# Patient Record
Sex: FEMALE | Race: WHITE | NOT HISPANIC OR LATINO | Employment: OTHER | URBAN - METROPOLITAN AREA
[De-identification: names, ages, dates, MRNs, and addresses within clinical notes are randomized per-mention and may not be internally consistent; named-entity substitution may affect disease eponyms.]

---

## 2017-02-03 NOTE — TELEPHONE ENCOUNTER
3 month supply refilled. Please advise pt to do fasting labs and make appt for follow-up and additional fills.

## 2017-02-18 ENCOUNTER — HOSPITAL ENCOUNTER (OUTPATIENT)
Dept: LAB | Facility: MEDICAL CENTER | Age: 62
End: 2017-02-18
Attending: NURSE PRACTITIONER
Payer: COMMERCIAL

## 2017-02-18 DIAGNOSIS — E11.9 DIET-CONTROLLED TYPE 2 DIABETES MELLITUS (HCC): ICD-10-CM

## 2017-02-18 DIAGNOSIS — E78.1 HYPERTRIGLYCERIDEMIA WITHOUT HYPERCHOLESTEROLEMIA: ICD-10-CM

## 2017-02-18 LAB
ALBUMIN SERPL BCP-MCNC: 4.3 G/DL (ref 3.2–4.9)
ALBUMIN/GLOB SERPL: 1.7 G/DL
ALP SERPL-CCNC: 55 U/L (ref 30–99)
ALT SERPL-CCNC: 21 U/L (ref 2–50)
ANION GAP SERPL CALC-SCNC: 9 MMOL/L (ref 0–11.9)
AST SERPL-CCNC: 22 U/L (ref 12–45)
BILIRUB SERPL-MCNC: 0.5 MG/DL (ref 0.1–1.5)
BUN SERPL-MCNC: 18 MG/DL (ref 8–22)
CALCIUM SERPL-MCNC: 9.5 MG/DL (ref 8.5–10.5)
CHLORIDE SERPL-SCNC: 109 MMOL/L (ref 96–112)
CHOLEST SERPL-MCNC: 156 MG/DL (ref 100–199)
CO2 SERPL-SCNC: 24 MMOL/L (ref 20–33)
CREAT SERPL-MCNC: 0.74 MG/DL (ref 0.5–1.4)
EST. AVERAGE GLUCOSE BLD GHB EST-MCNC: 137 MG/DL
GLOBULIN SER CALC-MCNC: 2.6 G/DL (ref 1.9–3.5)
GLUCOSE SERPL-MCNC: 149 MG/DL (ref 65–99)
HBA1C MFR BLD: 6.4 % (ref 0–5.6)
HDLC SERPL-MCNC: 63 MG/DL
LDLC SERPL CALC-MCNC: 66 MG/DL
POTASSIUM SERPL-SCNC: 4.3 MMOL/L (ref 3.6–5.5)
PROT SERPL-MCNC: 6.9 G/DL (ref 6–8.2)
SODIUM SERPL-SCNC: 142 MMOL/L (ref 135–145)
TRIGL SERPL-MCNC: 135 MG/DL (ref 0–149)

## 2017-02-18 PROCEDURE — 80053 COMPREHEN METABOLIC PANEL: CPT

## 2017-02-18 PROCEDURE — 83036 HEMOGLOBIN GLYCOSYLATED A1C: CPT

## 2017-02-18 PROCEDURE — 80061 LIPID PANEL: CPT

## 2017-02-18 PROCEDURE — 36415 COLL VENOUS BLD VENIPUNCTURE: CPT

## 2017-02-27 ENCOUNTER — TELEPHONE (OUTPATIENT)
Dept: MEDICAL GROUP | Facility: PHYSICIAN GROUP | Age: 62
End: 2017-02-27

## 2017-02-27 DIAGNOSIS — N18.1 DIABETES MELLITUS DUE TO UNDERLYING CONDITION, CONTROLLED, WITH STAGE 1 CHRONIC KIDNEY DISEASE, WITHOUT LONG-TERM CURRENT USE OF INSULIN (HCC): ICD-10-CM

## 2017-02-27 DIAGNOSIS — E08.22 DIABETES MELLITUS DUE TO UNDERLYING CONDITION, CONTROLLED, WITH STAGE 1 CHRONIC KIDNEY DISEASE, WITHOUT LONG-TERM CURRENT USE OF INSULIN (HCC): ICD-10-CM

## 2017-02-27 NOTE — TELEPHONE ENCOUNTER
Spoke with Dunia to notify. She is asking if you can place the orders so that she can go in and get labs done in 6 months. Please advise.

## 2017-02-27 NOTE — TELEPHONE ENCOUNTER
Very bizarre.  I never received the results.  They look great.  Cholesterol is well controlled and A1C is well controlled at 6.4. No changes to medications.  Plan on repeating in 6 months.  Thank you

## 2017-02-27 NOTE — TELEPHONE ENCOUNTER
1. Caller Name: Dunia Her                                           Call Back Number: [phone] (home)         Patient approves a detailed voicemail message: N\A    Pt is asking for results of labs done 02/18/2017.  Thank you

## 2017-03-17 RX ORDER — LOSARTAN POTASSIUM 50 MG/1
TABLET ORAL
Qty: 90 TAB | Refills: 1 | Status: SHIPPED | OUTPATIENT
Start: 2017-03-17 | End: 2017-05-02 | Stop reason: SDUPTHER

## 2017-03-17 NOTE — TELEPHONE ENCOUNTER
Refill X 6 months, sent to pharmacy.Pt. Seen in the last 6 months per protocol.   Lab Results   Component Value Date/Time    SODIUM 142 02/18/2017 07:34 AM    POTASSIUM 4.3 02/18/2017 07:34 AM    CHLORIDE 109 02/18/2017 07:34 AM    CO2 24 02/18/2017 07:34 AM    GLUCOSE 149* 02/18/2017 07:34 AM    BUN 18 02/18/2017 07:34 AM    CREATININE 0.74 02/18/2017 07:34 AM

## 2017-04-10 RX ORDER — TRAZODONE HYDROCHLORIDE 50 MG/1
TABLET ORAL
Qty: 90 TAB | Refills: 0 | Status: SHIPPED | OUTPATIENT
Start: 2017-04-10 | End: 2017-05-02 | Stop reason: SDUPTHER

## 2017-04-10 NOTE — TELEPHONE ENCOUNTER
Was the patient seen in the last year in this department? Yes     Does patient have an active prescription for medications requested? No     Received Request Via: Pharmacy      Pt met protocol?: Yes    LAST OV 07/19/2016

## 2017-04-11 NOTE — TELEPHONE ENCOUNTER
Last seen by PCP 7/16. Will send 3 months to pharmacy. Patient is due for an appointment. Please schedule.

## 2017-04-27 ENCOUNTER — HOSPITAL ENCOUNTER (EMERGENCY)
Facility: MEDICAL CENTER | Age: 62
End: 2017-04-27
Attending: EMERGENCY MEDICINE
Payer: COMMERCIAL

## 2017-04-27 ENCOUNTER — APPOINTMENT (OUTPATIENT)
Dept: RADIOLOGY | Facility: MEDICAL CENTER | Age: 62
End: 2017-04-27
Attending: EMERGENCY MEDICINE
Payer: COMMERCIAL

## 2017-04-27 VITALS
DIASTOLIC BLOOD PRESSURE: 87 MMHG | RESPIRATION RATE: 18 BRPM | HEART RATE: 74 BPM | BODY MASS INDEX: 32.24 KG/M2 | WEIGHT: 200.62 LBS | TEMPERATURE: 97.8 F | SYSTOLIC BLOOD PRESSURE: 155 MMHG | OXYGEN SATURATION: 96 % | HEIGHT: 66 IN

## 2017-04-27 DIAGNOSIS — R51.9 ACUTE NONINTRACTABLE HEADACHE, UNSPECIFIED HEADACHE TYPE: ICD-10-CM

## 2017-04-27 PROCEDURE — 700102 HCHG RX REV CODE 250 W/ 637 OVERRIDE(OP): Performed by: EMERGENCY MEDICINE

## 2017-04-27 PROCEDURE — 70450 CT HEAD/BRAIN W/O DYE: CPT

## 2017-04-27 PROCEDURE — A9270 NON-COVERED ITEM OR SERVICE: HCPCS | Performed by: EMERGENCY MEDICINE

## 2017-04-27 PROCEDURE — 99284 EMERGENCY DEPT VISIT MOD MDM: CPT

## 2017-04-27 RX ORDER — OXYCODONE HYDROCHLORIDE AND ACETAMINOPHEN 5; 325 MG/1; MG/1
2 TABLET ORAL ONCE
Status: DISCONTINUED | OUTPATIENT
Start: 2017-04-27 | End: 2017-04-27 | Stop reason: HOSPADM

## 2017-04-27 RX ORDER — IBUPROFEN 600 MG/1
600 TABLET ORAL ONCE
Status: COMPLETED | OUTPATIENT
Start: 2017-04-27 | End: 2017-04-27

## 2017-04-27 RX ORDER — OXYCODONE HYDROCHLORIDE AND ACETAMINOPHEN 5; 325 MG/1; MG/1
1-2 TABLET ORAL EVERY 4 HOURS PRN
Qty: 20 TAB | Refills: 0 | Status: SHIPPED | OUTPATIENT
Start: 2017-04-27 | End: 2017-05-02

## 2017-04-27 RX ADMIN — IBUPROFEN 600 MG: 600 TABLET, FILM COATED ORAL at 12:02

## 2017-04-27 ASSESSMENT — LIFESTYLE VARIABLES: DO YOU DRINK ALCOHOL: NO

## 2017-04-27 NOTE — ED PROVIDER NOTES
"ED Provider Note    Scribed for Arun Fu M.D. by Venancio Mcfadden. 4/27/2017  11:50 AM    Primary care provider: JUANI Mccrary  Means of arrival: Walk in   History obtained from: Patient  History limited by: None    CHIEF COMPLAINT  Chief Complaint   Patient presents with   • Head Ache       HPI  uDnia Her is a 61 y.o. female who presents to the Emergency Department complaining of a headache. The patient reports two nights ago \"the room was spinning\". She states her headache developed 2 days ago that has progressively worsened so she decided to come to the ED. Currently, she has a severe headache sensitive to light touch. She describes her headache like a stabbing pain located throughout. She has associated nausea, sensitivity to sound, and mild neck pain. She denies any vomiting,  recent numbness, tingling, weakness to arms or legs. She denies a history of migraines. She has medical hhistory    REVIEW OF SYSTEMS  Pertinent positives include headache, nausea, dizziness, sound sensitivity, neck pain.   Pertinent negatives include no vomiting, numbness, tingling, weakness.    All other systems reviewed and negative.      PAST MEDICAL HISTORY   has a past medical history of Recurrent sinusitis; Hypertension; Type II or unspecified type diabetes mellitus without mention of complication, not stated as uncontrolled; Pre-diabetes (10/2/2014); Obesity (BMI 30-39.9) (10/2/2014); and Hyperlipidemia LDL goal < 100 (10/2/2014).    SURGICAL HISTORY   has past surgical history that includes abdominal hysterectomy total (1993).    SOCIAL HISTORY  Social History   Substance Use Topics   • Smoking status: Current Every Day Smoker -- 1.00 packs/day for 48 years     Types: Cigarettes   • Smokeless tobacco: None   • Alcohol Use: No      History   Drug Use No       FAMILY HISTORY  Family History   Problem Relation Age of Onset   • Cancer Mother      pancreatic    • Heart Disease Father    • Heart Attack Father    • " "Diabetes Father      pre-diabetes   • Stroke Neg Hx        CURRENT MEDICATIONS  Home Medications     Reviewed by Dior Irby R.N. (Registered Nurse) on 04/27/17 at 1139  Med List Status: Not Addressed    Medication Last Dose Status    albuterol (PROVENTIL) 2.5mg/3ml Nebu Soln solution for nebulization prn Active    losartan (COZAAR) 50 MG Tab 4/27/2017 Active    metformin (GLUCOPHAGE) 500 MG Tab 4/26/2017 Active    simvastatin (ZOCOR) 40 MG Tab 4/26/2017 Active    trazodone (DESYREL) 50 MG Tab 4/26/2017 Active                ALLERGIES  Allergies   Allergen Reactions   • Shellfish Allergy      Eyes itchy   • Sulfa Drugs Hives       PHYSICAL EXAM  VITAL SIGNS: /88 mmHg  Pulse 80  Temp(Src) 36.6 °C (97.8 °F) (Temporal)  Resp 12  Ht 1.664 m (5' 5.5\")  Wt 91 kg (200 lb 9.9 oz)  BMI 32.87 kg/m2  SpO2 99%    Nursing note and vitals reviewed.  Constitutional: Well-developed and well-nourished. No distress.   HENT: Head is normocephalic and atraumatic. Oropharynx is clear and moist without exudate or erythema. Tenderness to very light palpation of the parietal occipital scalp. No skin lesions. No tenderness over the temporal artery.   Eyes: Pupils are equal, round, and reactive to light. Conjunctiva are normal. No nystagmus.   Cardiovascular: Normal rate and regular rhythm. No murmur heard. Normal radial pulses.  Pulmonary/Chest: Breath sounds normal. No wheezes or rales.   Abdominal: Soft and non-tender. No distention    Musculoskeletal: Extremities exhibit normal range of motion without edema. Mild tenderness of the upper cervical paraspinal musculature.   Neurological: Awake, alert and oriented to person, place, and time. No focal deficits noted. Normal speech and language. Normal strength and sensation.   Skin: Skin is warm and dry. No rash.   Psychiatric: Normal mood and affect. Appropriate for clinical situation    DIAGNOSTIC STUDIES / PROCEDURES    RADIOLOGY  CT-HEAD W/O   Final Result      No " acute intracranial abnormality is identified.        The radiologist's interpretation of all radiological studies have been reviewed by me.    COURSE & MEDICAL DECISION MAKING  Nursing notes, VS, PMSFHx reviewed in chart.     Review of past medical records shows no previous ER visits.      11:50 AM - Patient seen and examined at bedside. Patient will be treated with Motrin 600 mg PO. Ordered CT-Head w/o to evaluate her symptoms. The differential diagnoses include but are not limited to: Tension headache, occipital neuralgia, intracranial hemorrhage    12:57 PM - Patient will be treated with PERCOCET 5-325mg PO    1:00 PM - Patient re-evaluated at bedside. Patient reports feeling better. The patient also says she has been crocheting a blanket recently for extending periods of time while her head is bent down in a forward position. Discussed with patient this may be causing her headache. Patient's CT-Head results discussed which showed no acute intracranial abnormality identified. Discussed patient's condition and treatment plan of prescription for PERCOCET.  Patient advised to return to the ED if symptoms worsen and to follow up with her primary care provider. The patient understood and is in agreement.     I reviewed prescription monitoring program for patient's narcotic use before prescribing a scheduled drug.The patient will not drink alcohol nor drive with prescribed medications. The patient will return for new or worsening symptoms and is stable at the time of discharge.    The patient is referred to a primary physician for blood pressure management, diabetic screening, and for all other preventative health concerns.    DISPOSITION:  Patient will be discharged home in stable condition.    FOLLOW UP:  [facility], Emergency Dept  [address] [address] [phone]    If symptoms worsen    MARIAN Mccrary.  [address] [facility]  [address] [place] [address]  [id number]  [phone]    Schedule an appointment as soon as possible for a visit        OUTPATIENT MEDICATIONS:  New Prescriptions    OXYCODONE-ACETAMINOPHEN (PERCOCET) 5-325 MG TAB    Take 1-2 Tabs by mouth every four hours as needed.       FINAL IMPRESSION  1. Acute nonintractable headache, unspecified headache type          I, Venancio Mcfadden (Scribe), am scribing for, and in the presence of, Arun Fu M.D..    Electronically signed by: Venancio Mcfadden (Scribe), 4/27/2017    I, Arun Fu M.D. personally performed the services described in this documentation, as scribed by Venancio Mcfadden in my presence, and it is both accurate and complete.    The note accurately reflects work and decisions made by me.  Arun Fu  4/27/2017  3:42 PM

## 2017-04-27 NOTE — ED NOTES
Pt to triage c/o headache and pressure x2 days. (+) Nausea. (+) sound sensitivity. Vertigo last night.   Pt advised to return to triage nurse for any changes or concerns.

## 2017-04-27 NOTE — DISCHARGE INSTRUCTIONS
General Headache Without Cause  A headache is pain or discomfort felt around the head or neck area. The specific cause of a headache may not be found. There are many causes and types of headaches. A few common ones are:  · Tension headaches.  · Migraine headaches.  · Cluster headaches.  · Chronic daily headaches.  HOME CARE INSTRUCTIONS   · Keep all follow-up appointments with your health care provider or any specialist referral.  · Only take over-the-counter or prescription medicines for pain or discomfort as directed by your health care provider.  · Lie down in a dark, quiet room when you have a headache.  · Keep a headache journal to find out what may trigger your migraine headaches. For example, write down:  · What you eat and drink.  · How much sleep you get.  · Any change to your diet or medicines.  · Try massage or other relaxation techniques.  · Put ice packs or heat on the head and neck. Use these 3 to 4 times per day for 15 to 20 minutes each time, or as needed.  · Limit stress.  · Sit up straight, and do not tense your muscles.  · Quit smoking if you smoke.  · Limit alcohol use.  · Decrease the amount of caffeine you drink, or stop drinking caffeine.  · Eat and sleep on a regular schedule.  · Get 7 to 9 hours of sleep, or as recommended by your health care provider.  · Keep lights dim if bright lights bother you and make your headaches worse.  SEEK MEDICAL CARE IF:   · You have problems with the medicines you were prescribed.  · Your medicines are not working.  · You have a change from the usual headache.  · You have nausea or vomiting.  SEEK IMMEDIATE MEDICAL CARE IF:   · Your headache becomes severe.  · You have a fever.  · You have a stiff neck.  · You have loss of vision.  · You have muscular weakness or loss of muscle control.  · You start losing your balance or have trouble walking.  · You feel faint or pass out.  · You have severe symptoms that are different from your first symptoms.     This  information is not intended to replace advice given to you by your health care provider. Make sure you discuss any questions you have with your health care provider.     Document Released: 12/18/2006 Document Revised: 05/03/2016 Document Reviewed: 01/02/2013  [employer] Interactive Patient Education ©2016 [employer]      Occipital Neuralgia  Occipital neuralgia is a type of headache that causes episodes of very bad pain in the back of your head. Pain from occipital neuralgia may spread (radiate) to other parts of your head. The pain is usually brief and often goes away after you rest and relax.  These headaches may be caused by irritation of the nerves that leave your spinal cord high up in your neck, just below the base of your skull (occipital nerves). Your occipital nerves transmit sensations from the back of your head, the top of your head, and the areas behind your ears.  CAUSES  Occipital neuralgia can occur without any known cause (primary headache syndrome). In other cases, occipital neuralgia is caused by pressure on or irritation of one of the two occipital nerves. Causes of occipital nerve compression or irritation include:  · Wear and tear of the vertebrae in the neck (osteoarthritis).  · Neck injury.  · Disease of the disks that separate the vertebrae.  · Tumors.  · Gout.  · Infections.  · Diabetes.  · Swollen blood vessels that put pressure on the occipital nerves.  · Muscle spasm in the neck.  SIGNS AND SYMPTOMS  Pain is the main symptom of occipital neuralgia. It usually starts in the back of the head but may also be felt in other areas supplied by the occipital nerves. Pain is usually on one side but may be on both sides. You may have:   · Brief episodes of very bad pain that is burning, stabbing, shocking, or shooting.  · Pain behind the eye.  · Pain triggered by neck movement or hair brushing.  · Scalp tenderness.  · Aching in the back of the head between episodes of very bad pain.  DIAGNOSIS    Your health care provider may diagnose occipital neuralgia based on your symptoms and a physical exam. During the exam, the health care provider may push on areas supplied by the occipital nerves to see if they are painful. Some tests may also be done to help in making the diagnosis. These may include:  · Imaging studies of the upper spinal cord, such as an MRI or CT scan. These may show compression or spinal cord abnormalities.  · Nerve block. You will get an injection of numbing medicine (local anesthetic) near the occipital nerve to see if this relieves pain.  TREATMENT   Treatment may begin with simple measures, such as:   · Rest.  · Massage.  · Heat.  · Over-the-counter pain relievers.  If these measures do not work, you may need other treatments, including:  · Medicines such as:  ¨ Prescription-strength anti-inflammatory medicines.  ¨ Muscle relaxants.  ¨ Antiseizure medicines.  ¨ Antidepressants.  · Steroid injection. This involves injections of local anesthetic and strong anti-inflammatory drugs (steroids).  · Pulsed radiofrequency. Wires are implanted to deliver electrical impulses that block pain signals from the occipital nerve.  · Physical therapy.  · Surgery to relieve nerve pressure.  HOME CARE INSTRUCTIONS  · Take all medicines as directed by your health care provider.  · Avoid activities that cause pain.  · Rest when you have an attack of pain.  · Try gentle massage or a heating pad to relieve pain.  · Work with a physical therapist to learn stretching exercises you can do at home.  · Try a different pillow or sleeping position.  · Practice good posture.  · Try to stay active. Get regular exercise that does not cause pain. Ask your health care provider to suggest safe exercises for you.  · Keep all follow-up visits as directed by your health care provider. This is important.  SEEK MEDICAL CARE IF:  · Your medicine is not working.  · You have new or worsening symptoms.  SEEK IMMEDIATE MEDICAL CARE  IF:  · You have very bad head pain that is not going away.  · You have a sudden change in vision, balance, or speech.  MAKE SURE YOU:  · Understand these instructions.  · Will watch your condition.  · Will get help right away if you are not doing well or get worse.     This information is not intended to replace advice given to you by your health care provider. Make sure you discuss any questions you have with your health care provider.     Document Released: 12/12/2002 Document Revised: 01/08/2016 Document Reviewed: 12/10/2014  [employer] Interactive Patient Education ©2016 [employer]

## 2017-04-27 NOTE — ED AVS SNAPSHOT
4/27/2017    Dunia Her  [address] [place] [address]    Dear Dunia:    [facility] wants to ensure your discharge home is safe and you or your loved ones have had all of your questions answered regarding your care after you leave the hospital.    Below is a list of resources and contact information should you have any questions regarding your hospital stay, follow-up instructions, or active medical symptoms.    Questions or Concerns Regarding… Contact   Medical Questions Related to Your Discharge  (7 days a week, 8am-5pm) Contact a Nurse Care Coordinator   [phone]   Medical Questions Not Related to Your Discharge  (24 hours a day / 7 days a week)  Contact the Nurse Health Line   [phone]    Medications or Discharge Instructions Refer to your discharge packet   or contact your [facility] Primary Care Provider   [phone]   Follow-up Appointment(s) Schedule your appointment via [employer]   or contact Scheduling [phone]   Billing Review your statement via [employer]  or contact Billing [phone]   Medical Records Review your records via [employer]   or contact Medical Records [phone]     You may receive a telephone call within two days of discharge. This call is to make certain you understand your discharge instructions and have the opportunity to have any questions answered. You can also easily access your medical information, test results and upcoming appointments via the [employer] free online health management tool. You can learn more and sign up at [employer]. For assistance setting up your [facility] account, please call [phone].    Once again, we want to ensure your discharge home is safe and that you have a clear understanding of any next steps in your care. If you have any questions or concerns, please do not hesitate to contact us, we are here for you. Thank you for choosing [facility] for your healthcare needs.    Sincerely,    Your [facility] Healthcare Team

## 2017-04-27 NOTE — ED AVS SNAPSHOT
Shopistan Access Code: [email or web address]  Expires: 5/27/2017  1:15 PM    Shopistan  A secure, online tool to manage your health information     [employer]’s Shopistan® is a secure, online tool that connects you to your personalized health information from the privacy of your home -- day or night - making it very easy for you to manage your healthcare. Once the activation process is completed, you can even access your medical information using the Shopistan martha, which is available for free in the [employer] store or Google Play store.     Shopistan provides the following levels of access (as shown below):   My Chart Features   [facility] Primary Care Doctor [facility]  Specialists [facility]  Urgent  Care Non-[facility]  Primary Care  Doctor   Email your healthcare team securely and privately 24/7 X X X X   Manage appointments: schedule your next appointment; view details of past/upcoming appointments X      Request prescription refills. X      View recent personal medical records, including lab and immunizations X X X X   View health record, including health history, allergies, medications X X X X   Read reports about your outpatient visits, procedures, consult and ER notes X X X X   See your discharge summary, which is a recap of your hospital and/or ER visit that includes your diagnosis, lab results, and care plan. X X       How to register for Shopistan:  1. Go to  [email or web address].  2. Click on the Sign Up Now box, which takes you to the New Member Sign Up page. You will need to provide the following information:  a. Enter your Shopistan Access Code exactly as it appears at the top of this page. (You will not need to use this code after you’ve completed the sign-up process. If you do not sign up before the expiration date, you must request a new code.)   b. Enter your date of birth.   c. Enter your home email address.   d. Click Submit, and follow the next screen’s instructions.  3. Create a Shopistan ID. This will be your Shopistan  login ID and cannot be changed, so think of one that is secure and easy to remember.  4. Create a [employer] password. You can change your password at any time.  5. Enter your Password Reset Question and Answer. This can be used at a later time if you forget your password.   6. Enter your e-mail address. This allows you to receive e-mail notifications when new information is available in [employer].  7. Click Sign Up. You can now view your health information.    For assistance activating your [employer] account, call [phone]

## 2017-04-27 NOTE — ED NOTES
Discharge instructions given, pt verbalized understanding.  Prescription instructions given, pt verbalized understanding.  Understands NOT to drive or drink alcohol while taking narcotics.  A&ox4.  VSS.  Ambulates out of ER with friend

## 2017-04-27 NOTE — ED AVS SNAPSHOT
Home Care Instructions                                                                                                                Dunia Her   MRN: [id number]    Department:  [facility], Emergency Dept   Date of Visit:  4/27/2017            [facility], Emergency Dept    [address] [place] [id number]    Phone:  [phone]      You were seen by     Arun Fu M.D.      Your Diagnosis Was     Acute nonintractable headache, unspecified headache type     R51       These are the medications you received during your hospitalization from 04/27/2017 1114 to 04/27/2017 1315     Date/Time Order Dose Route Action    04/27/2017 1202 ibuprofen (MOTRIN) tablet 600 mg 600 mg Oral Given    04/27/2017 1314 oxycodone-acetaminophen (PERCOCET) 5-325 MG per tablet 2 Tab 2 Tab Oral Refused      Follow-up Information     1. Follow up with [facility], Emergency Dept.    Specialty:  Emergency Medicine    Why:  If symptoms worsen    Contact information    [address] [place] [id number] [phone]        2. Schedule an appointment as soon as possible for a visit with JUANI Mccrary.    Specialty:  Family Medicine    Contact information    [address]  [address]  [facility] [address] [phone]        Medication Information     Review all of your home medications and newly ordered medications with your primary doctor and/or pharmacist as soon as possible. Follow medication instructions as directed by your doctor and/or pharmacist.     Please keep your complete medication list with you and share with your physician. Update the information when medications are discontinued, doses are changed, or new medications (including over-the-counter products) are added; and carry medication information at all times in the event of emergency situations.               Medication List      START taking these medications        Instructions    Morning  Afternoon Evening Bedtime    oxycodone-acetaminophen 5-325 MG Tabs   Commonly known as:  PERCOCET        Take 1-2 Tabs by mouth every four hours as needed.   Dose:  1-2 Tab                          ASK your doctor about these medications        Instructions    Morning Afternoon Evening Bedtime    albuterol 2.5mg/3ml Nebu solution for nebulization   Commonly known as:  PROVENTIL        Doctor's comments:  Dispense #25 3ml unit dose vials   3 mL by Nebulization route every four hours as needed for Shortness of Breath.   Dose:  2.5 mg                        losartan 50 MG Tabs   Commonly known as:  COZAAR        TAKE ONE TABLET BY MOUTH DAILY                        metformin 500 MG Tabs   Commonly known as:  GLUCOPHAGE        Doctor's comments:  Pt to make appt and get labs prior to more refills.   TAKE ONE TABLET BY MOUTH TWICE A DAY WITH MEALS                        simvastatin 40 MG Tabs   Commonly known as:  ZOCOR        Doctor's comments:  Authorization of a refill request. Phoned in.   TAKE ONE TABLET BY MOUTH IN THE EVENING                        trazodone 50 MG Tabs   Commonly known as:  DESYREL        Doctor's comments:  Authorization of a refill request.   TAKE ONE TABLET BY MOUTH EVERY NIGHT AT BEDTIME AS NEEDED FOR SLEEP                             Where to Get Your Medications      You can get these medications from any pharmacy     Bring a paper prescription for each of these medications    - oxycodone-acetaminophen 5-325 MG Tabs            Procedures and tests performed during your visit     CT-HEAD W/O        Discharge Instructions       General Headache Without Cause  A headache is pain or discomfort felt around the head or neck area. The specific cause of a headache may not be found. There are many causes and types of headaches. A few common ones are:  · Tension headaches.  · Migraine headaches.  · Cluster headaches.  · Chronic daily headaches.  HOME CARE INSTRUCTIONS   · Keep all follow-up  appointments with your health care provider or any specialist referral.  · Only take over-the-counter or prescription medicines for pain or discomfort as directed by your health care provider.  · Lie down in a dark, quiet room when you have a headache.  · Keep a headache journal to find out what may trigger your migraine headaches. For example, write down:  · What you eat and drink.  · How much sleep you get.  · Any change to your diet or medicines.  · Try massage or other relaxation techniques.  · Put ice packs or heat on the head and neck. Use these 3 to 4 times per day for 15 to 20 minutes each time, or as needed.  · Limit stress.  · Sit up straight, and do not tense your muscles.  · Quit smoking if you smoke.  · Limit alcohol use.  · Decrease the amount of caffeine you drink, or stop drinking caffeine.  · Eat and sleep on a regular schedule.  · Get 7 to 9 hours of sleep, or as recommended by your health care provider.  · Keep lights dim if bright lights bother you and make your headaches worse.  SEEK MEDICAL CARE IF:   · You have problems with the medicines you were prescribed.  · Your medicines are not working.  · You have a change from the usual headache.  · You have nausea or vomiting.  SEEK IMMEDIATE MEDICAL CARE IF:   · Your headache becomes severe.  · You have a fever.  · You have a stiff neck.  · You have loss of vision.  · You have muscular weakness or loss of muscle control.  · You start losing your balance or have trouble walking.  · You feel faint or pass out.  · You have severe symptoms that are different from your first symptoms.     This information is not intended to replace advice given to you by your health care provider. Make sure you discuss any questions you have with your health care provider.     Document Released: 12/18/2006 Document Revised: 05/03/2016 Document Reviewed: 01/02/2013  Elsevier Interactive Patient Education ©2016 Elsevier Inc.      Occipital Neuralgia  Occipital neuralgia is  a type of headache that causes episodes of very bad pain in the back of your head. Pain from occipital neuralgia may spread (radiate) to other parts of your head. The pain is usually brief and often goes away after you rest and relax.  These headaches may be caused by irritation of the nerves that leave your spinal cord high up in your neck, just below the base of your skull (occipital nerves). Your occipital nerves transmit sensations from the back of your head, the top of your head, and the areas behind your ears.  CAUSES  Occipital neuralgia can occur without any known cause (primary headache syndrome). In other cases, occipital neuralgia is caused by pressure on or irritation of one of the two occipital nerves. Causes of occipital nerve compression or irritation include:  · Wear and tear of the vertebrae in the neck (osteoarthritis).  · Neck injury.  · Disease of the disks that separate the vertebrae.  · Tumors.  · Gout.  · Infections.  · Diabetes.  · Swollen blood vessels that put pressure on the occipital nerves.  · Muscle spasm in the neck.  SIGNS AND SYMPTOMS  Pain is the main symptom of occipital neuralgia. It usually starts in the back of the head but may also be felt in other areas supplied by the occipital nerves. Pain is usually on one side but may be on both sides. You may have:   · Brief episodes of very bad pain that is burning, stabbing, shocking, or shooting.  · Pain behind the eye.  · Pain triggered by neck movement or hair brushing.  · Scalp tenderness.  · Aching in the back of the head between episodes of very bad pain.  DIAGNOSIS   Your health care provider may diagnose occipital neuralgia based on your symptoms and a physical exam. During the exam, the health care provider may push on areas supplied by the occipital nerves to see if they are painful. Some tests may also be done to help in making the diagnosis. These may include:  · Imaging studies of the upper spinal cord, such as an MRI or CT  scan. These may show compression or spinal cord abnormalities.  · Nerve block. You will get an injection of numbing medicine (local anesthetic) near the occipital nerve to see if this relieves pain.  TREATMENT   Treatment may begin with simple measures, such as:   · Rest.  · Massage.  · Heat.  · Over-the-counter pain relievers.  If these measures do not work, you may need other treatments, including:  · Medicines such as:  ¨ Prescription-strength anti-inflammatory medicines.  ¨ Muscle relaxants.  ¨ Antiseizure medicines.  ¨ Antidepressants.  · Steroid injection. This involves injections of local anesthetic and strong anti-inflammatory drugs (steroids).  · Pulsed radiofrequency. Wires are implanted to deliver electrical impulses that block pain signals from the occipital nerve.  · Physical therapy.  · Surgery to relieve nerve pressure.  HOME CARE INSTRUCTIONS  · Take all medicines as directed by your health care provider.  · Avoid activities that cause pain.  · Rest when you have an attack of pain.  · Try gentle massage or a heating pad to relieve pain.  · Work with a physical therapist to learn stretching exercises you can do at home.  · Try a different pillow or sleeping position.  · Practice good posture.  · Try to stay active. Get regular exercise that does not cause pain. Ask your health care provider to suggest safe exercises for you.  · Keep all follow-up visits as directed by your health care provider. This is important.  SEEK MEDICAL CARE IF:  · Your medicine is not working.  · You have new or worsening symptoms.  SEEK IMMEDIATE MEDICAL CARE IF:  · You have very bad head pain that is not going away.  · You have a sudden change in vision, balance, or speech.  MAKE SURE YOU:  · Understand these instructions.  · Will watch your condition.  · Will get help right away if you are not doing well or get worse.     This information is not intended to replace advice given to you by your health care provider. Make sure  you discuss any questions you have with your health care provider.     Document Released: 12/12/2002 Document Revised: 01/08/2016 Document Reviewed: 12/10/2014  Elsevier Interactive Patient Education ©2016 Elsevier Inc.            Patient Information     Patient Information    Following emergency treatment: all patient requiring follow-up care must return either to a private physician or a clinic if your condition worsens before you are able to obtain further medical attention, please return to the emergency room.     Billing Information    At [facility], we work to make the billing process streamlined for our patients.  Our Representatives are here to answer any questions you may have regarding your hospital bill.  If you have insurance coverage and have supplied your insurance information to us, we will submit a claim to your insurer on your behalf.  Should you have any questions regarding your bill, we can be reached online or by phone as follows:  Online: You are able pay your bills online or live chat with our representatives about any billing questions you may have. We are here to help Monday - Friday from 8:00am to 7:30pm and 9:00am - 12:00pm on Saturdays.  Please visit [email or web address]  for more information.   Phone:  [phone] or [phone]    Please note that your emergency physician, surgeon, pathologist, radiologist, anesthesiologist, and other specialists are not employed by [facility] and will therefore bill separately for their services.  Please contact them directly for any questions concerning their bills at the numbers below:     Emergency Physician Services:  [phone]  [facility]:  [phone]  [facility]:  [phone]  [facility]:  [phone]    1. Your final bill may vary from the amount quoted upon discharge if all procedures are not complete at that time, or if your doctor has additional  procedures of which we are not aware. You will receive an additional bill if you return to the Emergency Department at [facility] for suture removal regardless of the facility of which the sutures were placed.     2. Please arrange for settlement of this account at the emergency registration.    3. All self-pay accounts are due in full at the time of treatment.  If you are unable to meet this obligation then payment is expected within 4-5 days.     4. If you have had radiology studies (CT, X-ray, Ultrasound, MRI), you have received a preliminary result during your emergency department visit. Please contact the radiology department [phone] to receive a copy of your final result. Please discuss the Final result with your primary physician or with the follow up physician provided.     Crisis Hotline:  [place] Crisis Hotline:  0-560-XIESUPX or 1-664.732.5510  [place] Crisis Hotline:    1-643.178.5504 or 881-669-5223         ED Discharge Follow Up Questions    1. In order to provide you with very good care, we would like to follow up with a phone call in the next few days.  May we have your permission to contact you?     YES /  NO    2. What is the best phone number to call you? (       )_____-__________    3. What is the best time to call you?      Morning  /  Afternoon  /  Evening                   Patient Signature:  ____________________________________________________________    Date:  ____________________________________________________________      Your appointments     Jun 29, 2017  9:00 AM   Established Patient with JUANI Mccrary   [facility])    [address] [place] [id number] [phone]           You will be receiving a confirmation call a few days before your appointment from our automated call confirmation system.

## 2017-05-02 ENCOUNTER — OFFICE VISIT (OUTPATIENT)
Dept: MEDICAL GROUP | Facility: PHYSICIAN GROUP | Age: 62
End: 2017-05-02
Payer: COMMERCIAL

## 2017-05-02 VITALS
BODY MASS INDEX: 31.82 KG/M2 | WEIGHT: 198 LBS | SYSTOLIC BLOOD PRESSURE: 120 MMHG | TEMPERATURE: 98.1 F | DIASTOLIC BLOOD PRESSURE: 84 MMHG | OXYGEN SATURATION: 97 % | HEIGHT: 66 IN | HEART RATE: 85 BPM | RESPIRATION RATE: 16 BRPM

## 2017-05-02 DIAGNOSIS — Z71.6 ENCOUNTER FOR SMOKING CESSATION COUNSELING: ICD-10-CM

## 2017-05-02 DIAGNOSIS — F17.200 CURRENT SMOKER: ICD-10-CM

## 2017-05-02 DIAGNOSIS — E66.9 OBESITY (BMI 30-39.9): ICD-10-CM

## 2017-05-02 DIAGNOSIS — R42 VERTIGO: ICD-10-CM

## 2017-05-02 DIAGNOSIS — F33.1 MODERATE EPISODE OF RECURRENT MAJOR DEPRESSIVE DISORDER (HCC): Primary | ICD-10-CM

## 2017-05-02 DIAGNOSIS — E78.5 HYPERLIPIDEMIA WITH TARGET LDL LESS THAN 100: ICD-10-CM

## 2017-05-02 PROCEDURE — 99214 OFFICE O/P EST MOD 30 MIN: CPT | Performed by: NURSE PRACTITIONER

## 2017-05-02 RX ORDER — LOSARTAN POTASSIUM 50 MG/1
50 TABLET ORAL DAILY
Qty: 90 TAB | Refills: 1 | Status: SHIPPED | OUTPATIENT
Start: 2017-05-02 | End: 2018-03-05

## 2017-05-02 RX ORDER — TRAZODONE HYDROCHLORIDE 50 MG/1
50 TABLET ORAL EVERY EVENING
Qty: 90 TAB | Refills: 0 | Status: SHIPPED | OUTPATIENT
Start: 2017-05-02 | End: 2018-03-03 | Stop reason: SDUPTHER

## 2017-05-02 RX ORDER — BUPROPION HYDROCHLORIDE 150 MG/1
150 TABLET, EXTENDED RELEASE ORAL 2 TIMES DAILY
Qty: 60 TAB | Refills: 3 | Status: SHIPPED | OUTPATIENT
Start: 2017-05-02 | End: 2017-08-26 | Stop reason: SDUPTHER

## 2017-05-02 RX ORDER — SIMVASTATIN 40 MG
40 TABLET ORAL EVERY EVENING
Qty: 90 TAB | Refills: 1 | Status: SHIPPED | OUTPATIENT
Start: 2017-05-02 | End: 2017-10-24 | Stop reason: SDUPTHER

## 2017-05-02 ASSESSMENT — PATIENT HEALTH QUESTIONNAIRE - PHQ9
5. POOR APPETITE OR OVEREATING: 3 - NEARLY EVERY DAY
CLINICAL INTERPRETATION OF PHQ2 SCORE: 6
SUM OF ALL RESPONSES TO PHQ QUESTIONS 1-9: 22

## 2017-05-02 NOTE — MR AVS SNAPSHOT
"Dunia Her   2017 9:00 AM   Office Visit   MRN: [id number]    Department:  [facility]   Dept Phone:  [phone]    Description:  Female : 1955   Provider:  JUANI Mccrary           Reason for Visit     Hospital Follow-up     Nail Problem     Depression score 22      Allergies as of 2017     Allergen Noted Reactions    Shellfish Allergy 10/02/2014       Eyes itchy    Sulfa Drugs 2009   Hives      You were diagnosed with     Moderate episode of recurrent major depressive disorder ([facility])   [8782157]  -  Primary     Vertigo   [076964]       Hyperlipidemia with target LDL less than 100   [400898]       Current smoker   [047713]       Obesity (BMI 30-39.9)   [762374]       Encounter for smoking cessation counseling   [488110]         Vital Signs     Blood Pressure Pulse Temperature Respirations Height Weight    120/84 mmHg 85 36.7 °C (98.1 °F) 16 1.664 m (5' 5.5\") 89.812 kg (198 lb)    Body Mass Index Oxygen Saturation Smoking Status             32.44 kg/m2 97% Current Every Day Smoker         Basic Information     Date Of Birth Sex Race Ethnicity Preferred Language    1955 Female White Non- English      Your appointments     2017  8:40 AM   Established Patient with JUANI Mccrary   [address] [address] [phone]           You will be receiving a confirmation call a few days before your appointment from our automated call confirmation system.              Problem List              ICD-10-CM Priority Class Noted - Resolved    Pre-diabetes R73.03   10/2/2014 - Present    Obesity (BMI 30-39.9) E66.9   10/2/2014 - Present    Hyperlipidemia with target LDL less than 100 E78.5   10/2/2014 - Present    Essential hypertension I10   3/28/2016 - Present    Chronic insomnia F51.04   2016 - Present    Current smoker F17.200   2016 - Present      Health Maintenance   "       Date Due Completion Dates    RETINAL SCREENING 6/13/1972 ---    IMM DTaP/Tdap/Td Vaccine (1 - Tdap) 6/13/1973 ---    URINE ACR / MICROALBUMIN 3/4/2017 3/4/2016, 10/3/2014, 4/18/2014    DIABETES MONOFILAMENT / LE EXAM 4/18/2017 4/18/2016    MAMMOGRAM 5/16/2017 5/16/2016, 10/21/2014, 10/2/1994 (Prv Comp)    Override on 10/2/1994: Previously completed    A1C SCREENING 8/18/2017 2/18/2017, 7/19/2016, 3/4/2016, 8/5/2015, 2/24/2015, 10/3/2014, 7/16/2014, 4/18/2014    FASTING LIPID PROFILE 2/18/2018 2/18/2017, 7/19/2016, 3/4/2016, 8/5/2015, 2/24/2015, 10/3/2014, 7/16/2014, 4/18/2014    SERUM CREATININE 2/18/2018 2/18/2017, 7/19/2016, 3/4/2016, 8/5/2015, 2/24/2015, 10/3/2014, 4/18/2014    COLONOSCOPY 5/2/2018 5/2/2013 (Prv Comp)    Override on 5/2/2013: Previously completed            Current Immunizations     13-VALENT PCV PREVNAR 4/18/2016    Influenza Vaccine Quad Inj (Pf) 10/2/2014    Influenza Vaccine Quad Inj (Preserved)  Incomplete    Pneumococcal polysaccharide vaccine (PPSV-23) 2/24/2015    SHINGLES VACCINE 2/24/2015      Below and/or attached are the medications your provider expects you to take. Review all of your home medications and newly ordered medications with your provider and/or pharmacist. Follow medication instructions as directed by your provider and/or pharmacist. Please keep your medication list with you and share with your provider. Update the information when medications are discontinued, doses are changed, or new medications (including over-the-counter products) are added; and carry medication information at all times in the event of emergency situations     Allergies:  SHELLFISH ALLERGY - (reactions not documented)     SULFA DRUGS - Hives               Medications  Valid as of: May 02, 2017 -  9:22 AM    Generic Name Brand Name Tablet Size Instructions for use    BuPROPion HCl (TABLET SR 12 HR) WELLBUTRIN- MG Take 1 Tab by mouth 2 times a day.        Losartan Potassium (Tab) COZAAR 50  MG Take 1 Tab by mouth every day.        MetFORMIN HCl (Tab) GLUCOPHAGE 500 MG Take 1 Tab by mouth 2 times a day, with meals.        Simvastatin (Tab) ZOCOR 40 MG Take 1 Tab by mouth every evening.        TraZODone HCl (Tab) DESYREL 50 MG Take 1 Tab by mouth every evening. AS NEEDED FOR SLEEP        .                 Medicines prescribed today were sent to:     [facility][id number] - [place], [place] - [address] [place] [address] AT [address] [address]    Phone: [phone] Fax: [phone]    Open 24 Hours?: No      Medication refill instructions:       If your prescription bottle indicates you have medication refills left, it is not necessary to call your provider’s office. Please contact your pharmacy and they will refill your medication.    If your prescription bottle indicates you do not have any refills left, you may request refills at any time through one of the following ways: The online [employer] system (except Urgent Care), by calling your provider’s office, or by asking your pharmacy to contact your provider’s office with a refill request. Medication refills are processed only during regular business hours and may not be available until the next business day. Your provider may request additional information or to have a follow-up visit with you prior to refilling your medication.   *Please Note: Medication refills are assigned a new Rx number when refilled electronically. Your pharmacy may indicate that no refills were authorized even though a new prescription for the same medication is available at the pharmacy. Please request the medicine by name with the pharmacy before contacting your provider for a refill.           [employer] Access Code: [email or web address]  Expires: 5/27/2017  1:15 PM    [employer]  A secure, online tool to manage your health information     [employer]’s [employer]® is a secure, online tool that connects you to your personalized health information from the privacy of your home --  day or night - making it very easy for you to manage your healthcare. Once the activation process is completed, you can even access your medical information using the [employer] martha, which is available for free in the [employer] Martha store or Google Play store.     [employer] provides the following levels of access (as shown below):   My Chart Features   [facility] Doctor [facility]  Specialists [facility]  Urgent  Care Non-[facility]  Primary Care  Doctor   Email your healthcare team securely and privately 24/7 X X X    Manage appointments: schedule your next appointment; view details of past/upcoming appointments X      Request prescription refills. X      View recent personal medical records, including lab and immunizations X X X X   View health record, including health history, allergies, medications X X X X   Read reports about your outpatient visits, procedures, consult and ER notes X X X X   See your discharge summary, which is a recap of your hospital and/or ER visit that includes your diagnosis, lab results, and care plan. X X       How to register for [employer]:  1. Go to  [email or web address].  2. Click on the Sign Up Now box, which takes you to the New Member Sign Up page. You will need to provide the following information:  a. Enter your [employer] Access Code exactly as it appears at the top of this page. (You will not need to use this code after you’ve completed the sign-up process. If you do not sign up before the expiration date, you must request a new code.)   b. Enter your date of birth.   c. Enter your home email address.   d. Click Submit, and follow the next screen’s instructions.  3. Create a [employer] ID. This will be your [employer] login ID and cannot be changed, so think of one that is secure and easy to remember.  4. Create a [employer] password. You can change your password at any time.  5. Enter your Password Reset Question and Answer. This can be used at a later time if you forget your password.   6. Enter  your e-mail address. This allows you to receive e-mail notifications when new information is available in .com.  7. Click Sign Up. You can now view your health information.    For assistance activating your .com account, call [phone]        Quit Tobacco Information     Do you want to quit using tobacco?    Quitting tobacco decreases risks of cancer, heart and lung disease, increases life expectancy, improves sense of taste and smell, and increases spending money, among other benefits.    If you are thinking about quitting, we can help.  • [facility] Quit Tobacco Program: 635.400.2771  o Program occurs weekly for four weeks and includes pharmacist consultation on products to support quitting smoking or chewing tobacco. A provider referral is needed for pharmacist consultation.  • Tobacco Users Help Hotline: 9-800QUIT-NOW (776-1360) or https://[place].quitlogix.org/  o Free, confidential telephone and online coaching for [place] residents. Sessions are designed on a schedule that is convenient for you. Eligible clients receive free nicotine replacement therapy.  • Nationally: www.smokefree.gov  o Information and professional assistance to support both immediate and long-term needs as you become, and remain, a non-smoker. Smokefree.gov allows you to choose the help that best fits your needs.

## 2017-05-02 NOTE — PROGRESS NOTES
Chief Complaint   Patient presents with   • Hospital Follow-up   • Nail Problem   • Depression     score 22       HISTORY OF PRESENT ILLNESS: Patient is a 61 y.o. female established patient who presents today to follow-up on recent ER visit as well as discuss the followin. Moderate episode of recurrent major depressive disorder (CMS-HCC)  Patient scored a 22 on the depression screen.  She states that she has felt sad for quite some time now.  She states that it is not related to her current ER visit.  She reports lack of motivation and energy, states that she cries a lot and also wants to do is sleep.  She believes that a majority of her fresh reaching comes from the lack of interest her daughter-in-law has for her granddaughter.  She states that both her son and granddaughter currently living with her and the patient is refusing the 3-year-old granddaughter.  She states that her mother is not involved in this creates a lot of frustration, guilt and sadness for her granddaughter.  She has been on mood medications in the past including Paxil and Wellbutrin.  She states that it is been a number of years since she has tried any medications and is open to suggestions.  Patient is not doing anything regularly for exercise.  She denies any suicidal or homicidal thoughts.    2. Vertigo  Patient was recently seen in the emergency room with complaints of headache and spinning room.  She was tentatively diagnosed with vertigo and sent home after normal CT scan of the head.  Patient mentioned that she had been crocheting for quite some time prior to the onset and believes that may be the position of her head prompted the symptoms.  She continues to report some subtle tenderness on the top of her head, but symptoms have improved.    3. Hyperlipidemia with target LDL less than 100  Patient's most recent labs reviewed.  Cholesterol remains very well controlled with her current dose of simvastatin.  She denies any side  effects with the medication.    Lab Results   Component Value Date/Time    CHOLESTEROL, 02/18/2017 07:34 AM    LDL 66 02/18/2017 07:34 AM    HDL 63 02/18/2017 07:34 AM    TRIGLYCERIDES 135 02/18/2017 07:34 AM       Lab Results   Component Value Date/Time    SODIUM 142 02/18/2017 07:34 AM    POTASSIUM 4.3 02/18/2017 07:34 AM    CHLORIDE 109 02/18/2017 07:34 AM    CO2 24 02/18/2017 07:34 AM    GLUCOSE 149* 02/18/2017 07:34 AM    BUN 18 02/18/2017 07:34 AM    CREATININE 0.74 02/18/2017 07:34 AM     Lab Results   Component Value Date/Time    ALKALINE PHOSPHATASE 55 02/18/2017 07:34 AM    AST(SGOT) 22 02/18/2017 07:34 AM    ALT(SGPT) 21 02/18/2017 07:34 AM    TOTAL BILIRUBIN 0.5 02/18/2017 07:34 AM      4. Current smoker  Patient is currently smoking approximately one pack of cigarettes per day.  She has tried smoking cessation options in the past without success.  She is fearful that continued smoking is going to be very detrimental to her health as her siblings recently had endarterectomies and been having issues with the circulation in her legs.  She would like to explore some smoking cessation options.    5. Obesity (BMI 30-39.9)    6. Encounter for smoking cessation counseling    Allergies:Shellfish allergy and Sulfa drugs    Current Outpatient Prescriptions Ordered in [facility]   Medication Sig Dispense Refill   • buPROPion SR (WELLBUTRIN-SR) 150 MG TABLET SR 12 HR sustained-release tablet Take 1 Tab by mouth 2 times a day. 60 Tab 3   • trazodone (DESYREL) 50 MG Tab Take 1 Tab by mouth every evening. AS NEEDED FOR SLEEP 90 Tab 0   • simvastatin (ZOCOR) 40 MG Tab Take 1 Tab by mouth every evening. 90 Tab 1   • metformin (GLUCOPHAGE) 500 MG Tab Take 1 Tab by mouth 2 times a day, with meals. 180 Tab 1   • losartan (COZAAR) 50 MG Tab Take 1 Tab by mouth every day. 90 Tab 1     No current Epic-ordered facility-administered medications on file.       Past Medical History   Diagnosis Date   • Recurrent sinusitis    •  "Hypertension    • Type II or unspecified type diabetes mellitus without mention of complication, not stated as uncontrolled      pre-diabetes   • Pre-diabetes 10/2/2014   • Obesity (BMI 30-39.9) 10/2/2014   • Hyperlipidemia LDL goal < 100 10/2/2014       Social History   Substance Use Topics   • Smoking status: Current Every Day Smoker -- 1.00 packs/day for 48 years     Types: Cigarettes   • Smokeless tobacco: Never Used   • Alcohol Use: No       Family Status   Relation Status Death Age   • Mother     • Father       Family History   Problem Relation Age of Onset   • Cancer Mother      pancreatic    • Heart Disease Father    • Heart Attack Father    • Diabetes Father      pre-diabetes   • Stroke Neg Hx        ROS: see above    Review of Systems   Constitutional: Negative for fever, chills, weight loss and malaise/fatigue.    HENT: Negative for ear pain, nosebleeds, congestion, sore throat and neck pain.    Eyes: Negative for blurred vision.   Respiratory: Negative for cough, sputum production, shortness of breath and wheezing.    Cardiovascular: Negative for chest pain, palpitations, orthopnea and leg swelling.   Gastrointestinal: Negative for heartburn, nausea, vomiting and abdominal pain.   Genitourinary: Negative for dysuria, urgency and frequency.   Musculoskeletal: Negative for myalgias, back pain and joint pain.   Skin: Negative for rash and itching.   Neurological: Negative for dizziness, tingling, tremors, sensory change, focal weakness and headaches.   Endo/Heme/Allergies: Does not bruise/bleed easily.   Psychiatric/Behavioral: Negative for depression, suicidal ideas and memory loss.  The patient is not nervous/anxious and does not have insomnia.        Exam:  Blood pressure 120/84, pulse 85, temperature 36.7 °C (98.1 °F), resp. rate 16, height 1.664 m (5' 5.5\"), weight 89.812 kg (198 lb), SpO2 97 %.  General:  Well nourished, well developed female in NAD  Head is grossly normal.  Neck: " Thyroid is not enlarged.  Pulmonary: Normal effort. Rhonci throughout the lower lobes with a congested cough.  Cardiovascular: Regular rate and rhythm without murmur.   Extremities: no clubbing, cyanosis, or edema.  Psych:  Mood and affect are normal.  Answering questions appropriately with good eye contact. Tearful during visit.      Please note that this dictation was created using voice recognition software. I have made every reasonable attempt to correct obvious errors, but I expect that there are errors of grammar and possibly content that I did not discover before finalizing the note.    Assessment/Plan:    1. Moderate episode of recurrent major depressive disorder (CMS-HCC)  buPROPion SR (WELLBUTRIN-SR) 150 MG TABLET SR 12 HR sustained-release tablet    Patient has been identified as being depressed and appropriate orders and counseling have been given   2. Vertigo     3. Hyperlipidemia with target LDL less than 100  simvastatin (ZOCOR) 40 MG Tab   4. Current smoker  buPROPion SR (WELLBUTRIN-SR) 150 MG TABLET SR 12 HR sustained-release tablet   5. Obesity (BMI 30-39.9)     6. Encounter for smoking cessation counseling  buPROPion SR (WELLBUTRIN-SR) 150 MG TABLET SR 12 HR sustained-release tablet        1.  Trial of Wellbutrin, start 150 mg once a day, increase to twice a day after the first 5 days.  Discussed the purpose of the medication, in hopes of getting 2 benefits with mood and smoking cessation.  2.  No changes to remainder of medications, refilled at current dosages, stable.  3.  Return to clinic in 3-4 weeks for recheck of mood and effectiveness of medication.

## 2017-06-01 ENCOUNTER — HOSPITAL ENCOUNTER (OUTPATIENT)
Dept: RADIOLOGY | Facility: MEDICAL CENTER | Age: 62
End: 2017-06-01
Attending: NURSE PRACTITIONER
Payer: COMMERCIAL

## 2017-06-01 ENCOUNTER — OFFICE VISIT (OUTPATIENT)
Dept: MEDICAL GROUP | Facility: PHYSICIAN GROUP | Age: 62
End: 2017-06-01
Payer: COMMERCIAL

## 2017-06-01 ENCOUNTER — TELEPHONE (OUTPATIENT)
Dept: MEDICAL GROUP | Facility: PHYSICIAN GROUP | Age: 62
End: 2017-06-01

## 2017-06-01 VITALS
RESPIRATION RATE: 16 BRPM | TEMPERATURE: 98.2 F | DIASTOLIC BLOOD PRESSURE: 80 MMHG | WEIGHT: 198 LBS | HEART RATE: 85 BPM | BODY MASS INDEX: 31.82 KG/M2 | SYSTOLIC BLOOD PRESSURE: 142 MMHG | OXYGEN SATURATION: 98 % | HEIGHT: 66 IN

## 2017-06-01 DIAGNOSIS — F33.41 RECURRENT MAJOR DEPRESSIVE DISORDER, IN PARTIAL REMISSION (HCC): Primary | ICD-10-CM

## 2017-06-01 DIAGNOSIS — R20.2 PARESTHESIA OF BOTH LOWER EXTREMITIES: ICD-10-CM

## 2017-06-01 DIAGNOSIS — Z12.39 SCREENING BREAST EXAMINATION: ICD-10-CM

## 2017-06-01 DIAGNOSIS — M85.80 OSTEOPENIA, UNSPECIFIED LOCATION: ICD-10-CM

## 2017-06-01 DIAGNOSIS — R73.03 PRE-DIABETES: ICD-10-CM

## 2017-06-01 DIAGNOSIS — F17.200 CURRENT SMOKER: ICD-10-CM

## 2017-06-01 PROCEDURE — 99213 OFFICE O/P EST LOW 20 MIN: CPT | Performed by: NURSE PRACTITIONER

## 2017-06-01 PROCEDURE — 72100 X-RAY EXAM L-S SPINE 2/3 VWS: CPT

## 2017-06-01 ASSESSMENT — PATIENT HEALTH QUESTIONNAIRE - PHQ9
2. FEELING DOWN, DEPRESSED, IRRITABLE, OR HOPELESS: 0
1. LITTLE INTEREST OR PLEASURE IN DOING THINGS: 3
SUM OF ALL RESPONSES TO PHQ9 QUESTIONS 1 AND 2: 3

## 2017-06-01 NOTE — TELEPHONE ENCOUNTER
----- Message from JUANI Mccrary sent at 6/1/2017  4:21 PM PDT -----  Mild arthritis throughout the spine.  No obvious abnormalities or cause to her symptoms.  We can consider physical therapy if symptoms continue.  Thank you

## 2017-06-01 NOTE — PROGRESS NOTES
Chief Complaint   Patient presents with   • Follow-Up     Depression        HISTORY OF PRESENT ILLNESS: Patient is a 61 y.o. female established patient who presents today to discuss the followin. Recurrent major depressive disorder, in partial remission (CMS-HCC)  Patient reports significant improvement of her mood since starting the Wellbutrin.  She is currently taking 150mg twice a day.  She does report some slight jitteriness but is unsure if this is due to the medication or her reduction in the nicotine.  She states that she is pleased with her mood at this time and denies any desire to adjust or change the medication at this time.  She reports much less tearful episodes and finds herself being more involved and smiling.    2. Paresthesia of both lower extremities  Patient has been experiencing intermittent numbness in the tops of her legs.  She also reports a catching sensation when she stands up from a seated position.  She denies any significant back pain.  Denies any specific joint pain.  Denies any falls or loss of bowel or bladder control.    3. Pre-diabetes    4. Osteopenia, unspecified location  Patient reports undergoing an informal bone density test during a health fair several years ago.  She recalls being told that she was osteopenic.  She has not had a formal bone density test.  Denies any personal history of pathologic fractures.    5. Current smoker  Patient has reduced her cigarette use from 20-30 a day to about 10 since starting the Wellbutrin.    6. Screening breast examination    Allergies:Shellfish allergy and Sulfa drugs    Current Outpatient Prescriptions Ordered in [facility]   Medication Sig Dispense Refill   • buPROPion SR (WELLBUTRIN-SR) 150 MG TABLET SR 12 HR sustained-release tablet Take 1 Tab by mouth 2 times a day. 60 Tab 3   • trazodone (DESYREL) 50 MG Tab Take 1 Tab by mouth every evening. AS NEEDED FOR SLEEP 90 Tab 0   • simvastatin (ZOCOR) 40 MG Tab Take 1 Tab by mouth every  evening. 90 Tab 1   • metformin (GLUCOPHAGE) 500 MG Tab Take 1 Tab by mouth 2 times a day, with meals. 180 Tab 1   • losartan (COZAAR) 50 MG Tab Take 1 Tab by mouth every day. 90 Tab 1     No current Epic-ordered facility-administered medications on file.       Past Medical History   Diagnosis Date   • Recurrent sinusitis    • Hypertension    • Type II or unspecified type diabetes mellitus without mention of complication, not stated as uncontrolled      pre-diabetes   • Pre-diabetes 10/2/2014   • Obesity (BMI 30-39.9) 10/2/2014   • Hyperlipidemia LDL goal < 100 10/2/2014       Social History   Substance Use Topics   • Smoking status: Current Every Day Smoker -- 1.00 packs/day for 48 years     Types: Cigarettes   • Smokeless tobacco: Never Used   • Alcohol Use: No       Family Status   Relation Status Death Age   • Mother     • Father       Family History   Problem Relation Age of Onset   • Cancer Mother      pancreatic    • Heart Disease Father    • Heart Attack Father    • Diabetes Father      pre-diabetes   • Stroke Neg Hx        ROS: see above    Review of Systems   Constitutional: Negative for fever, chills, weight loss and malaise/fatigue.   HENT: Negative for ear pain, nosebleeds, congestion, sore throat and neck pain.    Eyes: Negative for blurred vision.   Respiratory: Negative for cough, sputum production, shortness of breath and wheezing.    Cardiovascular: Negative for chest pain, palpitations, orthopnea and leg swelling.   Gastrointestinal: Negative for heartburn, nausea, vomiting and abdominal pain.   Genitourinary: Negative for dysuria, urgency and frequency.   Musculoskeletal: Negative for myalgias, back pain and joint pain.   Skin: Negative for rash and itching.   Neurological: Negative for dizziness, tingling, tremors, sensory change, focal weakness and headaches.   Endo/Heme/Allergies: Does not bruise/bleed easily.   Psychiatric/Behavioral: Negative for depression, suicidal ideas  "and memory loss.  The patient is not nervous/anxious and does not have insomnia.        Exam:  Blood pressure 142/80, pulse 85, temperature 36.8 °C (98.2 °F), resp. rate 16, height 1.664 m (5' 5.5\"), weight 89.812 kg (198 lb), SpO2 98 %.  General:  Well nourished, well developed female in NAD  Head is grossly normal.  Neck: Thyroid is not enlarged.  Pulmonary: Normal effort.   Cardiovascular: Regular rate.   Extremities: no clubbing, cyanosis, or edema.  Diabetic Foot Exam: No ulcers or skin lesions present, patient tested with a 10 g force and is sensitive bilaterally throughout the ball of the foot, great toe and heel.  Psych:  Mood and affect are normal.  Answering questions appropriately with good eye contact.      Please note that this dictation was created using voice recognition software. I have made every reasonable attempt to correct obvious errors, but I expect that there are errors of grammar and possibly content that I did not discover before finalizing the note.    Assessment/Plan:    1. Recurrent major depressive disorder, in partial remission (CMS-HCC)     2. Paresthesia of both lower extremities  DX-LUMBAR SPINE-2 OR 3 VIEWS   3. Pre-diabetes  Diabetic Monofilament LE Exam   4. Osteopenia, unspecified location  DS-BONE DENSITY STUDY (DEXA)   5. Current smoker     6. Screening breast examination  MA-SCREEN MAMMO W/CAD-BILAT        1.  Lumbar spine xray  2.  Due for mammo and bone density  3.  No changes to medications, change to 300mg XL with next refill.  4.  RTC in 3 months, sooner prn.    "

## 2017-06-01 NOTE — Clinical Note
[facility]  DELFINA MccraryPALICIA  [address]  [place] [address]  Fax: [phone]   Authorization for Release/Disclosure of   Protected Health Information   Name: DUNIA HATCH : 1955 SSN: [id number]   Address: [facility] [place] [address] Phone:    [phone] (home)    I authorize the entity listed below to release/disclose the PHI below to:   Critical access hospital/DELFINA MccraryPALICIA and JUANI Mccrary   Provider or Entity Name:  [facility]    Address   City, State, [facility]   Phone:      Fax:     Reason for request: continuity of care   Information to be released:    [  ] LAST COLONOSCOPY,  including any PATH REPORT and follow-up  [  ] LAST FIT/COLOGUARD RESULT [  ] LAST DEXA  [  ] LAST MAMMOGRAM  [  ] LAST PAP  [  ] LAST LABS [X] RETINA EXAM REPORT  [  ] IMMUNIZATION RECORDS  [  ] Release all info      [  ] Check here and initial the line next to each item to release ALL health information INCLUDING  _____ Care and treatment for drug and / or alcohol abuse  _____ HIV testing, infection status, or AIDS  _____ Genetic Testing    DATES OF SERVICE OR TIME PERIOD TO BE DISCLOSED: _____________  I understand and acknowledge that:  * This Authorization may be revoked at any time by you in writing, except if your health information has already been used or disclosed.  * Your health information that will be used or disclosed as a result of you signing this authorization could be re-disclosed by the recipient. If this occurs, your re-disclosed health information may no longer be protected by State or Federal laws.  * You may refuse to sign this Authorization. Your refusal will not affect your ability to obtain treatment.  * This Authorization becomes effective upon signing and will  on (date) __________.      If no date is indicated, this Authorization will  one (1) year from the signature date.    Name: Dunia Hatch       Date:     2017       PLEASE FAX  REQUESTED RECORDS BACK TO: [phone]

## 2017-06-01 NOTE — MR AVS SNAPSHOT
"Dunia Her   2017 8:40 AM   Office Visit   MRN: [id number]    Department:  [facility]   Dept Phone:  [phone]    Description:  Female : 1955   Provider:  JUANI Mccrary           Reason for Visit     Follow-Up Depression       Allergies as of 2017     Allergen Noted Reactions    Shellfish Allergy 10/02/2014       Eyes itchy    Sulfa Drugs 2009   Hives      You were diagnosed with     Recurrent major depressive disorder, in partial remission (CMS-[facility])   [7736611]  -  Primary     Paresthesia of both lower extremities   [1858443]       Pre-diabetes   [067593]       Osteopenia, unspecified location   [2123890]       Current smoker   [966664]       Screening breast examination   [880139]         Vital Signs     Blood Pressure Pulse Temperature Respirations Height Weight    142/80 mmHg 85 36.8 °C (98.2 °F) 16 1.664 m (5' 5.5\") 89.812 kg (198 lb)    Body Mass Index Oxygen Saturation Smoking Status             32.44 kg/m2 98% Current Every Day Smoker         Basic Information     Date Of Birth Sex Race Ethnicity Preferred Language    1955 Female White Non- English      Problem List              ICD-10-CM Priority Class Noted - Resolved    Pre-diabetes R73.03   10/2/2014 - Present    Obesity (BMI 30-39.9) E66.9   10/2/2014 - Present    Hyperlipidemia with target LDL less than 100 E78.5   10/2/2014 - Present    Essential hypertension I10   3/28/2016 - Present    Chronic insomnia F51.04   2016 - Present    Current smoker F17.200   2016 - Present      Health Maintenance        Date Due Completion Dates    RETINAL SCREENING 1972 ---    BONE DENSITY 1972 ---    IMM DTaP/Tdap/Td Vaccine (1 - Tdap) 1973 ---    URINE ACR / MICROALBUMIN 3/4/2017 3/4/2016, 10/3/2014, 2014    DIABETES MONOFILAMENT / LE EXAM 2017    MAMMOGRAM 2017, 10/21/2014, 10/2/1994 (Prv Comp)    Override on 10/2/1994: Previously completed  "    A1C SCREENING 8/18/2017 2/18/2017, 7/19/2016, 3/4/2016, 8/5/2015, 2/24/2015, 10/3/2014, 7/16/2014, 4/18/2014    FASTING LIPID PROFILE 2/18/2018 2/18/2017, 7/19/2016, 3/4/2016, 8/5/2015, 2/24/2015, 10/3/2014, 7/16/2014, 4/18/2014    SERUM CREATININE 2/18/2018 2/18/2017, 7/19/2016, 3/4/2016, 8/5/2015, 2/24/2015, 10/3/2014, 4/18/2014    COLONOSCOPY 5/2/2018 5/2/2013 (Prv Comp)    Override on 5/2/2013: Previously completed            Current Immunizations     13-VALENT PCV PREVNAR 4/18/2016    Influenza Vaccine Quad Inj (Pf) 10/2/2014    Influenza Vaccine Quad Inj (Preserved)  Incomplete    Pneumococcal polysaccharide vaccine (PPSV-23) 2/24/2015    SHINGLES VACCINE 2/24/2015      Below and/or attached are the medications your provider expects you to take. Review all of your home medications and newly ordered medications with your provider and/or pharmacist. Follow medication instructions as directed by your provider and/or pharmacist. Please keep your medication list with you and share with your provider. Update the information when medications are discontinued, doses are changed, or new medications (including over-the-counter products) are added; and carry medication information at all times in the event of emergency situations     Allergies:  SHELLFISH ALLERGY - (reactions not documented)     SULFA DRUGS - Hives               Medications  Valid as of: June 01, 2017 -  8:56 AM    Generic Name Brand Name Tablet Size Instructions for use    BuPROPion HCl (TABLET SR 12 HR) WELLBUTRIN- MG Take 1 Tab by mouth 2 times a day.        Losartan Potassium (Tab) COZAAR 50 MG Take 1 Tab by mouth every day.        MetFORMIN HCl (Tab) GLUCOPHAGE 500 MG Take 1 Tab by mouth 2 times a day, with meals.        Simvastatin (Tab) ZOCOR 40 MG Take 1 Tab by mouth every evening.        TraZODone HCl (Tab) DESYREL 50 MG Take 1 Tab by mouth every evening. AS NEEDED FOR SLEEP        .                 Medicines prescribed today were sent  to:     [facility][id number] - [place], [place] - [address]    [address] [place] [address]    Phone: [phone] Fax: [phone]    Open 24 Hours?: No      Medication refill instructions:       If your prescription bottle indicates you have medication refills left, it is not necessary to call your provider’s office. Please contact your pharmacy and they will refill your medication.    If your prescription bottle indicates you do not have any refills left, you may request refills at any time through one of the following ways: The online SafetyCulture system (except Urgent Care), by calling your provider’s office, or by asking your pharmacy to contact your provider’s office with a refill request. Medication refills are processed only during regular business hours and may not be available until the next business day. Your provider may request additional information or to have a follow-up visit with you prior to refilling your medication.   *Please Note: Medication refills are assigned a new Rx number when refilled electronically. Your pharmacy may indicate that no refills were authorized even though a new prescription for the same medication is available at the pharmacy. Please request the medicine by name with the pharmacy before contacting your provider for a refill.        Your To Do List     Future Labs/Procedures Complete By Expires    DS-BONE DENSITY STUDY (DEXA)  As directed 12/1/2017    DX-LUMBAR SPINE-2 OR 3 VIEWS  As directed 6/1/2018    MA-SCREEN MAMMO W/CAD-BILAT  As directed 6/1/2018         SafetyCulture Access Code: [email or web address]  Expires: 6/30/2017  4:38 AM    SafetyCulture  A secure, online tool to manage your health information     OneRiot® is a secure, online tool that connects you to your personalized health information from the privacy of your home -- day or night - making it very easy for you to manage your healthcare. Once the activation process is completed, you can even  access your medical information using the Exit Games martha, which is available for free in the Apple Martha store or Google Play store.     Exit Games provides the following levels of access (as shown below):   My Chart Features   [facility] Doctor [facility]  Specialists [facility]  Urgent  Care Non-[facility]  Primary Care  Doctor   Email your healthcare team securely and privately 24/7 X X X    Manage appointments: schedule your next appointment; view details of past/upcoming appointments X      Request prescription refills. X      View recent personal medical records, including lab and immunizations X X X X   View health record, including health history, allergies, medications X X X X   Read reports about your outpatient visits, procedures, consult and ER notes X X X X   See your discharge summary, which is a recap of your hospital and/or ER visit that includes your diagnosis, lab results, and care plan. X X       How to register for Exit Games:  1. Go to  [email or web address].  2. Click on the Sign Up Now box, which takes you to the New Member Sign Up page. You will need to provide the following information:  a. Enter your Exit Games Access Code exactly as it appears at the top of this page. (You will not need to use this code after you’ve completed the sign-up process. If you do not sign up before the expiration date, you must request a new code.)   b. Enter your date of birth.   c. Enter your home email address.   d. Click Submit, and follow the next screen’s instructions.  3. Create a Exit Games ID. This will be your Exit Games login ID and cannot be changed, so think of one that is secure and easy to remember.  4. Create a Exit Games password. You can change your password at any time.  5. Enter your Password Reset Question and Answer. This can be used at a later time if you forget your password.   6. Enter your e-mail address. This allows you to receive e-mail notifications when new information is available in Exit Games.  7. Click  Sign Up. You can now view your health information.    For assistance activating your [employer] account, call [phone]        Quit Tobacco Information     Do you want to quit using tobacco?    Quitting tobacco decreases risks of cancer, heart and lung disease, increases life expectancy, improves sense of taste and smell, and increases spending money, among other benefits.    If you are thinking about quitting, we can help.  • [facility] Quit Tobacco Program: [phone]  o Program occurs weekly for four weeks and includes pharmacist consultation on products to support quitting smoking or chewing tobacco. A provider referral is needed for pharmacist consultation.  • Tobacco Users Help Hotline: 4-800QUIT-NOW (101-1247) or https://[place].quitlogix.org/  o Free, confidential telephone and online coaching for [place] residents. Sessions are designed on a schedule that is convenient for you. Eligible clients receive free nicotine replacement therapy.  • Nationally: www.smokefree.gov  o Information and professional assistance to support both immediate and long-term needs as you become, and remain, a non-smoker. Smokefree.gov allows you to choose the help that best fits your needs.

## 2017-08-26 DIAGNOSIS — F17.200 CURRENT SMOKER: ICD-10-CM

## 2017-08-26 DIAGNOSIS — F33.1 MODERATE EPISODE OF RECURRENT MAJOR DEPRESSIVE DISORDER (HCC): ICD-10-CM

## 2017-08-26 DIAGNOSIS — Z71.6 ENCOUNTER FOR SMOKING CESSATION COUNSELING: ICD-10-CM

## 2017-08-28 RX ORDER — BUPROPION HYDROCHLORIDE 150 MG/1
TABLET, EXTENDED RELEASE ORAL
Qty: 180 TAB | Refills: 1 | Status: SHIPPED | OUTPATIENT
Start: 2017-08-28 | End: 2018-03-04 | Stop reason: SDUPTHER

## 2017-08-28 NOTE — TELEPHONE ENCOUNTER
Was the patient seen in the last year in this department? Yes     Does patient have an active prescription for medications requested? No     Received Request Via: Pharmacy      Pt met protocol?: Yes, last ov 6/1/17

## 2017-09-27 ENCOUNTER — OFFICE VISIT (OUTPATIENT)
Dept: MEDICAL GROUP | Facility: PHYSICIAN GROUP | Age: 62
End: 2017-09-27
Payer: COMMERCIAL

## 2017-09-27 VITALS
OXYGEN SATURATION: 96 % | HEIGHT: 65 IN | BODY MASS INDEX: 32.99 KG/M2 | HEART RATE: 80 BPM | RESPIRATION RATE: 16 BRPM | TEMPERATURE: 98.3 F | DIASTOLIC BLOOD PRESSURE: 80 MMHG | SYSTOLIC BLOOD PRESSURE: 138 MMHG | WEIGHT: 198 LBS

## 2017-09-27 DIAGNOSIS — Z76.89 ENCOUNTER TO ESTABLISH CARE WITH NEW DOCTOR: ICD-10-CM

## 2017-09-27 DIAGNOSIS — I10 ESSENTIAL HYPERTENSION: ICD-10-CM

## 2017-09-27 DIAGNOSIS — E78.5 HYPERLIPIDEMIA WITH TARGET LDL LESS THAN 100: ICD-10-CM

## 2017-09-27 DIAGNOSIS — Z12.39 SCREENING FOR BREAST CANCER: ICD-10-CM

## 2017-09-27 PROCEDURE — 99214 OFFICE O/P EST MOD 30 MIN: CPT | Performed by: NURSE PRACTITIONER

## 2017-09-27 ASSESSMENT — PATIENT HEALTH QUESTIONNAIRE - PHQ9
5. POOR APPETITE OR OVEREATING: 3 - NEARLY EVERY DAY
CLINICAL INTERPRETATION OF PHQ2 SCORE: 1
SUM OF ALL RESPONSES TO PHQ QUESTIONS 1-9: 8

## 2017-09-27 NOTE — PROGRESS NOTES
Chief Complaint   Patient presents with   • Establish Care       HISTORY OF PRESENT ILLNESS: Patient is a 62 y.o. female new patient who presents today to discuss the following issues:    Encounter to establish care with new doctor  Is here to establish with a new primary care provider.  Was previously seen by Shira Ahuja.    Screening for breast cancer  Is due for screening mammogram.    BMI 32.0-32.9,adult  Patient is aware of BMI elevation.  Brief discussion of diet, exercise, and lifestyle modification.      Essential hypertension  Chronic in nature, stable on meds.    Hyperlipidemia with target LDL less than 100  Chronic in nature, stable on meds.      Patient Active Problem List    Diagnosis Date Noted   • Encounter to establish care with new doctor 09/27/2017   • Screening for breast cancer 09/27/2017   • Chronic insomnia 04/18/2016   • Current smoker 04/18/2016   • Essential hypertension 03/28/2016   • Pre-diabetes 10/02/2014   • BMI 32.0-32.9,adult 10/02/2014   • Hyperlipidemia with target LDL less than 100 10/02/2014       Allergies:Shellfish allergy and Sulfa drugs    Current Outpatient Prescriptions   Medication Sig Dispense Refill   • buPROPion SR (WELLBUTRIN-SR) 150 MG TABLET SR 12 HR sustained-release tablet TAKE ONE TABLET BY MOUTH TWICE A  Tab 1   • trazodone (DESYREL) 50 MG Tab Take 1 Tab by mouth every evening. AS NEEDED FOR SLEEP 90 Tab 0   • simvastatin (ZOCOR) 40 MG Tab Take 1 Tab by mouth every evening. 90 Tab 1   • metformin (GLUCOPHAGE) 500 MG Tab Take 1 Tab by mouth 2 times a day, with meals. 180 Tab 1   • losartan (COZAAR) 50 MG Tab Take 1 Tab by mouth every day. 90 Tab 1     No current facility-administered medications for this visit.        Social History   Substance Use Topics   • Smoking status: Former Smoker     Packs/day: 0.00     Years: 48.00     Quit date: 6/7/2017   • Smokeless tobacco: Never Used   • Alcohol use No       Family Status   Relation Status   • Mother  "   • Father    • Neg Hx      Family History   Problem Relation Age of Onset   • Cancer Mother      pancreatic    • Heart Disease Father    • Heart Attack Father    • Diabetes Father      pre-diabetes   • Stroke Neg Hx        Review of Systems:   Constitutional: Negative for fever, chills, weight loss and malaise/fatigue.   HENT: Negative for ear pain, nosebleeds, congestion, sore throat and neck pain.    Eyes: Negative for blurred vision.   Respiratory: Negative for cough, sputum production, shortness of breath and wheezing.    Cardiovascular: Negative for chest pain, palpitations, orthopnea and leg swelling.   Gastrointestinal: Negative for heartburn, nausea, vomiting and abdominal pain.   Genitourinary: Negative for dysuria, urgency and frequency.   Musculoskeletal: Negative for myalgias, joint pain, and back pain.  Skin: Negative for rash and itching.   Neurological: Negative for dizziness, tingling, tremors, sensory change, focal weakness and headaches.   Endo/Heme/Allergies: Does not bruise/bleed easily.   Psychiatric/Behavioral: Negative for depression, suicidal ideas and memory loss.  The patient is not nervous/anxious and does not have insomnia.    All other systems reviewed and are negative except as in HPI.    Exam:  Blood pressure 138/80, pulse 80, temperature 36.8 °C (98.3 °F), resp. rate 16, height 1.651 m (5' 5\"), weight 89.8 kg (198 lb), SpO2 96 %.  General:  Well nourished, well developed female in NAD  Head: Grossly normal.  Neck: Supple without JVD or bruit. Thyroid is not enlarged.  Pulmonary: Clear to ausculation. Normal effort. No rales, ronchi, or wheezing.  Cardiovascular: Regular rate and rhythm without murmur.   Extremities: No clubbing, cyanosis, or edema.  Skin: Intact with no obvious rashes or lesions.  Neuro: Grossly intact.  Psych: Alert and oriented x 3.  Mood and affect appropriate.    Medical decision-making and discussion: Dunia is here to establish with a new " primary care provider.  We reviewed her past medical history and discussed her current medications. A mammogram was ordered, and she will call her pharmacy for refills. She will sign a records release for her previous provider, she will sign up with Pranay, and she will plan to follow-up here as needed.       Assessment/Plan:  1. Encounter to establish care with new doctor     2. Screening for breast cancer  MA-SCREEN MAMMO W/CAD-BILAT   3. BMI 32.0-32.9,adult  Patient identified as having weight management issue.  Appropriate orders and counseling given.   4. Essential hypertension     5. Hyperlipidemia with target LDL less than 100         Return if symptoms worsen or fail to improve.    Please note that this dictation was created using voice recognition software. I have made every reasonable attempt to correct obvious errors, but I expect that there are errors of grammar and possibly content that I did not discover before finalizing the note.

## 2017-10-18 ENCOUNTER — HOSPITAL ENCOUNTER (OUTPATIENT)
Dept: RADIOLOGY | Facility: MEDICAL CENTER | Age: 62
End: 2017-10-18
Attending: NURSE PRACTITIONER
Payer: COMMERCIAL

## 2017-10-18 DIAGNOSIS — Z12.39 SCREENING FOR BREAST CANCER: ICD-10-CM

## 2017-10-18 PROCEDURE — G0202 SCR MAMMO BI INCL CAD: HCPCS

## 2017-10-23 ENCOUNTER — TELEPHONE (OUTPATIENT)
Dept: MEDICAL GROUP | Facility: PHYSICIAN GROUP | Age: 62
End: 2017-10-23

## 2017-10-23 NOTE — TELEPHONE ENCOUNTER
----- Message from JUANI Swann sent at 10/23/2017 12:17 PM PDT -----  Please call patient and let her know that her mammogram was normal.  She should plan on having her next mammogram in about 1 year.    Thank you!  Benjamin

## 2017-10-24 DIAGNOSIS — E78.5 HYPERLIPIDEMIA WITH TARGET LDL LESS THAN 100: ICD-10-CM

## 2017-10-26 RX ORDER — SIMVASTATIN 40 MG
TABLET ORAL
Qty: 90 TAB | Refills: 3 | Status: SHIPPED | OUTPATIENT
Start: 2017-10-26 | End: 2018-10-24 | Stop reason: SDUPTHER

## 2017-10-26 NOTE — TELEPHONE ENCOUNTER
Was the patient seen in the last year in this department? Yes     Does patient have an active prescription for medications requested? No     Received Request Via: Pharmacy      Pt met protocol?: Yes Last OV 09/2017  Lab Results   Component Value Date/Time    HBA1C 6.4 (H) 02/18/2017 07:34 AM      Lab Results   Component Value Date/Time    HDL 63 02/18/2017 07:34 AM      BP Readings from Last 1 Encounters:   09/27/17 138/80

## 2018-02-03 ENCOUNTER — OFFICE VISIT (OUTPATIENT)
Dept: URGENT CARE | Facility: PHYSICIAN GROUP | Age: 63
End: 2018-02-03
Payer: COMMERCIAL

## 2018-02-03 ENCOUNTER — HOSPITAL ENCOUNTER (OUTPATIENT)
Dept: RADIOLOGY | Facility: MEDICAL CENTER | Age: 63
End: 2018-02-03
Attending: PHYSICIAN ASSISTANT
Payer: COMMERCIAL

## 2018-02-03 VITALS
DIASTOLIC BLOOD PRESSURE: 84 MMHG | SYSTOLIC BLOOD PRESSURE: 142 MMHG | BODY MASS INDEX: 33.49 KG/M2 | HEART RATE: 83 BPM | HEIGHT: 65 IN | OXYGEN SATURATION: 96 % | WEIGHT: 201 LBS | RESPIRATION RATE: 16 BRPM | TEMPERATURE: 99.1 F

## 2018-02-03 DIAGNOSIS — J44.1 COPD WITH ACUTE EXACERBATION (HCC): Primary | ICD-10-CM

## 2018-02-03 DIAGNOSIS — R05.9 COUGH: ICD-10-CM

## 2018-02-03 DIAGNOSIS — R50.9 FEVER, UNSPECIFIED FEVER CAUSE: ICD-10-CM

## 2018-02-03 LAB
FLUAV+FLUBV AG SPEC QL IA: NEGATIVE
INT CON NEG: NEGATIVE
INT CON POS: POSITIVE

## 2018-02-03 PROCEDURE — 87804 INFLUENZA ASSAY W/OPTIC: CPT | Performed by: PHYSICIAN ASSISTANT

## 2018-02-03 PROCEDURE — 71046 X-RAY EXAM CHEST 2 VIEWS: CPT

## 2018-02-03 PROCEDURE — 99214 OFFICE O/P EST MOD 30 MIN: CPT | Performed by: PHYSICIAN ASSISTANT

## 2018-02-03 RX ORDER — AZITHROMYCIN 250 MG/1
TABLET, FILM COATED ORAL
Qty: 6 TAB | Refills: 0 | Status: SHIPPED | OUTPATIENT
Start: 2018-02-03 | End: 2019-02-05

## 2018-02-03 RX ORDER — CODEINE PHOSPHATE/GUAIFENESIN 10-100MG/5
10 LIQUID (ML) ORAL 4 TIMES DAILY PRN
Qty: 200 ML | Refills: 0 | Status: CANCELLED | OUTPATIENT
Start: 2018-02-03 | End: 2018-02-08

## 2018-02-03 RX ORDER — METHYLPREDNISOLONE 4 MG/1
TABLET ORAL
Qty: 1 KIT | Refills: 0 | Status: SHIPPED | OUTPATIENT
Start: 2018-02-03 | End: 2019-02-05

## 2018-02-03 ASSESSMENT — ENCOUNTER SYMPTOMS
SPUTUM PRODUCTION: 1
CHILLS: 0
HEADACHES: 1
SHORTNESS OF BREATH: 1
RHINORRHEA: 1
COUGH: 1
SORE THROAT: 0
WHEEZING: 1
MYALGIAS: 1

## 2018-02-03 ASSESSMENT — COPD QUESTIONNAIRES: COPD: 1

## 2018-02-03 NOTE — PROGRESS NOTES
"Subjective:      Dunia Her is a 62 y.o. female who presents with Cough (with body aches, headaches, congestion x 3-4 days)    Pt PMH, SocHx, SurgHx, FamHx, Drug allergies and medications reviewed with pt/EPIC.      Family history reviewed, it is not pertinent to this complaint.           Cough   This is a new problem. The current episode started in the past 7 days. The problem has been gradually worsening. The problem occurs every few minutes. The cough is productive of sputum. Associated symptoms include headaches, myalgias, nasal congestion, postnasal drip, rhinorrhea, shortness of breath and wheezing. Pertinent negatives include no chills, ear pain or sore throat. The symptoms are aggravated by lying down. Risk factors for lung disease include smoking/tobacco exposure. She has tried a beta-agonist inhaler and rest for the symptoms. The treatment provided mild relief. Her past medical history is significant for asthma, bronchitis and COPD.       Review of Systems   Constitutional: Negative for chills and malaise/fatigue.   HENT: Positive for congestion, postnasal drip and rhinorrhea. Negative for ear pain and sore throat.    Respiratory: Positive for cough, sputum production, shortness of breath and wheezing.    Musculoskeletal: Positive for myalgias.   Neurological: Positive for headaches.   All other systems reviewed and are negative.         Objective:     /84   Pulse 83   Temp 37.3 °C (99.1 °F)   Resp 16   Ht 1.651 m (5' 5\")   Wt 91.2 kg (201 lb)   SpO2 96%   BMI 33.45 kg/m²      Physical Exam   Constitutional: She is oriented to person, place, and time. She appears well-developed and well-nourished.   HENT:   Head: Normocephalic and atraumatic.   Nose: Rhinorrhea present.   Mouth/Throat: Uvula is midline, oropharynx is clear and moist and mucous membranes are normal.   Eyes: Conjunctivae and EOM are normal. Pupils are equal, round, and reactive to light.   Neck: Normal range of motion. Neck " supple.   Cardiovascular: Normal rate, regular rhythm and normal heart sounds.    Pulmonary/Chest: Effort normal. No respiratory distress. She has wheezes. She has rhonchi. She has no rales.   Abdominal: Soft.   Musculoskeletal: Normal range of motion.   Neurological: She is alert and oriented to person, place, and time. Gait normal.   Skin: Skin is warm and dry. Capillary refill takes less than 2 seconds.   Psychiatric: She has a normal mood and affect.   Nursing note and vitals reviewed.         Xray images viewed and interpreted by me, confirmed by radiology:    No acute infiltrate, no PTX or free air. No acute findings.    Flu: negative    PT declined neb in clinic. P02 is adequate.    Assessment/Plan:     1. COPD with acute exacerbation (CMS-HCC)  POCT Influenza A/B    DX-CHEST-2 VIEWS    azithromycin (ZITHROMAX) 250 MG Tab    MethylPREDNISolone (MEDROL DOSEPAK) 4 MG Tablet Therapy Pack   2. Fever, unspecified fever cause  POCT Influenza A/B    DX-CHEST-2 VIEWS    azithromycin (ZITHROMAX) 250 MG Tab    MethylPREDNISolone (MEDROL DOSEPAK) 4 MG Tablet Therapy Pack     PT to continue taking prescription medications as prescribed.      OTC cough medicine as necessary.     PT should follow up with PCP in 1-2 days for re-evaluation if symptoms have not improved.  Discussed red flags and reasons to return to UC or ED.  Pt and/or family verbalized understanding of diagnosis and follow up instructions and was offered informational handout on diagnosis.  PT discharged.

## 2018-03-04 ENCOUNTER — TELEPHONE (OUTPATIENT)
Dept: MEDICAL GROUP | Facility: PHYSICIAN GROUP | Age: 63
End: 2018-03-04

## 2018-03-04 DIAGNOSIS — Z71.6 ENCOUNTER FOR SMOKING CESSATION COUNSELING: ICD-10-CM

## 2018-03-04 DIAGNOSIS — I10 ESSENTIAL HYPERTENSION: ICD-10-CM

## 2018-03-04 DIAGNOSIS — F33.1 MODERATE EPISODE OF RECURRENT MAJOR DEPRESSIVE DISORDER (HCC): ICD-10-CM

## 2018-03-04 DIAGNOSIS — F17.200 CURRENT SMOKER: ICD-10-CM

## 2018-03-04 DIAGNOSIS — E78.5 HYPERLIPIDEMIA WITH TARGET LDL LESS THAN 100: ICD-10-CM

## 2018-03-05 RX ORDER — BUPROPION HYDROCHLORIDE 150 MG/1
TABLET, EXTENDED RELEASE ORAL
Qty: 180 TAB | Refills: 0 | Status: SHIPPED | OUTPATIENT
Start: 2018-03-05 | End: 2018-06-02 | Stop reason: SDUPTHER

## 2018-03-05 RX ORDER — TRAZODONE HYDROCHLORIDE 50 MG/1
TABLET ORAL
Qty: 90 TAB | Refills: 1 | Status: SHIPPED | OUTPATIENT
Start: 2018-03-05 | End: 2019-02-05 | Stop reason: SDUPTHER

## 2018-03-05 RX ORDER — LOSARTAN POTASSIUM 50 MG/1
TABLET ORAL
Qty: 90 TAB | Refills: 0 | Status: SHIPPED | OUTPATIENT
Start: 2018-03-05 | End: 2018-06-02 | Stop reason: SDUPTHER

## 2018-03-05 NOTE — TELEPHONE ENCOUNTER
Was the patient seen in the last year in this department? Yes     Does patient have an active prescription for medications requested? No     Received Request Via: Pharmacy      Pt met protocol?: No labs due  pt last ov 9/2017   BP Readings from Last 1 Encounters:   02/03/18 142/84     Lab Results   Component Value Date/Time    HBA1C 6.4 (H) 02/18/2017 07:34 AM

## 2018-03-22 ENCOUNTER — HOSPITAL ENCOUNTER (OUTPATIENT)
Dept: LAB | Facility: MEDICAL CENTER | Age: 63
End: 2018-03-22
Attending: NURSE PRACTITIONER
Payer: COMMERCIAL

## 2018-03-22 DIAGNOSIS — I10 ESSENTIAL HYPERTENSION: ICD-10-CM

## 2018-03-22 DIAGNOSIS — E78.5 HYPERLIPIDEMIA WITH TARGET LDL LESS THAN 100: ICD-10-CM

## 2018-03-22 LAB
ALBUMIN SERPL BCP-MCNC: 4.5 G/DL (ref 3.2–4.9)
ALBUMIN/GLOB SERPL: 1.9 G/DL
ALP SERPL-CCNC: 46 U/L (ref 30–99)
ALT SERPL-CCNC: 17 U/L (ref 2–50)
ANION GAP SERPL CALC-SCNC: 10 MMOL/L (ref 0–11.9)
AST SERPL-CCNC: 16 U/L (ref 12–45)
BILIRUB SERPL-MCNC: 0.5 MG/DL (ref 0.1–1.5)
BUN SERPL-MCNC: 20 MG/DL (ref 8–22)
CALCIUM SERPL-MCNC: 9.9 MG/DL (ref 8.5–10.5)
CHLORIDE SERPL-SCNC: 105 MMOL/L (ref 96–112)
CHOLEST SERPL-MCNC: 199 MG/DL (ref 100–199)
CO2 SERPL-SCNC: 24 MMOL/L (ref 20–33)
CREAT SERPL-MCNC: 0.8 MG/DL (ref 0.5–1.4)
ERYTHROCYTE [DISTWIDTH] IN BLOOD BY AUTOMATED COUNT: 45.1 FL (ref 35.9–50)
EST. AVERAGE GLUCOSE BLD GHB EST-MCNC: 128 MG/DL
GLOBULIN SER CALC-MCNC: 2.4 G/DL (ref 1.9–3.5)
GLUCOSE SERPL-MCNC: 132 MG/DL (ref 65–99)
HBA1C MFR BLD: 6.1 % (ref 0–5.6)
HCT VFR BLD AUTO: 42.2 % (ref 37–47)
HDLC SERPL-MCNC: 70 MG/DL
HGB BLD-MCNC: 13.8 G/DL (ref 12–16)
LDLC SERPL CALC-MCNC: 90 MG/DL
MCH RBC QN AUTO: 31.4 PG (ref 27–33)
MCHC RBC AUTO-ENTMCNC: 32.7 G/DL (ref 33.6–35)
MCV RBC AUTO: 96.1 FL (ref 81.4–97.8)
PLATELET # BLD AUTO: 226 K/UL (ref 164–446)
PMV BLD AUTO: 10.6 FL (ref 9–12.9)
POTASSIUM SERPL-SCNC: 4.6 MMOL/L (ref 3.6–5.5)
PROT SERPL-MCNC: 6.9 G/DL (ref 6–8.2)
RBC # BLD AUTO: 4.39 M/UL (ref 4.2–5.4)
SODIUM SERPL-SCNC: 139 MMOL/L (ref 135–145)
TRIGL SERPL-MCNC: 194 MG/DL (ref 0–149)
WBC # BLD AUTO: 6.7 K/UL (ref 4.8–10.8)

## 2018-03-22 PROCEDURE — 83036 HEMOGLOBIN GLYCOSYLATED A1C: CPT

## 2018-03-22 PROCEDURE — 36415 COLL VENOUS BLD VENIPUNCTURE: CPT

## 2018-03-22 PROCEDURE — 80061 LIPID PANEL: CPT

## 2018-03-22 PROCEDURE — 85027 COMPLETE CBC AUTOMATED: CPT

## 2018-03-22 PROCEDURE — 80053 COMPREHEN METABOLIC PANEL: CPT

## 2018-03-28 ENCOUNTER — TELEPHONE (OUTPATIENT)
Dept: MEDICAL GROUP | Facility: PHYSICIAN GROUP | Age: 63
End: 2018-03-28

## 2018-04-20 ENCOUNTER — OFFICE VISIT (OUTPATIENT)
Dept: URGENT CARE | Facility: PHYSICIAN GROUP | Age: 63
End: 2018-04-20
Payer: COMMERCIAL

## 2018-04-20 VITALS
OXYGEN SATURATION: 97 % | TEMPERATURE: 98.1 F | HEIGHT: 65 IN | WEIGHT: 199 LBS | RESPIRATION RATE: 16 BRPM | HEART RATE: 78 BPM | BODY MASS INDEX: 33.15 KG/M2

## 2018-04-20 DIAGNOSIS — J02.8 PHARYNGITIS DUE TO OTHER ORGANISM: ICD-10-CM

## 2018-04-20 LAB
INT CON NEG: NORMAL
INT CON POS: NORMAL
S PYO AG THROAT QL: NEGATIVE

## 2018-04-20 PROCEDURE — 99203 OFFICE O/P NEW LOW 30 MIN: CPT | Performed by: EMERGENCY MEDICINE

## 2018-04-20 PROCEDURE — 87880 STREP A ASSAY W/OPTIC: CPT | Performed by: EMERGENCY MEDICINE

## 2018-04-20 ASSESSMENT — ENCOUNTER SYMPTOMS
EYE REDNESS: 0
PALPITATIONS: 0
NECK PAIN: 0
SENSORY CHANGE: 0
FEVER: 0
COUGH: 1
EYE DISCHARGE: 0
ABDOMINAL PAIN: 0
STRIDOR: 0
SORE THROAT: 1
CONSTIPATION: 0
NAUSEA: 0
VOMITING: 0
CHILLS: 0

## 2018-04-20 NOTE — PROGRESS NOTES
Subjective:      Dunia Her is a 62 y.o. female who presents with Pharyngitis (since monday, also cough, and congestion)            HPI  Patient is a pleasant 62-year-old female complaining of a sore throat as well as a cough with congestion. She is primarily worried about her grandchildren getting strep as well as able to drink clear liquids.    Allergies   Allergen Reactions   • Shellfish Allergy      Eyes itchy   • Sulfa Drugs Hives     Social History     Social History   • Marital status:      Spouse name: N/A   • Number of children: N/A   • Years of education: N/A     Occupational History   • Not on file.     Social History Main Topics   • Smoking status: Former Smoker     Packs/day: 0.00     Years: 48.00     Quit date: 6/7/2017   • Smokeless tobacco: Never Used   • Alcohol use No   • Drug use: No   • Sexual activity: Yes     Partners: Male      Comment:      Other Topics Concern   • Not on file     Social History Narrative   • No narrative on file     Past Medical History:   Diagnosis Date   • Hyperlipidemia LDL goal < 100 10/2/2014   • Hypertension    • Obesity (BMI 30-39.9) 10/2/2014   • Pre-diabetes 10/2/2014   • Recurrent sinusitis    • Type II or unspecified type diabetes mellitus without mention of complication, not stated as uncontrolled     pre-diabetes     Current Outpatient Prescriptions on File Prior to Visit   Medication Sig Dispense Refill   • metFORMIN (GLUCOPHAGE) 500 MG Tab TAKE ONE TABLET BY MOUTH TWICE A DAY WITH MEALS 180 Tab 0   • losartan (COZAAR) 50 MG Tab TAKE ONE TABLET BY MOUTH DAILY 90 Tab 0   • buPROPion SR (WELLBUTRIN-SR) 150 MG TABLET SR 12 HR sustained-release tablet TAKE ONE TABLET BY MOUTH TWICE A  Tab 0   • simvastatin (ZOCOR) 40 MG Tab TAKE ONE TABLET BY MOUTH EVERY EVENING 90 Tab 3   • traZODone (DESYREL) 50 MG Tab TAKE ONE TABLET BY MOUTH EVERY EVENING AS NEEDED FOR SLEEP 90 Tab 1   • azithromycin (ZITHROMAX) 250 MG Tab Take 2 tabs by mouth once  "today, then one tab by mouth once daily days 2-5.  Complete all medication. 6 Tab 0   • MethylPREDNISolone (MEDROL DOSEPAK) 4 MG Tablet Therapy Pack Take as directed. 1 Kit 0     No current facility-administered medications on file prior to visit.      Family History   Problem Relation Age of Onset   • Cancer Mother      pancreatic    • Heart Disease Father    • Heart Attack Father    • Diabetes Father      pre-diabetes   • Stroke Neg Hx      Review of Systems   Constitutional: Negative for chills and fever.   HENT: Positive for congestion and sore throat.    Eyes: Negative for discharge and redness.   Respiratory: Positive for cough. Negative for stridor.    Cardiovascular: Negative for chest pain and palpitations.   Gastrointestinal: Negative for abdominal pain, constipation, nausea and vomiting.   Genitourinary: Negative.    Musculoskeletal: Negative for neck pain.   Neurological: Negative for sensory change.          Objective:     Pulse 78   Temp 36.7 °C (98.1 °F)   Resp 16   Ht 1.651 m (5' 5\")   Wt 90.3 kg (199 lb)   SpO2 97%   BMI 33.12 kg/m²      Physical Exam   Constitutional: She appears well-developed and well-nourished. She appears distressed.   HENT:   Head: Atraumatic.   Right Ear: External ear normal.   Her pharynx is inflamed and no exudate   Neck: Normal range of motion.   Cardiovascular: Normal rate and regular rhythm.    Pulmonary/Chest: Effort normal and breath sounds normal. No stridor.   Musculoskeletal: Normal range of motion.   Lymphadenopathy:     She has no cervical adenopathy.   Neurological: She is alert. Coordination normal.   Skin: Skin is warm. She is not diaphoretic. No erythema.   Psychiatric: Her behavior is normal.   Vitals reviewed.       rapid strep negative  Assessment/Plan:     1. Pharyngitis due to other organism          I am recommending the patient initiate/ continue hydration efforts including the use of a vaporizer/humidifier/ netti pot. I also recommend the pt, " initiate Mucinex (, Chloraseptic spray.. In addition the patient will initiate the prescribed prescription medication/s: Xylocaine viscus for dysphasia 5 cc and a quarter couple water swish and spit 6 times a day.. If the patient's condition exacerbates with worsening dysphagia, shortness of breath, uncontrolled fever, headache or chest pressure he/she will return immediately to the urgent care or go to  the emergency department for further evaluation.    Micheal Torrez    - lidocaine viscous 2% (XYLOCAINE) 2 % Solution; Take 5 mL by mouth 6 Times a Day.  Dispense: 200 mL; Refill: 1

## 2018-06-02 DIAGNOSIS — F33.1 MODERATE EPISODE OF RECURRENT MAJOR DEPRESSIVE DISORDER (HCC): ICD-10-CM

## 2018-06-02 DIAGNOSIS — Z71.6 ENCOUNTER FOR SMOKING CESSATION COUNSELING: ICD-10-CM

## 2018-06-02 DIAGNOSIS — F17.200 CURRENT SMOKER: ICD-10-CM

## 2018-06-04 RX ORDER — LOSARTAN POTASSIUM 50 MG/1
TABLET ORAL
Qty: 90 TAB | Refills: 0 | Status: SHIPPED | OUTPATIENT
Start: 2018-06-04 | End: 2018-09-16 | Stop reason: SDUPTHER

## 2018-06-04 RX ORDER — BUPROPION HYDROCHLORIDE 150 MG/1
TABLET, EXTENDED RELEASE ORAL
Qty: 180 TAB | Refills: 0 | Status: SHIPPED | OUTPATIENT
Start: 2018-06-04 | End: 2018-09-06 | Stop reason: SDUPTHER

## 2018-09-04 ENCOUNTER — HOSPITAL ENCOUNTER (OUTPATIENT)
Dept: RADIOLOGY | Facility: MEDICAL CENTER | Age: 63
End: 2018-09-04
Attending: INTERNAL MEDICINE
Payer: COMMERCIAL

## 2018-09-04 ENCOUNTER — OFFICE VISIT (OUTPATIENT)
Dept: MEDICAL GROUP | Facility: PHYSICIAN GROUP | Age: 63
End: 2018-09-04
Payer: COMMERCIAL

## 2018-09-04 VITALS
HEART RATE: 76 BPM | TEMPERATURE: 97.7 F | HEIGHT: 65 IN | OXYGEN SATURATION: 97 % | RESPIRATION RATE: 16 BRPM | WEIGHT: 199 LBS | DIASTOLIC BLOOD PRESSURE: 82 MMHG | BODY MASS INDEX: 33.15 KG/M2 | SYSTOLIC BLOOD PRESSURE: 132 MMHG

## 2018-09-04 DIAGNOSIS — M25.522 LEFT ELBOW PAIN: ICD-10-CM

## 2018-09-04 DIAGNOSIS — R21 RASH: ICD-10-CM

## 2018-09-04 DIAGNOSIS — Z87.891 FORMER SMOKER: ICD-10-CM

## 2018-09-04 PROCEDURE — 73030 X-RAY EXAM OF SHOULDER: CPT | Mod: LT

## 2018-09-04 PROCEDURE — 99214 OFFICE O/P EST MOD 30 MIN: CPT | Performed by: INTERNAL MEDICINE

## 2018-09-04 PROCEDURE — 73080 X-RAY EXAM OF ELBOW: CPT | Mod: LT

## 2018-09-04 RX ORDER — CLOBETASOL PROPIONATE 0.5 MG/G
1 CREAM TOPICAL 2 TIMES DAILY
Qty: 1 TUBE | Refills: 0 | Status: SHIPPED | OUTPATIENT
Start: 2018-09-04 | End: 2020-01-14

## 2018-09-04 ASSESSMENT — PATIENT HEALTH QUESTIONNAIRE - PHQ9: CLINICAL INTERPRETATION OF PHQ2 SCORE: 0

## 2018-09-04 NOTE — ASSESSMENT & PLAN NOTE
Has had tennis elbow. Having left elbow pain radiating up to her shoulder. Has been having shoulder pain for a long standing time. Elbow pain has been ongoing for over 2 weeks. Cannot characterize the pain. She has tried a wrap she used to use for tennis elbow but it isn't helping. Ibuprofen and resting her arm helps. She baby sits her grand kids so activity with them makes her pain worse. Denies injuries.

## 2018-09-04 NOTE — ASSESSMENT & PLAN NOTE
Stopped smoking with Wellbutrin about a year ago. Would like to come off of the Wellbutrin. Feels more energy since she's quit smoking.

## 2018-09-04 NOTE — ASSESSMENT & PLAN NOTE
Also having intermittent rash of her right elbow. Rash itches but hasn't spread. Tried eczema cream (Eucerin) but had blistering with that. Also tried over the counter hydrocortisone without relief. Has had this rash for over 6 months.

## 2018-09-04 NOTE — PROGRESS NOTES
PRIMARY CARE CLINIC FOLLOW UP VISIT  Chief Complaint   Patient presents with   • Elbow Pain     Lt side x 2 weeks    • Rash     Rt Elbow    • Medication Management     Like to discontinue wellbutrin      History of Present Illness     Left elbow pain  Has had tennis elbow. Having left elbow pain radiating up to her shoulder. Has been having shoulder pain for a long standing time. Elbow pain has been ongoing for over 2 weeks. Cannot characterize the pain. She has tried a wrap she used to use for tennis elbow but it isn't helping. Ibuprofen and resting her arm helps. She baby sits her grand kids so activity with them makes her pain worse. Denies injuries.     Rash  Also having intermittent rash of her right elbow. Rash itches but hasn't spread. Tried eczema cream (Eucerin) but had blistering with that. Also tried over the counter hydrocortisone without relief. Has had this rash for over 6 months.     Former smoker  Stopped smoking with Wellbutrin about a year ago. Would like to come off of the Wellbutrin. Feels more energy since she's quit smoking.     Current Outpatient Prescriptions   Medication Sig Dispense Refill   • clobetasol (TEMOVATE) 0.05 % Cream Apply 1 Application to affected area(s) 2 times a day. 1 Tube 0   • losartan (COZAAR) 50 MG Tab TAKE ONE TABLET BY MOUTH DAILY 90 Tab 0   • buPROPion SR (WELLBUTRIN-SR) 150 MG TABLET SR 12 HR sustained-release tablet TAKE ONE TABLET BY MOUTH TWICE A  Tab 0   • lidocaine viscous 2% (XYLOCAINE) 2 % Solution Take 5 mL by mouth 6 Times a Day. 200 mL 1   • traZODone (DESYREL) 50 MG Tab TAKE ONE TABLET BY MOUTH EVERY EVENING AS NEEDED FOR SLEEP 90 Tab 1   • metFORMIN (GLUCOPHAGE) 500 MG Tab TAKE ONE TABLET BY MOUTH TWICE A DAY WITH MEALS 180 Tab 0   • azithromycin (ZITHROMAX) 250 MG Tab Take 2 tabs by mouth once today, then one tab by mouth once daily days 2-5.  Complete all medication. 6 Tab 0   • MethylPREDNISolone (MEDROL DOSEPAK) 4 MG Tablet Therapy Pack Take as  "directed. 1 Kit 0   • simvastatin (ZOCOR) 40 MG Tab TAKE ONE TABLET BY MOUTH EVERY EVENING 90 Tab 3     No current facility-administered medications for this visit.      ROS  As per HPI above. All other systems reviewed and negative.        Objective   Blood pressure 132/82, pulse 76, temperature 36.5 °C (97.7 °F), resp. rate 16, height 1.651 m (5' 5\"), weight 90.3 kg (199 lb), SpO2 97 %. Body mass index is 33.12 kg/m².    General: alert and oriented, pleasant, cooperative  HEENT: Normocephalic, atraumatic.   MSK: inability to raise her left upper extremity overhead   Skin: mild skin breakdown of right elbow   Psychiatric: appropriate mood and affect. Good insight and appropriate judgment     Assessment and Plan   The following treatment plan was discussed     1. Left elbow pain  May have impingement/frozen shoulder given limited range of motion. Advised physical therapy and if symptoms don't improve then referral to sports medicine. Will evaluate further with plain films.   - DX-SHOULDER 2+ LEFT; Future  - DX-ELBOW-COMPLETE 3+ LEFT; Future  - REFERRAL TO PHYSICAL THERAPY Reason for Therapy: Eval/Treat/Report    2. Rash    - clobetasol (TEMOVATE) 0.05 % Cream; Apply 1 Application to affected area(s) 2 times a day.  Dispense: 1 Tube; Refill: 0    3. Former smoker  Advised she cut her wellbutrin bid doses by 1/4 tablet of each dose weekly to taper off. Discussed withdrawal symptoms and to inform us if these occur.     Return if symptoms worsen or fail to improve.    Kal Christensen MD  Internal Medicine  [facility]                   "

## 2018-09-05 ENCOUNTER — TELEPHONE (OUTPATIENT)
Dept: MEDICAL GROUP | Facility: PHYSICIAN GROUP | Age: 63
End: 2018-09-05

## 2018-09-06 DIAGNOSIS — Z71.6 ENCOUNTER FOR SMOKING CESSATION COUNSELING: ICD-10-CM

## 2018-09-06 DIAGNOSIS — F17.200 CURRENT SMOKER: ICD-10-CM

## 2018-09-06 DIAGNOSIS — F33.1 MODERATE EPISODE OF RECURRENT MAJOR DEPRESSIVE DISORDER (HCC): ICD-10-CM

## 2018-09-06 RX ORDER — BUPROPION HYDROCHLORIDE 150 MG/1
TABLET, EXTENDED RELEASE ORAL
Qty: 60 TAB | Refills: 0 | Status: SHIPPED | OUTPATIENT
Start: 2018-09-06 | End: 2019-02-05

## 2018-09-06 NOTE — TELEPHONE ENCOUNTER
----- Message from Kal Christensen M.D. sent at 9/5/2018  5:12 PM PDT -----  Please let Dunia know that her x-ray showed tendinitis in the shoulder which would help explain her symptoms. I would advise ibuprofen 400 mg twice daily for about 7 days and to begin physical therapy

## 2018-09-06 NOTE — TELEPHONE ENCOUNTER
----- Message from Kal Christensen M.D. sent at 9/5/2018  5:14 PM PDT -----  Please let Dunia know that the x-ray of her elbow doesn't show any abnormalities which would explain her symptoms, which are likely from the tendonitis in her shoulder

## 2018-09-06 NOTE — TELEPHONE ENCOUNTER
Pt is trying to taper off but is out of medication    Was the patient seen in the last year in this department? Yes    Does patient have an active prescription for medications requested? No     Received Request Via: Patient

## 2018-09-18 RX ORDER — LOSARTAN POTASSIUM 50 MG/1
TABLET ORAL
Qty: 90 TAB | Refills: 1 | Status: SHIPPED | OUTPATIENT
Start: 2018-09-18 | End: 2019-02-05 | Stop reason: SDUPTHER

## 2018-09-18 NOTE — TELEPHONE ENCOUNTER
Refill X 6 months, sent to pharmacy.Pt. Seen in the last 6 months per protocol.   Lab Results   Component Value Date/Time    SODIUM 139 03/22/2018 08:15 AM    POTASSIUM 4.6 03/22/2018 08:15 AM    CHLORIDE 105 03/22/2018 08:15 AM    CO2 24 03/22/2018 08:15 AM    GLUCOSE 132 (H) 03/22/2018 08:15 AM    BUN 20 03/22/2018 08:15 AM    CREATININE 0.80 03/22/2018 08:15 AM

## 2018-10-24 DIAGNOSIS — E78.5 HYPERLIPIDEMIA WITH TARGET LDL LESS THAN 100: ICD-10-CM

## 2018-10-24 NOTE — TELEPHONE ENCOUNTER
Was the patient seen in the last year in this department? Yes    Does patient have an active prescription for medications requested? No     Received Request Via: Pharmacy      Pt met protocol?: Yes  pt last ov 9/18   Lab Results  Component Value Date/Time   CHOLSTRLTOT 199 03/22/2018 0815   TRIGLYCERIDE 194 (H) 03/22/2018 0815   HDL 70 03/22/2018 0815   LDL 90 03/22/2018 0815

## 2018-10-28 RX ORDER — SIMVASTATIN 40 MG
TABLET ORAL
Qty: 90 TAB | Refills: 1 | Status: SHIPPED | OUTPATIENT
Start: 2018-10-28 | End: 2019-02-05 | Stop reason: SDUPTHER

## 2018-10-29 ENCOUNTER — TELEPHONE (OUTPATIENT)
Dept: MEDICAL GROUP | Facility: PHYSICIAN GROUP | Age: 63
End: 2018-10-29

## 2018-10-29 NOTE — TELEPHONE ENCOUNTER
Pt has had OV within the 12 month protocol and lipid panel is current. 6 month supply sent to pharmacy.   Lab Results   Component Value Date/Time    CHOLSTRLTOT 199 03/22/2018 08:15 AM    LDL 90 03/22/2018 08:15 AM    HDL 70 03/22/2018 08:15 AM    TRIGLYCERIDE 194 (H) 03/22/2018 08:15 AM       Lab Results   Component Value Date/Time    SODIUM 139 03/22/2018 08:15 AM    POTASSIUM 4.6 03/22/2018 08:15 AM    CHLORIDE 105 03/22/2018 08:15 AM    CO2 24 03/22/2018 08:15 AM    GLUCOSE 132 (H) 03/22/2018 08:15 AM    BUN 20 03/22/2018 08:15 AM    CREATININE 0.80 03/22/2018 08:15 AM     Lab Results   Component Value Date/Time    ALKPHOSPHAT 46 03/22/2018 08:15 AM    ASTSGOT 16 03/22/2018 08:15 AM    ALTSGPT 17 03/22/2018 08:15 AM    TBILIRUBIN 0.5 03/22/2018 08:15 AM

## 2018-10-29 NOTE — TELEPHONE ENCOUNTER
VOICEMAIL  1. Caller Name: Dunia                       Call Back Number: [phone] (home)      2. Message: Pharm stated they dont have pts rx script yet    3. Patient approves office to leave a detailed voicemail/MyChart message: N\A    Called pts pharm and they said they received the script yesterday but the Rx is not ready to fill yet and they will call pt and let her know when it is ready  For

## 2019-01-08 ENCOUNTER — TELEPHONE (OUTPATIENT)
Dept: MEDICAL GROUP | Facility: PHYSICIAN GROUP | Age: 64
End: 2019-01-08

## 2019-01-08 NOTE — TELEPHONE ENCOUNTER
1. Caller Name: Dunia                                          Call Back Number: [phone] (home)        Patient approves a detailed voicemail message: N\A    Pt says she saw she was due for an A1C lab work and would like to have that done. Please advise,

## 2019-02-05 ENCOUNTER — OFFICE VISIT (OUTPATIENT)
Dept: MEDICAL GROUP | Facility: PHYSICIAN GROUP | Age: 64
End: 2019-02-05
Payer: COMMERCIAL

## 2019-02-05 VITALS
HEIGHT: 65 IN | TEMPERATURE: 98.2 F | SYSTOLIC BLOOD PRESSURE: 130 MMHG | WEIGHT: 216 LBS | OXYGEN SATURATION: 96 % | HEART RATE: 82 BPM | RESPIRATION RATE: 16 BRPM | DIASTOLIC BLOOD PRESSURE: 80 MMHG | BODY MASS INDEX: 35.99 KG/M2

## 2019-02-05 DIAGNOSIS — R79.89 LOW VITAMIN D LEVEL: ICD-10-CM

## 2019-02-05 DIAGNOSIS — F51.04 CHRONIC INSOMNIA: ICD-10-CM

## 2019-02-05 DIAGNOSIS — R53.83 FATIGUE, UNSPECIFIED TYPE: ICD-10-CM

## 2019-02-05 DIAGNOSIS — E78.5 HYPERLIPIDEMIA WITH TARGET LDL LESS THAN 100: ICD-10-CM

## 2019-02-05 DIAGNOSIS — I10 HYPERTENSION, UNSPECIFIED TYPE: ICD-10-CM

## 2019-02-05 DIAGNOSIS — R73.03 PREDIABETES: ICD-10-CM

## 2019-02-05 DIAGNOSIS — F32.A DEPRESSION, UNSPECIFIED DEPRESSION TYPE: ICD-10-CM

## 2019-02-05 DIAGNOSIS — Z23 NEED FOR VACCINATION: ICD-10-CM

## 2019-02-05 DIAGNOSIS — R73.03 PRE-DIABETES: ICD-10-CM

## 2019-02-05 DIAGNOSIS — G47.00 INSOMNIA, UNSPECIFIED TYPE: ICD-10-CM

## 2019-02-05 DIAGNOSIS — I10 ESSENTIAL HYPERTENSION: ICD-10-CM

## 2019-02-05 PROBLEM — Z87.891 FORMER SMOKER: Status: RESOLVED | Noted: 2018-09-04 | Resolved: 2019-02-05

## 2019-02-05 PROCEDURE — 90686 IIV4 VACC NO PRSV 0.5 ML IM: CPT | Performed by: NURSE PRACTITIONER

## 2019-02-05 PROCEDURE — 99214 OFFICE O/P EST MOD 30 MIN: CPT | Mod: 25 | Performed by: NURSE PRACTITIONER

## 2019-02-05 PROCEDURE — 90471 IMMUNIZATION ADMIN: CPT | Performed by: NURSE PRACTITIONER

## 2019-02-05 RX ORDER — LOSARTAN POTASSIUM 50 MG/1
TABLET ORAL
Qty: 90 TAB | Refills: 3 | Status: SHIPPED | OUTPATIENT
Start: 2019-02-05 | End: 2020-03-31

## 2019-02-05 RX ORDER — TRAZODONE HYDROCHLORIDE 50 MG/1
TABLET ORAL
Qty: 90 TAB | Refills: 3 | Status: SHIPPED | OUTPATIENT
Start: 2019-02-05 | End: 2020-03-10

## 2019-02-05 RX ORDER — PAROXETINE HYDROCHLORIDE 20 MG/1
20 TABLET, FILM COATED ORAL DAILY
Qty: 90 TAB | Refills: 3 | Status: SHIPPED | OUTPATIENT
Start: 2019-02-05 | End: 2019-12-08 | Stop reason: SDUPTHER

## 2019-02-05 RX ORDER — SIMVASTATIN 40 MG
TABLET ORAL
Qty: 90 TAB | Refills: 3 | Status: SHIPPED
Start: 2019-02-05 | End: 2020-02-24

## 2019-02-05 ASSESSMENT — PATIENT HEALTH QUESTIONNAIRE - PHQ9
SUM OF ALL RESPONSES TO PHQ QUESTIONS 1-9: 15
5. POOR APPETITE OR OVEREATING: 1 - SEVERAL DAYS
CLINICAL INTERPRETATION OF PHQ2 SCORE: 6

## 2019-02-05 NOTE — PROGRESS NOTES
Chief Complaint   Patient presents with   • Depression     Concerns    • Hypertension     Medication concern        HISTORY OF PRESENT ILLNESS: Patient is a 63 y.o. female established patient who presents today to discuss the following issues:    Depression  Reports that he mood has been poor recently and the wellbutrin was not helping.  She stopped taking it and she feels the same.  She has been under a lot of stress recently.  Denies SI/HI.  Reports that paxil has worked well for her in the past and she would like to restart that.  Understands risks vs benefits.    Chronic insomnia  Chronic in nature.  Stable on meds.  Due for refills of trazodone.    Essential hypertension  Chronic in nature.  Stable on meds.  Due for labs and refills.    Hyperlipidemia with target LDL less than 100  Chronic in nature.  Stable on meds.  Due for labs and refills.      Pre-diabetes  Due for labs.    Need for vaccination  Would like a flu shot today.        Patient Active Problem List    Diagnosis Date Noted   • Depression 02/10/2019   • Need for vaccination 02/10/2019   • Left elbow pain 09/04/2018   • Rash 09/04/2018   • Chronic insomnia 04/18/2016   • Essential hypertension 03/28/2016   • Pre-diabetes 10/02/2014   • BMI 32.0-32.9,adult 10/02/2014   • Hyperlipidemia with target LDL less than 100 10/02/2014       Allergies:Shellfish allergy and Sulfa drugs    Current Outpatient Prescriptions   Medication Sig Dispense Refill   • losartan (COZAAR) 50 MG Tab TAKE ONE TABLET BY MOUTH DAILY 90 Tab 3   • metFORMIN (GLUCOPHAGE) 500 MG Tab Take 1 Tab by mouth 2 times a day, with meals. 180 Tab 3   • simvastatin (ZOCOR) 40 MG Tab TAKE ONE TABLET BY MOUTH EVERY EVENING 90 Tab 3   • traZODone (DESYREL) 50 MG Tab TAKE ONE TABLET BY MOUTH EVERY EVENING AS NEEDED FOR SLEEP 90 Tab 3   • PARoxetine (PAXIL) 20 MG Tab Take 1 Tab by mouth every day. 90 Tab 3   • clobetasol (TEMOVATE) 0.05 % Cream Apply 1 Application to affected area(s) 2 times a day.  "1 Tube 0     No current facility-administered medications for this visit.        Social History   Substance Use Topics   • Smoking status: Former Smoker     Packs/day: 0.00     Years: 48.00     Quit date: 2017   • Smokeless tobacco: Never Used   • Alcohol use No       Family Status   Relation Status   • Mo    • Fa    • Neg Hx (Not Specified)     Family History   Problem Relation Age of Onset   • Cancer Mother         pancreatic    • Heart Disease Father    • Heart Attack Father    • Diabetes Father         pre-diabetes   • Stroke Neg Hx        Review of Systems:   Constitutional: Negative for fever, chills, weight loss and malaise/fatigue.   HENT: Negative for ear pain, nosebleeds, congestion, sore throat and neck pain.    Eyes: Negative for blurred vision.   Respiratory: Negative for cough, sputum production, shortness of breath and wheezing.    Cardiovascular: Negative for chest pain, palpitations, orthopnea and leg swelling.   Gastrointestinal: Negative for heartburn, nausea, vomiting and abdominal pain.   Genitourinary: Negative for dysuria, urgency and frequency.   Musculoskeletal: Negative for myalgias, joint pain, and back pain.  Skin: Negative for rash and itching.   Neurological: Negative for dizziness, tingling, tremors, sensory change, focal weakness and headaches.   Endo/Heme/Allergies: Does not bruise/bleed easily.   Psychiatric/Behavioral: Positive for depression and stable insomnia.  Denies SI/HI.  All other systems reviewed and are negative except as in HPI.    Exam:  Blood pressure 130/80, pulse 82, temperature 36.8 °C (98.2 °F), resp. rate 16, height 1.651 m (5' 5\"), weight 98 kg (216 lb), SpO2 96 %.  General:  Well nourished, well developed female in NAD  Head: Grossly normal.  Neck: Supple without JVD or bruit. Thyroid is not enlarged.  Pulmonary: Clear to ausculation. Normal effort. No rales, ronchi, or wheezing.  Cardiovascular: Regular rate and rhythm without murmur. "   Abdomen:  Soft, nontender, nondistended.  Extremities: No clubbing, cyanosis, or edema.  Skin: Intact with no obvious rashes or lesions.  Neuro: Grossly intact.  Psych: Alert and oriented x 3.  Mood and affect appropriate.    Medical decision-making and discussion: Dunia is here today to discuss a few things.  Lab work was ordered and her medications were refilled.  She will restart paxil, and she was given a flu shot.  She will follow-up here as needed.    I have placed the below orders and discussed them with an approved delegating provider. The MA is performing the below orders under the direction of Dr. Baptiste., who have provided verbal consent for supervision.            Assessment/Plan:  1. Need for vaccination  Influenza Vaccine Quad Injection >3Y (PF)   2. Hyperlipidemia with target LDL less than 100  simvastatin (ZOCOR) 40 MG Tab    CBC WITH DIFFERENTIAL    COMP METABOLIC PANEL    Lipid Profile   3. Hypertension, unspecified type  losartan (COZAAR) 50 MG Tab   4. Prediabetes  metFORMIN (GLUCOPHAGE) 500 MG Tab    COMP METABOLIC PANEL    HEMOGLOBIN A1C    MICROALBUMIN CREAT RATIO URINE   5. Insomnia, unspecified type  traZODone (DESYREL) 50 MG Tab   6. Depression, unspecified depression type  PARoxetine (PAXIL) 20 MG Tab   7. Low vitamin D level  VITAMIN D,25 HYDROXY   8. Fatigue, unspecified type  TSH   9. Chronic insomnia     10. Essential hypertension     11. Pre-diabetes         Return if symptoms worsen or fail to improve.    Please note that this dictation was created using voice recognition software. I have made every reasonable attempt to correct obvious errors, but I expect that there are errors of grammar and possibly content that I did not discover before finalizing the note.

## 2019-02-10 PROBLEM — F32.A DEPRESSION: Status: ACTIVE | Noted: 2019-02-10

## 2019-02-10 PROBLEM — Z23 NEED FOR VACCINATION: Status: ACTIVE | Noted: 2019-02-10

## 2019-02-20 ENCOUNTER — HOSPITAL ENCOUNTER (OUTPATIENT)
Dept: LAB | Facility: MEDICAL CENTER | Age: 64
End: 2019-02-20
Attending: NURSE PRACTITIONER
Payer: COMMERCIAL

## 2019-02-20 DIAGNOSIS — E78.5 HYPERLIPIDEMIA WITH TARGET LDL LESS THAN 100: ICD-10-CM

## 2019-02-20 DIAGNOSIS — R53.83 FATIGUE, UNSPECIFIED TYPE: ICD-10-CM

## 2019-02-20 DIAGNOSIS — R73.03 PREDIABETES: ICD-10-CM

## 2019-02-20 DIAGNOSIS — R79.89 LOW VITAMIN D LEVEL: ICD-10-CM

## 2019-02-20 LAB
25(OH)D3 SERPL-MCNC: 24 NG/ML (ref 30–100)
ALBUMIN SERPL BCP-MCNC: 4.5 G/DL (ref 3.2–4.9)
ALBUMIN/GLOB SERPL: 1.7 G/DL
ALP SERPL-CCNC: 49 U/L (ref 30–99)
ALT SERPL-CCNC: 31 U/L (ref 2–50)
ANION GAP SERPL CALC-SCNC: 10 MMOL/L (ref 0–11.9)
AST SERPL-CCNC: 24 U/L (ref 12–45)
BASOPHILS # BLD AUTO: 0.7 % (ref 0–1.8)
BASOPHILS # BLD: 0.04 K/UL (ref 0–0.12)
BILIRUB SERPL-MCNC: 0.4 MG/DL (ref 0.1–1.5)
BUN SERPL-MCNC: 21 MG/DL (ref 8–22)
CALCIUM SERPL-MCNC: 9.1 MG/DL (ref 8.5–10.5)
CHLORIDE SERPL-SCNC: 109 MMOL/L (ref 96–112)
CHOLEST SERPL-MCNC: 200 MG/DL (ref 100–199)
CO2 SERPL-SCNC: 23 MMOL/L (ref 20–33)
CREAT SERPL-MCNC: 0.72 MG/DL (ref 0.5–1.4)
CREAT UR-MCNC: 133.1 MG/DL
EOSINOPHIL # BLD AUTO: 0.17 K/UL (ref 0–0.51)
EOSINOPHIL NFR BLD: 3 % (ref 0–6.9)
ERYTHROCYTE [DISTWIDTH] IN BLOOD BY AUTOMATED COUNT: 45.1 FL (ref 35.9–50)
EST. AVERAGE GLUCOSE BLD GHB EST-MCNC: 151 MG/DL
FASTING STATUS PATIENT QL REPORTED: NORMAL
GLOBULIN SER CALC-MCNC: 2.6 G/DL (ref 1.9–3.5)
GLUCOSE SERPL-MCNC: 145 MG/DL (ref 65–99)
HBA1C MFR BLD: 6.9 % (ref 0–5.6)
HCT VFR BLD AUTO: 42.3 % (ref 37–47)
HDLC SERPL-MCNC: 78 MG/DL
HGB BLD-MCNC: 13.6 G/DL (ref 12–16)
IMM GRANULOCYTES # BLD AUTO: 0.01 K/UL (ref 0–0.11)
IMM GRANULOCYTES NFR BLD AUTO: 0.2 % (ref 0–0.9)
LDLC SERPL CALC-MCNC: 93 MG/DL
LYMPHOCYTES # BLD AUTO: 1.78 K/UL (ref 1–4.8)
LYMPHOCYTES NFR BLD: 31.4 % (ref 22–41)
MCH RBC QN AUTO: 30.6 PG (ref 27–33)
MCHC RBC AUTO-ENTMCNC: 32.2 G/DL (ref 33.6–35)
MCV RBC AUTO: 95.3 FL (ref 81.4–97.8)
MICROALBUMIN UR-MCNC: 1.4 MG/DL
MICROALBUMIN/CREAT UR: 11 MG/G (ref 0–30)
MONOCYTES # BLD AUTO: 0.38 K/UL (ref 0–0.85)
MONOCYTES NFR BLD AUTO: 6.7 % (ref 0–13.4)
NEUTROPHILS # BLD AUTO: 3.28 K/UL (ref 2–7.15)
NEUTROPHILS NFR BLD: 58 % (ref 44–72)
NRBC # BLD AUTO: 0 K/UL
NRBC BLD-RTO: 0 /100 WBC
PLATELET # BLD AUTO: 226 K/UL (ref 164–446)
PMV BLD AUTO: 10.6 FL (ref 9–12.9)
POTASSIUM SERPL-SCNC: 4.4 MMOL/L (ref 3.6–5.5)
PROT SERPL-MCNC: 7.1 G/DL (ref 6–8.2)
RBC # BLD AUTO: 4.44 M/UL (ref 4.2–5.4)
SODIUM SERPL-SCNC: 142 MMOL/L (ref 135–145)
TRIGL SERPL-MCNC: 145 MG/DL (ref 0–149)
TSH SERPL DL<=0.005 MIU/L-ACNC: 1.96 UIU/ML (ref 0.38–5.33)
WBC # BLD AUTO: 5.7 K/UL (ref 4.8–10.8)

## 2019-02-20 PROCEDURE — 82043 UR ALBUMIN QUANTITATIVE: CPT

## 2019-02-20 PROCEDURE — 85025 COMPLETE CBC W/AUTO DIFF WBC: CPT

## 2019-02-20 PROCEDURE — 80053 COMPREHEN METABOLIC PANEL: CPT

## 2019-02-20 PROCEDURE — 83036 HEMOGLOBIN GLYCOSYLATED A1C: CPT

## 2019-02-20 PROCEDURE — 80061 LIPID PANEL: CPT

## 2019-02-20 PROCEDURE — 84443 ASSAY THYROID STIM HORMONE: CPT

## 2019-02-20 PROCEDURE — 82306 VITAMIN D 25 HYDROXY: CPT

## 2019-02-20 PROCEDURE — 36415 COLL VENOUS BLD VENIPUNCTURE: CPT

## 2019-02-20 PROCEDURE — 82570 ASSAY OF URINE CREATININE: CPT

## 2019-03-14 ENCOUNTER — OFFICE VISIT (OUTPATIENT)
Dept: MEDICAL GROUP | Facility: PHYSICIAN GROUP | Age: 64
End: 2019-03-14
Payer: COMMERCIAL

## 2019-03-14 VITALS
HEIGHT: 65 IN | RESPIRATION RATE: 16 BRPM | DIASTOLIC BLOOD PRESSURE: 86 MMHG | WEIGHT: 209 LBS | OXYGEN SATURATION: 96 % | HEART RATE: 72 BPM | TEMPERATURE: 97.7 F | SYSTOLIC BLOOD PRESSURE: 130 MMHG | BODY MASS INDEX: 34.82 KG/M2

## 2019-03-14 DIAGNOSIS — E78.5 HYPERLIPIDEMIA WITH TARGET LDL LESS THAN 100: ICD-10-CM

## 2019-03-14 DIAGNOSIS — I10 ESSENTIAL HYPERTENSION: ICD-10-CM

## 2019-03-14 DIAGNOSIS — R73.03 PRE-DIABETES: ICD-10-CM

## 2019-03-14 PROBLEM — Z23 NEED FOR VACCINATION: Status: RESOLVED | Noted: 2019-02-10 | Resolved: 2019-03-14

## 2019-03-14 PROCEDURE — 99214 OFFICE O/P EST MOD 30 MIN: CPT | Performed by: NURSE PRACTITIONER

## 2019-04-08 NOTE — PROGRESS NOTES
Chief Complaint   Patient presents with   • Results     [facility]        HISTORY OF PRESENT ILLNESS: Patient is a 63 y.o. female established patient who presents today to discuss the following issues:    Pre-diabetes  A1c has gone up to 6.9%.  She admits that she hasn't been eating well, and she hasn't been consistent with her medications.  She would like to work on diet and lifestyle modification, take her medication as directed, and follow-up in a few months.     Essential hypertension  Chronic in nature.  Stable on meds.        Hyperlipidemia with target LDL less than 100  Chronic in nature.  Stable on meds.          Patient Active Problem List    Diagnosis Date Noted   • Depression 02/10/2019   • Left elbow pain 09/04/2018   • Rash 09/04/2018   • Chronic insomnia 04/18/2016   • Essential hypertension 03/28/2016   • Pre-diabetes 10/02/2014   • BMI 32.0-32.9,adult 10/02/2014   • Hyperlipidemia with target LDL less than 100 10/02/2014       Allergies:Shellfish allergy and Sulfa drugs    Current Outpatient Prescriptions   Medication Sig Dispense Refill   • losartan (COZAAR) 50 MG Tab TAKE ONE TABLET BY MOUTH DAILY (Patient taking differently: Take 50 mg by mouth every day. TAKE ONE TABLET BY MOUTH DAILY) 90 Tab 3   • metFORMIN (GLUCOPHAGE) 500 MG Tab Take 1 Tab by mouth 2 times a day, with meals. 180 Tab 3   • simvastatin (ZOCOR) 40 MG Tab TAKE ONE TABLET BY MOUTH EVERY EVENING (Patient taking differently: Take 40 mg by mouth every day. TAKE ONE TABLET BY MOUTH EVERY EVENING) 90 Tab 3   • PARoxetine (PAXIL) 20 MG Tab Take 1 Tab by mouth every day. 90 Tab 3   • traZODone (DESYREL) 50 MG Tab TAKE ONE TABLET BY MOUTH EVERY EVENING AS NEEDED FOR SLEEP (Patient taking differently: Take 50 mg by mouth as needed. TAKE ONE TABLET BY MOUTH EVERY EVENING AS NEEDED FOR SLEEP) 90 Tab 3   • clobetasol (TEMOVATE) 0.05 % Cream Apply 1 Application to affected area(s) 2 times a day. 1 Tube 0     No current facility-administered  medications for this visit.        Social History   Substance Use Topics   • Smoking status: Former Smoker     Packs/day: 0.00     Years: 48.00     Quit date: 2017   • Smokeless tobacco: Never Used   • Alcohol use No       Family Status   Relation Status   • Mo    • Fa    • Neg Hx (Not Specified)     Family History   Problem Relation Age of Onset   • Cancer Mother         pancreatic    • Heart Disease Father    • Heart Attack Father    • Diabetes Father         pre-diabetes   • Stroke Neg Hx        Review of Systems:   Constitutional: Negative for fever, chills, weight loss and malaise/fatigue.   HENT: Negative for ear pain, nosebleeds, congestion, sore throat and neck pain.    Eyes: Negative for blurred vision.   Respiratory: Negative for cough, sputum production, shortness of breath and wheezing.    Cardiovascular: Negative for chest pain, palpitations, orthopnea and leg swelling.   Gastrointestinal: Negative for heartburn, nausea, vomiting and abdominal pain.   Genitourinary: Negative for dysuria, urgency and frequency.   Musculoskeletal: Negative for myalgias, joint pain, and back pain.  Skin: Negative for rash and itching.   Neurological: Negative for dizziness, tingling, tremors, sensory change, focal weakness and headaches.   Endo/Heme/Allergies: Does not bruise/bleed easily.   Psychiatric/Behavioral: Negative for depression, suicidal ideas and memory loss.  The patient is not nervous/anxious and does not have insomnia.    All other systems reviewed and are negative except as in HPI.    Exam:    General:  Well nourished, well developed female in NAD  Head: Grossly normal.  Neck: Supple without JVD or bruit. Thyroid is not enlarged.  Pulmonary: Clear to ausculation. Normal effort. No rales, ronchi, or wheezing.  Cardiovascular: Regular rate and rhythm without murmur.   Abdomen:  Soft, nontender, nondistended.  Extremities: No clubbing, cyanosis, or edema.  Skin: Intact with no obvious  rashes or lesions.  Neuro: Grossly intact.  Psych: Alert and oriented x 3.  Mood and affect appropriate.    Medical decision-making and discussion: Dunia is here today for follow-up.  Her lab results were reviewed, she will work on lifestyle modification, and she will follow-up in a few months.          Assessment/Plan:  1. Pre-diabetes     2. Essential hypertension     3. Hyperlipidemia with target LDL less than 100         Return if symptoms worsen or fail to improve.    Please note that this dictation was created using voice recognition software. I have made every reasonable attempt to correct obvious errors, but I expect that there are errors of grammar and possibly content that I did not discover before finalizing the note.

## 2019-04-08 NOTE — ASSESSMENT & PLAN NOTE
A1c has gone up to 6.9%.  She admits that she hasn't been eating well, and she hasn't been consistent with her medications.  She would like to work on diet and lifestyle modification, take her medication as directed, and follow-up in a few months.

## 2019-05-01 ENCOUNTER — TELEPHONE (OUTPATIENT)
Dept: MEDICAL GROUP | Facility: PHYSICIAN GROUP | Age: 64
End: 2019-05-01

## 2019-05-01 NOTE — TELEPHONE ENCOUNTER
1. Caller Name: Dunia                                          Call Back Number: [phone] (Mobile)        Patient approves a detailed voicemail message: N\A    Pt has been taking PARoxetine (PAXIL) 20 MG Tab and states that it was working for a while and then about 2 wks it became ineffective and pt would like to know if she can have the dosage increased or changed. Please advise.   
Medical Necessity Information: It is in your best interest to select a reason for this procedure from the list below. All of these items fulfill various CMS LCD requirements except the new and changing color options.
Pt notified and will schedule once on medicare  
She needs to be seen to discuss medication changes.  Thanks!  
Detail Level: Zone
Medical Necessity Clause: This procedure was medically necessary because the lesions that were treated were: infectious and contagious.
Render Post-Care Instructions In Note?: yes
Consent: The patient's verbal consent was obtained including but not limited to risks of crusting, scabbing, blistering, scarring, darker or lighter pigmentary change, recurrence, incomplete removal and infection.
Include Z78.9 (Other Specified Conditions Influencing Health Status) As An Associated Diagnosis?: No
Post-Care Instructions: I reviewed with the patient in detail post-care instructions. Patient is to avoid picking at any of the treated lesions, and may apply Vaseline to crusted or scabbing areas.
Number Of Freeze-Thaw Cycles: 2 freeze-thaw cycles

## 2019-12-08 DIAGNOSIS — F32.A DEPRESSION, UNSPECIFIED DEPRESSION TYPE: ICD-10-CM

## 2019-12-10 RX ORDER — PAROXETINE HYDROCHLORIDE 20 MG/1
TABLET, FILM COATED ORAL
Qty: 90 TAB | Refills: 0 | Status: SHIPPED | OUTPATIENT
Start: 2019-12-10 | End: 2020-03-10

## 2019-12-11 NOTE — TELEPHONE ENCOUNTER
Was the patient seen in the last year in this department? Yes    Does patient have an active prescription for medications requested? No     Received Request Via: Pharmacy      Pt met protocol?: Yes, ov 3/19

## 2020-01-14 ENCOUNTER — OFFICE VISIT (OUTPATIENT)
Dept: MEDICAL GROUP | Facility: PHYSICIAN GROUP | Age: 65
End: 2020-01-14
Payer: MEDICARE

## 2020-01-14 VITALS
OXYGEN SATURATION: 94 % | WEIGHT: 217 LBS | SYSTOLIC BLOOD PRESSURE: 118 MMHG | RESPIRATION RATE: 14 BRPM | TEMPERATURE: 97.4 F | HEART RATE: 85 BPM | DIASTOLIC BLOOD PRESSURE: 82 MMHG | BODY MASS INDEX: 36.15 KG/M2 | HEIGHT: 65 IN

## 2020-01-14 DIAGNOSIS — R53.83 FATIGUE, UNSPECIFIED TYPE: ICD-10-CM

## 2020-01-14 DIAGNOSIS — R06.2 WHEEZE: ICD-10-CM

## 2020-01-14 DIAGNOSIS — I34.1 MVP (MITRAL VALVE PROLAPSE): ICD-10-CM

## 2020-01-14 DIAGNOSIS — J40 BRONCHITIS: ICD-10-CM

## 2020-01-14 DIAGNOSIS — E78.5 HYPERLIPIDEMIA WITH TARGET LDL LESS THAN 100: ICD-10-CM

## 2020-01-14 DIAGNOSIS — Z82.49 FAMILY HISTORY OF HEART DISEASE: ICD-10-CM

## 2020-01-14 DIAGNOSIS — R73.03 PRE-DIABETES: ICD-10-CM

## 2020-01-14 DIAGNOSIS — E55.9 AVITAMINOSIS D: ICD-10-CM

## 2020-01-14 DIAGNOSIS — I10 ESSENTIAL HYPERTENSION: ICD-10-CM

## 2020-01-14 DIAGNOSIS — R79.89 LOW VITAMIN D LEVEL: ICD-10-CM

## 2020-01-14 PROCEDURE — 99214 OFFICE O/P EST MOD 30 MIN: CPT | Performed by: NURSE PRACTITIONER

## 2020-01-14 RX ORDER — ALBUTEROL SULFATE 2.5 MG/3ML
2.5 SOLUTION RESPIRATORY (INHALATION) EVERY 4 HOURS PRN
Qty: 25 BULLET | Refills: 0 | Status: SHIPPED | OUTPATIENT
Start: 2020-01-14 | End: 2024-01-17 | Stop reason: SDUPTHER

## 2020-01-14 RX ORDER — DOXYCYCLINE HYCLATE 100 MG
100 TABLET ORAL 2 TIMES DAILY
Qty: 20 TAB | Refills: 0 | Status: SHIPPED
Start: 2020-01-14 | End: 2020-04-03

## 2020-01-14 ASSESSMENT — PATIENT HEALTH QUESTIONNAIRE - PHQ9
7. TROUBLE CONCENTRATING ON THINGS, SUCH AS READING THE NEWSPAPER OR WATCHING TELEVISION: NOT AT ALL
3. TROUBLE FALLING OR STAYING ASLEEP OR SLEEPING TOO MUCH: NOT AT ALL
8. MOVING OR SPEAKING SO SLOWLY THAT OTHER PEOPLE COULD HAVE NOTICED. OR THE OPPOSITE, BEING SO FIGETY OR RESTLESS THAT YOU HAVE BEEN MOVING AROUND A LOT MORE THAN USUAL: NOT AT ALL
SUM OF ALL RESPONSES TO PHQ9 QUESTIONS 1 AND 2: 0
4. FEELING TIRED OR HAVING LITTLE ENERGY: NOT AT ALL
5. POOR APPETITE OR OVEREATING: NOT AT ALL
2. FEELING DOWN, DEPRESSED, IRRITABLE, OR HOPELESS: NOT AT ALL
1. LITTLE INTEREST OR PLEASURE IN DOING THINGS: NOT AT ALL
SUM OF ALL RESPONSES TO PHQ QUESTIONS 1-9: 0
6. FEELING BAD ABOUT YOURSELF - OR THAT YOU ARE A FAILURE OR HAVE LET YOURSELF OR YOUR FAMILY DOWN: NOT AL ALL
9. THOUGHTS THAT YOU WOULD BE BETTER OFF DEAD, OR OF HURTING YOURSELF: NOT AT ALL

## 2020-01-14 NOTE — PROGRESS NOTES
Chief Complaint   Patient presents with   • Annual Exam   • Orders Needed     Requesting Labs    • Congestion     Chest/Nasal/Fever/Cough/x 4 days        HISTORY OF PRESENT ILLNESS: Patient is a 64 y.o. female established patient who presents today to discuss the following issues:    BMI 36.0-36.9,adult  Patient is aware of BMI elevation.  Brief discussion of diet, exercise, and lifestyle modification.      Essential hypertension  Chronic in nature.  Stable on meds.  Due for labs.    Hyperlipidemia with target LDL less than 100  Chronic in nature.  Stable on meds.  Due for labs.    Pre-diabetes  Due for labs.    MVP (mitral valve prolapse)  Would like a referral to cardiology.  In addition to the MVP, she also has an extensive family history of heart disease.    Bronchitis  Started about 10 days ago with cold symptoms.  Has moved into her chest and she now has a productive cough with thick, dark sputum.  Discussed plan to proceed with antibiotics.  She was encouraged to rest, stay hydrated, and to treat her symptoms with otc meds.        Patient Active Problem List    Diagnosis Date Noted   • Bronchitis 01/22/2020   • MVP (mitral valve prolapse) 01/22/2020   • Depression 02/10/2019   • Left elbow pain 09/04/2018   • Rash 09/04/2018   • Chronic insomnia 04/18/2016   • Essential hypertension 03/28/2016   • Pre-diabetes 10/02/2014   • BMI 36.0-36.9,adult 10/02/2014   • Hyperlipidemia with target LDL less than 100 10/02/2014       Allergies:Shellfish allergy and Sulfa drugs    Current Outpatient Medications   Medication Sig Dispense Refill   • doxycycline (VIBRAMYCIN) 100 MG Tab Take 1 Tab by mouth 2 times a day. 20 Tab 0   • albuterol (PROVENTIL) 2.5mg/3ml Nebu Soln solution for nebulization 3 mL by Nebulization route every four hours as needed for Shortness of Breath. 25 Bullet 0   • PARoxetine (PAXIL) 20 MG Tab TAKE ONE TABLET BY MOUTH DAILY 90 Tab 0   • losartan (COZAAR) 50 MG Tab TAKE ONE TABLET BY MOUTH DAILY  (Patient taking differently: Take 50 mg by mouth every day. TAKE ONE TABLET BY MOUTH DAILY) 90 Tab 3   • metFORMIN (GLUCOPHAGE) 500 MG Tab Take 1 Tab by mouth 2 times a day, with meals. 180 Tab 3   • simvastatin (ZOCOR) 40 MG Tab TAKE ONE TABLET BY MOUTH EVERY EVENING (Patient taking differently: Take 40 mg by mouth every day. TAKE ONE TABLET BY MOUTH EVERY EVENING) 90 Tab 3   • traZODone (DESYREL) 50 MG Tab TAKE ONE TABLET BY MOUTH EVERY EVENING AS NEEDED FOR SLEEP (Patient taking differently: Take 50 mg by mouth as needed. TAKE ONE TABLET BY MOUTH EVERY EVENING AS NEEDED FOR SLEEP) 90 Tab 3     No current facility-administered medications for this visit.        Social History     Tobacco Use   • Smoking status: Former Smoker     Packs/day: 0.00     Years: 48.00     Pack years: 0.00     Last attempt to quit: 2017     Years since quittin.6   • Smokeless tobacco: Never Used   Substance Use Topics   • Alcohol use: No     Alcohol/week: 0.0 oz   • Drug use: No       Family Status   Relation Name Status   • Mo     • Fa     • Neg Hx  (Not Specified)     Family History   Problem Relation Age of Onset   • Cancer Mother         pancreatic    • Heart Disease Father    • Heart Attack Father    • Diabetes Father         pre-diabetes   • Stroke Neg Hx        Review of Systems:   Constitutional: Negative for fever, chills, weight loss and malaise/fatigue.   HENT: Negative for ear pain, nosebleeds, congestion, sore throat and neck pain.    Eyes: Negative for blurred vision.   Respiratory: Positive for cough, sputum production.  Negative for shortness of breath and wheezing.    Cardiovascular: Negative for chest pain, palpitations, orthopnea and leg swelling.   Gastrointestinal: Negative for heartburn, nausea, vomiting and abdominal pain.   Genitourinary: Negative for dysuria, urgency and frequency.   Musculoskeletal: Negative for myalgias, joint pain, and back pain.  Skin: Negative for rash and itching.  "  Neurological: Negative for dizziness, tingling, tremors, sensory change, focal weakness and headaches.   Endo/Heme/Allergies: Does not bruise/bleed easily.   Psychiatric/Behavioral: Negative for depression, suicidal ideas and memory loss.  The patient is not nervous/anxious and does not have insomnia.    All other systems reviewed and are negative except as in HPI.    Exam:  Blood Pressure 118/82   Pulse 85   Temperature 36.3 °C (97.4 °F)   Respiration 14   Height 1.651 m (5' 5\")   Weight 98.4 kg (217 lb)   Oxygen Saturation 94%   General:  Well nourished, well developed female in NAD  Head: Grossly normal.  Neck: Supple without JVD or bruit. Thyroid is not enlarged.  Pulmonary: Clear to ausculation. Normal effort. No rales, ronchi, or wheezing.  Cardiovascular: Regular rate and rhythm without murmur.   Abdomen:  Soft, nontender, nondistended.  Extremities: No clubbing, cyanosis, or edema.  Skin: Intact with no obvious rashes or lesions.  Neuro: Grossly intact.  Psych: Alert and oriented x 3.  Mood and affect appropriate.    Medical decision-making and discussion: Dunia is here today to discuss several things.  Her records were reviewed, lab work was ordered, doxycycline was sent to her pharmacy, and a referral was sent to cardiology.  She will follow-up here as needed.          Assessment/Plan:  1. BMI 36.0-36.9,adult  Patient identified as having weight management issue.  Appropriate orders and counseling given.   2. Essential hypertension  CBC WITH DIFFERENTIAL    Comp Metabolic Panel   3. Hyperlipidemia with target LDL less than 100  CBC WITH DIFFERENTIAL    Comp Metabolic Panel    Lipid Profile   4. Pre-diabetes  CANCELED: HEMOGLOBIN A1C   5. Fatigue, unspecified type  TSH   6. Low vitamin D level     7. Avitaminosis D  VITAMIN D,25 HYDROXY   8. Bronchitis  doxycycline (VIBRAMYCIN) 100 MG Tab    albuterol (PROVENTIL) 2.5mg/3ml Nebu Soln solution for nebulization   9. Wheeze     10. MVP (mitral valve " prolapse)  REFERRAL TO CARDIOLOGY   11. Family history of heart disease  REFERRAL TO CARDIOLOGY       Return if symptoms worsen or fail to improve.    Please note that this dictation was created using voice recognition software. I have made every reasonable attempt to correct obvious errors, but I expect that there are errors of grammar and possibly content that I did not discover before finalizing the note.

## 2020-01-22 ENCOUNTER — HOSPITAL ENCOUNTER (OUTPATIENT)
Dept: LAB | Facility: MEDICAL CENTER | Age: 65
End: 2020-01-22
Attending: NURSE PRACTITIONER
Payer: MEDICARE

## 2020-01-22 DIAGNOSIS — R73.03 PRE-DIABETES: ICD-10-CM

## 2020-01-22 DIAGNOSIS — I10 ESSENTIAL HYPERTENSION: ICD-10-CM

## 2020-01-22 DIAGNOSIS — E55.9 AVITAMINOSIS D: ICD-10-CM

## 2020-01-22 DIAGNOSIS — R53.83 FATIGUE, UNSPECIFIED TYPE: ICD-10-CM

## 2020-01-22 DIAGNOSIS — E78.5 HYPERLIPIDEMIA WITH TARGET LDL LESS THAN 100: ICD-10-CM

## 2020-01-22 PROBLEM — J40 BRONCHITIS: Status: ACTIVE | Noted: 2020-01-22

## 2020-01-22 PROBLEM — I34.1 MVP (MITRAL VALVE PROLAPSE): Status: ACTIVE | Noted: 2020-01-22

## 2020-01-22 LAB
25(OH)D3 SERPL-MCNC: 35 NG/ML (ref 30–100)
ALBUMIN SERPL BCP-MCNC: 4.4 G/DL (ref 3.2–4.9)
ALBUMIN/GLOB SERPL: 1.6 G/DL
ALP SERPL-CCNC: 59 U/L (ref 30–99)
ALT SERPL-CCNC: 23 U/L (ref 2–50)
ANION GAP SERPL CALC-SCNC: 10 MMOL/L (ref 0–11.9)
AST SERPL-CCNC: 16 U/L (ref 12–45)
BASOPHILS # BLD AUTO: 0.9 % (ref 0–1.8)
BASOPHILS # BLD: 0.06 K/UL (ref 0–0.12)
BILIRUB SERPL-MCNC: 0.4 MG/DL (ref 0.1–1.5)
BUN SERPL-MCNC: 23 MG/DL (ref 8–22)
CALCIUM SERPL-MCNC: 10.1 MG/DL (ref 8.5–10.5)
CHLORIDE SERPL-SCNC: 106 MMOL/L (ref 96–112)
CHOLEST SERPL-MCNC: 181 MG/DL (ref 100–199)
CO2 SERPL-SCNC: 26 MMOL/L (ref 20–33)
CREAT SERPL-MCNC: 0.9 MG/DL (ref 0.5–1.4)
EOSINOPHIL # BLD AUTO: 0.18 K/UL (ref 0–0.51)
EOSINOPHIL NFR BLD: 2.8 % (ref 0–6.9)
ERYTHROCYTE [DISTWIDTH] IN BLOOD BY AUTOMATED COUNT: 44 FL (ref 35.9–50)
FASTING STATUS PATIENT QL REPORTED: NORMAL
GLOBULIN SER CALC-MCNC: 2.7 G/DL (ref 1.9–3.5)
GLUCOSE SERPL-MCNC: 142 MG/DL (ref 65–99)
HCT VFR BLD AUTO: 42.6 % (ref 37–47)
HDLC SERPL-MCNC: 67 MG/DL
HGB BLD-MCNC: 13.8 G/DL (ref 12–16)
IMM GRANULOCYTES # BLD AUTO: 0.01 K/UL (ref 0–0.11)
IMM GRANULOCYTES NFR BLD AUTO: 0.2 % (ref 0–0.9)
LDLC SERPL CALC-MCNC: 92 MG/DL
LYMPHOCYTES # BLD AUTO: 2.32 K/UL (ref 1–4.8)
LYMPHOCYTES NFR BLD: 36 % (ref 22–41)
MCH RBC QN AUTO: 31.3 PG (ref 27–33)
MCHC RBC AUTO-ENTMCNC: 32.4 G/DL (ref 33.6–35)
MCV RBC AUTO: 96.6 FL (ref 81.4–97.8)
MONOCYTES # BLD AUTO: 0.37 K/UL (ref 0–0.85)
MONOCYTES NFR BLD AUTO: 5.7 % (ref 0–13.4)
NEUTROPHILS # BLD AUTO: 3.51 K/UL (ref 2–7.15)
NEUTROPHILS NFR BLD: 54.4 % (ref 44–72)
NRBC # BLD AUTO: 0 K/UL
NRBC BLD-RTO: 0 /100 WBC
PLATELET # BLD AUTO: 211 K/UL (ref 164–446)
PMV BLD AUTO: 10.9 FL (ref 9–12.9)
POTASSIUM SERPL-SCNC: 4.7 MMOL/L (ref 3.6–5.5)
PROT SERPL-MCNC: 7.1 G/DL (ref 6–8.2)
RBC # BLD AUTO: 4.41 M/UL (ref 4.2–5.4)
SODIUM SERPL-SCNC: 142 MMOL/L (ref 135–145)
TRIGL SERPL-MCNC: 112 MG/DL (ref 0–149)
TSH SERPL DL<=0.005 MIU/L-ACNC: 2.13 UIU/ML (ref 0.38–5.33)
WBC # BLD AUTO: 6.5 K/UL (ref 4.8–10.8)

## 2020-01-22 PROCEDURE — 85025 COMPLETE CBC W/AUTO DIFF WBC: CPT

## 2020-01-22 PROCEDURE — 82306 VITAMIN D 25 HYDROXY: CPT

## 2020-01-22 PROCEDURE — 84443 ASSAY THYROID STIM HORMONE: CPT

## 2020-01-22 PROCEDURE — 80061 LIPID PANEL: CPT

## 2020-01-22 PROCEDURE — 80053 COMPREHEN METABOLIC PANEL: CPT

## 2020-01-22 PROCEDURE — 36415 COLL VENOUS BLD VENIPUNCTURE: CPT

## 2020-01-22 NOTE — ASSESSMENT & PLAN NOTE
Started about 10 days ago with cold symptoms.  Has moved into her chest and she now has a productive cough with thick, dark sputum.  Discussed plan to proceed with antibiotics.  She was encouraged to rest, stay hydrated, and to treat her symptoms with otc meds.

## 2020-01-22 NOTE — ASSESSMENT & PLAN NOTE
Would like a referral to cardiology.  In addition to the MVP, she also has an extensive family history of heart disease.

## 2020-01-28 ENCOUNTER — TELEPHONE (OUTPATIENT)
Dept: CARDIOLOGY | Facility: MEDICAL CENTER | Age: 65
End: 2020-01-28

## 2020-01-29 NOTE — TELEPHONE ENCOUNTER
Patient called back and said she has no recent testing and the last cardiologist she saw was Dr. Leung 40 years ago.

## 2020-02-07 ENCOUNTER — OFFICE VISIT (OUTPATIENT)
Dept: CARDIOLOGY | Facility: MEDICAL CENTER | Age: 65
End: 2020-02-07
Payer: MEDICARE

## 2020-02-07 ENCOUNTER — TELEPHONE (OUTPATIENT)
Dept: CARDIOLOGY | Facility: MEDICAL CENTER | Age: 65
End: 2020-02-07

## 2020-02-07 VITALS
OXYGEN SATURATION: 95 % | DIASTOLIC BLOOD PRESSURE: 64 MMHG | HEART RATE: 74 BPM | WEIGHT: 216.71 LBS | SYSTOLIC BLOOD PRESSURE: 118 MMHG | RESPIRATION RATE: 18 BRPM | HEIGHT: 65 IN | BODY MASS INDEX: 36.11 KG/M2

## 2020-02-07 DIAGNOSIS — E78.5 HYPERLIPIDEMIA WITH TARGET LDL LESS THAN 100: ICD-10-CM

## 2020-02-07 DIAGNOSIS — Z91.89 OTHER SPECIFIED PERSONAL RISK FACTORS, NOT ELSEWHERE CLASSIFIED: ICD-10-CM

## 2020-02-07 DIAGNOSIS — M79.606 PAIN AND SWELLING OF LOWER EXTREMITY, UNSPECIFIED LATERALITY: ICD-10-CM

## 2020-02-07 DIAGNOSIS — M79.604 PAIN IN BOTH LOWER EXTREMITIES: ICD-10-CM

## 2020-02-07 DIAGNOSIS — M79.605 PAIN IN BOTH LOWER EXTREMITIES: ICD-10-CM

## 2020-02-07 DIAGNOSIS — I10 ESSENTIAL HYPERTENSION: ICD-10-CM

## 2020-02-07 DIAGNOSIS — M79.89 PAIN AND SWELLING OF LOWER EXTREMITY, UNSPECIFIED LATERALITY: ICD-10-CM

## 2020-02-07 DIAGNOSIS — I34.1 MVP (MITRAL VALVE PROLAPSE): ICD-10-CM

## 2020-02-07 LAB — EKG IMPRESSION: NORMAL

## 2020-02-07 PROCEDURE — 99204 OFFICE O/P NEW MOD 45 MIN: CPT | Performed by: INTERNAL MEDICINE

## 2020-02-07 PROCEDURE — 93000 ELECTROCARDIOGRAM COMPLETE: CPT | Performed by: INTERNAL MEDICINE

## 2020-02-07 ASSESSMENT — ENCOUNTER SYMPTOMS
ABDOMINAL PAIN: 0
DECREASED APPETITE: 0
CLAUDICATION: 1
DEPRESSION: 0
IRREGULAR HEARTBEAT: 0
PND: 0
VOMITING: 0
HEARTBURN: 0
FLANK PAIN: 0
DIZZINESS: 0
WEIGHT GAIN: 0
NEAR-SYNCOPE: 0
COUGH: 0
WEIGHT LOSS: 0
SHORTNESS OF BREATH: 0
DYSPNEA ON EXERTION: 0
ORTHOPNEA: 0
PALPITATIONS: 0
NAUSEA: 0
DIARRHEA: 0
CONSTIPATION: 0
BLURRED VISION: 0
FEVER: 0
SYNCOPE: 0
ALTERED MENTAL STATUS: 0
BACK PAIN: 0

## 2020-02-07 NOTE — PATIENT INSTRUCTIONS
Mediterranean Diet  A Mediterranean diet refers to food and lifestyle choices that are based on the traditions of countries located on the [place]. This way of eating has been shown to help prevent certain conditions and improve outcomes for people who have chronic diseases, like kidney disease and heart disease.  What are tips for following this plan?  Lifestyle  · Cook and eat meals together with your family, when possible.  · Drink enough fluid to keep your urine clear or pale yellow.  · Be physically active every day. This includes:  ¨ Aerobic exercise like running or swimming.  ¨ Leisure activities like gardening, walking, or housework.  · Get 7-8 hours of sleep each night.  · If recommended by your health care provider, drink red wine in moderation. This means 1 glass a day for nonpregnant women and 2 glasses a day for men. A glass of wine equals 5 oz (150 mL).  Reading food labels  · Check the serving size of packaged foods. For foods such as rice and pasta, the serving size refers to the amount of cooked product, not dry.  · Check the total fat in packaged foods. Avoid foods that have saturated fat or trans fats.  · Check the ingredients list for added sugars, such as corn syrup.  Shopping  · At the grocery store, buy most of your food from the areas near the walls of the store. This includes:  ¨ Fresh fruits and vegetables (produce).  ¨ Grains, beans, nuts, and seeds. Some of these may be available in unpackaged forms or large amounts (in bulk).  ¨ Fresh seafood.  ¨ Poultry and eggs.  ¨ Low-fat dairy products.  · Buy whole ingredients instead of prepackaged foods.  · Buy fresh fruits and vegetables in-season from local farmers markets.  · Buy frozen fruits and vegetables in resealable bags.  · If you do not have access to quality fresh seafood, buy precooked frozen shrimp or canned fish, such as tuna, salmon, or sardines.  · Buy small amounts of raw or cooked vegetables, salads, or olives from the  deli or salad bar at your store.  · Stock your pantry so you always have certain foods on hand, such as olive oil, canned tuna, canned tomatoes, rice, pasta, and beans.  Cooking  · Cook foods with extra-virgin olive oil instead of using butter or other vegetable oils.  · Have meat as a side dish, and have vegetables or grains as your main dish. This means having meat in small portions or adding small amounts of meat to foods like pasta or stew.  · Use beans or vegetables instead of meat in common dishes like chili or lasagna.  · [place] with different cooking methods. Try roasting or broiling vegetables instead of steaming or sautéeing them.  · Add frozen vegetables to soups, stews, pasta, or rice.  · Add nuts or seeds for added healthy fat at each meal. You can add these to yogurt, salads, or vegetable dishes.  · Marinate fish or vegetables using olive oil, lemon juice, garlic, and fresh herbs.  Meal planning  · Plan to eat 1 vegetarian meal one day each week. Try to work up to 2 vegetarian meals, if possible.  · Eat seafood 2 or more times a week.  · Have healthy snacks readily available, such as:  ¨ Vegetable sticks with hummus.  ¨ Greek yogurt.  ¨ Fruit and nut trail mix.  · Eat balanced meals throughout the week. This includes:  ¨ Fruit: 2-3 servings a day  ¨ Vegetables: 4-5 servings a day  ¨ Low-fat dairy: 2 servings a day  ¨ Fish, poultry, or lean meat: 1 serving a day  ¨ Beans and legumes: 2 or more servings a week  ¨ Nuts and seeds: 1-2 servings a day  ¨ Whole grains: 6-8 servings a day  ¨ Extra-virgin olive oil: 3-4 servings a day  · Limit red meat and sweets to only a few servings a month  What are my food choices?  · Mediterranean diet  ¨ Recommended  ¨ Grains: Whole-grain pasta. Brown rice. Bulgar wheat. Polenta. Couscous. Whole-wheat bread. Oatmeal. Quinoa.  ¨ Vegetables: Artichokes. Beets. Broccoli. Cabbage. Carrots. Eggplant. Green beans. Chard. Kale. Spinach. Onions. Leeks. Peas. Squash.  Tomatoes. Peppers. Radishes.  ¨ Fruits: Apples. Apricots. Avocado. Berries. Bananas. Cherries. Dates. Figs. Grapes. Pj. Melon. Oranges. Peaches. Plums. Pomegranate.  ¨ Meats and other protein foods: Beans. Almonds. Sunflower seeds. Pine nuts. Peanuts. Cod. [place]. Scallops. Shrimp. Tuna. Tilapia. Clams. Oysters. Eggs.  ¨ Dairy: Low-fat milk. Cheese. Greek yogurt.  ¨ Beverages: Water. Red wine. Herbal tea.  ¨ Fats and oils: Extra virgin olive oil. Avocado oil. Grape seed oil.  ¨ Sweets and desserts: Greek yogurt with honey. Baked apples. Poached pears. Trail mix.  ¨ Seasoning and other foods: Basil. Cilantro. Coriander. Cumin. Mint. Parsley. Harry. Rosemary. Tarragon. Garlic. Oregano. Thyme. Pepper. Balsalmic vinegar. Tahini. Hummus. Tomato sauce. Olives. Mushrooms.  ¨ Limit these  ¨ Grains: Prepackaged pasta or rice dishes. Prepackaged cereal with added sugar.  ¨ Vegetables: Deep fried potatoes (french fries).  ¨ Fruits: Fruit canned in syrup.  ¨ Meats and other protein foods: Beef. Pork. Lamb. Poultry with skin. Hot dogs. Sarah.  ¨ Dairy: Ice cream. Sour cream. Whole milk.  ¨ Beverages: Juice. Sugar-sweetened soft drinks. Beer. Liquor and spirits.  ¨ Fats and oils: Butter. Canola oil. Vegetable oil. Beef fat (tallow). Lard.  ¨ Sweets and desserts: Cookies. Cakes. Pies. Candy.  ¨ Seasoning and other foods: Mayonnaise. Premade sauces and marinades.  ¨ The items listed may not be a complete list. Talk with your dietitian about what dietary choices are right for you.  Summary  · The Mediterranean diet includes both food and lifestyle choices.  · Eat a variety of fresh fruits and vegetables, beans, nuts, seeds, and whole grains.  · Limit the amount of red meat and sweets that you eat.  · Talk with your health care provider about whether it is safe for you to drink red wine in moderation. This means 1 glass a day for nonpregnant women and 2 glasses a day for men. A glass of wine equals 5 oz (150 mL).  This information  is not intended to replace advice given to you by your health care provider. Make sure you discuss any questions you have with your health care provider.  Document Released: 08/10/2017 Document Revised: 09/12/2017 Document Reviewed: 08/10/2017  Elsevier Interactive Patient Education © 2017 [employer]

## 2020-02-07 NOTE — PROGRESS NOTES
Cardiology Note    Chief Complaint   Patient presents with   • Chest Pain       History of Present Illness: Dunia Her is a 64 y.o. female who presents for MVP, HTN, and HLD (DM / persist >160 / fam hx).     Describes feels well, but concerned due to her family history. She did also develop claudication type symptoms with ambulation. She stays active by walking and also caring for her granddaughter who is now 6 years old. She attempts to follow healthy diet by avoiding carbohydrates and salt, but she really loves beef and has it at least once weekly.     Review of Systems   Constitution: Negative for decreased appetite, fever, malaise/fatigue, weight gain and weight loss.   HENT: Negative for congestion and nosebleeds.    Eyes: Negative for blurred vision.   Cardiovascular: Positive for claudication. Negative for chest pain, dyspnea on exertion, irregular heartbeat, leg swelling, near-syncope, orthopnea, palpitations, paroxysmal nocturnal dyspnea and syncope.   Respiratory: Negative for cough and shortness of breath.    Endocrine: Negative for cold intolerance and heat intolerance.   Skin: Negative for rash.   Musculoskeletal: Negative for back pain.   Gastrointestinal: Negative for abdominal pain, constipation, diarrhea, heartburn, melena, nausea and vomiting.   Genitourinary: Negative for dysuria, flank pain and hematuria.   Neurological: Negative for dizziness.   Psychiatric/Behavioral: Negative for altered mental status and depression.         Past Medical History:   Diagnosis Date   • Hyperlipidemia LDL goal < 100 10/2/2014   • Hypertension    • Obesity (BMI 30-39.9) 10/2/2014   • Pre-diabetes 10/2/2014   • Recurrent sinusitis    • Type II or unspecified type diabetes mellitus without mention of complication, not stated as uncontrolled     pre-diabetes         Past Surgical History:   Procedure Laterality Date   • ABDOMINAL HYSTERECTOMY TOTAL  1993    benign cysts, total         Current Outpatient  Medications   Medication Sig Dispense Refill   • aspirin EC (ECOTRIN) 81 MG Tablet Delayed Response Take 1 Tab by mouth every day. 90 Tab 3   • doxycycline (VIBRAMYCIN) 100 MG Tab Take 1 Tab by mouth 2 times a day. 20 Tab 0   • albuterol (PROVENTIL) 2.5mg/3ml Nebu Soln solution for nebulization 3 mL by Nebulization route every four hours as needed for Shortness of Breath. 25 Bullet 0   • PARoxetine (PAXIL) 20 MG Tab TAKE ONE TABLET BY MOUTH DAILY 90 Tab 0   • losartan (COZAAR) 50 MG Tab TAKE ONE TABLET BY MOUTH DAILY (Patient taking differently: Take 50 mg by mouth every day. TAKE ONE TABLET BY MOUTH DAILY) 90 Tab 3   • metFORMIN (GLUCOPHAGE) 500 MG Tab Take 1 Tab by mouth 2 times a day, with meals. 180 Tab 3   • simvastatin (ZOCOR) 40 MG Tab TAKE ONE TABLET BY MOUTH EVERY EVENING (Patient taking differently: Take 40 mg by mouth every day. TAKE ONE TABLET BY MOUTH EVERY EVENING) 90 Tab 3   • traZODone (DESYREL) 50 MG Tab TAKE ONE TABLET BY MOUTH EVERY EVENING AS NEEDED FOR SLEEP (Patient taking differently: Take 50 mg by mouth as needed. TAKE ONE TABLET BY MOUTH EVERY EVENING AS NEEDED FOR SLEEP) 90 Tab 3     No current facility-administered medications for this visit.          Allergies   Allergen Reactions   • Shellfish Allergy      Eyes itchy   • Sulfa Drugs Hives         Family History   Problem Relation Age of Onset   • Cancer Mother         pancreatic    • Heart Disease Father    • Heart Attack Father    • Diabetes Father         pre-diabetes   • Stroke Neg Hx          Social History     Socioeconomic History   • Marital status:      Spouse name: Not on file   • Number of children: Not on file   • Years of education: Not on file   • Highest education level: Not on file   Occupational History   • Not on file   Social Needs   • Financial resource strain: Not on file   • Food insecurity:     Worry: Not on file     Inability: Not on file   • Transportation needs:     Medical: Not on file     Non-medical:  "Not on file   Tobacco Use   • Smoking status: Former Smoker     Packs/day: 0.00     Years: 48.00     Pack years: 0.00     Last attempt to quit: 2017     Years since quittin.6   • Smokeless tobacco: Never Used   Substance and Sexual Activity   • Alcohol use: No     Alcohol/week: 0.0 oz   • Drug use: No   • Sexual activity: Yes     Partners: Male     Comment:    Lifestyle   • Physical activity:     Days per week: Not on file     Minutes per session: Not on file   • Stress: Not on file   Relationships   • Social connections:     Talks on phone: Not on file     Gets together: Not on file     Attends [employer] service: Not on file     Active member of club or organization: Not on file     Attends meetings of clubs or organizations: Not on file     Relationship status: Not on file   • Intimate partner violence:     Fear of current or ex partner: Not on file     Emotionally abused: Not on file     Physically abused: Not on file     Forced sexual activity: Not on file   Other Topics Concern   • Not on file   Social History Narrative   • Not on file         Physical Exam:  Ambulatory Vitals  /64 (BP Location: Left arm, Patient Position: Sitting, BP Cuff Size: Adult)   Pulse 74   Resp 18   Ht 1.651 m (5' 5\")   Wt 98.3 kg (216 lb 11.4 oz)   SpO2 95%    BP Readings from Last 4 Encounters:   20 118/64   20 118/82   19 130/86   19 130/80     Weight/BMI:   Vitals:    20 0906   BP: 118/64   Weight: 98.3 kg (216 lb 11.4 oz)   Height: 1.651 m (5' 5\")    Body mass index is 36.06 kg/m².  Wt Readings from Last 4 Encounters:   20 98.3 kg (216 lb 11.4 oz)   20 98.4 kg (217 lb)   19 94.8 kg (209 lb)   19 98 kg (216 lb)       Physical Exam   Constitutional: She is oriented to person, place, and time and well-developed, well-nourished, and in no distress. No distress.   HENT:   Head: Normocephalic and atraumatic.   Eyes: Pupils are equal, round, and reactive to " light. Conjunctivae are normal.   Neck: Normal range of motion. Neck supple. No JVD present.   Cardiovascular: Normal rate, regular rhythm, normal heart sounds and intact distal pulses. Exam reveals no gallop and no friction rub.   No murmur heard.  Pulmonary/Chest: Effort normal and breath sounds normal. No respiratory distress. She has no wheezes. She has no rales. She exhibits no tenderness.   Abdominal: Soft. Bowel sounds are normal. She exhibits no distension.   Musculoskeletal:         General: No edema.   Neurological: She is alert and oriented to person, place, and time.   Skin: Skin is warm and dry.   Psychiatric: Affect and judgment normal.       Lab Data Review:  Lab Results   Component Value Date/Time    CHOLSTRLTOT 181 01/22/2020 09:39 AM    LDL 92 01/22/2020 09:39 AM    HDL 67 01/22/2020 09:39 AM    TRIGLYCERIDE 112 01/22/2020 09:39 AM       Lab Results   Component Value Date/Time    SODIUM 142 01/22/2020 09:39 AM    POTASSIUM 4.7 01/22/2020 09:39 AM    CHLORIDE 106 01/22/2020 09:39 AM    CO2 26 01/22/2020 09:39 AM    GLUCOSE 142 (H) 01/22/2020 09:39 AM    BUN 23 (H) 01/22/2020 09:39 AM    CREATININE 0.90 01/22/2020 09:39 AM     CrCl cannot be calculated (Patient's most recent lab result is older than the maximum 7 days allowed.).  Lab Results   Component Value Date/Time    ALKPHOSPHAT 59 01/22/2020 09:39 AM    ASTSGOT 16 01/22/2020 09:39 AM    ALTSGPT 23 01/22/2020 09:39 AM    TBILIRUBIN 0.4 01/22/2020 09:39 AM      Lab Results   Component Value Date/Time    WBC 6.5 01/22/2020 09:39 AM     Lab Results   Component Value Date/Time    HBA1C 6.9 (H) 02/20/2019 09:17 AM     No components found for: TROP      Cardiac Imaging and Procedures Review:        Medical Decision Making:  Problem List Items Addressed This Visit     Hyperlipidemia with target LDL less than 100     Cont statin and add aspirin.         Essential hypertension     Controlled. Cont ARB.         Relevant Orders    EC-ECHOCARDIOGRAM  COMPLETE W/O CONT    MVP (mitral valve prolapse)     Check TTE.         Pain in both lower extremities     Long time smoker. Symptoms suggestive of claudication. Will check LE us/HERNANDEZ.         Relevant Orders    US-EXTREMITY ARTERY LOWER BILAT W/HERNANDEZ (COMBO)    Other specified personal risk factors, not elsewhere classified     Check CT CAC         Relevant Orders    CT-CARDIAC SCORING          It was my pleasure to meet with Ms. Her.

## 2020-02-18 ENCOUNTER — HOSPITAL ENCOUNTER (OUTPATIENT)
Dept: RADIOLOGY | Facility: MEDICAL CENTER | Age: 65
End: 2020-02-18
Attending: INTERNAL MEDICINE
Payer: MEDICARE

## 2020-02-20 ENCOUNTER — HOSPITAL ENCOUNTER (OUTPATIENT)
Dept: CARDIOLOGY | Facility: MEDICAL CENTER | Age: 65
End: 2020-02-20
Attending: INTERNAL MEDICINE
Payer: MEDICARE

## 2020-02-20 ENCOUNTER — HOSPITAL ENCOUNTER (OUTPATIENT)
Dept: RADIOLOGY | Facility: MEDICAL CENTER | Age: 65
End: 2020-02-20
Attending: INTERNAL MEDICINE
Payer: COMMERCIAL

## 2020-02-20 DIAGNOSIS — Z91.89 OTHER SPECIFIED PERSONAL RISK FACTORS, NOT ELSEWHERE CLASSIFIED: ICD-10-CM

## 2020-02-20 DIAGNOSIS — I10 ESSENTIAL HYPERTENSION: ICD-10-CM

## 2020-02-20 LAB
LV EJECT FRACT MOD 2C 99903: 54.74
LV EJECT FRACT MOD 4C 99902: 72.65
LV EJECT FRACT MOD BP 99901: 63.85

## 2020-02-20 PROCEDURE — 93306 TTE W/DOPPLER COMPLETE: CPT

## 2020-02-20 PROCEDURE — 93306 TTE W/DOPPLER COMPLETE: CPT | Mod: 26 | Performed by: INTERNAL MEDICINE

## 2020-02-20 PROCEDURE — 4410556 CT-CARDIAC SCORING

## 2020-02-21 ENCOUNTER — HOSPITAL ENCOUNTER (OUTPATIENT)
Dept: RADIOLOGY | Facility: MEDICAL CENTER | Age: 65
End: 2020-02-21
Attending: INTERNAL MEDICINE
Payer: MEDICARE

## 2020-02-21 DIAGNOSIS — M79.605 PAIN IN BOTH LOWER EXTREMITIES: ICD-10-CM

## 2020-02-21 DIAGNOSIS — M79.604 PAIN IN BOTH LOWER EXTREMITIES: ICD-10-CM

## 2020-02-21 DIAGNOSIS — M79.89 SWELLING OF RIGHT LOWER EXTREMITY: ICD-10-CM

## 2020-02-21 DIAGNOSIS — M79.89 PAIN AND SWELLING OF LOWER EXTREMITY, LEFT: ICD-10-CM

## 2020-02-21 DIAGNOSIS — M79.605 PAIN AND SWELLING OF LOWER EXTREMITY, LEFT: ICD-10-CM

## 2020-02-21 PROCEDURE — 93922 UPR/L XTREMITY ART 2 LEVELS: CPT

## 2020-02-21 NOTE — NON-PROVIDER
Received call from [facility] in imaging. Pt is there currently completing the US of lower extremities, but she requests that ICD code be changed to pain and swelling in lower extremity. ICD code changed.

## 2020-02-24 ENCOUNTER — PATIENT MESSAGE (OUTPATIENT)
Dept: CARDIOLOGY | Facility: MEDICAL CENTER | Age: 65
End: 2020-02-24

## 2020-02-24 DIAGNOSIS — E78.5 HYPERLIPIDEMIA WITH TARGET LDL LESS THAN 100: ICD-10-CM

## 2020-02-24 RX ORDER — ROSUVASTATIN CALCIUM 20 MG/1
20 TABLET, COATED ORAL EVERY EVENING
Qty: 90 TAB | Refills: 3 | Status: SHIPPED
Start: 2020-02-24 | End: 2020-04-03

## 2020-02-24 NOTE — PATIENT COMMUNICATION
Result Notes for CT-CARDIAC SCORING   Notes recorded by Bradley Bajwa M.D. on 2/23/2020 at 11:08 PM MEG Medina Nicolasa,  Can we switch her statin to rosuvastatin 20mg? And repeat lipids just prior to next visit in May.   Thank you!     Rx sent for rosuvastatin. Lipid order placed. Pt notified through [email or web address].

## 2020-02-27 ENCOUNTER — TELEPHONE (OUTPATIENT)
Dept: CARDIOLOGY | Facility: MEDICAL CENTER | Age: 65
End: 2020-02-27

## 2020-02-27 DIAGNOSIS — M79.604 PAIN IN BOTH LOWER EXTREMITIES: ICD-10-CM

## 2020-02-27 DIAGNOSIS — I73.9 PERIPHERAL ARTERIAL DISEASE (HCC): ICD-10-CM

## 2020-02-27 DIAGNOSIS — M79.605 PAIN IN BOTH LOWER EXTREMITIES: ICD-10-CM

## 2020-02-27 DIAGNOSIS — M79.606 PAIN AND SWELLING OF LOWER EXTREMITY, UNSPECIFIED LATERALITY: ICD-10-CM

## 2020-02-27 DIAGNOSIS — M79.89 PAIN AND SWELLING OF LOWER EXTREMITY, UNSPECIFIED LATERALITY: ICD-10-CM

## 2020-02-27 NOTE — TELEPHONE ENCOUNTER
Result Notes for US-EXTREMITY ARTERY LOWER BILAT W/HERNANDEZ (COMBO)  Bradley Bajwa M.D.  Nicolasa Morris R.N.             Adam Baldwni,   Can we refer Ms Her to vascular surgery? Looks like she has significant peripheral arterial disease.   Thank you!      Called pt and discussed results. Referral placed to vascular surgery. Informed pt that they should reach out to her within 1-2 weeks to arrange appt.

## 2020-03-07 DIAGNOSIS — F32.A DEPRESSION, UNSPECIFIED DEPRESSION TYPE: ICD-10-CM

## 2020-03-07 DIAGNOSIS — R73.03 PREDIABETES: ICD-10-CM

## 2020-03-07 DIAGNOSIS — E78.5 HYPERLIPIDEMIA WITH TARGET LDL LESS THAN 100: ICD-10-CM

## 2020-03-07 DIAGNOSIS — G47.00 INSOMNIA, UNSPECIFIED TYPE: ICD-10-CM

## 2020-03-09 ENCOUNTER — TELEPHONE (OUTPATIENT)
Dept: CARDIOLOGY | Facility: MEDICAL CENTER | Age: 65
End: 2020-03-09

## 2020-03-09 NOTE — TELEPHONE ENCOUNTER
Called pt, who said she called [facility] on 3/3/20 and again today, but they did not receive referral info. Per referral notes, it was sent on 2/28/20. To referrals team for f/u.

## 2020-03-09 NOTE — TELEPHONE ENCOUNTER
Rhina Kearnsiver  You 2 minutes ago (11:58 AM)      Hello,          Not a problem.  I double checked the fax number to make sure we had the same one.  We did so I made sure to put attn: Oriana.  Hopefully they get it this time.

## 2020-03-09 NOTE — TELEPHONE ENCOUNTER
VR      Patient called [facility] about referral, she was told they never received the referral. They told her to have it re-faxed to #[phone], attn: Oriana. She can be reached at [phone].

## 2020-03-09 NOTE — TELEPHONE ENCOUNTER
Was the patient seen in the last year in this department? Yes    Does patient have an active prescription for medications requested? No     Received Request Via: Pharmacy      Pt met protocol?: Yes   Pt last ov 1/14/2020   Lab Results   Component Value Date/Time    CHOLSTRLTOT 181 01/22/2020 0939    TRIGLYCERIDE 112 01/22/2020 0939    HDL 67 01/22/2020 0939    LDL 92 01/22/2020 0939     Lab Results   Component Value Date/Time    HBA1C 6.9 (H) 02/20/2019 09:17 AM      Lab Results   Component Value Date/Time    SODIUM 142 01/22/2020 09:39 AM    POTASSIUM 4.7 01/22/2020 09:39 AM    CHLORIDE 106 01/22/2020 09:39 AM    CO2 26 01/22/2020 09:39 AM    GLUCOSE 142 (H) 01/22/2020 09:39 AM    BUN 23 (H) 01/22/2020 09:39 AM    CREATININE 0.90 01/22/2020 09:39 AM

## 2020-03-10 RX ORDER — PAROXETINE HYDROCHLORIDE 20 MG/1
TABLET, FILM COATED ORAL
Qty: 90 TAB | Refills: 1 | Status: SHIPPED
Start: 2020-03-10 | End: 2020-04-03

## 2020-03-10 RX ORDER — SIMVASTATIN 40 MG
TABLET ORAL
Qty: 90 TAB | Refills: 2 | OUTPATIENT
Start: 2020-03-10

## 2020-03-10 RX ORDER — TRAZODONE HYDROCHLORIDE 50 MG/1
TABLET ORAL
Qty: 90 TAB | Refills: 1 | Status: SHIPPED
Start: 2020-03-10 | End: 2020-04-03

## 2020-03-10 NOTE — TELEPHONE ENCOUNTER
Patient has recently been seen by PCP within the last 6 months per protocol (1/20). Will refill DM supplies for 12 months.  Lab Results   Component Value Date/Time    HBA1C 6.9 (H) 02/20/2019 09:17 AM      Lab Results   Component Value Date/Time    MALBCRT 11 02/20/2019 09:17 AM    MICROALBUR 1.4 02/20/2019 09:17 AM      Lab Results   Component Value Date/Time    ALKPHOSPHAT 59 01/22/2020 09:39 AM    ASTSGOT 16 01/22/2020 09:39 AM    ALTSGPT 23 01/22/2020 09:39 AM    TBILIRUBIN 0.4 01/22/2020 09:39 AM

## 2020-03-30 NOTE — TELEPHONE ENCOUNTER
Was the patient seen in the last year in this department? Yes    Does patient have an active prescription for medications requested? No     Received Request Via: Pharmacy      Pt met protocol?: Yes   Last ov 1/2020   BP Readings from Last 1 Encounters:   02/07/20 118/64     Lab Results   Component Value Date/Time    SODIUM 142 01/22/2020 09:39 AM    POTASSIUM 4.7 01/22/2020 09:39 AM    CHLORIDE 106 01/22/2020 09:39 AM    CO2 26 01/22/2020 09:39 AM    GLUCOSE 142 (H) 01/22/2020 09:39 AM    BUN 23 (H) 01/22/2020 09:39 AM    CREATININE 0.90 01/22/2020 09:39 AM

## 2020-03-31 RX ORDER — LOSARTAN POTASSIUM 50 MG/1
TABLET ORAL
Qty: 90 TAB | Refills: 1 | Status: SHIPPED
Start: 2020-03-31 | End: 2020-04-03

## 2020-03-31 RX ORDER — LOSARTAN POTASSIUM 25 MG/1
TABLET ORAL
Qty: 180 TAB | Refills: 1 | OUTPATIENT
Start: 2020-03-31

## 2020-03-31 NOTE — TELEPHONE ENCOUNTER
Refill X 6 months, sent to pharmacy.Pt. Seen in the last 6 months per protocol.   Lab Results   Component Value Date/Time    SODIUM 142 01/22/2020 09:39 AM    POTASSIUM 4.7 01/22/2020 09:39 AM    CHLORIDE 106 01/22/2020 09:39 AM    CO2 26 01/22/2020 09:39 AM    GLUCOSE 142 (H) 01/22/2020 09:39 AM    BUN 23 (H) 01/22/2020 09:39 AM    CREATININE 0.90 01/22/2020 09:39 AM

## 2020-04-02 ENCOUNTER — APPOINTMENT (OUTPATIENT)
Dept: RADIOLOGY | Facility: MEDICAL CENTER | Age: 65
End: 2020-04-02
Attending: EMERGENCY MEDICINE
Payer: MEDICARE

## 2020-04-02 ENCOUNTER — HOSPITAL ENCOUNTER (OUTPATIENT)
Facility: MEDICAL CENTER | Age: 65
End: 2020-04-03
Attending: EMERGENCY MEDICINE | Admitting: INTERNAL MEDICINE
Payer: MEDICARE

## 2020-04-02 LAB
ALBUMIN SERPL BCP-MCNC: 4.7 G/DL (ref 3.2–4.9)
ALBUMIN/GLOB SERPL: 2 G/DL
ALP SERPL-CCNC: 58 U/L (ref 30–99)
ALT SERPL-CCNC: 23 U/L (ref 2–50)
ANION GAP SERPL CALC-SCNC: 15 MMOL/L (ref 7–16)
AST SERPL-CCNC: 22 U/L (ref 12–45)
BASOPHILS # BLD AUTO: 0.3 % (ref 0–1.8)
BASOPHILS # BLD: 0.04 K/UL (ref 0–0.12)
BILIRUB SERPL-MCNC: 0.3 MG/DL (ref 0.1–1.5)
BUN SERPL-MCNC: 15 MG/DL (ref 8–22)
CALCIUM SERPL-MCNC: 9.8 MG/DL (ref 8.5–10.5)
CHLORIDE SERPL-SCNC: 100 MMOL/L (ref 96–112)
CO2 SERPL-SCNC: 25 MMOL/L (ref 20–33)
CREAT SERPL-MCNC: 0.77 MG/DL (ref 0.5–1.4)
EKG IMPRESSION: NORMAL
EOSINOPHIL # BLD AUTO: 0.28 K/UL (ref 0–0.51)
EOSINOPHIL NFR BLD: 2.1 % (ref 0–6.9)
ERYTHROCYTE [DISTWIDTH] IN BLOOD BY AUTOMATED COUNT: 42.1 FL (ref 35.9–50)
GLOBULIN SER CALC-MCNC: 2.3 G/DL (ref 1.9–3.5)
GLUCOSE SERPL-MCNC: 135 MG/DL (ref 65–99)
HCT VFR BLD AUTO: 40.5 % (ref 37–47)
HGB BLD-MCNC: 13.5 G/DL (ref 12–16)
IMM GRANULOCYTES # BLD AUTO: 0.04 K/UL (ref 0–0.11)
IMM GRANULOCYTES NFR BLD AUTO: 0.3 % (ref 0–0.9)
LIPASE SERPL-CCNC: 57 U/L (ref 11–82)
LYMPHOCYTES # BLD AUTO: 4.41 K/UL (ref 1–4.8)
LYMPHOCYTES NFR BLD: 33.6 % (ref 22–41)
MCH RBC QN AUTO: 31 PG (ref 27–33)
MCHC RBC AUTO-ENTMCNC: 33.3 G/DL (ref 33.6–35)
MCV RBC AUTO: 92.9 FL (ref 81.4–97.8)
MONOCYTES # BLD AUTO: 0.83 K/UL (ref 0–0.85)
MONOCYTES NFR BLD AUTO: 6.3 % (ref 0–13.4)
NEUTROPHILS # BLD AUTO: 7.51 K/UL (ref 2–7.15)
NEUTROPHILS NFR BLD: 57.4 % (ref 44–72)
NRBC # BLD AUTO: 0 K/UL
NRBC BLD-RTO: 0 /100 WBC
PLATELET # BLD AUTO: 222 K/UL (ref 164–446)
PMV BLD AUTO: 10.2 FL (ref 9–12.9)
POTASSIUM SERPL-SCNC: 3.9 MMOL/L (ref 3.6–5.5)
PROT SERPL-MCNC: 7 G/DL (ref 6–8.2)
RBC # BLD AUTO: 4.36 M/UL (ref 4.2–5.4)
SODIUM SERPL-SCNC: 140 MMOL/L (ref 135–145)
TROPONIN T SERPL-MCNC: 9 NG/L (ref 6–19)
WBC # BLD AUTO: 13.1 K/UL (ref 4.8–10.8)

## 2020-04-02 PROCEDURE — 83690 ASSAY OF LIPASE: CPT

## 2020-04-02 PROCEDURE — 700111 HCHG RX REV CODE 636 W/ 250 OVERRIDE (IP): Performed by: EMERGENCY MEDICINE

## 2020-04-02 PROCEDURE — 80053 COMPREHEN METABOLIC PANEL: CPT

## 2020-04-02 PROCEDURE — 84484 ASSAY OF TROPONIN QUANT: CPT

## 2020-04-02 PROCEDURE — 96374 THER/PROPH/DIAG INJ IV PUSH: CPT | Mod: XU

## 2020-04-02 PROCEDURE — 93005 ELECTROCARDIOGRAM TRACING: CPT | Performed by: EMERGENCY MEDICINE

## 2020-04-02 PROCEDURE — 85025 COMPLETE CBC W/AUTO DIFF WBC: CPT

## 2020-04-02 PROCEDURE — A9270 NON-COVERED ITEM OR SERVICE: HCPCS | Performed by: EMERGENCY MEDICINE

## 2020-04-02 PROCEDURE — 700102 HCHG RX REV CODE 250 W/ 637 OVERRIDE(OP): Performed by: EMERGENCY MEDICINE

## 2020-04-02 PROCEDURE — 93005 ELECTROCARDIOGRAM TRACING: CPT

## 2020-04-02 PROCEDURE — 71275 CT ANGIOGRAPHY CHEST: CPT

## 2020-04-02 PROCEDURE — 700117 HCHG RX CONTRAST REV CODE 255: Performed by: EMERGENCY MEDICINE

## 2020-04-02 PROCEDURE — 96375 TX/PRO/DX INJ NEW DRUG ADDON: CPT | Mod: XU

## 2020-04-02 PROCEDURE — 99285 EMERGENCY DEPT VISIT HI MDM: CPT

## 2020-04-02 RX ORDER — LOSARTAN POTASSIUM 50 MG/1
50 TABLET ORAL ONCE
Status: COMPLETED | OUTPATIENT
Start: 2020-04-02 | End: 2020-04-02

## 2020-04-02 RX ORDER — CLONIDINE HYDROCHLORIDE 0.1 MG/1
0.1 TABLET ORAL ONCE
Status: COMPLETED | OUTPATIENT
Start: 2020-04-02 | End: 2020-04-02

## 2020-04-02 RX ORDER — MORPHINE SULFATE 4 MG/ML
4 INJECTION, SOLUTION INTRAMUSCULAR; INTRAVENOUS ONCE
Status: COMPLETED | OUTPATIENT
Start: 2020-04-02 | End: 2020-04-02

## 2020-04-02 RX ORDER — ONDANSETRON 2 MG/ML
4 INJECTION INTRAMUSCULAR; INTRAVENOUS ONCE
Status: COMPLETED | OUTPATIENT
Start: 2020-04-02 | End: 2020-04-02

## 2020-04-02 RX ADMIN — LIDOCAINE HYDROCHLORIDE 30 ML: 20 SOLUTION OROPHARYNGEAL at 22:21

## 2020-04-02 RX ADMIN — LOSARTAN POTASSIUM 50 MG: 50 TABLET, FILM COATED ORAL at 22:25

## 2020-04-02 RX ADMIN — MORPHINE SULFATE 4 MG: 4 INJECTION INTRAVENOUS at 22:21

## 2020-04-02 RX ADMIN — IOHEXOL 50 ML: 350 INJECTION, SOLUTION INTRAVENOUS at 23:19

## 2020-04-02 RX ADMIN — ONDANSETRON 4 MG: 2 INJECTION INTRAMUSCULAR; INTRAVENOUS at 22:21

## 2020-04-02 RX ADMIN — CLONIDINE HYDROCHLORIDE 0.1 MG: 0.1 TABLET ORAL at 22:50

## 2020-04-02 ASSESSMENT — FIBROSIS 4 INDEX: FIB4 SCORE: 1.03

## 2020-04-03 ENCOUNTER — APPOINTMENT (OUTPATIENT)
Dept: RADIOLOGY | Facility: MEDICAL CENTER | Age: 65
End: 2020-04-03
Attending: INTERNAL MEDICINE
Payer: MEDICARE

## 2020-04-03 VITALS
HEART RATE: 63 BPM | WEIGHT: 214.07 LBS | SYSTOLIC BLOOD PRESSURE: 105 MMHG | OXYGEN SATURATION: 99 % | TEMPERATURE: 98.1 F | BODY MASS INDEX: 35.67 KG/M2 | DIASTOLIC BLOOD PRESSURE: 57 MMHG | HEIGHT: 65 IN | RESPIRATION RATE: 16 BRPM

## 2020-04-03 PROBLEM — R07.9 CHEST PAIN: Status: RESOLVED | Noted: 2020-04-03 | Resolved: 2020-04-03

## 2020-04-03 PROBLEM — R07.9 CHEST PAIN: Status: ACTIVE | Noted: 2020-04-03

## 2020-04-03 LAB
CHOLEST SERPL-MCNC: 147 MG/DL (ref 100–199)
EKG IMPRESSION: NORMAL
EST. AVERAGE GLUCOSE BLD GHB EST-MCNC: 148 MG/DL
GLUCOSE BLD-MCNC: 136 MG/DL (ref 65–99)
GLUCOSE BLD-MCNC: 141 MG/DL (ref 65–99)
HBA1C MFR BLD: 6.8 % (ref 0–5.6)
HDLC SERPL-MCNC: 72 MG/DL
LDLC SERPL CALC-MCNC: 59 MG/DL
TRIGL SERPL-MCNC: 79 MG/DL (ref 0–149)
TROPONIN T SERPL-MCNC: 7 NG/L (ref 6–19)
TROPONIN T SERPL-MCNC: 9 NG/L (ref 6–19)

## 2020-04-03 PROCEDURE — A9270 NON-COVERED ITEM OR SERVICE: HCPCS | Performed by: INTERNAL MEDICINE

## 2020-04-03 PROCEDURE — 93005 ELECTROCARDIOGRAM TRACING: CPT | Performed by: INTERNAL MEDICINE

## 2020-04-03 PROCEDURE — 93010 ELECTROCARDIOGRAM REPORT: CPT | Performed by: INTERNAL MEDICINE

## 2020-04-03 PROCEDURE — 700102 HCHG RX REV CODE 250 W/ 637 OVERRIDE(OP): Performed by: INTERNAL MEDICINE

## 2020-04-03 PROCEDURE — 36415 COLL VENOUS BLD VENIPUNCTURE: CPT

## 2020-04-03 PROCEDURE — 83036 HEMOGLOBIN GLYCOSYLATED A1C: CPT

## 2020-04-03 PROCEDURE — G0378 HOSPITAL OBSERVATION PER HR: HCPCS

## 2020-04-03 PROCEDURE — 84484 ASSAY OF TROPONIN QUANT: CPT

## 2020-04-03 PROCEDURE — 99236 HOSP IP/OBS SAME DATE HI 85: CPT | Performed by: INTERNAL MEDICINE

## 2020-04-03 PROCEDURE — 700111 HCHG RX REV CODE 636 W/ 250 OVERRIDE (IP)

## 2020-04-03 PROCEDURE — 80061 LIPID PANEL: CPT

## 2020-04-03 PROCEDURE — 700102 HCHG RX REV CODE 250 W/ 637 OVERRIDE(OP): Performed by: EMERGENCY MEDICINE

## 2020-04-03 PROCEDURE — 82962 GLUCOSE BLOOD TEST: CPT

## 2020-04-03 PROCEDURE — A9502 TC99M TETROFOSMIN: HCPCS

## 2020-04-03 PROCEDURE — A9270 NON-COVERED ITEM OR SERVICE: HCPCS | Performed by: EMERGENCY MEDICINE

## 2020-04-03 RX ORDER — TRAZODONE HYDROCHLORIDE 50 MG/1
50 TABLET ORAL NIGHTLY
COMMUNITY
End: 2020-08-27 | Stop reason: SDUPTHER

## 2020-04-03 RX ORDER — BISACODYL 10 MG
10 SUPPOSITORY, RECTAL RECTAL
Status: DISCONTINUED | OUTPATIENT
Start: 2020-04-03 | End: 2020-04-03 | Stop reason: HOSPADM

## 2020-04-03 RX ORDER — OMEPRAZOLE 20 MG/1
20 CAPSULE, DELAYED RELEASE ORAL DAILY
Status: DISCONTINUED | OUTPATIENT
Start: 2020-04-03 | End: 2020-04-03 | Stop reason: HOSPADM

## 2020-04-03 RX ORDER — REGADENOSON 0.08 MG/ML
0.4 INJECTION, SOLUTION INTRAVENOUS
Status: COMPLETED | OUTPATIENT
Start: 2020-04-03 | End: 2020-04-03

## 2020-04-03 RX ORDER — ROSUVASTATIN CALCIUM 20 MG/1
20 TABLET, COATED ORAL EVERY EVENING
Status: DISCONTINUED | OUTPATIENT
Start: 2020-04-03 | End: 2020-04-03 | Stop reason: HOSPADM

## 2020-04-03 RX ORDER — LABETALOL HYDROCHLORIDE 5 MG/ML
10 INJECTION, SOLUTION INTRAVENOUS EVERY 4 HOURS PRN
Status: DISCONTINUED | OUTPATIENT
Start: 2020-04-03 | End: 2020-04-03 | Stop reason: HOSPADM

## 2020-04-03 RX ORDER — ROSUVASTATIN CALCIUM 20 MG/1
20 TABLET, COATED ORAL EVERY EVENING
COMMUNITY
End: 2021-02-25

## 2020-04-03 RX ORDER — POLYETHYLENE GLYCOL 3350 17 G/17G
1 POWDER, FOR SOLUTION ORAL
Status: DISCONTINUED | OUTPATIENT
Start: 2020-04-03 | End: 2020-04-03 | Stop reason: HOSPADM

## 2020-04-03 RX ORDER — ASPIRIN 325 MG
325 TABLET ORAL ONCE
Status: COMPLETED | OUTPATIENT
Start: 2020-04-03 | End: 2020-04-03

## 2020-04-03 RX ORDER — AMINOPHYLLINE 25 MG/ML
100 INJECTION, SOLUTION INTRAVENOUS
Status: DISCONTINUED | OUTPATIENT
Start: 2020-04-03 | End: 2020-04-03 | Stop reason: HOSPADM

## 2020-04-03 RX ORDER — ONDANSETRON 2 MG/ML
4 INJECTION INTRAMUSCULAR; INTRAVENOUS EVERY 4 HOURS PRN
Status: DISCONTINUED | OUTPATIENT
Start: 2020-04-03 | End: 2020-04-03 | Stop reason: HOSPADM

## 2020-04-03 RX ORDER — LOSARTAN POTASSIUM 50 MG/1
50 TABLET ORAL DAILY
Status: DISCONTINUED | OUTPATIENT
Start: 2020-04-03 | End: 2020-04-03 | Stop reason: HOSPADM

## 2020-04-03 RX ORDER — ACETAMINOPHEN 325 MG/1
650 TABLET ORAL EVERY 6 HOURS PRN
Status: DISCONTINUED | OUTPATIENT
Start: 2020-04-03 | End: 2020-04-03 | Stop reason: HOSPADM

## 2020-04-03 RX ORDER — DEXTROSE MONOHYDRATE 25 G/50ML
50 INJECTION, SOLUTION INTRAVENOUS
Status: DISCONTINUED | OUTPATIENT
Start: 2020-04-03 | End: 2020-04-03 | Stop reason: HOSPADM

## 2020-04-03 RX ORDER — ONDANSETRON 4 MG/1
4 TABLET, ORALLY DISINTEGRATING ORAL EVERY 4 HOURS PRN
Status: DISCONTINUED | OUTPATIENT
Start: 2020-04-03 | End: 2020-04-03 | Stop reason: HOSPADM

## 2020-04-03 RX ORDER — REGADENOSON 0.08 MG/ML
INJECTION, SOLUTION INTRAVENOUS
Status: COMPLETED
Start: 2020-04-03 | End: 2020-04-03

## 2020-04-03 RX ORDER — PAROXETINE HYDROCHLORIDE 20 MG/1
20 TABLET, FILM COATED ORAL DAILY
COMMUNITY
End: 2020-08-27 | Stop reason: SDUPTHER

## 2020-04-03 RX ORDER — ASPIRIN 300 MG/1
300 SUPPOSITORY RECTAL ONCE
Status: COMPLETED | OUTPATIENT
Start: 2020-04-03 | End: 2020-04-03

## 2020-04-03 RX ORDER — PAROXETINE 10 MG/1
20 TABLET, FILM COATED ORAL DAILY
Status: DISCONTINUED | OUTPATIENT
Start: 2020-04-03 | End: 2020-04-03 | Stop reason: HOSPADM

## 2020-04-03 RX ORDER — ASPIRIN 81 MG/1
324 TABLET, CHEWABLE ORAL ONCE
Status: COMPLETED | OUTPATIENT
Start: 2020-04-03 | End: 2020-04-03

## 2020-04-03 RX ORDER — HYDRALAZINE HYDROCHLORIDE 20 MG/ML
20 INJECTION INTRAMUSCULAR; INTRAVENOUS EVERY 6 HOURS PRN
Status: DISCONTINUED | OUTPATIENT
Start: 2020-04-03 | End: 2020-04-03 | Stop reason: HOSPADM

## 2020-04-03 RX ORDER — OMEPRAZOLE 20 MG/1
20 CAPSULE, DELAYED RELEASE ORAL DAILY
Qty: 30 CAP | Refills: 0 | Status: SHIPPED
Start: 2020-04-04 | End: 2020-10-06

## 2020-04-03 RX ORDER — LOSARTAN POTASSIUM 50 MG/1
50 TABLET ORAL DAILY
COMMUNITY
End: 2020-04-22 | Stop reason: SDUPTHER

## 2020-04-03 RX ORDER — AMOXICILLIN 250 MG
2 CAPSULE ORAL 2 TIMES DAILY
Status: DISCONTINUED | OUTPATIENT
Start: 2020-04-03 | End: 2020-04-03 | Stop reason: HOSPADM

## 2020-04-03 RX ADMIN — PAROXETINE HYDROCHLORIDE 20 MG: 10 TABLET, FILM COATED ORAL at 06:06

## 2020-04-03 RX ADMIN — REGADENOSON 0.4 MG: 0.08 INJECTION, SOLUTION INTRAVENOUS at 08:42

## 2020-04-03 RX ADMIN — ASPIRIN 325 MG: 325 TABLET, FILM COATED ORAL at 01:00

## 2020-04-03 RX ADMIN — OMEPRAZOLE 20 MG: 20 CAPSULE, DELAYED RELEASE ORAL at 02:43

## 2020-04-03 RX ADMIN — SENNOSIDES AND DOCUSATE SODIUM 2 TABLET: 8.6; 5 TABLET ORAL at 06:06

## 2020-04-03 RX ADMIN — LOSARTAN POTASSIUM 50 MG: 50 TABLET, FILM COATED ORAL at 06:06

## 2020-04-03 RX ADMIN — NITROGLYCERIN 0.5 INCH: 20 OINTMENT TOPICAL at 00:09

## 2020-04-03 ASSESSMENT — ENCOUNTER SYMPTOMS
SPUTUM PRODUCTION: 0
DIZZINESS: 0
NAUSEA: 0
ABDOMINAL PAIN: 0
MYALGIAS: 0
SORE THROAT: 0
SHORTNESS OF BREATH: 0
DIAPHORESIS: 0
CHILLS: 0
COUGH: 0
FEVER: 0
BRUISES/BLEEDS EASILY: 0
BLURRED VISION: 0
NECK PAIN: 0
DIARRHEA: 0
VOMITING: 0
SEIZURES: 0
PALPITATIONS: 0
BACK PAIN: 0
FOCAL WEAKNESS: 0
WHEEZING: 0
HEADACHES: 0
BLOOD IN STOOL: 0
FLANK PAIN: 0

## 2020-04-03 ASSESSMENT — COGNITIVE AND FUNCTIONAL STATUS - GENERAL
MOBILITY SCORE: 24
SUGGESTED CMS G CODE MODIFIER MOBILITY: CH
DAILY ACTIVITIY SCORE: 24
SUGGESTED CMS G CODE MODIFIER DAILY ACTIVITY: CH

## 2020-04-03 ASSESSMENT — COPD QUESTIONNAIRES
DO YOU EVER COUGH UP ANY MUCUS OR PHLEGM?: NO/ONLY WITH OCCASIONAL COLDS OR INFECTIONS
HAVE YOU SMOKED AT LEAST 100 CIGARETTES IN YOUR ENTIRE LIFE: YES
COPD SCREENING SCORE: 4
DURING THE PAST 4 WEEKS HOW MUCH DID YOU FEEL SHORT OF BREATH: NONE/LITTLE OF THE TIME

## 2020-04-03 ASSESSMENT — PATIENT HEALTH QUESTIONNAIRE - PHQ9
2. FEELING DOWN, DEPRESSED, IRRITABLE, OR HOPELESS: NOT AT ALL
SUM OF ALL RESPONSES TO PHQ9 QUESTIONS 1 AND 2: 0
1. LITTLE INTEREST OR PLEASURE IN DOING THINGS: NOT AT ALL

## 2020-04-03 ASSESSMENT — LIFESTYLE VARIABLES
EVER_SMOKED: YES
EVER_SMOKED: YES
ALCOHOL_USE: NO
EVER_SMOKED: YES

## 2020-04-03 ASSESSMENT — FIBROSIS 4 INDEX: FIB4 SCORE: 1.34

## 2020-04-03 NOTE — ED NOTES
Pt resting comfortably in stretcher in no acute distress. Pt awaiting admission bed. Pt has no further requests at this time.

## 2020-04-03 NOTE — PROGRESS NOTES
Monitor Summary    .20/.08/.36  Rhythm: Sinus Rhythm  Rate: 70s  Ectopy: None    New admit. Hooked up to monitor at 0203.

## 2020-04-03 NOTE — ED NOTES
Med Rec Updated and Complete per Historical and Outside Medications Tab  Allergies Reviewed Historically  UNK PO ABX HX.    Unable to reach Pt or Family via phone at this time.    Unknown last doses.    Pt taking LD ASA daily.

## 2020-04-03 NOTE — CARE PLAN
Problem: Safety  Goal: Will remain free from falls  Outcome: PROGRESSING AS EXPECTED   Safety and fall precautions in place, pt calls appropriately, steady when ambulating.     Problem: Venous Thromboembolism (VTW)/Deep Vein Thrombosis (DVT) Prevention:  Goal: Patient will participate in Venous Thrombosis (VTE)/Deep Vein Thrombosis (DVT)Prevention Measures  Outcome: PROGRESSING AS EXPECTED  Pt ambulates, performs ROM exercises in bed.      Problem: Knowledge Deficit  Goal: Knowledge of disease process/condition, treatment plan, diagnostic tests, and medications will improve  Outcome: PROGRESSING AS EXPECTED   Discussed POC with pt, pt verbalizes understanding, asks appropriate questions.

## 2020-04-03 NOTE — ED PROVIDER NOTES
ED Provider Note    CHIEF COMPLAINT  Chief Complaint   Patient presents with   • Chest Pain     since 1700, worse over time. Radiates to back. Took pepcid and jimena seltzer, no relief   • Chills     x1 hour        HPI  Dunia Her is a 64 y.o. female who presents with a chief complaint of chest pain.  It started at 5:00 it is in the epigastric area.  It states radiates to the back.  Is not much worse when she takes a deep breath there is no associated cough.  No associated fever.  She has not been in contact with anyone diagnosed with Cobin virus.  She has not traveled anywhere outside in [place].  In the last 2 weeks.  The patient states that the chest pain is sharp stabbing is uncomfortable.  She is concerned it might be heartburn.      Cardiac risk factors:  Cholesterol positive. Diabetes negative. Hypertension positive. Tobacco positive. Family cardiac history not asked.  Chest pain was non-exertional and non-pleuritic.      REVIEW OF SYSTEMS  General: No fever or chills.  Eyes: No eye discharge or eye pain  Ears nose throat: Nose or throat trouble swallowing  Pulmonary: No coughing or trouble breathing  Cardiac: See above.  GI: No abdominal pain. See above   : No dysuria  Dermatologic: No rashes  Neurologic: No weakness or numbness    PAST MEDICAL HISTORY  Past Medical History:   Diagnosis Date   • Hyperlipidemia LDL goal < 100 10/2/2014   • Hypertension    • Obesity (BMI 30-39.9) 10/2/2014   • Pre-diabetes 10/2/2014   • Recurrent sinusitis    • Type II or unspecified type diabetes mellitus without mention of complication, not stated as uncontrolled     pre-diabetes       FAMILY HISTORY  Family History   Problem Relation Age of Onset   • Cancer Mother         pancreatic    • Heart Disease Father    • Heart Attack Father    • Diabetes Father         pre-diabetes   • Stroke Neg Hx        SOCIAL HISTORY  Social History     Socioeconomic History   • Marital status:      Spouse name: Not on file   •  "Number of children: Not on file   • Years of education: Not on file   • Highest education level: Not on file   Occupational History   • Not on file   Social Needs   • Financial resource strain: Not on file   • Food insecurity     Worry: Not on file     Inability: Not on file   • Transportation needs     Medical: Not on file     Non-medical: Not on file   Tobacco Use   • Smoking status: Former Smoker     Packs/day: 0.00     Years: 48.00     Pack years: 0.00     Last attempt to quit: 2017     Years since quittin.8   • Smokeless tobacco: Never Used   Substance and Sexual Activity   • Alcohol use: No     Alcohol/week: 0.0 oz   • Drug use: No   • Sexual activity: Yes     Partners: Male     Comment:    Lifestyle   • Physical activity     Days per week: Not on file     Minutes per session: Not on file   • Stress: Not on file   Relationships   • Social connections     Talks on phone: Not on file     Gets together: Not on file     Attends Gnosticism service: Not on file     Active member of club or organization: Not on file     Attends meetings of clubs or organizations: Not on file     Relationship status: Not on file   • Intimate partner violence     Fear of current or ex partner: Not on file     Emotionally abused: Not on file     Physically abused: Not on file     Forced sexual activity: Not on file   Other Topics Concern   • Not on file   Social History Narrative   • Not on file       SURGICAL HISTORY  Past Surgical History:   Procedure Laterality Date   • ABDOMINAL HYSTERECTOMY TOTAL      benign cysts, total       CURRENT MEDICATIONS  Home Medications    **Home medications have not yet been reviewed for this encounter**         ALLERGIES  Allergies   Allergen Reactions   • Shellfish Allergy      Eyes itchy   • Sulfa Drugs Hives       PHYSICAL EXAM  VITAL SIGNS: BP (!) 196/88   Pulse 70   Temp 36.4 °C (97.6 °F)   Resp 20   Ht 1.651 m (5' 5\")   Wt 94.8 kg (209 lb)   SpO2 97%   BMI 34.78 kg/m²  "   Constitutional: Well developed, Well nourished, No acute distress, uncomfortable appearing  Psychiatric: Calm. Not anxious.  ENT:  Oropharynx: no exudate no erythema  Eyes: PERRLA, EOMI, Conjunctiva normal, No discharge.   Hematologic/ lymphatic: No cervical lymphadenopathy  Musculoskeletal: Neck is soft and supple no meningismus  Cardiovascular: Regular rhythm rate of 80- Homans negative cords no pedal edema radial and pedal pulses equal bilaterally.   Pulmonary: Slight expiratory wheezes on the left no rhonchi  GI: Nondistended nontender  :No CVA tenderness  Dermatologic: No rashes or abrasions  Neurologic: Alert and oriented. Moves all extremities equally.  Psychiatric: Not anxious, calm.        RADIOLOGY/PROCEDURES  Results for orders placed or performed during the hospital encounter of 04/02/20   CBC w/ Differential   Result Value Ref Range    WBC 13.1 (H) 4.8 - 10.8 K/uL    RBC 4.36 4.20 - 5.40 M/uL    Hemoglobin 13.5 12.0 - 16.0 g/dL    Hematocrit 40.5 37.0 - 47.0 %    MCV 92.9 81.4 - 97.8 fL    MCH 31.0 27.0 - 33.0 pg    MCHC 33.3 (L) 33.6 - 35.0 g/dL    RDW 42.1 35.9 - 50.0 fL    Platelet Count 222 164 - 446 K/uL    MPV 10.2 9.0 - 12.9 fL    Neutrophils-Polys 57.40 44.00 - 72.00 %    Lymphocytes 33.60 22.00 - 41.00 %    Monocytes 6.30 0.00 - 13.40 %    Eosinophils 2.10 0.00 - 6.90 %    Basophils 0.30 0.00 - 1.80 %    Immature Granulocytes 0.30 0.00 - 0.90 %    Nucleated RBC 0.00 /100 WBC    Neutrophils (Absolute) 7.51 (H) 2.00 - 7.15 K/uL    Lymphs (Absolute) 4.41 1.00 - 4.80 K/uL    Monos (Absolute) 0.83 0.00 - 0.85 K/uL    Eos (Absolute) 0.28 0.00 - 0.51 K/uL    Baso (Absolute) 0.04 0.00 - 0.12 K/uL    Immature Granulocytes (abs) 0.04 0.00 - 0.11 K/uL    NRBC (Absolute) 0.00 K/uL   Complete Metabolic Panel (CMP)   Result Value Ref Range    Sodium 140 135 - 145 mmol/L    Potassium 3.9 3.6 - 5.5 mmol/L    Chloride 100 96 - 112 mmol/L    Co2 25 20 - 33 mmol/L    Anion Gap 15.0 7.0 - 16.0    Glucose 135  (H) 65 - 99 mg/dL    Bun 15 8 - 22 mg/dL    Creatinine 0.77 0.50 - 1.40 mg/dL    Calcium 9.8 8.5 - 10.5 mg/dL    AST(SGOT) 22 12 - 45 U/L    ALT(SGPT) 23 2 - 50 U/L    Alkaline Phosphatase 58 30 - 99 U/L    Total Bilirubin 0.3 0.1 - 1.5 mg/dL    Albumin 4.7 3.2 - 4.9 g/dL    Total Protein 7.0 6.0 - 8.2 g/dL    Globulin 2.3 1.9 - 3.5 g/dL    A-G Ratio 2.0 g/dL   Troponin STAT   Result Value Ref Range    Troponin T 9 6 - 19 ng/L   Lipase   Result Value Ref Range    Lipase 57 11 - 82 U/L   ESTIMATED GFR   Result Value Ref Range    GFR If African American >60 >60 mL/min/1.73 m 2    GFR If Non African American >60 >60 mL/min/1.73 m 2   EKG (NOW)   Result Value Ref Range    Report       [facility] Emergency Dept.    Test Date:  2020  Pt Name:    EAN HATCH                Department: ER  MRN:        [id number]                      Room:  Gender:     Female                       Technician: [id number]  :        1955                   Requested By:ER TRIAGE PROTOCOL  Order #:    [id number]                    Reading MD: Jean Carlos LEONARD MD    Measurements  Intervals                                Axis  Rate:       67                           P:          74  MA:         172                          QRS:        57  QRSD:       105                          T:          46  QT:         417  QTc:        441    Interpretive Statements  Normal sinus rhythm rate of 67.  Normal MA normal axis borderline QRS no ST  segment elevations or depressions no significant changes from EKG dated 2020  Electronically Signed On 2020 22:00:05 PDT by Jean Carlos LEONARD MD        CT-CTA CHEST PULMONARY ARTERY W/ RECONS   Final Result      1.  No pulmonary embolus is identified.      2.  1.1 cm right upper lobe groundglass nodule         Ground Glass: CT at 6-12 months to confirm persistence, then CT every 2 years until 5 years      Part Solid: CT at 3-6 months to confirm persistence. If unchanged  and solid component remains less than 6 mm, annual CT should be performed for 5 years.      Comments: In practice, part-solid nodules cannot be defined as such until equal to or greater than 6 mm, and nodules less than 6 mm do not usually require follow-up. Persistent part-solid nodules with solid components equal to or greater than 6 mm should    be considered highly suspicious.      Note: These recommendations do not apply to lung cancer screening, patients with immunosuppression, or patients with known primary cancer.      Fleischner Society 2017 Guidelines for Management of Incidentally Detected Pulmonary Nodules in Adults            COURSE & MEDICAL DECISION MAKING  Pertinent Labs & Imaging studies reviewed. (See chart for details)  Patient is here with the above symptoms at this point we will go ahead and good do cardiac enzymes at this point the patient does have some risk factor coronary disease her EKG is an indeterminate for acute MI this point will go ahead and do a CT scan there is a history the patient may have blockage in her legs will go ahead and review the chart and consider CT scan.  The patient is here with very uncomfortable appearing symptoms he is hypertensive we will go work-up and see if he can help the patient better with for morphine and some GI cocktail.    Patient at this point had a negative troponin the patient had a CT scan showed no PE showed is 1 small area of groundglass.  This did not be consistent with Coban and more likely as per the CT findings this would be something that would require follow-up this was discussed with her and she understands need for follow-up CT scan.    This point is a nice patient with some coronary risk factors with chest pain chest pressure will go ahead and do admit her as she is elevated blood pressure we will give her some Nitropaste this point the patient will be admitted in guarded condition and she has been informed of her results.  I spoke to the  hospitalist.    FINAL IMPRESSION  1.  Chest pain  2.          Electronically signed by: Jean Carlos Sarmineto M.D., 4/2/2020 9:58 PM

## 2020-04-03 NOTE — DISCHARGE INSTRUCTIONS
Discharge Instructions    Discharged to home by car with relative. Discharged via wheelchair, hospital escort: Yes.  Special equipment needed: Not Applicable    Be sure to schedule a follow-up appointment with your primary care doctor or any specialists as instructed.     Discharge Plan:   Influenza Vaccine Indication: Not indicated: Previously immunized this influenza season and > 8 years of age(Given 11/1/19)    I understand that a diet low in cholesterol, fat, and sodium is recommended for good health. Unless I have been given specific instructions below for another diet, I accept this instruction as my diet prescription.   Other diet: Regular Cardiac Diet    Special Instructions: None    · Is patient discharged on Warfarin / Coumadin?   No     Metoprolol tablets  What is this medicine?  METOPROLOL (me TOE proe lole) is a beta-blocker. Beta-blockers reduce the workload on the heart and help it to beat more regularly. This medicine is used to treat high blood pressure and to prevent chest pain. It is also used to after a heart attack and to prevent an additional heart attack from occurring.  This medicine may be used for other purposes; ask your health care provider or pharmacist if you have questions.  COMMON BRAND NAME(S): Lopressor  What should I tell my health care provider before I take this medicine?  They need to know if you have any of these conditions:  -diabetes  -heart or vessel disease like slow heart rate, worsening heart failure, heart block, sick sinus syndrome or Raynaud's disease  -kidney disease  -liver disease  -lung or breathing disease, like asthma or emphysema  -pheochromocytoma  -thyroid disease  -an unusual or allergic reaction to metoprolol, other beta-blockers, medicines, foods, dyes, or preservatives  -pregnant or trying to get pregnant  -breast-feeding  How should I use this medicine?  Take this medicine by mouth with a drink of water. Follow the directions on the prescription label.  Take this medicine immediately after meals. Take your doses at regular intervals. Do not take more medicine than directed. Do not stop taking this medicine suddenly. This could lead to serious heart-related effects.  Talk to your pediatrician regarding the use of this medicine in children. Special care may be needed.  Overdosage: If you think you have taken too much of this medicine contact a poison control center or emergency room at once.  NOTE: This medicine is only for you. Do not share this medicine with others.  What if I miss a dose?  If you miss a dose, take it as soon as you can. If it is almost time for your next dose, take only that dose. Do not take double or extra doses.  What may interact with this medicine?  This medicine may interact with the following medications:  -certain medicines for blood pressure, heart disease, irregular heart beat  -certain medicines for depression like monoamine oxidase (MAO) inhibitors, fluoxetine, or paroxetine  -clonidine  -dobutamine  -epinephrine  -isoproterenol  -reserpine  This list may not describe all possible interactions. Give your health care provider a list of all the medicines, herbs, non-prescription drugs, or dietary supplements you use. Also tell them if you smoke, drink alcohol, or use illegal drugs. Some items may interact with your medicine.  What should I watch for while using this medicine?  Visit your doctor or health care professional for regular check ups. Contact your doctor right away if your symptoms worsen. Check your blood pressure and pulse rate regularly. Ask your health care professional what your blood pressure and pulse rate should be, and when you should contact them.  You may get drowsy or dizzy. Do not drive, use machinery, or do anything that needs mental alertness until you know how this medicine affects you. Do not sit or stand up quickly, especially if you are an older patient. This reduces the risk of dizzy or fainting spells.  Contact your doctor if these symptoms continue. Alcohol may interfere with the effect of this medicine. Avoid alcoholic drinks.  What side effects may I notice from receiving this medicine?  Side effects that you should report to your doctor or health care professional as soon as possible:  -allergic reactions like skin rash, itching or hives  -cold or numb hands or feet  -depression  -difficulty breathing  -faint  -fever with sore throat  -irregular heartbeat, chest pain  -rapid weight gain  -swollen legs or ankles  Side effects that usually do not require medical attention (report to your doctor or health care professional if they continue or are bothersome):  -anxiety or nervousness  -change in sex drive or performance  -dry skin  -headache  -nightmares or trouble sleeping  -short term memory loss  -stomach upset or diarrhea  -unusually tired  This list may not describe all possible side effects. Call your doctor for medical advice about side effects. You may report side effects to FDA at 7-739-FDA-5895.  Where should I keep my medicine?  Keep out of the reach of children.  Store at room temperature between 15 and 30 degrees C (59 and 86 degrees F). Throw away any unused medicine after the expiration date.  NOTE: This sheet is a summary. It may not cover all possible information. If you have questions about this medicine, talk to your doctor, pharmacist, or health care provider.  © 2018 [employer]vier/Gold Standard (2014-08-22 14:40:36)    Omeprazole tablets (OTC)  What is this medicine?  OMEPRAZOLE (oh ME pray zol) prevents the production of acid in the stomach. It is used to treat the symptoms of heartburn. You can buy this medicine without a prescription. This product is not for long-term use, unless otherwise directed by your doctor or health care professional.  This medicine may be used for other purposes; ask your health care provider or pharmacist if you have questions.  COMMON BRAND NAME(S): Prilosec OTC  What  should I tell my health care provider before I take this medicine?  They need to know if you have any of these conditions:  -black or bloody stools  -chest pain  -difficulty swallowing  -have had heartburn for over 3 months  -have heartburn with dizziness, lightheadedness or sweating  -liver disease  -lupus  -stomach pain  -unexplained weight loss  -vomiting with blood  -wheezing  -an unusual or allergic reaction to omeprazole, other medicines, foods, dyes, or preservatives  -pregnant or trying to get pregnant  -breast-feeding  How should I use this medicine?  Take this medicine by mouth. Follow the directions on the product label. If you are taking this medicine without a prescription, take one tablet every day. Do not use for longer than 14 days or repeat a course of treatment more often than every 4 months unless directed by a doctor or healthcare professional. Take your dose at regular intervals every 24 hours. Swallow the tablet whole with a drink of water. Do not crush, break or chew. This medicine works best if taken on an empty stomach 30 minutes before breakfast. If you are using this medicine with the prescription of your doctor or healthcare professional, follow the directions you were given. Do not take your medicine more often than directed.  Talk to your pediatrician regarding the use of this medicine in children. Special care may be needed.  Overdosage: If you think you have taken too much of this medicine contact a poison control center or emergency room at once.  NOTE: This medicine is only for you. Do not share this medicine with others.  What if I miss a dose?  If you miss a dose, take it as soon as you can. If it is almost time for your next dose, take only that dose. Do not take double or extra doses.  What may interact with this medicine?  Do not take this medicine with any of the following medications:  -atazanavir  -clopidogrel  -nelfinavir  This medicine may also interact with the following  medications:  -ampicillin  -certain medicines for anxiety or sleep  -certain medicines that treat or prevent blood clots like warfarin  -cyclosporine  -diazepam  -digoxin  -disulfiram  -iron salts  -methotrexate  -mycophenolate mofetil  -phenytoin  -prescription medicine for fungal or yeast infection like itraconazole, ketoconazole, voriconazole  -saquinavir  -tacrolimus  This list may not describe all possible interactions. Give your health care provider a list of all the medicines, herbs, non-prescription drugs, or dietary supplements you use. Also tell them if you smoke, drink alcohol, or use illegal drugs. Some items may interact with your medicine.  What should I watch for while using this medicine?  It can take several days before your heartburn gets better. Check with your doctor or health care professional if your condition does not start to get better, or if it gets worse.  Do not treat diarrhea with over the counter products. Contact your doctor if you have diarrhea that lasts more than 2 days or if it is severe and watery.  Do not treat yourself for heartburn with this medicine for more than 14 days in a row. You should only use this medicine for a 2-week treatment period once every 4 months. If your symptoms return shortly after your therapy is complete, or within the 4 month time frame, call your doctor or health care professional.  What side effects may I notice from receiving this medicine?  Side effects that you should report to your doctor or health care professional as soon as possible:  -allergic reactions like skin rash, itching or hives, swelling of the face, lips, or tongue  -bone, muscle or joint pain  -breathing problems  -chest pain or chest tightness  -dark yellow or brown urine  -diarrhea  -dizziness  -fast, irregular heartbeat  -feeling faint or lightheaded  -fever or sore throat  -muscle spasm  -palpitations  -rash on cheeks or arms that gets worse in the sun  -redness, blistering,  peeling or loosening of the skin, including inside the mouth  -seizures  -tremors  -unusual bleeding or bruising  -unusually weak or tired  -yellowing of the eyes or skin  Side effects that usually do not require medical attention (report to your doctor or health care professional if they continue or are bothersome):  -constipation  -dry mouth  -headache  -loose stools  -nausea  This list may not describe all possible side effects. Call your doctor for medical advice about side effects. You may report side effects to FDA at 9-292-UDF-2659.  Where should I keep my medicine?  Keep out of the reach of children.  Store at room temperature between 20 and 25 degrees C (68 and 77 degrees F). Protect from light and moisture. Throw away any unused medicine after the expiration date.  NOTE: This sheet is a summary. It may not cover all possible information. If you have questions about this medicine, talk to your doctor, pharmacist, or health care provider.  © 2018 [employer]vier/Gold Standard (2017-01-19 13:06:31)      Depression / Suicide Risk    As you are discharged from this [facility] facility, it is important to learn how to keep safe from harming yourself.    Recognize the warning signs:  · Abrupt changes in personality, positive or negative- including increase in energy   · Giving away possessions  · Change in eating patterns- significant weight changes-  positive or negative  · Change in sleeping patterns- unable to sleep or sleeping all the time   · Unwillingness or inability to communicate  · Depression  · Unusual sadness, discouragement and loneliness  · Talk of wanting to die  · Neglect of personal appearance   · Rebelliousness- reckless behavior  · Withdrawal from people/activities they love  · Confusion- inability to concentrate     If you or a loved one observes any of these behaviors or has concerns about self-harm, here's what you can do:  · Talk about it- your feelings and reasons for harming  yourself  · Remove any means that you might use to hurt yourself (examples: pills, rope, extension cords, firearm)  · Get professional help from the community (Mental Health, Substance Abuse, psychological counseling)  · Do not be alone:Call your Safe Contact- someone whom you trust who will be there for you.  · Call your local CRISIS HOTLINE 276-8215 or 944-610-1629  · Call your local Children's Mobile Crisis Response Team [place] (870) 885-2884 or www.Zipscene  · Call the toll free National Suicide Prevention Hotlines   · National Suicide Prevention Lifeline 153-407-VGAH (0937)  · National Hope Line Network 800-SUICIDE (720-1781)

## 2020-04-03 NOTE — PROGRESS NOTES
2 RN SKIN CHECK  2 RN skin check complete.   Devices in place: Nasal Cannula.  Skin assessed under devices: yes.  Confirmed pressure ulcers found on: None.  New potential pressure ulcers noted on Not Applicable. Wound consult placed N/A.  The following interventions in place Pillows    Bilateral ears red and blanchable.   Red and blanchable under bilateral breasts.   Sacrum/buttocks intact.  Bilateral heels dry and flaky; intact.

## 2020-04-03 NOTE — PROGRESS NOTES
Bedside report received from STEPHANIE Davis. Pt sitting up in bed, no complaints of pain at this time, A/Ox4, on room air. Discussed POC, pt verbalizes understanding. Fall and safety precautions in place. No further needs at this time.

## 2020-04-03 NOTE — ED TRIAGE NOTES
"Chief Complaint   Patient presents with   • Chest Pain     since 1700, worse over time. Radiates to back. Took pepcid and jimena seltzer, no relief   • Chills     x1 hour       Pt presents to ed for cp and chills.  Pt reports intermittent sob, denies at this moment but is speaking only in 2 word sentences.  Pt has hx of dm, mitral valve murmur.  Pt denies travel or contact with anyone who is sick, fever, cough, body aches.     Charge aware of pt, Pt to G27.   Pulse 76   Temp 36.4 °C (97.6 °F)   Resp 16   Ht 1.651 m (5' 5\")   Wt 94.8 kg (209 lb)   SpO2 96%   BMI 34.78 kg/m²    " Notify  home certificate was signed

## 2020-04-03 NOTE — H&P
Hospital Medicine History & Physical Note    Date of Service  4/3/2020    Primary Care Physician  MARIAN Estevez.    Consultants  None    Code Status  Full Code    Chief Complaint  Chest pain    History of Presenting Illness  64 y.o. female with a past medical history of hypertension, hyperlipidemia, peripheral vascular disease, obesity, type 2 diabetes mellitus who presented 4/2/2020 with chest pain that started earlier today.  Patient developed nonexertional substernal chest pain that she described as pressure-like in sensation without radiation.  She denies any fever, shortness of breath, cough or diaphoresis.  Her pain has been constant.  She took a baby aspirin without any relief.  In the ER she was noted to be hypertensive and received a nitro paste with mild relief.  She is an ex-smoker and quit 3 years ago.  She reports a family history of MI in her father in his 50s.  He denies any recent travel to high risk areas or exposure to sick contacts.    The patient was hypertensive on arrival with a blood pressure of 205/87    EKG interpreted by me reveals sinus rhythm with no ST elevation or ST depression  CTA chest with IV contrast was negative for PE revealed a 1.1 cm right upper lobe groundglass nodule    Review of Systems  Review of Systems   Constitutional: Negative for chills, diaphoresis and fever.   HENT: Negative for hearing loss and sore throat.    Eyes: Negative for blurred vision.   Respiratory: Negative for cough, sputum production, shortness of breath and wheezing.    Cardiovascular: Positive for chest pain. Negative for palpitations and leg swelling.   Gastrointestinal: Negative for abdominal pain, blood in stool, diarrhea, nausea and vomiting.   Genitourinary: Negative for dysuria, flank pain and urgency.   Musculoskeletal: Negative for back pain, joint pain, myalgias and neck pain.   Skin: Negative for rash.   Neurological: Negative for dizziness, focal weakness, seizures and headaches.    Endo/Heme/Allergies: Does not bruise/bleed easily.   Psychiatric/Behavioral: Negative for suicidal ideas.   All other systems reviewed and are negative.      Past Medical History   has a past medical history of Hyperlipidemia LDL goal < 100 (10/2/2014), Hypertension, Obesity (BMI 30-39.9) (10/2/2014), Pre-diabetes (10/2/2014), Recurrent sinusitis, and Type II or unspecified type diabetes mellitus without mention of complication, not stated as uncontrolled.    Surgical History   has a past surgical history that includes abdominal hysterectomy total (1993).     Family History  family history includes Cancer in her mother; Diabetes in her father; Heart Attack in her father; Heart Disease in her father.     Social History   reports that she quit smoking about 2 years ago. She smoked 0.00 packs per day for 48.00 years. She has never used smokeless tobacco. She reports that she does not drink alcohol or use drugs.    Allergies  Allergies   Allergen Reactions   • Shellfish Allergy      Eyes itchy   • Sulfa Drugs Hives       Medications  Prior to Admission Medications   Prescriptions Last Dose Informant Patient Reported? Taking?   PARoxetine (PAXIL) 20 MG Tab UNK at AM  Yes Yes   Sig: Take 20 mg by mouth every day.   albuterol (PROVENTIL) 2.5mg/3ml Nebu Soln solution for nebulization <week  No No   Sig: 3 mL by Nebulization route every four hours as needed for Shortness of Breath.   aspirin EC (ECOTRIN) 81 MG Tablet Delayed Response UNK at AM  Yes Yes   Sig: Take 81 mg by mouth every day.   losartan (COZAAR) 50 MG Tab UNK at AM  Yes Yes   Sig: Take 50 mg by mouth every day.   metFORMIN (GLUCOPHAGE) 500 MG Tab UNK at AM  Yes Yes   Sig: Take 500 mg by mouth 2 times a day, with meals.   rosuvastatin (CRESTOR) 20 MG Tab UNK at PM  Yes Yes   Sig: Take 20 mg by mouth every evening.   traZODone (DESYREL) 50 MG Tab UNK at PM  Yes Yes   Sig: Take 50 mg by mouth every evening.      Facility-Administered Medications: None        Physical Exam  Temp:  [36.4 °C (97.6 °F)] 36.4 °C (97.6 °F)  Pulse:  [65-82] 76  Resp:  [14-28] 14  BP: (181-219)/(77-88) 181/77  SpO2:  [87 %-100 %] 99 %    Physical Exam  Vitals signs and nursing note reviewed.   Constitutional:       General: She is not in acute distress.     Appearance: She is obese.   HENT:      Head: Normocephalic and atraumatic.      Nose: Nose normal.      Mouth/Throat:      Mouth: Mucous membranes are moist.   Eyes:      Extraocular Movements: Extraocular movements intact.      Conjunctiva/sclera: Conjunctivae normal.      Pupils: Pupils are equal, round, and reactive to light.   Neck:      Musculoskeletal: Normal range of motion and neck supple.   Cardiovascular:      Rate and Rhythm: Normal rate and regular rhythm.      Pulses: Normal pulses.      Heart sounds: Normal heart sounds.   Pulmonary:      Effort: Pulmonary effort is normal. No respiratory distress.      Breath sounds: Normal breath sounds. No wheezing, rhonchi or rales.   Abdominal:      General: Bowel sounds are normal. There is no distension.      Palpations: Abdomen is soft.      Tenderness: There is no abdominal tenderness.   Musculoskeletal: Normal range of motion.         General: No swelling or tenderness.   Lymphadenopathy:      Cervical: No cervical adenopathy.   Skin:     General: Skin is warm.      Coloration: Skin is not jaundiced.      Findings: No rash.   Neurological:      General: No focal deficit present.      Mental Status: She is alert and oriented to person, place, and time.      Cranial Nerves: No cranial nerve deficit.      Motor: No weakness.   Psychiatric:         Mood and Affect: Mood normal.         Behavior: Behavior normal.         Laboratory:  Recent Labs     04/02/20 2146   WBC 13.1*   RBC 4.36   HEMOGLOBIN 13.5   HEMATOCRIT 40.5   MCV 92.9   MCH 31.0   MCHC 33.3*   RDW 42.1   PLATELETCT 222   MPV 10.2     Recent Labs     04/02/20 2146   SODIUM 140   POTASSIUM 3.9   CHLORIDE 100   CO2 25    GLUCOSE 135*   BUN 15   CREATININE 0.77   CALCIUM 9.8     Recent Labs     04/02/20  2146   ALTSGPT 23   ASTSGOT 22   ALKPHOSPHAT 58   TBILIRUBIN 0.3   LIPASE 57   GLUCOSE 135*         No results for input(s): NTPROBNP in the last 72 hours.      Recent Labs     04/02/20 2146   TROPONINT 9       Urinalysis:    No results found     Imaging:  CT-CTA CHEST PULMONARY ARTERY W/ RECONS   Final Result      1.  No pulmonary embolus is identified.      2.  1.1 cm right upper lobe groundglass nodule         Ground Glass: CT at 6-12 months to confirm persistence, then CT every 2 years until 5 years      Part Solid: CT at 3-6 months to confirm persistence. If unchanged and solid component remains less than 6 mm, annual CT should be performed for 5 years.      Comments: In practice, part-solid nodules cannot be defined as such until equal to or greater than 6 mm, and nodules less than 6 mm do not usually require follow-up. Persistent part-solid nodules with solid components equal to or greater than 6 mm should    be considered highly suspicious.      Note: These recommendations do not apply to lung cancer screening, patients with immunosuppression, or patients with known primary cancer.      Fleischner Society 2017 Guidelines for Management of Incidentally Detected Pulmonary Nodules in Adults      NM-CARDIAC STRESS TEST    (Results Pending)         Assessment/Plan:  I anticipate this patient is appropriate for observation status at this time.    Chest pain- (present on admission)  Assessment & Plan  CTA chest with IV contrast is negative for PE  Rule out ACS  Continuous cardiac monitoring with serial EKG and troponin  Nuclear medicine cardiac stress test in the morning if troponin remains negative  Patient has been given full dose of aspirin  Check lipid panel, TSH and hemoglobin A1c  Nitro when necessary for chest pain.  IV morphine for uncontrolled pain  GI cocktail PRN.  Started on omeprazole    Hypertensive urgency-  (present on admission)  Assessment & Plan  Started on metoprolol 25 mg twice daily  Continue losartan  IV hydralazine and IV labetalol PRN for SBP greater than 160    Hyperlipidemia with target LDL less than 100- (present on admission)  Assessment & Plan  Continue Crestor    BMI 36.0-36.9,adult- (present on admission)  Assessment & Plan  Body mass index is 34.78 kg/m².  Patient has been counseled on diet and lifestyle modifications      Type 2 diabetes mellitus without complication, without long-term current use of insulin ([facility])- (present on admission)  Assessment & Plan  Start on insulin sliding scale with serial Accu-Checks  Check hemoglobin A1c  Hypoglycemic protocol in place          VTE prophylaxis: Heparin

## 2020-04-03 NOTE — ASSESSMENT & PLAN NOTE
CTA chest with IV contrast is negative for PE  Rule out ACS  Continuous cardiac monitoring with serial EKG and troponin  Nuclear medicine cardiac stress test in the morning if troponin remains negative  Patient has been given full dose of aspirin  Check lipid panel, TSH and hemoglobin A1c  Nitro when necessary for chest pain.  IV morphine for uncontrolled pain  GI cocktail PRN.  Started on omeprazole

## 2020-04-03 NOTE — PROGRESS NOTES
Received patient from ED with bedside report. A&Ox 4. Placed on telemetry monitor and monitor tech notified. Pt in sinus rhythm with HR in the 70s. Pt transferred from rney to bed via ambulation. Oriented patient to room and explained pt personalized plan of care. Educated patient on safety and use of call light. Bed in low position and bed exit alarm set on. Call light in reach.

## 2020-04-03 NOTE — PROGRESS NOTES
Pt discharged home with family, escorted out with this RN via wheelchair. IV removed per protocol, tip intact. Medications returned to pharmacy. All pt belongings went with pt. Discharge instructions given, pt verbalized understanding, all questions answered.

## 2020-04-03 NOTE — CARE PLAN
Problem: Safety  Goal: Will remain free from falls  Outcome: PROGRESSING AS EXPECTED  Note: Pt educated on use of call light for patient safety. Bed in low position, bed exit alarm set on, and call light in reach. Non skid socks on when pt ambulating.      Problem: Knowledge Deficit  Goal: Knowledge of disease process/condition, treatment plan, diagnostic tests, and medications will improve  Outcome: PROGRESSING AS EXPECTED  Note: Quick overview given to pt of plan for NM cardiac stress test. Pt aware of no eating or drinking 4 hours prior to procedure. Patient understanding of plan.      Problem: Pain  Goal: Alleviation of Pain or a reduction in pain to the patient's comfort goal  Outcome: PROGRESSING AS EXPECTED

## 2020-04-03 NOTE — ASSESSMENT & PLAN NOTE
Started on metoprolol 25 mg twice daily  Continue losartan  IV hydralazine and IV labetalol PRN for SBP greater than 160

## 2020-04-04 NOTE — DISCHARGE SUMMARY
Discharge Summary    CHIEF COMPLAINT ON ADMISSION  Chief Complaint   Patient presents with   • Chest Pain     since 1700, worse over time. Radiates to back. Took pepcid and jimena seltzer, no relief   • Chills     x1 hour        Reason for Admission  Chest pain; Chills     Admission Date  4/2/2020    CODE STATUS  Prior    HPI & HOSPITAL COURSE  This is a 64 y.o. female who has past medical history of hypertension, type 2 diabetes mellitus, peripheral vascular disease, hyperlipidemia who comes in with chest pain.  Described of substernal pressure-like retrosternal pain with no radiation.  No aggravating or elevating factors.  In the emergency room she noted to be hypertensive and was given nitro with mild relief in her symptoms.  EKG was negative for any ACS.  Troponin was negative as well.  Was admitted for observation.  Troponins were trended and continued to be negative.  Patient underwent nuclear stress test that showed normal left ventricle size, ejection fraction, and wall motion and no reversible ischemia.  She was hemodynamically clinically stable.  She was cleared for discharge from medical standpoint.       Therefore, she is discharged in fair and stable condition to home with close outpatient follow-up.    The patient recovered much more quickly than anticipated on admission.    Discharge Date  4/3/2020    FOLLOW UP ITEMS POST DISCHARGE  Follow-up with PCP in 1 week    DISCHARGE DIAGNOSES  Active Problems:    Type 2 diabetes mellitus without complication, without long-term current use of insulin (HCC) POA: Yes    BMI 36.0-36.9,adult POA: Yes    Hyperlipidemia with target LDL less than 100 POA: Yes      Overview: ICD-10 transition  Resolved Problems:    Chest pain POA: Yes    Hypertensive urgency POA: Yes      FOLLOW UP  Future Appointments   Date Time Provider Department Center   4/7/2020  8:20 AM ANNA Kirkpatrick None   4/7/2020 10:25 AM JUANI Estevez [facility]   5/13/2020  9:20 AM  Bradley Bajwa M.D. [facility] None     No follow-up provider specified.    MEDICATIONS ON DISCHARGE     Medication List      START taking these medications      Instructions   metoprolol 25 MG Tabs  Commonly known as:  LOPRESSOR   Take 1 Tab by mouth 2 Times a Day.  Dose:  25 mg     omeprazole 20 MG delayed-release capsule  Start taking on:  April 4, 2020  Commonly known as:  PRILOSEC   Take 1 Cap by mouth every day.  Dose:  20 mg        CONTINUE taking these medications      Instructions   albuterol 2.5mg/3ml Nebu solution for nebulization  Commonly known as:  PROVENTIL   Doctor's comments:  Dispense #25 3ml unit dose vials  3 mL by Nebulization route every four hours as needed for Shortness of Breath.  Dose:  2.5 mg     aspirin EC 81 MG Tbec  Commonly known as:  ECOTRIN   Take 81 mg by mouth every day.  Dose:  81 mg     losartan 50 MG Tabs  Commonly known as:  COZAAR   Take 50 mg by mouth every day.  Dose:  50 mg     metFORMIN 500 MG Tabs  Commonly known as:  GLUCOPHAGE   Take 500 mg by mouth 2 times a day, with meals.  Dose:  500 mg     PARoxetine 20 MG Tabs  Commonly known as:  PAXIL   Take 20 mg by mouth every day.  Dose:  20 mg     rosuvastatin 20 MG Tabs  Commonly known as:  CRESTOR   Take 20 mg by mouth every evening.  Dose:  20 mg     traZODone 50 MG Tabs  Commonly known as:  DESYREL   Take 50 mg by mouth every evening.  Dose:  50 mg            Allergies  Allergies   Allergen Reactions   • Shellfish Allergy      Eyes itchy   • Sulfa Drugs Hives       DIET  No orders of the defined types were placed in this encounter.      ACTIVITY  As tolerated.  Weight bearing as tolerated    CONSULTATIONS  None    PROCEDURES  None    LABORATORY  Lab Results   Component Value Date    SODIUM 140 04/02/2020    POTASSIUM 3.9 04/02/2020    CHLORIDE 100 04/02/2020    CO2 25 04/02/2020    GLUCOSE 135 (H) 04/02/2020    BUN 15 04/02/2020    CREATININE 0.77 04/02/2020        Lab Results   Component Value Date    WBC 13.1 (H) 04/02/2020     HEMOGLOBIN 13.5 04/02/2020    HEMATOCRIT 40.5 04/02/2020    PLATELETCT 222 04/02/2020        Total time of the discharge process exceeds 56 minutes.

## 2020-04-07 ENCOUNTER — OFFICE VISIT (OUTPATIENT)
Dept: CARDIOLOGY | Facility: MEDICAL CENTER | Age: 65
End: 2020-04-07
Payer: MEDICARE

## 2020-04-07 VITALS — WEIGHT: 202 LBS | HEIGHT: 65 IN | BODY MASS INDEX: 33.66 KG/M2

## 2020-04-07 DIAGNOSIS — E78.5 HYPERLIPIDEMIA LDL GOAL <70: ICD-10-CM

## 2020-04-07 DIAGNOSIS — I73.9 PERIPHERAL ARTERIAL DISEASE (HCC): ICD-10-CM

## 2020-04-07 DIAGNOSIS — I25.10 CORONARY ARTERY CALCIFICATION SEEN ON CT SCAN: ICD-10-CM

## 2020-04-07 DIAGNOSIS — I25.10 ASCVD (ARTERIOSCLEROTIC CARDIOVASCULAR DISEASE): ICD-10-CM

## 2020-04-07 DIAGNOSIS — E11.9 TYPE 2 DIABETES MELLITUS WITHOUT COMPLICATION, WITHOUT LONG-TERM CURRENT USE OF INSULIN (HCC): ICD-10-CM

## 2020-04-07 PROBLEM — I34.1 MVP (MITRAL VALVE PROLAPSE): Status: RESOLVED | Noted: 2020-01-22 | Resolved: 2020-04-07

## 2020-04-07 PROCEDURE — 99999 PR NO CHARGE: CPT | Mod: CR | Performed by: INTERNAL MEDICINE

## 2020-04-07 ASSESSMENT — ENCOUNTER SYMPTOMS
CONSTIPATION: 0
HEARTBURN: 0
WEIGHT LOSS: 0
ORTHOPNEA: 0
PALPITATIONS: 0
CLAUDICATION: 1
SYNCOPE: 0
VOMITING: 0
PND: 0
BACK PAIN: 0
SHORTNESS OF BREATH: 0
DIZZINESS: 0
DYSPNEA ON EXERTION: 0
ALTERED MENTAL STATUS: 0
DECREASED APPETITE: 0
ABDOMINAL PAIN: 0
FEVER: 0
FLANK PAIN: 0
COUGH: 0
WEIGHT GAIN: 0
NEAR-SYNCOPE: 0
DEPRESSION: 0
NAUSEA: 0
BLURRED VISION: 0
IRREGULAR HEARTBEAT: 0
DIARRHEA: 0

## 2020-04-07 ASSESSMENT — FIBROSIS 4 INDEX: FIB4 SCORE: 1.34

## 2020-04-07 NOTE — PROGRESS NOTES
Cardiology Note    Chief Complaint   Patient presents with   • Hypertension       History of Present Illness: Dunia Her is a 64 y.o. female who presents for MVP, HTN, HLD, CAD, PAD, and ASCVD  presents for follow up.     Walks 4 min gets about one block. Has to rest due to claudication. Symptoms resolve with rest and able to continue. She has an appointment pending with vascular surgery. Denies cardiac symptoms currently. Tolerating new medications without adverse effects.    ED visit 4/2/2020 for chest pain found noncardiac. Blood pressure elevated on arrival, but quickly improved. She is attempting to get blood pressure cuff for home.    Review of Systems   Constitution: Negative for decreased appetite, fever, malaise/fatigue, weight gain and weight loss.   HENT: Negative for congestion and nosebleeds.    Eyes: Negative for blurred vision.   Cardiovascular: Positive for claudication. Negative for chest pain, dyspnea on exertion, irregular heartbeat, leg swelling, near-syncope, orthopnea, palpitations, paroxysmal nocturnal dyspnea and syncope.   Respiratory: Negative for cough and shortness of breath.    Endocrine: Negative for cold intolerance and heat intolerance.   Skin: Negative for rash.   Musculoskeletal: Negative for back pain.   Gastrointestinal: Negative for abdominal pain, constipation, diarrhea, heartburn, melena, nausea and vomiting.   Genitourinary: Negative for dysuria, flank pain and hematuria.   Neurological: Negative for dizziness.   Psychiatric/Behavioral: Negative for altered mental status and depression.         Past Medical History:   Diagnosis Date   • Hyperlipidemia LDL goal < 100 10/2/2014   • Hypertension    • Obesity (BMI 30-39.9) 10/2/2014   • Pre-diabetes 10/2/2014   • Recurrent sinusitis    • Type II or unspecified type diabetes mellitus without mention of complication, not stated as uncontrolled     pre-diabetes         Past Surgical History:   Procedure Laterality Date   •  ABDOMINAL HYSTERECTOMY TOTAL      benign cysts, total         Current Outpatient Medications   Medication Sig Dispense Refill   • aspirin EC (ECOTRIN) 81 MG Tablet Delayed Response Take 81 mg by mouth every day.     • losartan (COZAAR) 50 MG Tab Take 50 mg by mouth every day.     • metFORMIN (GLUCOPHAGE) 500 MG Tab Take 500 mg by mouth 2 times a day, with meals.     • PARoxetine (PAXIL) 20 MG Tab Take 20 mg by mouth every day.     • rosuvastatin (CRESTOR) 20 MG Tab Take 20 mg by mouth every evening.     • traZODone (DESYREL) 50 MG Tab Take 50 mg by mouth every evening.     • metoprolol (LOPRESSOR) 25 MG Tab Take 1 Tab by mouth 2 Times a Day. 60 Tab 0   • omeprazole (PRILOSEC) 20 MG delayed-release capsule Take 1 Cap by mouth every day. 30 Cap 0   • albuterol (PROVENTIL) 2.5mg/3ml Nebu Soln solution for nebulization 3 mL by Nebulization route every four hours as needed for Shortness of Breath. 25 Bullet 0     No current facility-administered medications for this visit.          Allergies   Allergen Reactions   • Shellfish Allergy      Eyes itchy   • Sulfa Drugs Hives         Family History   Problem Relation Age of Onset   • Cancer Mother         pancreatic    • Heart Disease Father    • Heart Attack Father    • Diabetes Father         pre-diabetes   • Stroke Neg Hx          Social History     Socioeconomic History   • Marital status:      Spouse name: Not on file   • Number of children: Not on file   • Years of education: Not on file   • Highest education level: Not on file   Occupational History   • Not on file   Social Needs   • Financial resource strain: Not on file   • Food insecurity     Worry: Not on file     Inability: Not on file   • Transportation needs     Medical: Not on file     Non-medical: Not on file   Tobacco Use   • Smoking status: Former Smoker     Packs/day: 0.00     Years: 48.00     Pack years: 0.00     Last attempt to quit: 2017     Years since quittin.8   • Smokeless tobacco:  "Never Used   Substance and Sexual Activity   • Alcohol use: No     Alcohol/week: 0.0 oz   • Drug use: No   • Sexual activity: Yes     Partners: Male     Comment:    Lifestyle   • Physical activity     Days per week: Not on file     Minutes per session: Not on file   • Stress: Not on file   Relationships   • Social connections     Talks on phone: Not on file     Gets together: Not on file     Attends Bahai service: Not on file     Active member of club or organization: Not on file     Attends meetings of clubs or organizations: Not on file     Relationship status: Not on file   • Intimate partner violence     Fear of current or ex partner: Not on file     Emotionally abused: Not on file     Physically abused: Not on file     Forced sexual activity: Not on file   Other Topics Concern   • Not on file   Social History Narrative   • Not on file         Physical Exam:  Ambulatory Vitals  Ht 1.651 m (5' 5\")   Wt 91.6 kg (202 lb)    BP Readings from Last 4 Encounters:   04/03/20 105/57   02/07/20 118/64   01/14/20 118/82   03/14/19 130/86     Weight/BMI:   Vitals:    04/07/20 0813   Weight: 91.6 kg (202 lb)   Height: 1.651 m (5' 5\")    Body mass index is 33.61 kg/m².  Wt Readings from Last 4 Encounters:   04/07/20 91.6 kg (202 lb)   04/03/20 97.1 kg (214 lb 1.1 oz)   02/07/20 98.3 kg (216 lb 11.4 oz)   01/14/20 98.4 kg (217 lb)       Physical Exam   Telephone visit.    Lab Data Review:  Lab Results   Component Value Date/Time    CHOLSTRLTOT 147 04/03/2020 04:35 AM    LDL 59 04/03/2020 04:35 AM    HDL 72 04/03/2020 04:35 AM    TRIGLYCERIDE 79 04/03/2020 04:35 AM       Lab Results   Component Value Date/Time    SODIUM 140 04/02/2020 09:46 PM    POTASSIUM 3.9 04/02/2020 09:46 PM    CHLORIDE 100 04/02/2020 09:46 PM    CO2 25 04/02/2020 09:46 PM    GLUCOSE 135 (H) 04/02/2020 09:46 PM    BUN 15 04/02/2020 09:46 PM    CREATININE 0.77 04/02/2020 09:46 PM     Estimated Creatinine Clearance: 81.4 mL/min (by C-G formula " based on SCr of 0.77 mg/dL).  Lab Results   Component Value Date/Time    ALKPHOSPHAT 58 04/02/2020 09:46 PM    ASTSGOT 22 04/02/2020 09:46 PM    ALTSGPT 23 04/02/2020 09:46 PM    TBILIRUBIN 0.3 04/02/2020 09:46 PM      Lab Results   Component Value Date/Time    WBC 13.1 (H) 04/02/2020 09:46 PM     Lab Results   Component Value Date/Time    HBA1C 6.8 (H) 04/03/2020 04:35 AM     No components found for: TROP      Cardiac Imaging and Procedures Review:       vascular ultrasound 2/21/20  1.  Post occlusive waveform is noted in the distal left peroneal artery. Short segment occlusion or high-grade stenosis proximal to this location is likely present, although not directly visualized.  2.  No other evidence of occlusion or significant stenosis bilaterally.  3.  Moderate calcified atherosclerotic plaque is identified in each lower extremity.    TTE 2/2020  CONCLUSIONS  Left ventricular ejection fraction is visually estimated to be 65%.  Mild concentric left ventricular hypertrophy.  Decreased left ventricular compliance with indeterminate diastolic   function.  Normal right ventricular size and systolic function.    Nuclear stress spect sachi 4/3/20   NUCLEAR IMAGING INTERPRETATION   Normal left ventricular size, ejection fraction, and wall motion.   Small fixed defect in the inferolateral wall could be artifact. Infarct is in the differential but consider less likely.    No reversible defect.    CAC CT 2/20/20  Coronary calcification:  LMA - 0.0  LCX - 0.0  LAD - 315  RCA - 260.8  Total Calcium Score: 575.8    Medical Decision Making:  Problem List Items Addressed This Visit     Type 2 diabetes mellitus without complication, without long-term current use of insulin ([facility])    BMI 36.0-36.9,adult    Hyperlipidemia LDL goal <70    Peripheral arterial disease (HCC)    ASCVD (arteriosclerotic cardiovascular disease)    Coronary artery calcification seen on CT scan        DM2 - continue statin. A1c improving. Please consider  jardiance in the future.    HTN - Last checked borderline low. Get blood pressure cuff for home. She will call with values. Continue ARB and BB.     CAD/ASCVD - continue aspirin and statin. LDL at goal <70.     PAD - continue aspirin and statin. Discussed symptom limited exercise regimen by walking consisting of stress and rest 45 min 5x weekly. Follow up with vascular.     Diet with best evidence mediterranean.  Exercise goal above.     It was my pleasure to meet with Ms. Her.    This encounter was conducted via telephone.  Verbal consent was obtained. Patient's identity was verified.    24 minutes spent interviewing patient.

## 2020-04-21 ENCOUNTER — TELEPHONE (OUTPATIENT)
Dept: CARDIOLOGY | Facility: MEDICAL CENTER | Age: 65
End: 2020-04-21

## 2020-04-22 RX ORDER — LOSARTAN POTASSIUM 100 MG/1
100 TABLET ORAL DAILY
Qty: 90 TAB | Refills: 3 | Status: SHIPPED | OUTPATIENT
Start: 2020-04-22 | End: 2021-04-19

## 2020-04-22 NOTE — TELEPHONE ENCOUNTER
You  You; Bradley Bajwa M.D. 20 hours ago (1:49 PM)      Pt sent home BP log (in media). Any changes to meds?      Bradley Bajwa M.D.  You 2 hours ago (7:22 AM)      Yes, lets increase her losartan to 100mg daily. Can we also make sure she's taking it? Thanks Nicolasa!       Called pt. Confirmed that she has been taking losartan 50mg daily and metoprolol 25mg BID. She is agreeable to increasing losartan. New Rx sent. Also discussed that it is only necessary to check BP 1-2 times per day. Requested that she call us back if BP does not improve on increased losartan.

## 2020-05-06 ENCOUNTER — TELEPHONE (OUTPATIENT)
Dept: CARDIOLOGY | Facility: MEDICAL CENTER | Age: 65
End: 2020-05-06

## 2020-05-06 NOTE — TELEPHONE ENCOUNTER
Attempted to reach patient to see if she wanted to keep upcoming appointment scheduled with [facility], because last time she was seen [facility] mentioned she doesn't need a follow-up appt until 10/2020. Unable to reach patient, left voicemail for call back.

## 2020-06-25 NOTE — ADDENDUM NOTE
Addended by: HEIDI KEVIN on: 6/25/2020 03:49 PM     Modules accepted: Level of Service, SmartSet

## 2020-08-27 RX ORDER — PAROXETINE HYDROCHLORIDE 20 MG/1
20 TABLET, FILM COATED ORAL DAILY
Qty: 30 TAB | Refills: 0 | Status: SHIPPED | OUTPATIENT
Start: 2020-08-27 | End: 2020-09-08

## 2020-08-27 RX ORDER — TRAZODONE HYDROCHLORIDE 50 MG/1
50 TABLET ORAL NIGHTLY PRN
Qty: 30 TAB | Refills: 0 | Status: SHIPPED | OUTPATIENT
Start: 2020-08-27 | End: 2020-09-08

## 2020-08-27 NOTE — TELEPHONE ENCOUNTER
Received request via: [facility]    Was the patient seen in the last year in this department? Yes    Does the patient have an active prescription (recently filled or refills available) for medication(s) requested? No

## 2020-09-08 RX ORDER — PAROXETINE HYDROCHLORIDE 20 MG/1
TABLET, FILM COATED ORAL
Qty: 30 TAB | Refills: 0 | Status: SHIPPED | OUTPATIENT
Start: 2020-09-08 | End: 2020-10-16

## 2020-09-08 RX ORDER — TRAZODONE HYDROCHLORIDE 50 MG/1
TABLET ORAL
Qty: 30 TAB | Refills: 0 | Status: SHIPPED | OUTPATIENT
Start: 2020-09-08 | End: 2020-10-16

## 2020-09-17 ENCOUNTER — TELEPHONE (OUTPATIENT)
Dept: CARDIOLOGY | Facility: MEDICAL CENTER | Age: 65
End: 2020-09-17

## 2020-10-06 ENCOUNTER — OFFICE VISIT (OUTPATIENT)
Dept: MEDICAL GROUP | Facility: PHYSICIAN GROUP | Age: 65
End: 2020-10-06
Payer: MEDICARE

## 2020-10-06 VITALS
HEIGHT: 65 IN | WEIGHT: 214 LBS | BODY MASS INDEX: 35.65 KG/M2 | HEART RATE: 77 BPM | DIASTOLIC BLOOD PRESSURE: 78 MMHG | SYSTOLIC BLOOD PRESSURE: 132 MMHG | OXYGEN SATURATION: 96 % | TEMPERATURE: 97.2 F | RESPIRATION RATE: 12 BRPM

## 2020-10-06 DIAGNOSIS — I25.10 ASCVD (ARTERIOSCLEROTIC CARDIOVASCULAR DISEASE): ICD-10-CM

## 2020-10-06 DIAGNOSIS — I10 ESSENTIAL HYPERTENSION: ICD-10-CM

## 2020-10-06 DIAGNOSIS — E11.9 TYPE 2 DIABETES MELLITUS WITHOUT COMPLICATION, WITHOUT LONG-TERM CURRENT USE OF INSULIN (HCC): ICD-10-CM

## 2020-10-06 DIAGNOSIS — F32.A DEPRESSION, UNSPECIFIED DEPRESSION TYPE: ICD-10-CM

## 2020-10-06 DIAGNOSIS — E78.5 HYPERLIPIDEMIA LDL GOAL <70: ICD-10-CM

## 2020-10-06 DIAGNOSIS — J40 BRONCHITIS: ICD-10-CM

## 2020-10-06 DIAGNOSIS — R69 TAKING MEDICATION FOR CHRONIC DISEASE: ICD-10-CM

## 2020-10-06 DIAGNOSIS — Z23 NEED FOR VACCINATION: ICD-10-CM

## 2020-10-06 DIAGNOSIS — F51.04 CHRONIC INSOMNIA: ICD-10-CM

## 2020-10-06 PROCEDURE — 90732 PPSV23 VACC 2 YRS+ SUBQ/IM: CPT | Performed by: PHYSICIAN ASSISTANT

## 2020-10-06 PROCEDURE — 99214 OFFICE O/P EST MOD 30 MIN: CPT | Mod: 25 | Performed by: PHYSICIAN ASSISTANT

## 2020-10-06 PROCEDURE — G0009 ADMIN PNEUMOCOCCAL VACCINE: HCPCS | Performed by: PHYSICIAN ASSISTANT

## 2020-10-06 ASSESSMENT — FIBROSIS 4 INDEX: FIB4 SCORE: 1.36

## 2020-10-06 NOTE — ASSESSMENT & PLAN NOTE
Chronic condition, partial remission. On paroxetine 20 mg for a year. Works some, sometimes breakthrough deprssion, but no SI/HI.

## 2020-10-06 NOTE — ASSESSMENT & PLAN NOTE
This is a chronic condition.  Onset: years ago  Current Meds: losartan 100 mg, metoprolol 25 mg BID,   Side effects? none  Associated symptoms: denies any chest pain, sob, headaches, dizziness/lightheadedness

## 2020-10-06 NOTE — PROGRESS NOTES
CC:   Chief Complaint   Patient presents with   • Establish Care   • Diabetes          HISTORY OF PRESENT ILLNESS: Patient is a 65 y.o. female established patient who presents today to establish care with me and discuss the following issues:    Health Maintenance: Completed  Print shingrix  pnuemonia shot today  tdap at local pharmacy.   Declines mammogram right now.   Will get back into Dr. Jaimes for eye exam.     Bronchitis  40+ years of smoking 1 pack per day. Quit 2016, hasn't had issues since quitting, occasionally wheezing. Has rescue inhaler as needed.     Type 2 diabetes mellitus without complication, without long-term current use of insulin (HCC)  This is a chronic condition.  Current medications: metformin 500 mg daily  Last A1c:   Lab Results   Component Value Date/Time    HBA1C 6.8 (H) 04/03/2020 0435    AVGLUC 148 04/03/2020 0435     Last Microalb/Cr ratio:   Lab Results   Component Value Date/Time    URCREAT 133.10 02/20/2019 0917    MICROALBUR 1.4 02/20/2019 0917    MALBCRT 11 02/20/2019 0917       Last retinal eye exam: due- goes to Dr. Jaimes  ACEi/ARB? Yes, losartan   Statin? Yes crestor 20 mg  Aspirin? yes  Concomitant HTN? yes  Nightly foot checks? No changes.       Hyperlipidemia LDL goal <70  This is a chronic condition.   Latest Labs:   Lab Results   Component Value Date/Time    CHOLSTRLTOT 147 04/03/2020 04:35 AM    LDL 59 04/03/2020 04:35 AM    HDL 72 04/03/2020 04:35 AM    TRIGLYCERIDE 79 04/03/2020 04:35 AM      Medications: crestor 20 mg  Medication side effects: none  Diet/Exercise: diet good per patient. Doing more vegan. Has exercise bike. Does this 40 minutes some days.   Risk calculator: The 10-year ASCVD risk score (Bill DANA Jr., et al., 2013) is: 12.9%       Essential hypertension  This is a chronic condition.  Onset: years ago  Current Meds: losartan 100 mg, metoprolol 25 mg BID,   Side effects? none  Associated symptoms: denies any chest pain, sob, headaches,  dizziness/lightheadedness      ASCVD (arteriosclerotic cardiovascular disease)  Being followed by cardiology. Reviewed note from 04/20. On baby asa and statin.     Depression  Chronic condition, partial remission. On paroxetine 20 mg for a year. Works some, sometimes breakthrough deprssion, but no SI/HI.     Chronic insomnia  On trazodone 50 nightly, no side effects. Doesn't know if she snores, she does have daytime fatigue.     STOP BANG SCORE:   4 (10/6/2020  9:17 AM)  Discussed this is intermediate risk. Discussed getting sleep study.       Patient Active Problem List    Diagnosis Date Noted   • Essential hypertension 10/06/2020   • ASCVD (arteriosclerotic cardiovascular disease) 04/07/2020   • Coronary artery calcification seen on CT scan 04/07/2020   • Peripheral arterial disease (HCC) 02/07/2020   • Other specified personal risk factors, not elsewhere classified 02/07/2020   • Bronchitis 01/22/2020   • Depression 02/10/2019   • Left elbow pain 09/04/2018   • Rash 09/04/2018   • Chronic insomnia 04/18/2016   • Type 2 diabetes mellitus without complication, without long-term current use of insulin (HCC) 10/02/2014   • BMI 36.0-36.9,adult 10/02/2014   • Hyperlipidemia LDL goal <70 10/02/2014      Allergies:Shellfish allergy and Sulfa drugs    Current Outpatient Medications   Medication Sig Dispense Refill   • Zoster Vac Recomb Adjuvanted (SHINGRIX) 50 MCG/0.5ML Recon Susp 0.5 mL by Intramuscular route Once for 1 dose. 0.5 mL 0   • PARoxetine (PAXIL) 20 MG Tab TAKE ONE TABLET BY MOUTH DAILY 30 Tab 0   • metFORMIN (GLUCOPHAGE) 500 MG Tab TAKE ONE TABLET BY MOUTH TWICE A DAY WITH A MEAL 60 Tab 0   • traZODone (DESYREL) 50 MG Tab TAKE ONE TABLET BY MOUTH EVERY NIGHT AT BEDTIME AS NEEDED FOR SLEEP 30 Tab 0   • losartan (COZAAR) 100 MG Tab Take 1 Tab by mouth every day. 90 Tab 3   • aspirin EC (ECOTRIN) 81 MG Tablet Delayed Response Take 81 mg by mouth every day.     • rosuvastatin (CRESTOR) 20 MG Tab Take 20 mg by  "mouth every evening.     • metoprolol (LOPRESSOR) 25 MG Tab Take 1 Tab by mouth 2 Times a Day. 60 Tab 0   • albuterol (PROVENTIL) 2.5mg/3ml Nebu Soln solution for nebulization 3 mL by Nebulization route every four hours as needed for Shortness of Breath. 25 Bullet 0     No current facility-administered medications for this visit.        Social History     Tobacco Use   • Smoking status: Former Smoker     Packs/day: 0.00     Years: 48.00     Pack years: 0.00     Quit date: 6/7/2017     Years since quitting: 3.3   • Smokeless tobacco: Never Used   Substance Use Topics   • Alcohol use: No     Alcohol/week: 0.0 oz   • Drug use: No     Social History     Social History Narrative   • Not on file       Family History   Problem Relation Age of Onset   • Cancer Mother         pancreatic    • Heart Disease Father    • Heart Attack Father    • Diabetes Father         pre-diabetes   • Stroke Neg Hx        Review of Systems:    Constitutional: No Fevers, Chills  Eyes: No vision changes  ENT: No hearing changes  Resp: No Shortness of breath  CV: No Chest pain  GI: No Nausea/Vomiting  MSK: No weakness  Skin: No rashes  Neuro: No Headaches  Psych: No Suicidal ideations    All remaining systems reviewed and found to be negative, except as stated above.    Exam:    /78 (BP Location: Left arm, Patient Position: Sitting, BP Cuff Size: Large adult)   Pulse 77   Temp 36.2 °C (97.2 °F) (Temporal)   Resp 12   Ht 1.651 m (5' 5\")   Wt 97.1 kg (214 lb)   SpO2 96%  Body mass index is 35.61 kg/m².    General:  Well nourished, well developed female in NAD  HENT: Atraumatic, normocephalic  EYES: Extraocular movements intact  NECK: Supple, FROM  CHEST: No deformities, Equal chest expansion  RESP: Unlabored, no stridor or audible wheeze  HEART: Regular Rate and rhythm.   ABD: Soft, Nontender, Non-Distended  Extremities: No Clubbing, Cyanosis, or Edema  Skin: Warm/dry, without rashes  Neuro: A/O x 4, due to COVID-19- did not have patient " remove face mask to test cranial nerves.  Motor/sensory grossly intact  Psych: Normal behavior, normal affect  Monofilament testing with a 10 gram force: sensation intact: intact bilaterally  Visual Inspection: Feet without maceration, ulcers, fissures.  Pedal pulses: intact bilaterally        Lab review:  Labs are reviewed and discussed with a patient  Lab Results   Component Value Date/Time    CHOLSTRLTOT 147 04/03/2020 04:35 AM    LDL 59 04/03/2020 04:35 AM    HDL 72 04/03/2020 04:35 AM    TRIGLYCERIDE 79 04/03/2020 04:35 AM       Lab Results   Component Value Date/Time    SODIUM 140 04/02/2020 09:46 PM    POTASSIUM 3.9 04/02/2020 09:46 PM    CHLORIDE 100 04/02/2020 09:46 PM    CO2 25 04/02/2020 09:46 PM    GLUCOSE 135 (H) 04/02/2020 09:46 PM    BUN 15 04/02/2020 09:46 PM    CREATININE 0.77 04/02/2020 09:46 PM     Lab Results   Component Value Date/Time    ALKPHOSPHAT 58 04/02/2020 09:46 PM    ASTSGOT 22 04/02/2020 09:46 PM    ALTSGPT 23 04/02/2020 09:46 PM    TBILIRUBIN 0.3 04/02/2020 09:46 PM      Lab Results   Component Value Date/Time    HBA1C 6.8 (H) 04/03/2020 04:35 AM    HBA1C 6.9 (H) 02/20/2019 09:17 AM    HBA1C 6.1 (H) 03/22/2018 08:15 AM     No results found for: TSH  No results found for: FREET4    Lab Results   Component Value Date/Time    WBC 13.1 (H) 04/02/2020 09:46 PM    RBC 4.36 04/02/2020 09:46 PM    HEMOGLOBIN 13.5 04/02/2020 09:46 PM    HEMATOCRIT 40.5 04/02/2020 09:46 PM    MCV 92.9 04/02/2020 09:46 PM    MCH 31.0 04/02/2020 09:46 PM    MCHC 33.3 (L) 04/02/2020 09:46 PM    MPV 10.2 04/02/2020 09:46 PM    NEUTSPOLYS 57.40 04/02/2020 09:46 PM    LYMPHOCYTES 33.60 04/02/2020 09:46 PM    MONOCYTES 6.30 04/02/2020 09:46 PM    EOSINOPHILS 2.10 04/02/2020 09:46 PM    BASOPHILS 0.30 04/02/2020 09:46 PM          Assessment/Plan:  1. Need for vaccination  - Pneumoccocal Polysaccharide Vaccine 23-Valent =>3yo SQ/IM  - Zoster Vac Recomb Adjuvanted (SHINGRIX) 50 MCG/0.5ML Recon Susp; 0.5 mL by  Intramuscular route Once for 1 dose.  Dispense: 0.5 mL; Refill: 0    2. Type 2 diabetes mellitus without complication, without long-term current use of insulin (HCC)  - Diabetic Monofilament LE Exam  - Comp Metabolic Panel; Future  - HEMOGLOBIN A1C; Future  - MICROALBUMIN CREAT RATIO URINE; Future  Chronic condition.  Due for updated hemoglobin A1c and microalbumin.  Continue metformin 500 mg once a day.  Due for updated eye exam, patient will call Dr. Jaimes.   3. Bronchitis  History of bronchitis, patient has a 40+ year of smoking a pack a day, quit in 2017.  Does have wheezing from time to time, worse in the smoke.  Discussed she could have aspects of COPD, denies any chronic cough.  Has not had a bronchitis issue since quitting smoking.  Discussed using rescue inhaler as needed for now.  May benefit from PFTs in the future.  Also recommended screening CT for lung cancer, patient declines at this time.  4. Hyperlipidemia LDL goal <70  - Comp Metabolic Panel; Future  - Lipid Profile; Future  Chronic condition.  Continue rosuvastatin 20 mg daily, due for updated labs  5. Essential hypertension  - Comp Metabolic Panel; Future  Chronic condition.  Stable.  Patient unsure if she is taking metoprolol 25 mg twice a day, according to her last cardiology note she supposed to be on the losartan and metoprolol.  Recommend she call the cardiologist to verify this.  Blood pressure target today.  She also check at home to see if she is taking this medication.  6. ASCVD (arteriosclerotic cardiovascular disease)  Being followed by vascular, stable.  7. Depression, unspecified depression type  Chronic condition.  Patient on paroxetine 20 mg, in partial remission.  No SI/HI.  8. Chronic insomnia  Chronic condition patient taking trazodone 50 mg daily.  She did score intermediate on the stop bang screening exam.  Discussed getting a sleep study to rule out sleep apnea.  Will readdress at her next visit.  9. Taking medication for  chronic disease  - Comp Metabolic Panel; Future       Follow-up: Return in about 4 weeks (around 11/3/2020) for Follow up on labs.    Please note that this dictation was created using voice recognition software. I have made every reasonable attempt to correct obvious errors, but I expect that there are errors of grammar and possibly content that I did not discover before finalizing the note.

## 2020-10-06 NOTE — ASSESSMENT & PLAN NOTE
This is a chronic condition.  Current medications: metformin 500 mg daily  Last A1c:   Lab Results   Component Value Date/Time    HBA1C 6.8 (H) 04/03/2020 0435    AVGLUC 148 04/03/2020 0435     Last Microalb/Cr ratio:   Lab Results   Component Value Date/Time    URCREAT 133.10 02/20/2019 0917    MICROALBUR 1.4 02/20/2019 0917    MALBCRT 11 02/20/2019 0917       Last retinal eye exam: due- goes to Dr. Jaimes  ACEi/ARB? Yes, losartan   Statin? Yes crestor 20 mg  Aspirin? yes  Concomitant HTN? yes  Nightly foot checks? No changes.

## 2020-10-06 NOTE — ASSESSMENT & PLAN NOTE
This is a chronic condition.   Latest Labs:   Lab Results   Component Value Date/Time    CHOLSTRLTOT 147 04/03/2020 04:35 AM    LDL 59 04/03/2020 04:35 AM    HDL 72 04/03/2020 04:35 AM    TRIGLYCERIDE 79 04/03/2020 04:35 AM      Medications: crestor 20 mg  Medication side effects: none  Diet/Exercise: diet good per patient. Doing more vegan. Has exercise bike. Does this 40 minutes some days.   Risk calculator: The 10-year ASCVD risk score (Bill DANA Jr., et al., 2013) is: 12.9%

## 2020-10-06 NOTE — ASSESSMENT & PLAN NOTE
On trazodone 50 nightly, no side effects. Doesn't know if she snores, she does have daytime fatigue.     STOP BANG SCORE:   4 (10/6/2020  9:17 AM)  Discussed this is intermediate risk. Discussed getting sleep study.

## 2020-10-06 NOTE — ASSESSMENT & PLAN NOTE
40+ years of smoking 1 pack per day. Quit 2016, hasn't had issues since quitting, occasionally wheezing. Has rescue inhaler as needed.

## 2020-10-16 RX ORDER — TRAZODONE HYDROCHLORIDE 50 MG/1
TABLET ORAL
Qty: 90 TAB | Refills: 0 | Status: SHIPPED | OUTPATIENT
Start: 2020-10-16 | End: 2021-01-19

## 2020-10-16 RX ORDER — PAROXETINE HYDROCHLORIDE 20 MG/1
TABLET, FILM COATED ORAL
Qty: 90 TAB | Refills: 0 | Status: SHIPPED
Start: 2020-10-16 | End: 2020-11-10 | Stop reason: DRUGHIGH

## 2020-10-26 ENCOUNTER — HOSPITAL ENCOUNTER (OUTPATIENT)
Dept: LAB | Facility: MEDICAL CENTER | Age: 65
End: 2020-10-26
Attending: PHYSICIAN ASSISTANT
Payer: MEDICARE

## 2020-10-26 DIAGNOSIS — E78.5 HYPERLIPIDEMIA LDL GOAL <70: ICD-10-CM

## 2020-10-26 DIAGNOSIS — E11.9 TYPE 2 DIABETES MELLITUS WITHOUT COMPLICATION, WITHOUT LONG-TERM CURRENT USE OF INSULIN (HCC): ICD-10-CM

## 2020-10-26 DIAGNOSIS — R69 TAKING MEDICATION FOR CHRONIC DISEASE: ICD-10-CM

## 2020-10-26 DIAGNOSIS — I10 ESSENTIAL HYPERTENSION: ICD-10-CM

## 2020-10-26 LAB
ALBUMIN SERPL BCP-MCNC: 4.3 G/DL (ref 3.2–4.9)
ALBUMIN/GLOB SERPL: 1.7 G/DL
ALP SERPL-CCNC: 91 U/L (ref 30–99)
ALT SERPL-CCNC: 23 U/L (ref 2–50)
ANION GAP SERPL CALC-SCNC: 12 MMOL/L (ref 7–16)
AST SERPL-CCNC: 15 U/L (ref 12–45)
BILIRUB SERPL-MCNC: 0.4 MG/DL (ref 0.1–1.5)
BUN SERPL-MCNC: 18 MG/DL (ref 8–22)
CALCIUM SERPL-MCNC: 9.5 MG/DL (ref 8.5–10.5)
CHLORIDE SERPL-SCNC: 102 MMOL/L (ref 96–112)
CHOLEST SERPL-MCNC: 162 MG/DL (ref 100–199)
CO2 SERPL-SCNC: 25 MMOL/L (ref 20–33)
CREAT SERPL-MCNC: 0.74 MG/DL (ref 0.5–1.4)
CREAT UR-MCNC: 146.86 MG/DL
EST. AVERAGE GLUCOSE BLD GHB EST-MCNC: 160 MG/DL
FASTING STATUS PATIENT QL REPORTED: NORMAL
GLOBULIN SER CALC-MCNC: 2.6 G/DL (ref 1.9–3.5)
GLUCOSE SERPL-MCNC: 157 MG/DL (ref 65–99)
HBA1C MFR BLD: 7.2 % (ref 0–5.6)
HDLC SERPL-MCNC: 76 MG/DL
LDLC SERPL CALC-MCNC: 62 MG/DL
MICROALBUMIN UR-MCNC: 1.7 MG/DL
MICROALBUMIN/CREAT UR: 12 MG/G (ref 0–30)
POTASSIUM SERPL-SCNC: 4.6 MMOL/L (ref 3.6–5.5)
PROT SERPL-MCNC: 6.9 G/DL (ref 6–8.2)
SODIUM SERPL-SCNC: 139 MMOL/L (ref 135–145)
TRIGL SERPL-MCNC: 118 MG/DL (ref 0–149)

## 2020-10-26 PROCEDURE — 36415 COLL VENOUS BLD VENIPUNCTURE: CPT | Mod: GA

## 2020-10-26 PROCEDURE — 80061 LIPID PANEL: CPT

## 2020-10-26 PROCEDURE — 82043 UR ALBUMIN QUANTITATIVE: CPT

## 2020-10-26 PROCEDURE — 80053 COMPREHEN METABOLIC PANEL: CPT

## 2020-10-26 PROCEDURE — 83036 HEMOGLOBIN GLYCOSYLATED A1C: CPT | Mod: GA

## 2020-10-26 PROCEDURE — 82570 ASSAY OF URINE CREATININE: CPT

## 2020-11-06 NOTE — ASSESSMENT & PLAN NOTE
Hemoglobin A1 from 6.8-7.2.  Patient is currently on Metformin 500 mg daily.  Discussed lifestyle modifications versus increase in the medication today.

## 2020-11-06 NOTE — ASSESSMENT & PLAN NOTE
This is a chronic condition.   Latest Labs:   Lab Results   Component Value Date/Time    CHOLSTRLTOT 162 10/26/2020 09:17 AM    LDL 62 10/26/2020 09:17 AM    HDL 76 10/26/2020 09:17 AM    TRIGLYCERIDE 118 10/26/2020 09:17 AM      Medications: crestor 20 mg  Labs at goal, make no changes.     Risk calculator: The 10-year ASCVD risk score (Thorndikedaniel CORTEZ Jr., et al., 2013) is: 13.2%

## 2020-11-10 ENCOUNTER — OFFICE VISIT (OUTPATIENT)
Dept: MEDICAL GROUP | Facility: PHYSICIAN GROUP | Age: 65
End: 2020-11-10
Payer: MEDICARE

## 2020-11-10 VITALS
HEART RATE: 81 BPM | OXYGEN SATURATION: 96 % | DIASTOLIC BLOOD PRESSURE: 80 MMHG | SYSTOLIC BLOOD PRESSURE: 132 MMHG | RESPIRATION RATE: 12 BRPM | TEMPERATURE: 97.9 F | BODY MASS INDEX: 36.15 KG/M2 | HEIGHT: 65 IN | WEIGHT: 217 LBS

## 2020-11-10 DIAGNOSIS — Z23 NEED FOR VACCINATION: ICD-10-CM

## 2020-11-10 DIAGNOSIS — F33.41 RECURRENT MAJOR DEPRESSIVE DISORDER, IN PARTIAL REMISSION (HCC): ICD-10-CM

## 2020-11-10 DIAGNOSIS — E11.9 TYPE 2 DIABETES MELLITUS WITHOUT COMPLICATION, WITHOUT LONG-TERM CURRENT USE OF INSULIN (HCC): ICD-10-CM

## 2020-11-10 DIAGNOSIS — Z12.31 ENCOUNTER FOR SCREENING MAMMOGRAM FOR BREAST CANCER: ICD-10-CM

## 2020-11-10 DIAGNOSIS — J40 BRONCHITIS: ICD-10-CM

## 2020-11-10 DIAGNOSIS — E78.5 HYPERLIPIDEMIA LDL GOAL <70: ICD-10-CM

## 2020-11-10 PROCEDURE — 99214 OFFICE O/P EST MOD 30 MIN: CPT | Mod: 25 | Performed by: PHYSICIAN ASSISTANT

## 2020-11-10 PROCEDURE — 90715 TDAP VACCINE 7 YRS/> IM: CPT | Performed by: PHYSICIAN ASSISTANT

## 2020-11-10 PROCEDURE — 90471 IMMUNIZATION ADMIN: CPT | Performed by: PHYSICIAN ASSISTANT

## 2020-11-10 RX ORDER — PAROXETINE 30 MG/1
30 TABLET, FILM COATED ORAL DAILY
Qty: 30 TAB | Refills: 2 | Status: SHIPPED | OUTPATIENT
Start: 2020-11-10 | End: 2020-12-18 | Stop reason: SDUPTHER

## 2020-11-10 ASSESSMENT — FIBROSIS 4 INDEX: FIB4 SCORE: 0.93

## 2020-11-10 NOTE — ASSESSMENT & PLAN NOTE
Suspect diagnosis of COPD, recommend she get PFTs with pulmonology. No chronic cough, some wheezing- she has rescue inhaler- was using every day when smoke was here in the summer, otherwise rarely. Smoked 50 years 1 pack per day, quit 2016.

## 2020-11-10 NOTE — PROGRESS NOTES
CC:   Chief Complaint   Patient presents with   • Lab Results   • Diabetes          HISTORY OF PRESENT ILLNESS: Patient is a 65 y.o. female established patient who presents today to follow up on labs and chronic medical conditions.     Health Maintenance: Completed    Type 2 diabetes mellitus without complication, without long-term current use of insulin ([facility])  Hemoglobin A1 from 6.8-7.2.  Patient is currently on Metformin 500 mg daily.  Discussed lifestyle modifications versus increase in the medication today.    Hyperlipidemia LDL goal <70  This is a chronic condition.   Latest Labs:   Lab Results   Component Value Date/Time    CHOLSTRLTOT 162 10/26/2020 09:17 AM    LDL 62 10/26/2020 09:17 AM    HDL 76 10/26/2020 09:17 AM    TRIGLYCERIDE 118 10/26/2020 09:17 AM      Medications: crestor 20 mg  Labs at goal, make no changes.     Risk calculator: The 10-year ASCVD risk score (Bill CORTEZ Jr., et al., 2013) is: 13.2%       Recurrent major depressive disorder, in partial remission (HCC)  Has been on paroxetine for years, seems to not be helping right now. Discussed increasing to 30 mg daily. Patient agreeable to this. She declines seeing counselor at this time. No SI/HI.     Complains of fatigue and lack of motivation.     Bronchitis  Suspect diagnosis of COPD, recommend she get PFTs with pulmonology. No chronic cough, some wheezing- she has rescue inhaler- was using every day when smoke was here in the summer, otherwise rarely. Smoked 50 years 1 pack per day, quit 2016.       Patient Active Problem List    Diagnosis Date Noted   • Essential hypertension 10/06/2020   • ASCVD (arteriosclerotic cardiovascular disease) 04/07/2020   • Coronary artery calcification seen on CT scan 04/07/2020   • Peripheral arterial disease (HCC) 02/07/2020   • Other specified personal risk factors, not elsewhere classified 02/07/2020   • Bronchitis 01/22/2020   • Recurrent major depressive disorder, in partial remission (HCC) 02/10/2019   •  Left elbow pain 09/04/2018   • Rash 09/04/2018   • Chronic insomnia 04/18/2016   • Type 2 diabetes mellitus without complication, without long-term current use of insulin (HCC) 10/02/2014   • BMI 36.0-36.9,adult 10/02/2014   • Hyperlipidemia LDL goal <70 10/02/2014      Allergies:Shellfish allergy and Sulfa drugs    Current Outpatient Medications   Medication Sig Dispense Refill   • PARoxetine (PAXIL) 30 MG Tab Take 1 Tab by mouth every day. 30 Tab 2   • metFORMIN (GLUCOPHAGE) 500 MG Tab TAKE ONE TABLET BY MOUTH TWICE A DAY WITH A MEAL 180 Tab 0   • traZODone (DESYREL) 50 MG Tab TAKE ONE TABLET BY MOUTH EVERY NIGHT AT BEDTIME AS NEEDED FOR SLEEP 90 Tab 0   • losartan (COZAAR) 100 MG Tab Take 1 Tab by mouth every day. 90 Tab 3   • aspirin EC (ECOTRIN) 81 MG Tablet Delayed Response Take 81 mg by mouth every day.     • rosuvastatin (CRESTOR) 20 MG Tab Take 20 mg by mouth every evening.     • metoprolol (LOPRESSOR) 25 MG Tab Take 1 Tab by mouth 2 Times a Day. 60 Tab 0   • albuterol (PROVENTIL) 2.5mg/3ml Nebu Soln solution for nebulization 3 mL by Nebulization route every four hours as needed for Shortness of Breath. 25 Bullet 0     No current facility-administered medications for this visit.        Social History     Tobacco Use   • Smoking status: Former Smoker     Packs/day: 0.00     Years: 48.00     Pack years: 0.00     Quit date: 6/7/2017     Years since quitting: 3.4   • Smokeless tobacco: Never Used   Substance Use Topics   • Alcohol use: No     Alcohol/week: 0.0 oz   • Drug use: No     Social History     Social History Narrative   • Not on file       Family History   Problem Relation Age of Onset   • Cancer Mother         pancreatic    • Heart Disease Father    • Heart Attack Father    • Diabetes Father         pre-diabetes   • Stroke Neg Hx        Review of Systems:    Constitutional: No Fevers, Chills  Eyes: No vision changes  ENT: No hearing changes  Resp: No Shortness of breath  CV: No Chest pain  GI: No  "Nausea/Vomiting  MSK: No weakness  Skin: No rashes  Neuro: No Headaches  Psych: No Suicidal ideations, complains of depression see HPI.     All remaining systems reviewed and found to be negative, except as stated above.    Exam:    /80   Pulse 81   Temp 36.6 °C (97.9 °F) (Temporal)   Resp 12   Ht 1.651 m (5' 5\")   Wt 98.4 kg (217 lb)   SpO2 96%  Body mass index is 36.11 kg/m².    General:  Well nourished, well developed female in NAD  HENT: Atraumatic, normocephalic  EYES: Extraocular movements intact  NECK: Supple, FROM  CHEST: No deformities, Equal chest expansion  RESP: Unlabored, no stridor or audible wheeze  HEART: Regular Rate and rhythm.   Extremities: No Clubbing, Cyanosis, or Edema  Skin: Warm/dry, without rashes  Neuro: A/O x 4, due to COVID-19- did not have patient remove face mask to test cranial nerves.  Motor/sensory grossly intact  Psych: Normal behavior, normal affect      Lab review:  Labs are reviewed and discussed with a patient  Lab Results   Component Value Date/Time    CHOLSTRLTOT 162 10/26/2020 09:17 AM    LDL 62 10/26/2020 09:17 AM    HDL 76 10/26/2020 09:17 AM    TRIGLYCERIDE 118 10/26/2020 09:17 AM       Lab Results   Component Value Date/Time    SODIUM 139 10/26/2020 09:17 AM    POTASSIUM 4.6 10/26/2020 09:17 AM    CHLORIDE 102 10/26/2020 09:17 AM    CO2 25 10/26/2020 09:17 AM    GLUCOSE 157 (H) 10/26/2020 09:17 AM    BUN 18 10/26/2020 09:17 AM    CREATININE 0.74 10/26/2020 09:17 AM     Lab Results   Component Value Date/Time    ALKPHOSPHAT 91 10/26/2020 09:17 AM    ASTSGOT 15 10/26/2020 09:17 AM    ALTSGPT 23 10/26/2020 09:17 AM    TBILIRUBIN 0.4 10/26/2020 09:17 AM      Lab Results   Component Value Date/Time    HBA1C 7.2 (H) 10/26/2020 09:17 AM    HBA1C 6.8 (H) 04/03/2020 04:35 AM    HBA1C 6.9 (H) 02/20/2019 09:17 AM     No results found for: TSH  No results found for: FREET4    Lab Results   Component Value Date/Time    WBC 13.1 (H) 04/02/2020 09:46 PM    RBC 4.36 " 04/02/2020 09:46 PM    HEMOGLOBIN 13.5 04/02/2020 09:46 PM    HEMATOCRIT 40.5 04/02/2020 09:46 PM    MCV 92.9 04/02/2020 09:46 PM    MCH 31.0 04/02/2020 09:46 PM    MCHC 33.3 (L) 04/02/2020 09:46 PM    MPV 10.2 04/02/2020 09:46 PM    NEUTSPOLYS 57.40 04/02/2020 09:46 PM    LYMPHOCYTES 33.60 04/02/2020 09:46 PM    MONOCYTES 6.30 04/02/2020 09:46 PM    EOSINOPHILS 2.10 04/02/2020 09:46 PM    BASOPHILS 0.30 04/02/2020 09:46 PM          Assessment/Plan:  1. Type 2 diabetes mellitus without complication, without long-term current use of insulin (HCC)  Chronic condition, hemoglobin A1c is up to 7.2.  Discussed with patient today lifestyle modifications versus increasing her Metformin to 500 mg twice a day.  Patient elects to work on aggressive lifestyle modifications over the next 90 days.  If not below 7 by her next visit I would encourage her to increase Metformin to 500 mg twice a day, for now we will continue it once a day.  Microalbumin reviewed and acceptable today.    2. Hyperlipidemia LDL goal <70  Chronic condition.  Stable.  Continue rosuvastatin 20 mg daily.  Repeat 90 days.    3. Need for vaccination  - Tdap Vaccine =>8YO IM    4. Encounter for screening mammogram for breast cancer  - MA-SCREENING MAMMO BILAT W/TOMOSYNTHESIS W/CAD; Future    5. Recurrent major depressive disorder, in partial remission (HCC)  Patient states that her depression is uncontrolled at this point, discussed in detail today, no SI/HI.  Going to trial paroxetine 30 mg daily.    Follow-up in 1 month.    Return to clinic sooner for symptoms including but not limited to: worsening depression, suicidal ideation, anxiety, agitation, panic attacks, insomnia, irritability, hostility, aggressiveness, impulsivity, hypomania and dhiraj    If such symptoms are observed, you or family need to contact us immediately, and consider calling the National Suicide Prevention Lifeline (1686.790.6268) or 408, or transporting patient to the emergency  department for immediate evaluation.     SSRI Black Box Warnings include: Anxiety, agitation, panic attacks, insomnia, irritability, hostility, aggressiveness, impulsivity, hypomania and dhiraj have been reported in adult and pediatric patients being treated with SSRI for major depressive disorder as well as for other indications.    6. Bronchitis  - REFERRAL TO PULMONARY AND SLEEP MEDICINE  Patient has a history of recurrent bronchitis and a 50-year smoking 1 pack a day.  Patient may indeed have his diagnosis of COPD, has not formally seen pulmonology or had PFTs.  Right now uses rescue inhaler only as needed.  She was using daily when we had smoke over the summer, but now using rarely.  Denies any chronic cough.  Recommend PFTs to pulmonology today.  Patient agreeable to this.  Will send referral.    Other orders  - PARoxetine (PAXIL) 30 MG Tab; Take 1 Tab by mouth every day.  Dispense: 30 Tab; Refill: 2       Follow-up: Return in about 4 weeks (around 12/8/2020) for Follow up increase of paroxetine to 30 mg. .    Please note that this dictation was created using voice recognition software. I have made every reasonable attempt to correct obvious errors, but I expect that there are errors of grammar and possibly content that I did not discover before finalizing the note.

## 2020-11-23 NOTE — TELEPHONE ENCOUNTER
Refill X 6 months, sent to pharmacy.Pt. Seen in the last 6 months per protocol.   Lab Results   Component Value Date/Time    SODIUM 139 10/26/2020 09:17 AM    POTASSIUM 4.6 10/26/2020 09:17 AM    CHLORIDE 102 10/26/2020 09:17 AM    CO2 25 10/26/2020 09:17 AM    GLUCOSE 157 (H) 10/26/2020 09:17 AM    BUN 18 10/26/2020 09:17 AM    CREATININE 0.74 10/26/2020 09:17 AM

## 2020-12-18 ENCOUNTER — TELEMEDICINE (OUTPATIENT)
Dept: MEDICAL GROUP | Facility: PHYSICIAN GROUP | Age: 65
End: 2020-12-18
Payer: MEDICARE

## 2020-12-18 VITALS — BODY MASS INDEX: 36.15 KG/M2 | RESPIRATION RATE: 12 BRPM | WEIGHT: 217 LBS | HEIGHT: 65 IN

## 2020-12-18 DIAGNOSIS — Z79.899 HIGH RISK MEDICATION USE: ICD-10-CM

## 2020-12-18 DIAGNOSIS — E78.5 HYPERLIPIDEMIA LDL GOAL <70: ICD-10-CM

## 2020-12-18 DIAGNOSIS — F33.41 RECURRENT MAJOR DEPRESSIVE DISORDER, IN PARTIAL REMISSION (HCC): ICD-10-CM

## 2020-12-18 DIAGNOSIS — J40 BRONCHITIS: ICD-10-CM

## 2020-12-18 DIAGNOSIS — E11.9 TYPE 2 DIABETES MELLITUS WITHOUT COMPLICATION, WITHOUT LONG-TERM CURRENT USE OF INSULIN (HCC): ICD-10-CM

## 2020-12-18 PROCEDURE — 99213 OFFICE O/P EST LOW 20 MIN: CPT | Mod: 95,CR | Performed by: PHYSICIAN ASSISTANT

## 2020-12-18 RX ORDER — PAROXETINE 30 MG/1
30 TABLET, FILM COATED ORAL DAILY
Qty: 90 TAB | Refills: 1 | Status: SHIPPED
Start: 2020-12-18 | End: 2021-06-22

## 2020-12-18 ASSESSMENT — FIBROSIS 4 INDEX: FIB4 SCORE: 0.93

## 2020-12-18 NOTE — ASSESSMENT & PLAN NOTE
Last visit we increased paroxetine 30 mg daily. Patient does feel that the medication is working very well for her. No side effects. Feels much better.

## 2020-12-18 NOTE — PROGRESS NOTES
Virtual Visit: Established Patient   This visit was conducted via Zoom using secure and encrypted videoconferencing technology. The patient was in a private location in the state of [place].    The patient's identity was confirmed and verbal consent was obtained for this virtual visit.    Subjective:   CC:   Chief Complaint   Patient presents with   • Medication Refill   • Depression       Dunia Her is a 65 y.o. female presenting for evaluation and management of:    Health Maintenance: patient will schedule DM eye exam.     Recurrent major depressive disorder, in partial remission (HCC)  Last visit we increased paroxetine 30 mg daily. Patient does feel that the medication is working very well for her. No side effects. Feels much better.     Bronchitis  Patient has an appointment with Pulmonary on March 1st. No flare ups since last visit.       ROS   Denies any recent fevers or chills. No nausea or vomiting. No chest pains or shortness of breath.     Allergies   Allergen Reactions   • Shellfish Allergy      Eyes itchy   • Sulfa Drugs Hives       Current medicines (including changes today)  Current Outpatient Medications   Medication Sig Dispense Refill   • PARoxetine (PAXIL) 30 MG Tab Take 1 Tab by mouth every day. 90 Tab 1   • metoprolol (LOPRESSOR) 25 MG Tab Take 1 Tab by mouth 2 Times a Day. 180 Tab 1   • metFORMIN (GLUCOPHAGE) 500 MG Tab TAKE ONE TABLET BY MOUTH TWICE A DAY WITH A MEAL 180 Tab 0   • traZODone (DESYREL) 50 MG Tab TAKE ONE TABLET BY MOUTH EVERY NIGHT AT BEDTIME AS NEEDED FOR SLEEP 90 Tab 0   • losartan (COZAAR) 100 MG Tab Take 1 Tab by mouth every day. 90 Tab 3   • aspirin EC (ECOTRIN) 81 MG Tablet Delayed Response Take 81 mg by mouth every day.     • rosuvastatin (CRESTOR) 20 MG Tab Take 20 mg by mouth every evening.     • albuterol (PROVENTIL) 2.5mg/3ml Nebu Soln solution for nebulization 3 mL by Nebulization route every four hours as needed for Shortness of Breath. 25 Bullet 0     No  "current facility-administered medications for this visit.        Patient Active Problem List    Diagnosis Date Noted   • Essential hypertension 10/06/2020   • ASCVD (arteriosclerotic cardiovascular disease) 04/07/2020   • Coronary artery calcification seen on CT scan 04/07/2020   • Peripheral arterial disease (HCC) 02/07/2020   • Other specified personal risk factors, not elsewhere classified 02/07/2020   • Bronchitis 01/22/2020   • Recurrent major depressive disorder, in partial remission (HCC) 02/10/2019   • Left elbow pain 09/04/2018   • Rash 09/04/2018   • Chronic insomnia 04/18/2016   • Type 2 diabetes mellitus without complication, without long-term current use of insulin (HCC) 10/02/2014   • BMI 36.0-36.9,adult 10/02/2014   • Hyperlipidemia LDL goal <70 10/02/2014       Family History   Problem Relation Age of Onset   • Cancer Mother         pancreatic    • Heart Disease Father    • Heart Attack Father    • Diabetes Father         pre-diabetes   • Stroke Neg Hx        She  has a past medical history of Hyperlipidemia LDL goal < 100 (10/2/2014), Hypertension, Obesity (BMI 30-39.9) (10/2/2014), Pre-diabetes (10/2/2014), Recurrent sinusitis, and Type II or unspecified type diabetes mellitus without mention of complication, not stated as uncontrolled. She also has no past medical history of ASTHMA or Diabetes.  She  has a past surgical history that includes abdominal hysterectomy total (1993).       Objective:   Resp 12   Ht 1.651 m (5' 5\")   Wt 98.4 kg (217 lb)   BMI 36.11 kg/m²     Physical Exam:  Constitutional: Alert, no distress, well-groomed.  Skin: No rashes in visible areas.  Eye: Round. Conjunctiva clear, lids normal. No icterus.   ENMT: Lips pink without lesions, good dentition, moist mucous membranes. Phonation normal.  Neck: No masses, no thyromegaly. Moves freely without pain.  Respiratory: Unlabored respiratory effort, no cough or audible wheeze  Psych: Alert and oriented x3, normal affect and " mood.       Assessment and Plan:   The following treatment plan was discussed:     1. Recurrent major depressive disorder, in partial remission (HCC)  Chronic condition.  Stable.  Patient doing much better on the paroxetine 30 mg.  We will continue for now.  2. Bronchitis  Patient updated me she has an appointment with pulmonary 3/1/2021.  No exasperations to last visit.  3. Type 2 diabetes mellitus without complication, without long-term current use of insulin (HCC)  - HEMOGLOBIN A1C; Future  Chronic condition, due for A1c before next visit.  Continue Metformin better milligrams daily.  Patient working on lifestyle modifications.  4. Hyperlipidemia LDL goal <70  - Lipid Profile; Future  Chronic condition.  Stable.  Continue Crestor 20 mg daily.  Due for updated labs for next visit.  5. High risk medication use  - HEPATIC FUNCTION PANEL; Future    Other orders  - PARoxetine (PAXIL) 30 MG Tab; Take 1 Tab by mouth every day.  Dispense: 90 Tab; Refill: 1        Follow-up: Return in about 3 months (around 3/18/2021) for Follow up on labs.        The patient verbalized agreement and understanding of current plan. All questions and concerns were addressed at time of visit.    Please note that this dictation was created using voice recognition software. I have made every reasonable attempt to correct obvious errors, but I expect that there are errors of grammar and possibly content that I did not discover before finalizing the note.

## 2021-01-19 RX ORDER — TRAZODONE HYDROCHLORIDE 50 MG/1
TABLET ORAL
Qty: 90 TAB | Refills: 1 | Status: SHIPPED
Start: 2021-01-19 | End: 2021-06-22

## 2021-01-20 NOTE — TELEPHONE ENCOUNTER
Patient has recently been seen by PCP within the last 6 months per protocol (12/20). Will refill medications for 6 months.  Lab Results   Component Value Date/Time    HBA1C 7.2 (H) 10/26/2020 09:17 AM      Lab Results   Component Value Date/Time    MALBCRT 12 10/26/2020 09:17 AM    MICROALBUR 1.7 10/26/2020 09:17 AM      Lab Results   Component Value Date/Time    ALKPHOSPHAT 91 10/26/2020 09:17 AM    ASTSGOT 15 10/26/2020 09:17 AM    ALTSGPT 23 10/26/2020 09:17 AM    TBILIRUBIN 0.4 10/26/2020 09:17 AM

## 2021-02-11 ENCOUNTER — HOSPITAL ENCOUNTER (OUTPATIENT)
Dept: LAB | Facility: MEDICAL CENTER | Age: 66
DRG: 340 | End: 2021-02-11
Attending: STUDENT IN AN ORGANIZED HEALTH CARE EDUCATION/TRAINING PROGRAM
Payer: MEDICARE

## 2021-02-11 ENCOUNTER — OFFICE VISIT (OUTPATIENT)
Dept: URGENT CARE | Facility: PHYSICIAN GROUP | Age: 66
End: 2021-02-11
Payer: MEDICARE

## 2021-02-11 VITALS
HEIGHT: 65 IN | TEMPERATURE: 98.2 F | BODY MASS INDEX: 33.82 KG/M2 | WEIGHT: 203 LBS | SYSTOLIC BLOOD PRESSURE: 118 MMHG | HEART RATE: 73 BPM | RESPIRATION RATE: 15 BRPM | DIASTOLIC BLOOD PRESSURE: 68 MMHG | OXYGEN SATURATION: 98 %

## 2021-02-11 DIAGNOSIS — R10.31 RIGHT LOWER QUADRANT ABDOMINAL PAIN: ICD-10-CM

## 2021-02-11 DIAGNOSIS — R10.84 GENERALIZED ABDOMINAL PAIN: ICD-10-CM

## 2021-02-11 DIAGNOSIS — R11.0 NAUSEA: ICD-10-CM

## 2021-02-11 LAB
ANION GAP SERPL CALC-SCNC: 15 MMOL/L (ref 7–16)
APPEARANCE UR: NORMAL
BILIRUB UR STRIP-MCNC: NORMAL MG/DL
BUN SERPL-MCNC: 21 MG/DL (ref 8–22)
CALCIUM SERPL-MCNC: 10.5 MG/DL (ref 8.5–10.5)
CHLORIDE SERPL-SCNC: 101 MMOL/L (ref 96–112)
CO2 SERPL-SCNC: 23 MMOL/L (ref 20–33)
COLOR UR AUTO: NORMAL
CREAT SERPL-MCNC: 1.12 MG/DL (ref 0.5–1.4)
GLUCOSE SERPL-MCNC: 122 MG/DL (ref 65–99)
GLUCOSE UR STRIP.AUTO-MCNC: NEGATIVE MG/DL
KETONES UR STRIP.AUTO-MCNC: 15 MG/DL
LEUKOCYTE ESTERASE UR QL STRIP.AUTO: NORMAL
NITRITE UR QL STRIP.AUTO: NEGATIVE
PH UR STRIP.AUTO: 5.5 [PH] (ref 5–8)
POTASSIUM SERPL-SCNC: 4.5 MMOL/L (ref 3.6–5.5)
PROT UR QL STRIP: 30 MG/DL
RBC UR QL AUTO: NEGATIVE
SODIUM SERPL-SCNC: 139 MMOL/L (ref 135–145)
SP GR UR STRIP.AUTO: 1.02
UROBILINOGEN UR STRIP-MCNC: 0.2 MG/DL

## 2021-02-11 PROCEDURE — 99214 OFFICE O/P EST MOD 30 MIN: CPT | Performed by: STUDENT IN AN ORGANIZED HEALTH CARE EDUCATION/TRAINING PROGRAM

## 2021-02-11 PROCEDURE — 81002 URINALYSIS NONAUTO W/O SCOPE: CPT | Performed by: STUDENT IN AN ORGANIZED HEALTH CARE EDUCATION/TRAINING PROGRAM

## 2021-02-11 PROCEDURE — 36415 COLL VENOUS BLD VENIPUNCTURE: CPT

## 2021-02-11 PROCEDURE — 80048 BASIC METABOLIC PNL TOTAL CA: CPT

## 2021-02-11 ASSESSMENT — FIBROSIS 4 INDEX: FIB4 SCORE: 0.93

## 2021-02-11 NOTE — PROGRESS NOTES
Subjective:   CHIEF COMPLAINT  Chief Complaint   Patient presents with   • Abdominal Pain     Pt reports right abd pain. Onset 4 days.        HPI  Dunia Her is a 65 y.o. female with a history of diverticulosis and hysterectomy presents with a chief complaint of right lower quadrant abdominal pain for approximately 4 days.  She reports her symptoms have been getting progressively better every day however the symptoms continue to persist.  Says she has had diarrhea the last 3 days; she had 4 bouts yesterday.  No BMs today.  No blood in her stools.  She has been tolerating p.o. intake.  She had coffee today but no solids; says she feels bloated and has a diminished appetite.  She had scrambled eggs yesterday which did not aggravate her symptoms. Sx are not aggravated with food.  She admits nausea but no vomiting.  Says she has had a fever but has not taken her temperature.  There are no specific alleviating or aggravating factors.  No dysuria or hematuria.  Patient is uncertain if she has had diverticulitis in the past.     REVIEW OF SYSTEMS  General: no fever or chills  GI: no nausea or vomiting  See HPI for further details.    PAST MEDICAL HISTORY  Patient Active Problem List    Diagnosis Date Noted   • Essential hypertension 10/06/2020   • ASCVD (arteriosclerotic cardiovascular disease) 04/07/2020   • Coronary artery calcification seen on CT scan 04/07/2020   • Peripheral arterial disease (HCC) 02/07/2020   • Other specified personal risk factors, not elsewhere classified 02/07/2020   • Bronchitis 01/22/2020   • Recurrent major depressive disorder, in partial remission ([facility]) 02/10/2019   • Left elbow pain 09/04/2018   • Rash 09/04/2018   • Chronic insomnia 04/18/2016   • Type 2 diabetes mellitus without complication, without long-term current use of insulin ([facility]) 10/02/2014   • BMI 36.0-36.9,adult 10/02/2014   • Hyperlipidemia LDL goal <70 10/02/2014       SURGICAL HISTORY   has a past surgical history that  "includes abdominal hysterectomy total (1993).    ALLERGIES  Allergies   Allergen Reactions   • Shellfish Allergy      Eyes itchy   • Sulfa Drugs Hives       CURRENT MEDICATIONS  Home Medications     Reviewed by [facility] (Medical Assistant) on 02/11/21 at 1501  Med List Status: <None>   Medication Last Dose Status   albuterol (PROVENTIL) 2.5mg/3ml Nebu Soln solution for nebulization Taking Active   aspirin EC (ECOTRIN) 81 MG Tablet Delayed Response Taking Active   losartan (COZAAR) 100 MG Tab Taking Active   metFORMIN (GLUCOPHAGE) 500 MG Tab Taking Active   metoprolol (LOPRESSOR) 25 MG Tab Taking Active   PARoxetine (PAXIL) 30 MG Tab Taking Active   rosuvastatin (CRESTOR) 20 MG Tab Taking Active   traZODone (DESYREL) 50 MG Tab Taking Active                SOCIAL HISTORY  Social History     Tobacco Use   • Smoking status: Former Smoker     Packs/day: 0.00     Years: 48.00     Pack years: 0.00     Quit date: 6/7/2017     Years since quitting: 3.6   • Smokeless tobacco: Never Used   Substance and Sexual Activity   • Alcohol use: No     Alcohol/week: 0.0 oz   • Drug use: No   • Sexual activity: Yes     Partners: Male     Comment:        FAMILY HISTORY  Family History   Problem Relation Age of Onset   • Cancer Mother         pancreatic    • Heart Disease Father    • Heart Attack Father    • Diabetes Father         pre-diabetes   • Stroke Neg Hx           Objective:   PHYSICAL EXAM  VITAL SIGNS: /68 (BP Location: Left arm, Patient Position: Sitting, BP Cuff Size: Adult)   Pulse 73   Temp 36.8 °C (98.2 °F) (Temporal)   Resp 15   Ht 1.651 m (5' 5\")   Wt 92.1 kg (203 lb)   SpO2 98%   BMI 33.78 kg/m²     Gen: no acute distress, normal voice  Skin: dry, intact, moist mucosal membranes  Lungs: CTAB w/ symmetric expansion  CV: RRR w/o murmurs or clicks  Abdomen: Bowel sounds normal, Soft, RLQ and LLQ TTP; right greater than left.  Mild guarding.  No rebound.    Psych: normal affect, normal " judgement, alert, awake    UA: Small bili, 15 ketones, 30 protein, trace leukocytes.  No nitrites    Assessment/Plan:     1. Right lower quadrant abdominal pain  POCT Urinalysis    Basic Metabolic Panel    CANCELED: Basic Metabolic Panel    CANCELED: Basic Metabolic Panel   2. Generalized abdominal pain  CT-ABDOMEN-PELVIS WITH    POCT Urinalysis    Basic Metabolic Panel    CANCELED: Basic Metabolic Panel    CANCELED: Basic Metabolic Panel   3. Nausea     Suspect symptoms are secondary to colitis.  Patient has been tolerating PO intake, passing BMs and is currently afebrile without any peritoneal signs on exam; doubt appendicitis versus bowel obstruction.  Other DDx include IBS vs gastroenteritis.  Discussed symptomatic treatment and will get follow-up imaging tomorrow.  -Ordered CT abdomen/pelvis w/ contrast to further characterize source of sx  -Ordered BMP to assess kidney function.  -Okay to have clear liquids and advance diet as tolerated  -If at any point symptoms worsen she was instructed to go to the emergency room for further evaluation and treatment  -Will contact following imaging and discuss next step    Differential diagnosis, natural history, supportive care, and indications for immediate follow-up discussed. All questions answered. Patient agrees with the plan of care.    Follow-up as needed if symptoms worsen or fail to improve to PCP, Urgent care or Emergency Room.    Please note that this dictation was created using voice recognition software. I have made a reasonable attempt to correct obvious errors, but I expect that there are errors of grammar and possibly content that I did not discover before finalizing the note.

## 2021-02-12 ENCOUNTER — ANESTHESIA (OUTPATIENT)
Dept: SURGERY | Facility: MEDICAL CENTER | Age: 66
DRG: 340 | End: 2021-02-12
Payer: MEDICARE

## 2021-02-12 ENCOUNTER — TELEPHONE (OUTPATIENT)
Dept: URGENT CARE | Facility: PHYSICIAN GROUP | Age: 66
End: 2021-02-12

## 2021-02-12 ENCOUNTER — HOSPITAL ENCOUNTER (OUTPATIENT)
Dept: RADIOLOGY | Facility: MEDICAL CENTER | Age: 66
DRG: 340 | End: 2021-02-12
Attending: STUDENT IN AN ORGANIZED HEALTH CARE EDUCATION/TRAINING PROGRAM
Payer: MEDICARE

## 2021-02-12 ENCOUNTER — HOSPITAL ENCOUNTER (INPATIENT)
Facility: MEDICAL CENTER | Age: 66
LOS: 3 days | DRG: 340 | End: 2021-02-15
Attending: EMERGENCY MEDICINE | Admitting: SURGERY
Payer: MEDICARE

## 2021-02-12 ENCOUNTER — ANESTHESIA EVENT (OUTPATIENT)
Dept: SURGERY | Facility: MEDICAL CENTER | Age: 66
DRG: 340 | End: 2021-02-12
Payer: MEDICARE

## 2021-02-12 DIAGNOSIS — R10.84 GENERALIZED ABDOMINAL PAIN: ICD-10-CM

## 2021-02-12 DIAGNOSIS — K35.32 ACUTE APPENDICITIS WITH PERFORATION AND LOCALIZED PERITONITIS, WITHOUT GANGRENE, UNSPECIFIED WHETHER ABSCESS PRESENT: ICD-10-CM

## 2021-02-12 DIAGNOSIS — G89.18 ACUTE POSTOPERATIVE PAIN: ICD-10-CM

## 2021-02-12 PROBLEM — K35.33 ACUTE APPENDICITIS WITH APPENDICEAL ABSCESS: Status: ACTIVE | Noted: 2021-02-12

## 2021-02-12 PROBLEM — F32.A DEPRESSION: Status: ACTIVE | Noted: 2021-02-12

## 2021-02-12 LAB
ALBUMIN SERPL BCP-MCNC: 3.9 G/DL (ref 3.2–4.9)
ALBUMIN/GLOB SERPL: 1.2 G/DL
ALP SERPL-CCNC: 117 U/L (ref 30–99)
ALT SERPL-CCNC: 19 U/L (ref 2–50)
ANION GAP SERPL CALC-SCNC: 16 MMOL/L (ref 7–16)
APPEARANCE UR: CLEAR
AST SERPL-CCNC: 17 U/L (ref 12–45)
BASOPHILS # BLD AUTO: 0.3 % (ref 0–1.8)
BASOPHILS # BLD: 0.03 K/UL (ref 0–0.12)
BILIRUB SERPL-MCNC: 0.3 MG/DL (ref 0.1–1.5)
BILIRUB UR QL STRIP.AUTO: NEGATIVE
BUN SERPL-MCNC: 20 MG/DL (ref 8–22)
CALCIUM SERPL-MCNC: 9.6 MG/DL (ref 8.5–10.5)
CFT BLD TEG: 4.2 MIN (ref 5–10)
CHLORIDE SERPL-SCNC: 101 MMOL/L (ref 96–112)
CLOT ANGLE BLD TEG: 77.7 DEGREES (ref 53–72)
CLOT LYSIS 30M P MA LENFR BLD TEG: 0 % (ref 0–8)
CO2 SERPL-SCNC: 21 MMOL/L (ref 20–33)
COLOR UR: YELLOW
CREAT SERPL-MCNC: 0.97 MG/DL (ref 0.5–1.4)
CT.EXTRINSIC BLD ROTEM: 0.8 MIN (ref 1–3)
EOSINOPHIL # BLD AUTO: 0.28 K/UL (ref 0–0.51)
EOSINOPHIL NFR BLD: 3 % (ref 0–6.9)
ERYTHROCYTE [DISTWIDTH] IN BLOOD BY AUTOMATED COUNT: 45.9 FL (ref 35.9–50)
GLOBULIN SER CALC-MCNC: 3.3 G/DL (ref 1.9–3.5)
GLUCOSE BLD-MCNC: 152 MG/DL (ref 65–99)
GLUCOSE BLD-MCNC: 167 MG/DL (ref 65–99)
GLUCOSE SERPL-MCNC: 166 MG/DL (ref 65–99)
GLUCOSE UR STRIP.AUTO-MCNC: NEGATIVE MG/DL
HCT VFR BLD AUTO: 38.6 % (ref 37–47)
HGB BLD-MCNC: 12.6 G/DL (ref 12–16)
IMM GRANULOCYTES # BLD AUTO: 0.04 K/UL (ref 0–0.11)
IMM GRANULOCYTES NFR BLD AUTO: 0.4 % (ref 0–0.9)
KETONES UR STRIP.AUTO-MCNC: NEGATIVE MG/DL
LEUKOCYTE ESTERASE UR QL STRIP.AUTO: NEGATIVE
LIPASE SERPL-CCNC: 32 U/L (ref 11–82)
LYMPHOCYTES # BLD AUTO: 1.96 K/UL (ref 1–4.8)
LYMPHOCYTES NFR BLD: 21 % (ref 22–41)
MCF BLD TEG: 76.3 MM (ref 50–70)
MCH RBC QN AUTO: 30.1 PG (ref 27–33)
MCHC RBC AUTO-ENTMCNC: 32.6 G/DL (ref 33.6–35)
MCV RBC AUTO: 92.3 FL (ref 81.4–97.8)
MICRO URNS: NORMAL
MONOCYTES # BLD AUTO: 0.55 K/UL (ref 0–0.85)
MONOCYTES NFR BLD AUTO: 5.9 % (ref 0–13.4)
NEUTROPHILS # BLD AUTO: 6.49 K/UL (ref 2–7.15)
NEUTROPHILS NFR BLD: 69.4 % (ref 44–72)
NITRITE UR QL STRIP.AUTO: NEGATIVE
NRBC # BLD AUTO: 0 K/UL
NRBC BLD-RTO: 0 /100 WBC
PA AA BLD-ACNC: 61 %
PA ADP BLD-ACNC: 7.4 %
PATHOLOGY CONSULT NOTE: NORMAL
PH UR STRIP.AUTO: 5 [PH] (ref 5–8)
PLATELET # BLD AUTO: 233 K/UL (ref 164–446)
PMV BLD AUTO: 10.5 FL (ref 9–12.9)
POTASSIUM SERPL-SCNC: 4.1 MMOL/L (ref 3.6–5.5)
PROT SERPL-MCNC: 7.2 G/DL (ref 6–8.2)
PROT UR QL STRIP: NEGATIVE MG/DL
RBC # BLD AUTO: 4.18 M/UL (ref 4.2–5.4)
RBC UR QL AUTO: NEGATIVE
SARS-COV+SARS-COV-2 AG RESP QL IA.RAPID: NOTDETECTED
SARS-COV-2 RNA RESP QL NAA+PROBE: NOTDETECTED
SODIUM SERPL-SCNC: 138 MMOL/L (ref 135–145)
SP GR UR STRIP.AUTO: 1.01
SPECIMEN SOURCE: NORMAL
SPECIMEN SOURCE: NORMAL
TEG ALGORITHM TGALG: ABNORMAL
UROBILINOGEN UR STRIP.AUTO-MCNC: 0.2 MG/DL
WBC # BLD AUTO: 9.4 K/UL (ref 4.8–10.8)

## 2021-02-12 PROCEDURE — 700102 HCHG RX REV CODE 250 W/ 637 OVERRIDE(OP): Performed by: SURGERY

## 2021-02-12 PROCEDURE — 770006 HCHG ROOM/CARE - MED/SURG/GYN SEMI*

## 2021-02-12 PROCEDURE — 83690 ASSAY OF LIPASE: CPT

## 2021-02-12 PROCEDURE — 160035 HCHG PACU - 1ST 60 MINS PHASE I: Performed by: SURGERY

## 2021-02-12 PROCEDURE — 501838 HCHG SUTURE GENERAL: Performed by: SURGERY

## 2021-02-12 PROCEDURE — 160041 HCHG SURGERY MINUTES - EA ADDL 1 MIN LEVEL 4: Performed by: SURGERY

## 2021-02-12 PROCEDURE — 700111 HCHG RX REV CODE 636 W/ 250 OVERRIDE (IP): Performed by: ANESTHESIOLOGY

## 2021-02-12 PROCEDURE — 74177 CT ABD & PELVIS W/CONTRAST: CPT | Mod: MG

## 2021-02-12 PROCEDURE — 700105 HCHG RX REV CODE 258: Performed by: SURGERY

## 2021-02-12 PROCEDURE — 501582 HCHG TROCAR, THRD BLADED: Performed by: SURGERY

## 2021-02-12 PROCEDURE — 700117 HCHG RX CONTRAST REV CODE 255: Performed by: STUDENT IN AN ORGANIZED HEALTH CARE EDUCATION/TRAINING PROGRAM

## 2021-02-12 PROCEDURE — 700105 HCHG RX REV CODE 258: Performed by: ANESTHESIOLOGY

## 2021-02-12 PROCEDURE — 82962 GLUCOSE BLOOD TEST: CPT

## 2021-02-12 PROCEDURE — 160036 HCHG PACU - EA ADDL 30 MINS PHASE I: Performed by: SURGERY

## 2021-02-12 PROCEDURE — 87426 SARSCOV CORONAVIRUS AG IA: CPT

## 2021-02-12 PROCEDURE — C9803 HOPD COVID-19 SPEC COLLECT: HCPCS | Performed by: EMERGENCY MEDICINE

## 2021-02-12 PROCEDURE — 700101 HCHG RX REV CODE 250: Performed by: SURGERY

## 2021-02-12 PROCEDURE — 0DTJ4ZZ RESECTION OF APPENDIX, PERCUTANEOUS ENDOSCOPIC APPROACH: ICD-10-PCS | Performed by: SURGERY

## 2021-02-12 PROCEDURE — 501583 HCHG TROCAR, THRD CAN&SEAL 5X100: Performed by: SURGERY

## 2021-02-12 PROCEDURE — 85384 FIBRINOGEN ACTIVITY: CPT

## 2021-02-12 PROCEDURE — 88304 TISSUE EXAM BY PATHOLOGIST: CPT

## 2021-02-12 PROCEDURE — 85025 COMPLETE CBC W/AUTO DIFF WBC: CPT

## 2021-02-12 PROCEDURE — 502240 HCHG MISC OR SUPPLY RC 0272: Performed by: SURGERY

## 2021-02-12 PROCEDURE — 700101 HCHG RX REV CODE 250: Performed by: ANESTHESIOLOGY

## 2021-02-12 PROCEDURE — 80053 COMPREHEN METABOLIC PANEL: CPT

## 2021-02-12 PROCEDURE — 502571 HCHG PACK, LAP CHOLE: Performed by: SURGERY

## 2021-02-12 PROCEDURE — U0005 INFEC AGEN DETEC AMPLI PROBE: HCPCS

## 2021-02-12 PROCEDURE — 160002 HCHG RECOVERY MINUTES (STAT): Performed by: SURGERY

## 2021-02-12 PROCEDURE — U0003 INFECTIOUS AGENT DETECTION BY NUCLEIC ACID (DNA OR RNA); SEVERE ACUTE RESPIRATORY SYNDROME CORONAVIRUS 2 (SARS-COV-2) (CORONAVIRUS DISEASE [COVID-19]), AMPLIFIED PROBE TECHNIQUE, MAKING USE OF HIGH THROUGHPUT TECHNOLOGIES AS DESCRIBED BY CMS-2020-01-R: HCPCS

## 2021-02-12 PROCEDURE — 81003 URINALYSIS AUTO W/O SCOPE: CPT

## 2021-02-12 PROCEDURE — 700111 HCHG RX REV CODE 636 W/ 250 OVERRIDE (IP): Performed by: EMERGENCY MEDICINE

## 2021-02-12 PROCEDURE — 501570 HCHG TROCAR, SEPARATOR: Performed by: SURGERY

## 2021-02-12 PROCEDURE — 85347 COAGULATION TIME ACTIVATED: CPT

## 2021-02-12 PROCEDURE — 160009 HCHG ANES TIME/MIN: Performed by: SURGERY

## 2021-02-12 PROCEDURE — 160029 HCHG SURGERY MINUTES - 1ST 30 MINS LEVEL 4: Performed by: SURGERY

## 2021-02-12 PROCEDURE — 500868 HCHG NEEDLE, SURGI(VARES): Performed by: SURGERY

## 2021-02-12 PROCEDURE — A9270 NON-COVERED ITEM OR SERVICE: HCPCS | Performed by: ANESTHESIOLOGY

## 2021-02-12 PROCEDURE — 700105 HCHG RX REV CODE 258: Performed by: EMERGENCY MEDICINE

## 2021-02-12 PROCEDURE — A9270 NON-COVERED ITEM OR SERVICE: HCPCS | Performed by: SURGERY

## 2021-02-12 PROCEDURE — 700102 HCHG RX REV CODE 250 W/ 637 OVERRIDE(OP): Performed by: ANESTHESIOLOGY

## 2021-02-12 PROCEDURE — 99291 CRITICAL CARE FIRST HOUR: CPT

## 2021-02-12 PROCEDURE — 700111 HCHG RX REV CODE 636 W/ 250 OVERRIDE (IP): Performed by: SURGERY

## 2021-02-12 PROCEDURE — 160048 HCHG OR STATISTICAL LEVEL 1-5: Performed by: SURGERY

## 2021-02-12 PROCEDURE — 96365 THER/PROPH/DIAG IV INF INIT: CPT

## 2021-02-12 PROCEDURE — 500002 HCHG ADHESIVE, DERMABOND: Performed by: SURGERY

## 2021-02-12 PROCEDURE — 85576 BLOOD PLATELET AGGREGATION: CPT

## 2021-02-12 RX ORDER — DIPHENHYDRAMINE HYDROCHLORIDE 50 MG/ML
12.5 INJECTION INTRAMUSCULAR; INTRAVENOUS
Status: DISCONTINUED | OUTPATIENT
Start: 2021-02-12 | End: 2021-02-12 | Stop reason: HOSPADM

## 2021-02-12 RX ORDER — IBUPROFEN 200 MG
600 TABLET ORAL
COMMUNITY

## 2021-02-12 RX ORDER — VITAMIN B COMPLEX
3000 TABLET ORAL EVERY MORNING
COMMUNITY

## 2021-02-12 RX ORDER — OXYCODONE HYDROCHLORIDE 5 MG/1
5 TABLET ORAL EVERY 4 HOURS PRN
Status: DISCONTINUED | OUTPATIENT
Start: 2021-02-12 | End: 2021-02-15 | Stop reason: HOSPADM

## 2021-02-12 RX ORDER — DOCUSATE SODIUM 100 MG/1
100 CAPSULE, LIQUID FILLED ORAL 2 TIMES DAILY
Status: DISCONTINUED | OUTPATIENT
Start: 2021-02-12 | End: 2021-02-15 | Stop reason: HOSPADM

## 2021-02-12 RX ORDER — AMOXICILLIN 250 MG
1 CAPSULE ORAL
Status: DISCONTINUED | OUTPATIENT
Start: 2021-02-12 | End: 2021-02-15 | Stop reason: HOSPADM

## 2021-02-12 RX ORDER — DEXTROSE MONOHYDRATE 25 G/50ML
50 INJECTION, SOLUTION INTRAVENOUS
Status: DISCONTINUED | OUTPATIENT
Start: 2021-02-12 | End: 2021-02-15 | Stop reason: HOSPADM

## 2021-02-12 RX ORDER — HALOPERIDOL 5 MG/ML
1 INJECTION INTRAMUSCULAR
Status: DISCONTINUED | OUTPATIENT
Start: 2021-02-12 | End: 2021-02-12 | Stop reason: HOSPADM

## 2021-02-12 RX ORDER — KETOROLAC TROMETHAMINE 30 MG/ML
INJECTION, SOLUTION INTRAMUSCULAR; INTRAVENOUS PRN
Status: DISCONTINUED | OUTPATIENT
Start: 2021-02-12 | End: 2021-02-12 | Stop reason: SURG

## 2021-02-12 RX ORDER — ACETAMINOPHEN 325 MG/1
650 TABLET ORAL 4 TIMES DAILY
Status: DISCONTINUED | OUTPATIENT
Start: 2021-02-12 | End: 2021-02-15 | Stop reason: HOSPADM

## 2021-02-12 RX ORDER — LIDOCAINE HYDROCHLORIDE 20 MG/ML
INJECTION, SOLUTION EPIDURAL; INFILTRATION; INTRACAUDAL; PERINEURAL PRN
Status: DISCONTINUED | OUTPATIENT
Start: 2021-02-12 | End: 2021-02-12 | Stop reason: SURG

## 2021-02-12 RX ORDER — ROSUVASTATIN CALCIUM 20 MG/1
20 TABLET, COATED ORAL EVERY EVENING
Status: DISCONTINUED | OUTPATIENT
Start: 2021-02-12 | End: 2021-02-15 | Stop reason: HOSPADM

## 2021-02-12 RX ORDER — OXYCODONE HYDROCHLORIDE 10 MG/1
10 TABLET ORAL EVERY 4 HOURS PRN
Status: DISCONTINUED | OUTPATIENT
Start: 2021-02-12 | End: 2021-02-15 | Stop reason: HOSPADM

## 2021-02-12 RX ORDER — TRAZODONE HYDROCHLORIDE 50 MG/1
50 TABLET ORAL NIGHTLY PRN
Status: DISCONTINUED | OUTPATIENT
Start: 2021-02-12 | End: 2021-02-15 | Stop reason: HOSPADM

## 2021-02-12 RX ORDER — MORPHINE SULFATE 10 MG/ML
2 INJECTION, SOLUTION INTRAMUSCULAR; INTRAVENOUS EVERY 4 HOURS PRN
Status: DISCONTINUED | OUTPATIENT
Start: 2021-02-12 | End: 2021-02-14

## 2021-02-12 RX ORDER — SODIUM CHLORIDE, SODIUM LACTATE, POTASSIUM CHLORIDE, CALCIUM CHLORIDE 600; 310; 30; 20 MG/100ML; MG/100ML; MG/100ML; MG/100ML
INJECTION, SOLUTION INTRAVENOUS
Status: DISCONTINUED | OUTPATIENT
Start: 2021-02-12 | End: 2021-02-12 | Stop reason: SURG

## 2021-02-12 RX ORDER — LOSARTAN POTASSIUM 50 MG/1
100 TABLET ORAL EVERY MORNING
Status: DISCONTINUED | OUTPATIENT
Start: 2021-02-13 | End: 2021-02-15 | Stop reason: HOSPADM

## 2021-02-12 RX ORDER — BISACODYL 10 MG
10 SUPPOSITORY, RECTAL RECTAL
Status: DISCONTINUED | OUTPATIENT
Start: 2021-02-12 | End: 2021-02-15 | Stop reason: HOSPADM

## 2021-02-12 RX ORDER — ONDANSETRON 2 MG/ML
INJECTION INTRAMUSCULAR; INTRAVENOUS PRN
Status: DISCONTINUED | OUTPATIENT
Start: 2021-02-12 | End: 2021-02-12 | Stop reason: SURG

## 2021-02-12 RX ORDER — ENEMA 19; 7 G/133ML; G/133ML
1 ENEMA RECTAL
Status: DISCONTINUED | OUTPATIENT
Start: 2021-02-12 | End: 2021-02-15 | Stop reason: HOSPADM

## 2021-02-12 RX ORDER — ONDANSETRON 2 MG/ML
4 INJECTION INTRAMUSCULAR; INTRAVENOUS
Status: DISCONTINUED | OUTPATIENT
Start: 2021-02-12 | End: 2021-02-12 | Stop reason: HOSPADM

## 2021-02-12 RX ORDER — BUPIVACAINE HYDROCHLORIDE AND EPINEPHRINE 5; 5 MG/ML; UG/ML
INJECTION, SOLUTION EPIDURAL; INTRACAUDAL; PERINEURAL
Status: DISCONTINUED | OUTPATIENT
Start: 2021-02-12 | End: 2021-02-12 | Stop reason: HOSPADM

## 2021-02-12 RX ORDER — AMOXICILLIN 250 MG
1 CAPSULE ORAL NIGHTLY
Status: DISCONTINUED | OUTPATIENT
Start: 2021-02-12 | End: 2021-02-15 | Stop reason: HOSPADM

## 2021-02-12 RX ORDER — ONDANSETRON 2 MG/ML
4 INJECTION INTRAMUSCULAR; INTRAVENOUS EVERY 4 HOURS PRN
Status: DISCONTINUED | OUTPATIENT
Start: 2021-02-12 | End: 2021-02-15 | Stop reason: HOSPADM

## 2021-02-12 RX ORDER — PAROXETINE 30 MG/1
30 TABLET, FILM COATED ORAL EVERY MORNING
Status: DISCONTINUED | OUTPATIENT
Start: 2021-02-13 | End: 2021-02-15 | Stop reason: HOSPADM

## 2021-02-12 RX ORDER — METOCLOPRAMIDE HYDROCHLORIDE 5 MG/ML
INJECTION INTRAMUSCULAR; INTRAVENOUS PRN
Status: DISCONTINUED | OUTPATIENT
Start: 2021-02-12 | End: 2021-02-12 | Stop reason: SURG

## 2021-02-12 RX ADMIN — ACETAMINOPHEN 650 MG: 325 TABLET ORAL at 18:34

## 2021-02-12 RX ADMIN — ROCURONIUM BROMIDE 50 MG: 10 INJECTION, SOLUTION INTRAVENOUS at 15:29

## 2021-02-12 RX ADMIN — METOCLOPRAMIDE 10 MG: 5 INJECTION, SOLUTION INTRAMUSCULAR; INTRAVENOUS at 15:53

## 2021-02-12 RX ADMIN — HYDROCODONE BITARTRATE AND ACETAMINOPHEN 7.5 MG: 7.5; 325 SOLUTION ORAL at 16:37

## 2021-02-12 RX ADMIN — PIPERACILLIN AND TAZOBACTAM 3.38 G: 3; .375 INJECTION, POWDER, LYOPHILIZED, FOR SOLUTION INTRAVENOUS; PARENTERAL at 18:35

## 2021-02-12 RX ADMIN — ROSUVASTATIN CALCIUM 20 MG: 20 TABLET, FILM COATED ORAL at 18:35

## 2021-02-12 RX ADMIN — LIDOCAINE HYDROCHLORIDE 100 MG: 20 INJECTION, SOLUTION EPIDURAL; INFILTRATION; INTRACAUDAL at 15:29

## 2021-02-12 RX ADMIN — KETOROLAC TROMETHAMINE 15 MG: 30 INJECTION, SOLUTION INTRAMUSCULAR at 15:53

## 2021-02-12 RX ADMIN — FENTANYL CITRATE 50 MCG: 50 INJECTION, SOLUTION INTRAMUSCULAR; INTRAVENOUS at 16:38

## 2021-02-12 RX ADMIN — PIPERACILLIN SODIUM AND TAZOBACTAM SODIUM 3.38 G: 3; .375 INJECTION, POWDER, FOR SOLUTION INTRAVENOUS at 13:29

## 2021-02-12 RX ADMIN — ACETAMINOPHEN 650 MG: 325 TABLET ORAL at 23:48

## 2021-02-12 RX ADMIN — INSULIN HUMAN 1 UNITS: 100 INJECTION, SOLUTION PARENTERAL at 21:32

## 2021-02-12 RX ADMIN — METOPROLOL TARTRATE 25 MG: 25 TABLET, FILM COATED ORAL at 18:34

## 2021-02-12 RX ADMIN — DOCUSATE SODIUM 100 MG: 100 CAPSULE, LIQUID FILLED ORAL at 18:34

## 2021-02-12 RX ADMIN — INSULIN HUMAN 1 UNITS: 100 INJECTION, SOLUTION PARENTERAL at 18:36

## 2021-02-12 RX ADMIN — ONDANSETRON 4 MG: 2 INJECTION INTRAMUSCULAR; INTRAVENOUS at 16:05

## 2021-02-12 RX ADMIN — PROPOFOL 200 MG: 10 INJECTION, EMULSION INTRAVENOUS at 15:29

## 2021-02-12 RX ADMIN — IOHEXOL 100 ML: 350 INJECTION, SOLUTION INTRAVENOUS at 09:39

## 2021-02-12 RX ADMIN — SODIUM CHLORIDE, POTASSIUM CHLORIDE, SODIUM LACTATE AND CALCIUM CHLORIDE: 600; 310; 30; 20 INJECTION, SOLUTION INTRAVENOUS at 15:24

## 2021-02-12 ASSESSMENT — LIFESTYLE VARIABLES
ON A TYPICAL DAY WHEN YOU DRINK ALCOHOL HOW MANY DRINKS DO YOU HAVE: 0
HOW MANY TIMES IN THE PAST YEAR HAVE YOU HAD 5 OR MORE DRINKS IN A DAY: 0
TOTAL SCORE: 0
ALCOHOL_USE: NO
DOES PATIENT WANT TO STOP DRINKING: NO
EVER FELT BAD OR GUILTY ABOUT YOUR DRINKING: NO
CONSUMPTION TOTAL: NEGATIVE
AVERAGE NUMBER OF DAYS PER WEEK YOU HAVE A DRINK CONTAINING ALCOHOL: 0
TOTAL SCORE: 0
EVER HAD A DRINK FIRST THING IN THE MORNING TO STEADY YOUR NERVES TO GET RID OF A HANGOVER: NO
TOTAL SCORE: 0
HAVE PEOPLE ANNOYED YOU BY CRITICIZING YOUR DRINKING: NO
HAVE YOU EVER FELT YOU SHOULD CUT DOWN ON YOUR DRINKING: NO

## 2021-02-12 ASSESSMENT — COGNITIVE AND FUNCTIONAL STATUS - GENERAL
MOVING TO AND FROM BED TO CHAIR: A LITTLE
STANDING UP FROM CHAIR USING ARMS: A LITTLE
CLIMB 3 TO 5 STEPS WITH RAILING: A LITTLE
DRESSING REGULAR LOWER BODY CLOTHING: A LITTLE
DRESSING REGULAR UPPER BODY CLOTHING: A LITTLE
TOILETING: A LITTLE
DAILY ACTIVITIY SCORE: 19
TURNING FROM BACK TO SIDE WHILE IN FLAT BAD: A LITTLE
SUGGESTED CMS G CODE MODIFIER MOBILITY: CK
SUGGESTED CMS G CODE MODIFIER DAILY ACTIVITY: CK
PERSONAL GROOMING: A LITTLE
HELP NEEDED FOR BATHING: A LITTLE
WALKING IN HOSPITAL ROOM: A LITTLE
MOBILITY SCORE: 18
MOVING FROM LYING ON BACK TO SITTING ON SIDE OF FLAT BED: A LITTLE

## 2021-02-12 ASSESSMENT — PAIN DESCRIPTION - PAIN TYPE
TYPE: ACUTE PAIN;SURGICAL PAIN
TYPE: SURGICAL PAIN
TYPE: SURGICAL PAIN
TYPE: ACUTE PAIN;SURGICAL PAIN
TYPE: SURGICAL PAIN

## 2021-02-12 ASSESSMENT — FIBROSIS 4 INDEX: FIB4 SCORE: 0.93

## 2021-02-12 ASSESSMENT — PATIENT HEALTH QUESTIONNAIRE - PHQ9
2. FEELING DOWN, DEPRESSED, IRRITABLE, OR HOPELESS: NOT AT ALL
1. LITTLE INTEREST OR PLEASURE IN DOING THINGS: NOT AT ALL
SUM OF ALL RESPONSES TO PHQ9 QUESTIONS 1 AND 2: 0

## 2021-02-12 NOTE — ED TRIAGE NOTES
Dunia Castelan Arden  Chief Complaint   Patient presents with   • Abdominal Pain     Pt was seen at urgent care yesterday for RLQ pain and had blood work and a CT completed. Pt was told this morning that she has Apppendicitis.      Pt ambulatory to triage with above complaint.     /73   Pulse 70   Temp 36.1 °C (96.9 °F) (Temporal)   Resp 16   Wt 95.6 kg (210 lb 12.2 oz)   SpO2 96%   BMI 35.07 kg/m²     Pt informed of triage process and encouraged to notify staff of any changes or concerns. Pt verbalized understanding of instructions. Apologized for long wait time. Pt placed back in lobby.

## 2021-02-12 NOTE — TELEPHONE ENCOUNTER
Contacted patient and spoke with her over the phone reviewing the results of her CAT scan.  Unfortunately the findings are consistent with an acute appendicitis associated with a 13 mm periappendiceal fluid collection.  She was instructed to go to the emergency room for definitive treatment.  The patient reports she will have her sister take her there immediately.  I instructed her to hold anything by mouth.  Patient verbalized her understanding.  All questions were answered.

## 2021-02-12 NOTE — H&P
"      REFERRING PHYSICIAN: Jose Sanchez MD    DATE OF SERVICE: 2/12/2021    CHIEF COMPLAINT: Right lower quadrant abdominal pain.     HISTORY OF PRESENT ILLNESS: The patient is a 65 y.o. female, who presents to the emergency department with abdominal pain for the preceding 5 days. The patient thought it was a recurrence of diverticulitis. She was seen at an urgent care and a CT was ordered.  This was performed earlier this morning and showed appendicitis with a small beth-appendiceal abscess.  She was called with the results and told to come to the ER.  The patient denies any recent or intercurrent illness. The patient has had no previous abdominal ailments.     PAST MEDICAL HISTORY:  has a past medical history of Hyperlipidemia LDL goal < 100 (10/2/2014), Hypertension, Obesity (BMI 30-39.9) (10/2/2014), Pre-diabetes (10/2/2014), Recurrent sinusitis, and Type II or unspecified type diabetes mellitus without mention of complication, not stated as uncontrolled.     PAST SURGICAL HISTORY:  has a past surgical history that includes abdominal hysterectomy total (1993).     ALLERGIES:   Allergies   Allergen Reactions   • Sulfa Drugs Hives   • Shellfish Allergy Itching     \"Itchy eyes\"        CURRENT MEDICATIONS:   Please refer to the medication reconciliation in Epic which I have reviewed personally    FAMILY HISTORY:   Family History   Problem Relation Age of Onset   • Cancer Mother         pancreatic    • Heart Disease Father    • Heart Attack Father    • Diabetes Father         pre-diabetes   • Stroke Neg Hx         SOCIAL HISTORY:   Social History     Tobacco Use   • Smoking status: Former Smoker     Packs/day: 0.00     Years: 48.00     Pack years: 0.00     Quit date: 6/7/2017     Years since quitting: 3.6   • Smokeless tobacco: Never Used   Substance and Sexual Activity   • Alcohol use: No     Alcohol/week: 0.0 oz   • Drug use: No   • Sexual activity: Yes     Partners: Male     Comment:        REVIEW OF " SYSTEMS:   Constitutional: Negative for fever, chills, weight loss, malaise/fatigue and diaphoresis.   HENT: Negative for hearing loss, ear pain, nosebleeds, congestion, sore throat, neck pain, tinnitus and ear discharge.    Eyes: Negative for blurred vision, double vision, photophobia, pain, discharge and redness.   Respiratory: Negative for cough, hemoptysis, sputum production, shortness of breath, wheezing and stridor.    Cardiovascular: Negative for chest pain, palpitations, orthopnea, claudication, leg swelling and PND.   Gastrointestinal: Negative for heartburn, nausea, vomiting, abdominal pain, diarrhea, constipation, blood in stool and melena.   Genitourinary: Negative for dysuria, urgency, frequency, hematuria and flank pain.   Musculoskeletal: Negative for myalgias, back pain, joint pain and falls.   Skin: Negative for itching and rash.  Neurological: Negative for dizziness, tingling, tremors, sensory change, speech change, focal weakness, seizures, loss of consciousness, weakness and headaches.   Endo/Heme/Allergies: Negative for environmental allergies and polydipsia. Does not bruise/bleed easily.   Psychiatric/Behavioral: Negative for depression, suicidal ideas, hallucinations, memory loss and substance abuse. The patient is not nervous/anxious and does not have insomnia.    PHYSICAL EXAMINATION:   GENERAL: The patient is ill-appearing.   VITAL SIGNS: /73   Pulse 70   Temp 36.1 °C (96.9 °F) (Temporal)   Resp 16   Wt 95.6 kg (210 lb 12.2 oz)   SpO2 96%   GENERAL:  Otherwise healthy-appearing and in no acute distress  CHEST:  Lungs are clear to auscultation bilaterally.  No masses, lesions, or signs of trauma were noted.     CARDIOVASCULAR:  Regular rate and rhythm.  No murmurs appreciated.  No JVD.  Palpable pulses present in all four extremities.    ABDOMEN:  Soft,  focally tender over the right lower abdominal quadrant, non-distended.  Non-tympanitic.  No incisional, umbilical, or inguinal  hernias were appreciated.  SKIN:  Warm and well perfused. No rashes.  NEUROLOGIC:  Alert and oriented. Cranial nerves II through XII are grossly intact. Motor and sensory exams are normal in all four extremities. Motor and sensory reflexes are 2+ and symmetric with bilateral plantar responses.  PSYCHIATRIC: Affect and mood is appropriate for age and condition.    LABORATORY VALUES:   Recent Labs     02/12/21  1300   WBC 9.4   RBC 4.18*   HEMOGLOBIN 12.6   HEMATOCRIT 38.6   MCV 92.3   MCH 30.1   MCHC 32.6*   RDW 45.9   PLATELETCT 233   MPV 10.5     Recent Labs     02/11/21  1530 02/12/21  1300   SODIUM 139 138   POTASSIUM 4.5 4.1   CHLORIDE 101 101   CO2 23 21   GLUCOSE 122* 166*   BUN 21 20   CREATININE 1.12 0.97   CALCIUM 10.5 9.6     Recent Labs     02/12/21  1300   ASTSGOT 17   ALTSGPT 19   TBILIRUBIN 0.3   ALKPHOSPHAT 117*   GLOBULIN 3.3            IMAGING:   Outpatient CT abd/pel images and imaging report were reviewed personally    IMPRESSION AND PLAN:     Acute appendicitis with appendiceal abscess  Assessment & Plan  13mm beth-appendiceal abscess  2/12 To OR for [facility]    Depression  Assessment & Plan  Outpatient paroxetine ordered    Essential hypertension  Assessment & Plan  Outpatient metoprolol and losartan ordered    Chronic insomnia  Assessment & Plan  Outpatient trazodone ordered    Hyperlipidemia  Assessment & Plan  Outpatient rosuvastatin ordered    BMI 36.0-36.9,adult  Assessment & Plan  Long standing issue    Type 2 diabetes mellitus without complication, without long-term current use of insulin (HCC)  Assessment & Plan  Metformin as an outpatient  Admit   Will manage with low insulin sliding scale while inpatient      Given the above presentation, the patient will be taken to the operating room for laparoscopic appendectomy. The surgical plan was discussed with the patient and available family. Potential complications were discussed in detail and include but  are not limited to infection, bleeding, damage to adjacent tissues and organs, anesthetic complications . Questions were elicited and answered to the patient's and available family's satisfaction. The patient understands the rationale for surgery and agrees to proceed.  Operative consent was obtained.  ____________________________________   MD MARISA RAYMOND / ADRIA     DD: 2/12/2021   DT: 2:01 PM

## 2021-02-12 NOTE — ASSESSMENT & PLAN NOTE
13mm beth-appendiceal abscess  2/12 Lap appy with drainage of abscess  Zosyn commenced  [facility]

## 2021-02-12 NOTE — ED NOTES
Med rec completed per Pt at bedside.  Pt reports taking her metformin only once per day in the evening rather than taking one tablet twice per day.  Allergies reviewed with Pt.  No oral antibiotics in last 14 days.  Pt takes ASPIRIN.

## 2021-02-12 NOTE — ED PROVIDER NOTES
ED Provider Note    Scribed for Jose Sanchez M.D. by Viri Hernández. 2/12/2021  12:56 PM    Primary care provider: Karol Medina P.A.-C.  Means of arrival: Walk-in  History obtained from: Patient   History limited by: None    CHIEF COMPLAINT  Chief Complaint   Patient presents with   • Abdominal Pain     Pt was seen at urgent care yesterday for RLQ pain and had blood work and a CT completed. Pt was told this morning that she has Apppendicitis.      PPE Note: I personally donned full PPE for all patient encounters during this visit, including wearing an N95 respirator mask, gloves, and eye protection. Scribe remained outside the patient's room and did not have any contact with the patient for the duration of patient encounter.      DIMA Her is a 65 y.o. female who presents to the Emergency Department for right lower quadrant abdominal pain onset 5 days ago. She rates the pain a 6/10 in severity. She went to urgent care yesterday because she thought it was a flare up of her diverticulitis. She received a CT scan this morning which showed appendicitis. She denies fever, chills, cough, sore throat, nausea, or vomiting. No alleviating or exacerbating factors identified. She is allergic to Sulfa.     REVIEW OF SYSTEMS  Pertinent positives include abdominal pain. Pertinent negatives include no fever, chills, cough, shortness of breath, sore throat, nausea, or vomiting. All other systems reviewed and negative.    PAST MEDICAL HISTORY   has a past medical history of Hyperlipidemia LDL goal < 100 (10/2/2014), Hypertension, Obesity (BMI 30-39.9) (10/2/2014), Pre-diabetes (10/2/2014), Recurrent sinusitis, and Type II or unspecified type diabetes mellitus without mention of complication, not stated as uncontrolled.    SURGICAL HISTORY   has a past surgical history that includes abdominal hysterectomy total (1993).    SOCIAL HISTORY  Social History     Tobacco Use   • Smoking status: Former Smoker      "Packs/day: 0.00     Years: 48.00     Pack years: 0.00     Quit date: 6/7/2017     Years since quitting: 3.6   • Smokeless tobacco: Never Used   Substance Use Topics   • Alcohol use: No     Alcohol/week: 0.0 oz   • Drug use: No      Social History     Substance and Sexual Activity   Drug Use No       FAMILY HISTORY  Family History   Problem Relation Age of Onset   • Cancer Mother         pancreatic    • Heart Disease Father    • Heart Attack Father    • Diabetes Father         pre-diabetes   • Stroke Neg Hx        CURRENT MEDICATIONS  Current Outpatient Medications   Medication Instructions   • albuterol (PROVENTIL) 2.5 mg, Nebulization, EVERY 4 HOURS PRN   • aspirin EC (ECOTRIN) 81 mg, Oral, DAILY   • losartan (COZAAR) 100 mg, Oral, DAILY   • metFORMIN (GLUCOPHAGE) 500 MG Tab TAKE ONE TABLET BY MOUTH TWICE A DAY WITH A MEAL   • metoprolol tartrate (LOPRESSOR) 25 mg, Oral, 2 TIMES DAILY   • PARoxetine (PAXIL) 30 mg, Oral, DAILY   • rosuvastatin (CRESTOR) 20 mg, Oral, EVERY EVENING   • traZODone (DESYREL) 50 MG Tab TAKE ONE TABLET BY MOUTH EVERY NIGHT AT BEDTIME AS NEEDED FOR SLEEP       ALLERGIES  Allergies   Allergen Reactions   • Sulfa Drugs Hives   • Shellfish Allergy Itching     \"Itchy eyes\"       PHYSICAL EXAM  VITAL SIGNS: /73   Pulse 70   Temp 36.1 °C (96.9 °F) (Temporal)   Resp 16   Wt 95.6 kg (210 lb 12.2 oz)   SpO2 96%   BMI 35.07 kg/m²   Vital signs reviewed.    Constitutional: elderly woman, obese, well-nourished, in moderate distress.    Head: normocephalic, atraumatic  Eyes: pupils are 3mm, PEERLA  Oropharynx: Dry  Immunologic: No lymphadenopathy  Neck: no tenderness, normal range of motion.   Cardiovascular: regular rate and rhythm. No murmurs  Pulmonary/Chest: clear to auscultation, no wheezes, rales, or rhonchi  Abdominal: tenderness to the right lower quadrant   Skin: warm, dry, no rash.   Psychiatric: normal mood and affect.   Neurologic: Normal deep tendon reflexes strength sensation " intact    LABS  Results for orders placed or performed during the hospital encounter of 02/12/21   CBC WITH DIFFERENTIAL   Result Value Ref Range    WBC 9.4 4.8 - 10.8 K/uL    RBC 4.18 (L) 4.20 - 5.40 M/uL    Hemoglobin 12.6 12.0 - 16.0 g/dL    Hematocrit 38.6 37.0 - 47.0 %    MCV 92.3 81.4 - 97.8 fL    MCH 30.1 27.0 - 33.0 pg    MCHC 32.6 (L) 33.6 - 35.0 g/dL    RDW 45.9 35.9 - 50.0 fL    Platelet Count 233 164 - 446 K/uL    MPV 10.5 9.0 - 12.9 fL    Neutrophils-Polys 69.40 44.00 - 72.00 %    Lymphocytes 21.00 (L) 22.00 - 41.00 %    Monocytes 5.90 0.00 - 13.40 %    Eosinophils 3.00 0.00 - 6.90 %    Basophils 0.30 0.00 - 1.80 %    Immature Granulocytes 0.40 0.00 - 0.90 %    Nucleated RBC 0.00 /100 WBC    Neutrophils (Absolute) 6.49 2.00 - 7.15 K/uL    Lymphs (Absolute) 1.96 1.00 - 4.80 K/uL    Monos (Absolute) 0.55 0.00 - 0.85 K/uL    Eos (Absolute) 0.28 0.00 - 0.51 K/uL    Baso (Absolute) 0.03 0.00 - 0.12 K/uL    Immature Granulocytes (abs) 0.04 0.00 - 0.11 K/uL    NRBC (Absolute) 0.00 K/uL   COMP METABOLIC PANEL   Result Value Ref Range    Sodium 138 135 - 145 mmol/L    Potassium 4.1 3.6 - 5.5 mmol/L    Chloride 101 96 - 112 mmol/L    Co2 21 20 - 33 mmol/L    Anion Gap 16.0 7.0 - 16.0    Glucose 166 (H) 65 - 99 mg/dL    Bun 20 8 - 22 mg/dL    Creatinine 0.97 0.50 - 1.40 mg/dL    Calcium 9.6 8.5 - 10.5 mg/dL    AST(SGOT) 17 12 - 45 U/L    ALT(SGPT) 19 2 - 50 U/L    Alkaline Phosphatase 117 (H) 30 - 99 U/L    Total Bilirubin 0.3 0.1 - 1.5 mg/dL    Albumin 3.9 3.2 - 4.9 g/dL    Total Protein 7.2 6.0 - 8.2 g/dL    Globulin 3.3 1.9 - 3.5 g/dL    A-G Ratio 1.2 g/dL   LIPASE   Result Value Ref Range    Lipase 32 11 - 82 U/L   URINALYSIS    Specimen: Urine   Result Value Ref Range    Color Yellow     Character Clear     Specific Gravity 1.010 <1.035    Ph 5.0 5.0 - 8.0    Glucose Negative Negative mg/dL    Ketones Negative Negative mg/dL    Protein Negative Negative mg/dL    Bilirubin Negative Negative    Urobilinogen,  Urine 0.2 Negative    Nitrite Negative Negative    Leukocyte Esterase Negative Negative    Occult Blood Negative Negative    Micro Urine Req see below    SARS-CoV-2 PCR (24 hour In-House): Collect NP swab in VTM    Specimen: Nasopharyngeal; Respirate   Result Value Ref Range    SARS-CoV-2 Source NP Swab     SARS-CoV-2 by PCR NotDetected    PLATELET MAPPING WITH BASIC TEG   Result Value Ref Range    Reaction Time Initial-R 4.2 (L) 5.0 - 10.0 min    Clot Kinetics-K 0.8 (L) 1.0 - 3.0 min    Clot Angle-Angle 77.7 (H) 53.0 - 72.0 degrees    Maximum Clot Strength-MA 76.3 (H) 50.0 - 70.0 mm    Lysis 30 minutes-LY30 0.0 0.0 - 8.0 %    % Inhibition ADP 7.4 %    % Inhibition AA 61.0 %    TEG Algorithm Link Algorithm    ESTIMATED GFR   Result Value Ref Range    GFR If African American >60 >60 mL/min/1.73 m 2    GFR If Non  57 (A) >60 mL/min/1.73 m 2   SARS-COV Antigen NATHALY   Result Value Ref Range    SARS-CoV-2 Source Nasal Swab     SARS-COV ANTIGEN NATHALY NotDetected Not-Detected   Histology Request   Result Value Ref Range    Pathology Request Sent to Histo        All labs reviewed by me.    COURSE & MEDICAL DECISION MAKING  Pertinent Labs & Imaging studies reviewed. (See chart for details) The patient's [facility] and past medical records were reviewed. She was seen at urgent care yesterday. She received a CT scan today which showed acute appendicitis with a 13 mm periappendiceal fluid collection and possible chronic cholecystitis.     12:56 PM - Patient seen and examined at bedside. Patient will be treated with Zosyn 3.375 mg. Ordered CBC with diff, CMP, Lipase, and Urinalysis to evaluate her symptoms. The differential diagnoses include but are not limited to: appendicitis with possible perforation, chronic cholecystitis.     1:07 PM - Paged Surgery.     1:13  PM - Ordered COVID test prior to hospitalization.     1:27 PM -  I discussed the patient's case and the above findings with Dr. Dawson (Surgery) who  agrees to hospitalize the patient.    1:34 PM - Patient was reevaluated at bedside. Discussed lab results with the patient and informed them about plan for hospitalization. Patient verbalizes understanding and agreement to this plan of care.     DISPOSITION:  Patient will be hospitalized by Dr. Dawson in guarded condition.    FINAL IMPRESSION  1. Acute appendicitis with perforation and localized peritonitis, without gangrene, unspecified whether abscess present          Viri BLACK (Scribe), am scribing for, and in the presence of, Jose Sanchez M.D..    Electronically signed by: Viri Hernández (Scribe), 2/12/2021    Jose BLACK M.D. personally performed the services described in this documentation, as scribed by Viri Hernández in my presence, and it is both accurate and complete.    The note accurately reflects work and decisions made by me.  Jose Sanchez M.D.  2/12/2021  6:55 PM

## 2021-02-12 NOTE — ANESTHESIA PREPROCEDURE EVALUATION
Relevant Problems   CARDIAC   (+) ASCVD (arteriosclerotic cardiovascular disease)   (+) Coronary artery calcification seen on CT scan   (+) Essential hypertension   (+) Peripheral arterial disease (HCC)      ENDO   (+) Type 2 diabetes mellitus without complication, without long-term current use of insulin (HCC)      Other   (+) Acute appendicitis with appendiceal abscess       Physical Exam    Airway   Mallampati: II  TM distance: >3 FB  Neck ROM: full       Cardiovascular - normal exam  Rhythm: regular  Rate: normal  (-) murmur     Dental - normal exam           Pulmonary - normal exam  Breath sounds clear to auscultation     Abdominal    Neurological - normal exam                 Anesthesia Plan    ASA 3- EMERGENT   ASA physical status 3 criteria: diabetes - poorly controlled    Plan - general       Airway plan will be ETT          Induction: intravenous      Pertinent diagnostic labs and testing reviewed    Informed Consent:    Anesthetic plan and risks discussed with patient.

## 2021-02-13 LAB
ANION GAP SERPL CALC-SCNC: 11 MMOL/L (ref 7–16)
BASOPHILS # BLD AUTO: 0.2 % (ref 0–1.8)
BASOPHILS # BLD: 0.02 K/UL (ref 0–0.12)
BUN SERPL-MCNC: 24 MG/DL (ref 8–22)
CALCIUM SERPL-MCNC: 8.4 MG/DL (ref 8.5–10.5)
CHLORIDE SERPL-SCNC: 106 MMOL/L (ref 96–112)
CO2 SERPL-SCNC: 20 MMOL/L (ref 20–33)
CREAT SERPL-MCNC: 1.18 MG/DL (ref 0.5–1.4)
EOSINOPHIL # BLD AUTO: 0.17 K/UL (ref 0–0.51)
EOSINOPHIL NFR BLD: 1.8 % (ref 0–6.9)
ERYTHROCYTE [DISTWIDTH] IN BLOOD BY AUTOMATED COUNT: 46.4 FL (ref 35.9–50)
GLUCOSE BLD-MCNC: 115 MG/DL (ref 65–99)
GLUCOSE BLD-MCNC: 142 MG/DL (ref 65–99)
GLUCOSE BLD-MCNC: 159 MG/DL (ref 65–99)
GLUCOSE SERPL-MCNC: 142 MG/DL (ref 65–99)
HCT VFR BLD AUTO: 34.6 % (ref 37–47)
HGB BLD-MCNC: 10.8 G/DL (ref 12–16)
IMM GRANULOCYTES # BLD AUTO: 0.02 K/UL (ref 0–0.11)
IMM GRANULOCYTES NFR BLD AUTO: 0.2 % (ref 0–0.9)
LYMPHOCYTES # BLD AUTO: 1.68 K/UL (ref 1–4.8)
LYMPHOCYTES NFR BLD: 17.7 % (ref 22–41)
MCH RBC QN AUTO: 29.6 PG (ref 27–33)
MCHC RBC AUTO-ENTMCNC: 31.2 G/DL (ref 33.6–35)
MCV RBC AUTO: 94.8 FL (ref 81.4–97.8)
MONOCYTES # BLD AUTO: 0.67 K/UL (ref 0–0.85)
MONOCYTES NFR BLD AUTO: 7 % (ref 0–13.4)
NEUTROPHILS # BLD AUTO: 6.95 K/UL (ref 2–7.15)
NEUTROPHILS NFR BLD: 73.1 % (ref 44–72)
NRBC # BLD AUTO: 0 K/UL
NRBC BLD-RTO: 0 /100 WBC
PLATELET # BLD AUTO: 183 K/UL (ref 164–446)
PMV BLD AUTO: 10.8 FL (ref 9–12.9)
POTASSIUM SERPL-SCNC: 4.2 MMOL/L (ref 3.6–5.5)
RBC # BLD AUTO: 3.65 M/UL (ref 4.2–5.4)
SODIUM SERPL-SCNC: 137 MMOL/L (ref 135–145)
WBC # BLD AUTO: 9.5 K/UL (ref 4.8–10.8)

## 2021-02-13 PROCEDURE — 94760 N-INVAS EAR/PLS OXIMETRY 1: CPT

## 2021-02-13 PROCEDURE — 82962 GLUCOSE BLOOD TEST: CPT | Mod: 91

## 2021-02-13 PROCEDURE — A9270 NON-COVERED ITEM OR SERVICE: HCPCS | Performed by: SURGERY

## 2021-02-13 PROCEDURE — 36415 COLL VENOUS BLD VENIPUNCTURE: CPT

## 2021-02-13 PROCEDURE — 80048 BASIC METABOLIC PNL TOTAL CA: CPT

## 2021-02-13 PROCEDURE — 700111 HCHG RX REV CODE 636 W/ 250 OVERRIDE (IP): Performed by: SURGERY

## 2021-02-13 PROCEDURE — 700105 HCHG RX REV CODE 258: Performed by: SURGERY

## 2021-02-13 PROCEDURE — 700102 HCHG RX REV CODE 250 W/ 637 OVERRIDE(OP): Performed by: SURGERY

## 2021-02-13 PROCEDURE — 85025 COMPLETE CBC W/AUTO DIFF WBC: CPT

## 2021-02-13 PROCEDURE — 770006 HCHG ROOM/CARE - MED/SURG/GYN SEMI*

## 2021-02-13 RX ADMIN — ACETAMINOPHEN 650 MG: 325 TABLET ORAL at 05:09

## 2021-02-13 RX ADMIN — INSULIN HUMAN 1 UNITS: 100 INJECTION, SOLUTION PARENTERAL at 08:12

## 2021-02-13 RX ADMIN — PIPERACILLIN AND TAZOBACTAM 3.38 G: 3; .375 INJECTION, POWDER, LYOPHILIZED, FOR SOLUTION INTRAVENOUS; PARENTERAL at 20:26

## 2021-02-13 RX ADMIN — PAROXETINE HYDROCHLORIDE 30 MG: 20 TABLET, FILM COATED ORAL at 05:09

## 2021-02-13 RX ADMIN — OXYCODONE 5 MG: 5 TABLET ORAL at 00:21

## 2021-02-13 RX ADMIN — INSULIN HUMAN 1 UNITS: 100 INJECTION, SOLUTION PARENTERAL at 20:44

## 2021-02-13 RX ADMIN — DOCUSATE SODIUM 100 MG: 100 CAPSULE, LIQUID FILLED ORAL at 17:02

## 2021-02-13 RX ADMIN — PIPERACILLIN AND TAZOBACTAM 3.38 G: 3; .375 INJECTION, POWDER, LYOPHILIZED, FOR SOLUTION INTRAVENOUS; PARENTERAL at 12:13

## 2021-02-13 RX ADMIN — MORPHINE SULFATE 2 MG: 10 INJECTION INTRAVENOUS at 20:46

## 2021-02-13 RX ADMIN — OXYCODONE 5 MG: 5 TABLET ORAL at 05:09

## 2021-02-13 RX ADMIN — METOPROLOL TARTRATE 25 MG: 25 TABLET, FILM COATED ORAL at 17:02

## 2021-02-13 RX ADMIN — PIPERACILLIN AND TAZOBACTAM 3.38 G: 3; .375 INJECTION, POWDER, LYOPHILIZED, FOR SOLUTION INTRAVENOUS; PARENTERAL at 05:09

## 2021-02-13 RX ADMIN — ROSUVASTATIN CALCIUM 20 MG: 20 TABLET, FILM COATED ORAL at 17:02

## 2021-02-13 RX ADMIN — OXYCODONE 5 MG: 5 TABLET ORAL at 12:12

## 2021-02-13 RX ADMIN — OXYCODONE HYDROCHLORIDE 10 MG: 10 TABLET ORAL at 17:02

## 2021-02-13 RX ADMIN — ONDANSETRON 4 MG: 2 INJECTION INTRAMUSCULAR; INTRAVENOUS at 23:51

## 2021-02-13 RX ADMIN — ACETAMINOPHEN 650 MG: 325 TABLET ORAL at 12:12

## 2021-02-13 RX ADMIN — LOSARTAN POTASSIUM 100 MG: 50 TABLET, FILM COATED ORAL at 05:10

## 2021-02-13 RX ADMIN — DOCUSATE SODIUM 50 MG AND SENNOSIDES 8.6 MG 1 TABLET: 8.6; 5 TABLET, FILM COATED ORAL at 20:28

## 2021-02-13 RX ADMIN — ENOXAPARIN SODIUM 40 MG: 40 INJECTION SUBCUTANEOUS at 05:10

## 2021-02-13 RX ADMIN — ACETAMINOPHEN 650 MG: 325 TABLET ORAL at 23:26

## 2021-02-13 RX ADMIN — OXYCODONE HYDROCHLORIDE 10 MG: 10 TABLET ORAL at 22:48

## 2021-02-13 RX ADMIN — ACETAMINOPHEN 650 MG: 325 TABLET ORAL at 17:02

## 2021-02-13 ASSESSMENT — PAIN DESCRIPTION - PAIN TYPE
TYPE: ACUTE PAIN
TYPE: ACUTE PAIN;SURGICAL PAIN
TYPE: ACUTE PAIN;SURGICAL PAIN
TYPE: ACUTE PAIN

## 2021-02-13 ASSESSMENT — COPD QUESTIONNAIRES
HAVE YOU SMOKED AT LEAST 100 CIGARETTES IN YOUR ENTIRE LIFE: YES
COPD SCREENING SCORE: 4
DO YOU EVER COUGH UP ANY MUCUS OR PHLEGM?: NO/ONLY WITH OCCASIONAL COLDS OR INFECTIONS
DURING THE PAST 4 WEEKS HOW MUCH DID YOU FEEL SHORT OF BREATH: NONE/LITTLE OF THE TIME

## 2021-02-13 ASSESSMENT — LIFESTYLE VARIABLES: EVER_SMOKED: YES

## 2021-02-13 ASSESSMENT — PAIN SCALES - WONG BAKER: WONGBAKER_NUMERICALRESPONSE: HURTS JUST A LITTLE BIT

## 2021-02-13 NOTE — PROGRESS NOTES
Assumed care of patient at 0645. Bedside report received. Assessment complete.  AA&Ox4. Denies CP/SOB.  Reporting pain well controlled per MAR.    Educated patient regarding pharmacologic and non pharmacologic modalities for pain management.  Lap sites x3, well approximated with dermabond.   Tolerating diet. Denies N/V.  + void. Last BM PTA. + flatus  Pt ambulates SBA.  All needs met at this time. Call light within reach. Pt calls appropriately. Bed low and locked, non skid socks in place. Hourly rounding in place.

## 2021-02-13 NOTE — PROGRESS NOTES
DATE: 2/13/2021    POD 1 Lap Appy with drainage of beth-appendiceal abscess    Interval Events:  Pain controlled  Voiding  Tolerating PO    -Encourage IV hydration  -Continue IV antibiotics today  -OOB to chair  -Follow up labs tomorrow    PHYSICAL EXAMINATION:  Vital Signs: /61   Pulse 66   Temp 36.9 °C (98.5 °F) (Temporal)   Resp 18   Wt 95.6 kg (210 lb 12.2 oz)   SpO2 93%   GENERAL:  No acute distress  HEENT: Pupils equal, round and reactive to light bilaterally.  Sclera are clear bilaterally.  No otorrhea.  No rhinorrhea.  Mucus membranes are moist.  CHEST:  Lungs are clear to auscultation bilaterally  CARDIOVASCULAR:  Regular rate and rhythm  ABDOMEN: Soft, appropriately tender, non-distended, incisions clean and intact x4  GENITOURINARY:  No burden in place, voiding without issues  EXTREMITIES:  Good range of motion through out  NEUROLOGIC:  Alert and oriented.  Cranial Nerves II through XII are grossly intact, no focal deficits appreciated  PSYCHIATRIC:  Normal affect and mood appropriate for age and condition  SKIN:  Warm and well perfused, no rashes    Laboratory Values:   Recent Labs     02/12/21  1300 02/13/21  0344   WBC 9.4 9.5   RBC 4.18* 3.65*   HEMOGLOBIN 12.6 10.8*   HEMATOCRIT 38.6 34.6*   MCV 92.3 94.8   MCH 30.1 29.6   MCHC 32.6* 31.2*   RDW 45.9 46.4   PLATELETCT 233 183   MPV 10.5 10.8     Recent Labs     02/11/21  1530 02/12/21  1300 02/13/21  0344   SODIUM 139 138 137   POTASSIUM 4.5 4.1 4.2   CHLORIDE 101 101 106   CO2 23 21 20   GLUCOSE 122* 166* 142*   BUN 21 20 24*   CREATININE 1.12 0.97 1.18   CALCIUM 10.5 9.6 8.4*     Recent Labs     02/12/21  1300   ASTSGOT 17   ALTSGPT 19   TBILIRUBIN 0.3   ALKPHOSPHAT 117*   GLOBULIN 3.3              ASSESSMENT AND PLAN:     Acute appendicitis with appendiceal abscess- (present on admission)  Assessment & Plan  13mm beth-appendiceal abscess  2/12 Lap appy with drainage of abscess  Zosyn commenced  [facility]  Surgery    Depression- (present on admission)  Assessment & Plan  Outpatient paroxetine ordered    Essential hypertension- (present on admission)  Assessment & Plan  Outpatient metoprolol and losartan ordered    Chronic insomnia- (present on admission)  Assessment & Plan  Outpatient trazodone ordered    Hyperlipidemia- (present on admission)  Assessment & Plan  Outpatient rosuvastatin ordered    BMI 36.0-36.9,adult- (present on admission)  Assessment & Plan  Long standing issue    Type 2 diabetes mellitus without complication, without long-term current use of insulin (HCC)- (present on admission)  Assessment & Plan  Metformin as an outpatient  Admit   Will manage with low insulin sliding scale while inpatient           ____________________________________     Juan Dawson M.D.

## 2021-02-13 NOTE — OR NURSING
Patient arrived to PACU in stable condition. She was medicated with IV and oral pain medications for moderate pain to her right side. She is now comfortable and ready to transfer to her room. Her friend has been updated.

## 2021-02-13 NOTE — PROGRESS NOTES
Heidy admitted to room T435-02 via transport in [facility] from PACU at 1752.  Pt /51   Pulse 67   Temp 97 F(Temporal)   Resp 18  SpO2 96 on 3L NC .     Patient reports pain at 6 on a scale of 0-10. Oriented to room call light and smoking policy.  Reviewed plan of care (equipment, incentive spirometer, sequential compression devices, medications, activity, diet, fall precautions, skin care, and pain) with patient and family. Welcome packet given and reviewed with patient, all questions answered. Education provided on oral hygiene program.     AA&Ox4. Denies CP/SOB.  Tolerating sips diet. Denies N/V.  - void. Last BM 2/10.  Pt ambulates SBA.  All needs met at this time. Call light within reach. Pt calls appropriately. Bed low and locked, non skid socks in place. Hourly rounding in place.

## 2021-02-13 NOTE — OP REPORT
DATE OF OPERATION: 2/12/2021     PREOPERATIVE DIAGNOSIS: Acute appendicitis.     POSTOPERATIVE DIAGNOSIS: Acute suppurative appendicitis with laparoscopic drainage of beth-appendiceal abscess    PROCEDURE PERFORMED: Laparoscopic appendectomy.     SURGEON: Toni Dawson MD    ANESTHESIOLOGIST: Mathew Maki MD., MD     ANESTHESIA: General endotracheal anesthesia.     INDICATIONS: The patient is a 65 y.o.-year-old female with clinical and radiographic findings of acute appendicitis.  The patient is taken to the operating room for laparoscopic appendectomy.     FINDINGS: The appendix was markedly thickened and dilated along the majority of its length.  The adjacent mesoappendix was edematous and thickened as well.  The appendiceal base was healthy and pliable.  There was a small beth-appendiceal abscess containing thick, white pus.      SPECIMEN: Appendix.     ESTIMATED BLOOD LOSS: 5 mL.     PROCEDURE: Informed consent was obtained pre-operatively.  The patient voluntarily voided their urinary bladder just prior to being brought back to the OR.  The patient was taken back to the operating room and placed in supine position.  General endotracheal anesthesia was administered and the patient was intubated. Intravenous antibiotics were administered by the anesthesiologist in correct time interval. Sequential compression devices were placed. The abdomen was prepped and draped in a sterile fashion.  A time-out was performed and the patient and procedure were both verified.      Marcaine 0.5% with epinephrine was used to infiltrate the port sites. A 5 mm beth-umbilical incision was made and subcutaneous tissue spread bluntly. The umbilical stalk was grasped with a Kocher clamp and elevated towards the ceiling.  A Veress needle was atraumatically inserted into the peritoneal space. A saline test was performed and supported proper intra-peritoneal placement.  Carbon dioxide gas was applied to the port and pneumoperitoneum  was achieved.  The Veress needle was removed after adequate insufflation.      A 5 mm port was introduced into the peritoneal space through the beth-umbilical incision. A laparoscope was placed through this port.  The abdominal contents were inspected noted to be free of injury from Veress needle and port placement.  Additional 12 mm and 5 mm ports were placed in left lower quadrant and suprapubic region under direct visualization with a laparoscope, respectively.  The appendix was carefully identified, dissected free from surrounding adhesions, tissues and organs, and elevated toward the abdominal wall. A small abscess cavity was entered which contained thick white pus.  This was quickly suctioned away with minimal contamination of the surrounding tissues.  No irrigation was used.  The appendiceal mesentery was divided with a Harmonic device down to the appendiceal base as indicated by the splaying of the tenia coli from the adjacent cecum.  The appendix was divided with a single firing from an EndoPath stapler 45mm with a blue load.    The appendix was placed within an Endocatch bag and delivered intact from the abdominal cavity and submitted for permanent pathology. The appendiceal stump was inspected and noted to be intact. Hemostasis of the divided mesentery and appendiceal stump were both satisfactory.  The omentum was draped over the operative site.  The left lower quadrant port site fascia was closed with an 0 vicryl suture.  The fascia was noted to be tight and well approximated.    All ports were then opened and the abdomen was allowed to deflate. All ports were then removed.  All skin incisions were closed with interrupted 4-0 Vicryl subcuticular sutures and dressed with Dermabond.     The patient tolerated the procedure well and there were no apparent complications. All sponge, sharps, and instrument counts were correct on 2 separate occasions. The patient was awakened, extubated, and transferred to the  PACU in satisfactory condition.               NSQIP Post-Operative Data:    Emergency Case?----- Yes  Wound Classification?----- Dirty/Infected  Wound Closure?----- All Layers  ASA Classification?----- III    ____________________________________   Toni CUNNINGHAM / ADRIA     DD: 2/12/2021   DT: 4:18 PM

## 2021-02-13 NOTE — ANESTHESIA TIME REPORT
Anesthesia Start and Stop Event Times     Date Time Event    2/12/2021 1507 Ready for Procedure     1524 Anesthesia Start     1620 Anesthesia Stop        Responsible Staff  02/12/21    Name Role Begin End    Mathew Maki M.D. Anesth 1524 1620        Preop Diagnosis (Free Text):  Pre-op Diagnosis     Acute Appendicitis        Preop Diagnosis (Codes):    Post op Diagnosis  Acute appendicitis      Premium Reason  A. 3PM - 7AM    Comments:

## 2021-02-13 NOTE — PROGRESS NOTES
"2 RN skin check complete.     Devices in place: SCD's bilaterally, SpO2 finger probe, PIV line, silicone NC.   Skin assessed under devices     Surgical Incision - lap sites x3 with dermabond, DUTCH, no evidence of opening areas.    Confirmed pressure ulcers found on: NA  New potential ulcers noted on: NA  Wound consult placed: NA     -Skin beneath folds checked.   - All bony prominences assessed.   - raised skin \"bump\" above right eyebrow. Patient reports baseline.   -Skin intact.     Preventative measures in place:  - turns with use of pillows to support repositioning  -heels floated on pillows    -bony prominences floated on pillows  -mobilization  -repositioning of devices   -Offered tooth brushing  -Cue for turning while in bed        "

## 2021-02-13 NOTE — ANESTHESIA POSTPROCEDURE EVALUATION
Patient: Dunia Her    Procedure Summary     Date: 02/12/21 Room / Location: [facility] 09 / SURGERY [facility]    Anesthesia Start: 1524 Anesthesia Stop: 1620    Procedure: APPENDECTOMY, LAPAROSCOPIC (N/A Abdomen) Diagnosis: (Acute Appendicitis)    Surgeons: Juan Dawson M.D. Responsible Provider: Mathew Maki M.D.    Anesthesia Type: general ASA Status: 3 - Emergent          Final Anesthesia Type: general  Last vitals  BP   Blood Pressure : 150/60    Temp   36.3 °C (97.4 °F)    Pulse   64   Resp   15    SpO2   97 %      Anesthesia Post Evaluation    Patient location during evaluation: PACU  Patient participation: complete - patient participated  Level of consciousness: awake and alert    Airway patency: patent  Anesthetic complications: no  Cardiovascular status: hemodynamically stable  Respiratory status: acceptable  Hydration status: euvolemic    PONV: none          There were no known complications for this encounter.     Nurse Pain Score: 4 (NPRS)

## 2021-02-14 LAB
ANION GAP SERPL CALC-SCNC: 10 MMOL/L (ref 7–16)
BASOPHILS # BLD AUTO: 0.3 % (ref 0–1.8)
BASOPHILS # BLD: 0.03 K/UL (ref 0–0.12)
BUN SERPL-MCNC: 16 MG/DL (ref 8–22)
CALCIUM SERPL-MCNC: 8.3 MG/DL (ref 8.5–10.5)
CHLORIDE SERPL-SCNC: 104 MMOL/L (ref 96–112)
CO2 SERPL-SCNC: 22 MMOL/L (ref 20–33)
CREAT SERPL-MCNC: 0.81 MG/DL (ref 0.5–1.4)
EOSINOPHIL # BLD AUTO: 0.28 K/UL (ref 0–0.51)
EOSINOPHIL NFR BLD: 3.1 % (ref 0–6.9)
ERYTHROCYTE [DISTWIDTH] IN BLOOD BY AUTOMATED COUNT: 45.9 FL (ref 35.9–50)
GLUCOSE BLD-MCNC: 103 MG/DL (ref 65–99)
GLUCOSE BLD-MCNC: 137 MG/DL (ref 65–99)
GLUCOSE BLD-MCNC: 154 MG/DL (ref 65–99)
GLUCOSE SERPL-MCNC: 161 MG/DL (ref 65–99)
HCT VFR BLD AUTO: 33.8 % (ref 37–47)
HGB BLD-MCNC: 10.8 G/DL (ref 12–16)
IMM GRANULOCYTES # BLD AUTO: 0.03 K/UL (ref 0–0.11)
IMM GRANULOCYTES NFR BLD AUTO: 0.3 % (ref 0–0.9)
LYMPHOCYTES # BLD AUTO: 1.66 K/UL (ref 1–4.8)
LYMPHOCYTES NFR BLD: 18.2 % (ref 22–41)
MCH RBC QN AUTO: 29.9 PG (ref 27–33)
MCHC RBC AUTO-ENTMCNC: 32 G/DL (ref 33.6–35)
MCV RBC AUTO: 93.6 FL (ref 81.4–97.8)
MONOCYTES # BLD AUTO: 0.78 K/UL (ref 0–0.85)
MONOCYTES NFR BLD AUTO: 8.5 % (ref 0–13.4)
NEUTROPHILS # BLD AUTO: 6.36 K/UL (ref 2–7.15)
NEUTROPHILS NFR BLD: 69.6 % (ref 44–72)
NRBC # BLD AUTO: 0 K/UL
NRBC BLD-RTO: 0 /100 WBC
PLATELET # BLD AUTO: 180 K/UL (ref 164–446)
PMV BLD AUTO: 10.1 FL (ref 9–12.9)
POTASSIUM SERPL-SCNC: 3.6 MMOL/L (ref 3.6–5.5)
RBC # BLD AUTO: 3.61 M/UL (ref 4.2–5.4)
SODIUM SERPL-SCNC: 136 MMOL/L (ref 135–145)
WBC # BLD AUTO: 9.1 K/UL (ref 4.8–10.8)

## 2021-02-14 PROCEDURE — A9270 NON-COVERED ITEM OR SERVICE: HCPCS | Performed by: SURGERY

## 2021-02-14 PROCEDURE — 80048 BASIC METABOLIC PNL TOTAL CA: CPT

## 2021-02-14 PROCEDURE — 85025 COMPLETE CBC W/AUTO DIFF WBC: CPT

## 2021-02-14 PROCEDURE — 82962 GLUCOSE BLOOD TEST: CPT | Mod: 91

## 2021-02-14 PROCEDURE — 700102 HCHG RX REV CODE 250 W/ 637 OVERRIDE(OP): Performed by: SURGERY

## 2021-02-14 PROCEDURE — 770006 HCHG ROOM/CARE - MED/SURG/GYN SEMI*

## 2021-02-14 PROCEDURE — 700105 HCHG RX REV CODE 258: Performed by: SURGERY

## 2021-02-14 PROCEDURE — 700111 HCHG RX REV CODE 636 W/ 250 OVERRIDE (IP): Performed by: SURGERY

## 2021-02-14 RX ORDER — AMOXICILLIN AND CLAVULANATE POTASSIUM 875; 125 MG/1; MG/1
1 TABLET, FILM COATED ORAL EVERY 12 HOURS
Status: DISCONTINUED | OUTPATIENT
Start: 2021-02-14 | End: 2021-02-15 | Stop reason: HOSPADM

## 2021-02-14 RX ADMIN — ROSUVASTATIN CALCIUM 20 MG: 20 TABLET, FILM COATED ORAL at 16:45

## 2021-02-14 RX ADMIN — DOCUSATE SODIUM 50 MG AND SENNOSIDES 8.6 MG 1 TABLET: 8.6; 5 TABLET, FILM COATED ORAL at 19:29

## 2021-02-14 RX ADMIN — PAROXETINE HYDROCHLORIDE 30 MG: 20 TABLET, FILM COATED ORAL at 06:20

## 2021-02-14 RX ADMIN — LOSARTAN POTASSIUM 100 MG: 50 TABLET, FILM COATED ORAL at 06:21

## 2021-02-14 RX ADMIN — AMOXICILLIN AND CLAVULANATE POTASSIUM 1 TABLET: 875; 125 TABLET, FILM COATED ORAL at 10:58

## 2021-02-14 RX ADMIN — ACETAMINOPHEN 650 MG: 325 TABLET ORAL at 06:20

## 2021-02-14 RX ADMIN — DOCUSATE SODIUM 100 MG: 100 CAPSULE, LIQUID FILLED ORAL at 06:21

## 2021-02-14 RX ADMIN — TRAZODONE HYDROCHLORIDE 50 MG: 50 TABLET ORAL at 19:30

## 2021-02-14 RX ADMIN — METOPROLOL TARTRATE 25 MG: 25 TABLET, FILM COATED ORAL at 06:21

## 2021-02-14 RX ADMIN — ACETAMINOPHEN 650 MG: 325 TABLET ORAL at 22:46

## 2021-02-14 RX ADMIN — AMOXICILLIN AND CLAVULANATE POTASSIUM 1 TABLET: 875; 125 TABLET, FILM COATED ORAL at 16:45

## 2021-02-14 RX ADMIN — ACETAMINOPHEN 650 MG: 325 TABLET ORAL at 16:45

## 2021-02-14 RX ADMIN — PIPERACILLIN AND TAZOBACTAM 3.38 G: 3; .375 INJECTION, POWDER, LYOPHILIZED, FOR SOLUTION INTRAVENOUS; PARENTERAL at 06:19

## 2021-02-14 RX ADMIN — ACETAMINOPHEN 650 MG: 325 TABLET ORAL at 11:28

## 2021-02-14 RX ADMIN — DOCUSATE SODIUM 100 MG: 100 CAPSULE, LIQUID FILLED ORAL at 16:45

## 2021-02-14 RX ADMIN — ENOXAPARIN SODIUM 40 MG: 40 INJECTION SUBCUTANEOUS at 06:19

## 2021-02-14 RX ADMIN — METOPROLOL TARTRATE 25 MG: 25 TABLET, FILM COATED ORAL at 16:45

## 2021-02-14 ASSESSMENT — PAIN DESCRIPTION - PAIN TYPE
TYPE: SURGICAL PAIN;ACUTE PAIN
TYPE: ACUTE PAIN
TYPE: ACUTE PAIN;SURGICAL PAIN
TYPE: ACUTE PAIN
TYPE: ACUTE PAIN
TYPE: ACUTE PAIN;SURGICAL PAIN

## 2021-02-14 NOTE — PROGRESS NOTES
Assumed care of patient at 0645. Bedside report received. Assessment complete.  AA&Ox4. Denies CP/SOB.  Reporting pain well controlled per MAR  Educated patient regarding pharmacologic and non pharmacologic modalities for pain management.  Skin/Drains Lap sites x 3 well approximated with dermabond.   Tolerating diet. Denies N/V.  + void. Last BM PTA. + flatus  Pt ambulates independently.  All needs met at this time. Call light within reach. Pt calls appropriately. Bed low and locked, non skid socks in place. Hourly rounding in place.

## 2021-02-14 NOTE — PROGRESS NOTES
Assessment complete.  A&O x 4. Patient calls appropriately.  Patient ambulates with standby assist. Bed alarm off.   Patient has 7/10 pain. Pain managed with prescribed medications.  Denies N&V. Tolerating DM diet.  Lap site dressings CDI.  + void, + flatus, - BM.  Patient denies SOB.  SCD's refused.    Review plan with of care with patient. Call light and personal belongings with in reach. Hourly rounding in place. All needs met at this time.

## 2021-02-14 NOTE — PROGRESS NOTES
DATE: 2/14/2021    POD 2 Lap Appy with drainage of beth-appendiceal abscess    Interval Events:  Pain controlled  Reports feeling great  Voiding  Tolerating PO    -Transition to PO antibiotics  -OOB to chair  -Follow up labs tomorrow    PHYSICAL EXAMINATION:  Vital Signs: /76   Pulse 63   Temp 37 °C (98.6 °F) (Temporal)   Resp 17   Wt 95.6 kg (210 lb 12.2 oz)   SpO2 100%   GENERAL:  No acute distress  HEENT: Pupils equal, round and reactive to light bilaterally.  Sclera are clear bilaterally.  No otorrhea.  No rhinorrhea.  Mucus membranes are moist.  CHEST:  Lungs are clear to auscultation bilaterally  CARDIOVASCULAR:  Regular rate and rhythm  ABDOMEN: Soft, appropriately tender, non-distended, incisions clean and intact x3  GENITOURINARY:  No burden in place, voiding without issues  EXTREMITIES:  Good range of motion through out  NEUROLOGIC:  Alert and oriented.  Cranial Nerves II through XII are grossly intact, no focal deficits appreciated  PSYCHIATRIC:  Normal affect and mood appropriate for age and condition  SKIN:  Warm and well perfused, no rashes    Laboratory Values:   Recent Labs     02/12/21  1300 02/13/21  0344 02/14/21  0505   WBC 9.4 9.5 9.1   RBC 4.18* 3.65* 3.61*   HEMOGLOBIN 12.6 10.8* 10.8*   HEMATOCRIT 38.6 34.6* 33.8*   MCV 92.3 94.8 93.6   MCH 30.1 29.6 29.9   MCHC 32.6* 31.2* 32.0*   RDW 45.9 46.4 45.9   PLATELETCT 233 183 180   MPV 10.5 10.8 10.1     Recent Labs     02/12/21  1300 02/13/21  0344 02/14/21  0505   SODIUM 138 137 136   POTASSIUM 4.1 4.2 3.6   CHLORIDE 101 106 104   CO2 21 20 22   GLUCOSE 166* 142* 161*   BUN 20 24* 16   CREATININE 0.97 1.18 0.81   CALCIUM 9.6 8.4* 8.3*     Recent Labs     02/12/21  1300   ASTSGOT 17   ALTSGPT 19   TBILIRUBIN 0.3   ALKPHOSPHAT 117*   GLOBULIN 3.3              ASSESSMENT AND PLAN:     Acute appendicitis with appendiceal abscess- (present on admission)  Assessment & Plan  13mm beth-appendiceal abscess  2/12 Lap appy with drainage of  abscess  Zosyn commenced  [facility]    Depression- (present on admission)  Assessment & Plan  Outpatient paroxetine ordered    Essential hypertension- (present on admission)  Assessment & Plan  Outpatient metoprolol and losartan ordered    Chronic insomnia- (present on admission)  Assessment & Plan  Outpatient trazodone ordered    Hyperlipidemia- (present on admission)  Assessment & Plan  Outpatient rosuvastatin ordered    BMI 36.0-36.9,adult- (present on admission)  Assessment & Plan  Long standing issue    Type 2 diabetes mellitus without complication, without long-term current use of insulin (HCC)- (present on admission)  Assessment & Plan  Metformin as an outpatient  Admit   Will manage with low insulin sliding scale while inpatient         ____________________________________     Juan Dawson M.D.

## 2021-02-15 VITALS
WEIGHT: 210.76 LBS | DIASTOLIC BLOOD PRESSURE: 65 MMHG | OXYGEN SATURATION: 97 % | BODY MASS INDEX: 35.07 KG/M2 | RESPIRATION RATE: 18 BRPM | SYSTOLIC BLOOD PRESSURE: 146 MMHG | TEMPERATURE: 98.3 F | HEART RATE: 61 BPM

## 2021-02-15 LAB
BASOPHILS # BLD AUTO: 0.3 % (ref 0–1.8)
BASOPHILS # BLD: 0.02 K/UL (ref 0–0.12)
EOSINOPHIL # BLD AUTO: 0.28 K/UL (ref 0–0.51)
EOSINOPHIL NFR BLD: 4.4 % (ref 0–6.9)
ERYTHROCYTE [DISTWIDTH] IN BLOOD BY AUTOMATED COUNT: 44.9 FL (ref 35.9–50)
HCT VFR BLD AUTO: 37 % (ref 37–47)
HGB BLD-MCNC: 12 G/DL (ref 12–16)
IMM GRANULOCYTES # BLD AUTO: 0.02 K/UL (ref 0–0.11)
IMM GRANULOCYTES NFR BLD AUTO: 0.3 % (ref 0–0.9)
LYMPHOCYTES # BLD AUTO: 1.47 K/UL (ref 1–4.8)
LYMPHOCYTES NFR BLD: 23 % (ref 22–41)
MCH RBC QN AUTO: 29.7 PG (ref 27–33)
MCHC RBC AUTO-ENTMCNC: 31.6 G/DL (ref 33.6–35)
MCV RBC AUTO: 93.8 FL (ref 81.4–97.8)
MONOCYTES # BLD AUTO: 0.46 K/UL (ref 0–0.85)
MONOCYTES NFR BLD AUTO: 7.2 % (ref 0–13.4)
NEUTROPHILS # BLD AUTO: 4.15 K/UL (ref 2–7.15)
NEUTROPHILS NFR BLD: 64.8 % (ref 44–72)
NRBC # BLD AUTO: 0 K/UL
NRBC BLD-RTO: 0 /100 WBC
PLATELET # BLD AUTO: 146 K/UL (ref 164–446)
PMV BLD AUTO: 11.2 FL (ref 9–12.9)
RBC # BLD AUTO: 4.85 M/UL (ref 4.2–5.4)
WBC # BLD AUTO: 6.4 K/UL (ref 4.8–10.8)

## 2021-02-15 PROCEDURE — 700111 HCHG RX REV CODE 636 W/ 250 OVERRIDE (IP): Performed by: SURGERY

## 2021-02-15 PROCEDURE — A9270 NON-COVERED ITEM OR SERVICE: HCPCS | Performed by: SURGERY

## 2021-02-15 PROCEDURE — 85025 COMPLETE CBC W/AUTO DIFF WBC: CPT

## 2021-02-15 PROCEDURE — 700102 HCHG RX REV CODE 250 W/ 637 OVERRIDE(OP): Performed by: SURGERY

## 2021-02-15 PROCEDURE — 82962 GLUCOSE BLOOD TEST: CPT

## 2021-02-15 RX ORDER — AMOXICILLIN AND CLAVULANATE POTASSIUM 875; 125 MG/1; MG/1
1 TABLET, FILM COATED ORAL 2 TIMES DAILY
Qty: 10 TABLET | Refills: 0 | Status: SHIPPED | OUTPATIENT
Start: 2021-02-15 | End: 2021-02-20

## 2021-02-15 RX ORDER — OXYCODONE HYDROCHLORIDE 5 MG/1
5 TABLET ORAL EVERY 4 HOURS PRN
Qty: 10 TABLET | Refills: 0 | Status: SHIPPED | OUTPATIENT
Start: 2021-02-15 | End: 2021-02-18

## 2021-02-15 RX ADMIN — DOCUSATE SODIUM 100 MG: 100 CAPSULE, LIQUID FILLED ORAL at 06:31

## 2021-02-15 RX ADMIN — LOSARTAN POTASSIUM 100 MG: 50 TABLET, FILM COATED ORAL at 06:31

## 2021-02-15 RX ADMIN — ENOXAPARIN SODIUM 40 MG: 40 INJECTION SUBCUTANEOUS at 06:31

## 2021-02-15 RX ADMIN — METOPROLOL TARTRATE 25 MG: 25 TABLET, FILM COATED ORAL at 06:32

## 2021-02-15 RX ADMIN — PAROXETINE HYDROCHLORIDE 30 MG: 20 TABLET, FILM COATED ORAL at 06:32

## 2021-02-15 RX ADMIN — AMOXICILLIN AND CLAVULANATE POTASSIUM 1 TABLET: 875; 125 TABLET, FILM COATED ORAL at 06:32

## 2021-02-15 RX ADMIN — ACETAMINOPHEN 650 MG: 325 TABLET ORAL at 06:31

## 2021-02-15 ASSESSMENT — PAIN DESCRIPTION - PAIN TYPE
TYPE: ACUTE PAIN

## 2021-02-15 NOTE — PROGRESS NOTES
Bedside report received.  Assessment complete.  A&O x 4. Patient calls appropriately.  Patient ambulates with no assist. Bed alarm off.   Patient has 7/10 pain. Pain managed with prescribed medications.  Denies N&V. Tolerating CHO diet.  Surgical sites CDI.  + void, + flatus, last BM PTA.  Patient denies SOB.  SCD's off.  Patient is pleasant and cooperative.  Review plan with of care with patient. Call light and personal belongings with in reach. Hourly rounding in place. All needs met at this time.  /90   Pulse 73   Temp 36.8 °C (98.2 °F) (Temporal)   Resp 18   Wt 95.6 kg (210 lb 12.2 oz)   SpO2 94%   BMI 35.07 kg/m²

## 2021-02-15 NOTE — PROGRESS NOTES
[facility] orders received. [facility] [place] [place]  PIV discontinued.  Instructions, prescriptions and education given to patient.  Follow up appointments discussed.  Patient verbalized understanding of dc instructions and prescriptions.  Patient signed discharge instructions.  Patient verbalized she had all belongings with her.  Patient left via walking to her sister's vehicle.  Wished patient a pleasant day.

## 2021-02-15 NOTE — NON-PROVIDER
This note is intended for the purposes of medical student education and feedback only.   Please refer to the documentation by this patient's assigned medical practitioner for details of care and plans.    Reason for admission: Patient is a 65 year old F who was admitted on 2/12/21 with appendicitis and beth-appendiceal abscess.     HD/POD#:  POD 3 Lap Appy with drainage of beth-appendiceal abscess       SUBJECTIVE  (Overnight events?)    (How the patient is today?)    OBJECTIVE   Vital Signs:  • Max 24 hour temp: 98.2  • Current temp:   • Current HR:   • Current BP:  • Current RR:  • Current O2 Sat:  (on BLANK liters of O2)    Physical Exam (Components adjusted/added/removed when applicable):  General:   HEENT:   Cardiovascular:   Respiratory:   Abdominal exam:   Extremities:   Neurological:     Ins/Outs:  • P/O Intake:  • IV Intake:  • Urine output:  • Drain output:  • Other output:    Lab Results:  Recent Labs     02/12/21  1300 02/13/21  0344 02/14/21  0505   WBC 9.4 9.5 9.1   RBC 4.18* 3.65* 3.61*   HEMOGLOBIN 12.6 10.8* 10.8*   HEMATOCRIT 38.6 34.6* 33.8*   MCV 92.3 94.8 93.6   MCH 30.1 29.6 29.9   MCHC 32.6* 31.2* 32.0*   RDW 45.9 46.4 45.9   PLATELETCT 233 183 180   MPV 10.5 10.8 10.1     Recent Labs     02/12/21  1300 02/13/21  0344 02/14/21  0505   SODIUM 138 137 136   POTASSIUM 4.1 4.2 3.6   CHLORIDE 101 106 104   CO2 21 20 22   GLUCOSE 166* 142* 161*   BUN 20 24* 16   CREATININE 0.97 1.18 0.81   CALCIUM 9.6 8.4* 8.3*         Recent Labs     02/12/21  1300   ASTSGOT 17   ALTSGPT 19   TBILIRUBIN 0.3   ALKPHOSPHAT 117*   GLOBULIN 3.3       Imaging Results:  (Recent and pertinent trends)    ASSESSMENT/PLAN  Acute appendicitis with appendiceal abscess   - pt underwent lap appy with drainage of abscess on 2/12   -     PROPHYLAXIS  • DVT: Lovenox inj 40 mg   • GI: senna-docusate   • Other:

## 2021-02-15 NOTE — CARE PLAN
Problem: Communication  Goal: The ability to communicate needs accurately and effectively will improve  Outcome: PROGRESSING AS EXPECTED     Problem: Safety  Goal: Will remain free from injury  Outcome: PROGRESSING AS EXPECTED  Goal: Will remain free from falls  Outcome: PROGRESSING AS EXPECTED     Problem: Knowledge Deficit  Goal: Knowledge of disease process/condition, treatment plan, diagnostic tests, and medications will improve  Outcome: PROGRESSING AS EXPECTED  Goal: Knowledge of the prescribed therapeutic regimen will improve  Outcome: PROGRESSING AS EXPECTED     Problem: Discharge Barriers/Planning  Goal: Patient's continuum of care needs will be met  Outcome: PROGRESSING AS EXPECTED     Problem: Pain Management  Goal: Pain level will decrease to patient's comfort goal  Outcome: PROGRESSING AS EXPECTED     Problem: Respiratory:  Goal: Respiratory status will improve  Outcome: PROGRESSING AS EXPECTED

## 2021-02-15 NOTE — DISCHARGE INSTRUCTIONS
Discharge Instructions    Discharged to home by car with relative. Discharged via wheelchair, hospital escort: Yes.  Special equipment needed: Not Applicable    Be sure to schedule a follow-up appointment with your primary care doctor or any specialists as instructed.     Discharge Plan: Follow up with [facility].  Take your medications as prescribed.    Diet Plan: Discussed  Activity Level: Discussed  Confirmed Follow up Appointment: Patient to Call and Schedule Appointment  Confirmed Symptoms Management: Discussed  Medication Reconciliation Updated: Yes  Influenza Vaccine Indication: Not indicated: Previously immunized this influenza season and > 8 years of age    I understand that a diet low in cholesterol, fat, and sodium is recommended for good health. Unless I have been given specific instructions below for another diet, I accept this instruction as my diet prescription.     Other diet: Diabetic     Special Instructions:   Laparoscopic Appendectomy, Adult, Care After  This sheet gives you information about how to care for yourself after your procedure. Your doctor may also give you more specific instructions. If you have problems or questions, contact your doctor.  What can I expect after the procedure?  After the procedure, it is common to have:  · Little energy for normal activities.  · Mild pain in the area where the cuts from surgery (incisions) were made.  · Trouble pooping (constipation). This can be caused by:  ? Pain medicine.  ? A lack of activity.  Follow these instructions at home:  Medicines  · Take over-the-counter and prescription medicines only as told by your doctor.  · If you were prescribed an antibiotic medicine, take it as told by your doctor. Do not stop taking it even if you start to feel better.  · Do not drive or use heavy machinery while taking prescription pain medicine.  · Ask your doctor if the medicine you are taking can cause trouble pooping. You may need to take steps  to prevent or treat trouble pooping:  ? Drink enough fluid to keep your pee (urine) pale yellow.  ? Take over-the-counter or prescription medicines.  ? Eat foods that are high in fiber. These include beans, whole grains, and fresh fruits and vegetables.  ? Limit foods that are high in fat and sugar. These include fried or sweet foods.  Incision care    · Follow instructions from your doctor about how to take care of your cuts from surgery. Make sure you:  ? Wash your hands with soap and water before and after you change your bandage (dressing). If you cannot use soap and water, use hand .  ? Change your bandage as told by your doctor.  ? Leave stitches (sutures), skin glue, or skin tape (adhesive) strips in place. They may need to stay in place for 2 weeks or longer. If tape strips get loose and curl up, you may trim the loose edges. Do not remove tape strips completely unless your doctor says it is okay.  · Check your cuts from surgery every day for signs of infection. Check for:  ? Redness, swelling, or pain.  ? Fluid or blood.  ? Warmth.  ? Pus or a bad smell.  Bathing  · Keep your cuts from surgery clean and dry. Clean them as told by your doctor. To do this:  1. Gently wash the cuts with soap and water.  2. Rinse the cuts with water to remove all soap.  3. Pat the cuts dry with a clean towel. Do not rub the cuts.  · Do not take baths, swim, or use a hot tub for 2 weeks, or until your doctor says it is okay. You may take showers after 48 hours.  Activity    · Do not drive for 24 hours if you were given a medicine to help you relax (sedative) during your procedure.  · Rest after the procedure. Return to your normal activities as told by your doctor. Ask your doctor what activities are safe for you.  · For 3 weeks, or for as long as told by your doctor:  ? Do not lift anything that is heavier than 10 lb (4.5 kg), or the limit that you are told.  ? Do not play contact sports.  General instructions  · If  you were sent home with a drain, follow instructions from your doctor on how to care for it.  · Take deep breaths. This helps to keep your lungs from getting an infection (pneumonia).  · Keep all follow-up visits as told by your doctor. This is important.  Contact a doctor if:  · You have redness, swelling, or pain around a cut from surgery.  · You have fluid or blood coming from a cut.  · Your cut feels warm to the touch.  · You have pus or a bad smell coming from a cut or a bandage.  · The edges of a cut break open after the stitches have been taken out.  · You have pain in your shoulders that gets worse.  · You feel dizzy or you pass out (faint).  · You have shortness of breath.  · You keep feeling sick to your stomach (nauseous).  · You keep throwing up (vomiting).  · You get watery poop (diarrhea) or you cannot control your poop.  · You lose your appetite.  · You have swelling or pain in your legs.  · You get a rash.  Get help right away if:  · You have a fever.  · You have trouble breathing.  · You have sharp pains in your chest.  Summary  · After the procedure, it is common to have low energy, mild pain, and trouble pooping.  · Infection is a common problem after this procedure. Follow your doctor's instructions about caring for yourself after the procedure.  · Rest after the procedure. Return to your normal activities as told by your doctor.  · Contact your doctor if you see signs of infection around your cuts from surgery, or you get short of breath. Get help right away if you have a fever, chest pain, or trouble breathing.  This information is not intended to replace advice given to you by your health care provider. Make sure you discuss any questions you have with your health care provider.      · Is patient discharged on Warfarin / Coumadin?   No     Depression / Suicide Risk    As you are discharged from this [facility] facility, it is important to learn how to keep safe from harming  yourself.    Recognize the warning signs:  · Abrupt changes in personality, positive or negative- including increase in energy   · Giving away possessions  · Change in eating patterns- significant weight changes-  positive or negative  · Change in sleeping patterns- unable to sleep or sleeping all the time   · Unwillingness or inability to communicate  · Depression  · Unusual sadness, discouragement and loneliness  · Talk of wanting to die  · Neglect of personal appearance   · Rebelliousness- reckless behavior  · Withdrawal from people/activities they love  · Confusion- inability to concentrate     If you or a loved one observes any of these behaviors or has concerns about self-harm, here's what you can do:  · Talk about it- your feelings and reasons for harming yourself  · Remove any means that you might use to hurt yourself (examples: pills, rope, extension cords, firearm)  · Get professional help from the community (Mental Health, Substance Abuse, psychological counseling)  · Do not be alone:Call your Safe Contact- someone whom you trust who will be there for you.  · Call your local CRISIS HOTLINE 313-9159 or 630-357-4375  · Call your local Children's Mobile Crisis Response Team [place] (675) 629-6539 or www.[employer]  · Call the toll free National Suicide Prevention Hotlines   · National Suicide Prevention Lifeline 240-591-GIMM (2091)  · National Hope Line Network 800-SUICIDE (127-5559)        Dr. Dawson's Laparoscopic Appendectomy Discharge Instructions:    1. DIET: Upon discharge from the hospital you may resume your normal preoperative diet. Depending on how you are feeling and whether you have nausea or not, you may wish to stay with a bland diet for the first few days. However, you can advance this as quickly as you feel ready.    2. ACTIVITIES: You have a 15 pound weight restriction for two weeks after surgery.  Routine activities such as bathing, walking, going up and down stairs, and  driving* (see below) are safe.  Avoid strenuous activities and exercise that involves twisting, bending, and, running.    3. DRIVING: You may drive whenever you are off pain medications and are able to perform the activities needed to drive, i.e. turning, bending, twisting, wearing a seat belt, etc.    4. WOUND/BATHING: You may get the wound wet at any time after leaving the hospital. You may shower, but do not submerge in a bath or a pool for at least a week.  Peel the skin glue off with your finger or a pair of tweezers one week after surgery.     5. BOWEL FUNCTION: A few patients, after this operation, will develop either frequent or loose stools after meals. This usually corrects itself after a few days, to a few weeks.     Much more common than loose stools, is constipation. The combination of pain medication and decreased activity level can cause constipation in otherwise normal patients. If you feel this is occurring, take an over the counter laxatives (Milk of Magnesia, Ex-Lax, Senokot, Miralax, Magnesium Citrate, etc.) until the problem has resolved.    6. PAIN MEDICATION: You will be given a prescription for pain medication at discharge. Please take these as directed. It is important to remember not to take medications on an empty stomach as this may cause nausea.  Wean the use of pain medication as soon as possible.    7. CALL IF YOU HAVE: (1) Fevers to more than 101F, (2) Unusual chest or leg pain, (3) Drainage or fluid from incision that may be foul smelling, increased tenderness or soreness at the wound or the wound edges are no longer together, redness or swelling at the incision site. Please do not hesitate to call with any other questions.     8. APPOINTMENT: Contact our office at [phone] for a follow-up appointment in 1 to 2 weeks following your procedure.    If you have any additional questions, please do not hesitate to call the office and speak to either myself or the physician on  call.    Office address:  [address] [place] [address], [address] [place] [address]    Toni Dawson M.D.  [facility]  [phone]

## 2021-02-15 NOTE — PROGRESS NOTES
Assessment complete.  A&O x 4. Patient calls appropriately.  Patient ambulates with no assistance needed. Bed alarm off.   Patient has 5/10 pain. Pain managed with prescribed medications.  Denies N&V. Tolerating DM diet.  Lap site dressings CDI.  + void, + flatus, - BM.  Patient denies SOB.  SCD's refused.    Review plan with of care with patient. Call light and personal belongings with in reach. Hourly rounding in place. All needs met at this time.

## 2021-02-15 NOTE — PROGRESS NOTES
Discharge lounge order placed. Patient belongings returned to patient. Patient transported via wheel chair to discharge lounge to meet family.

## 2021-02-15 NOTE — PROGRESS NOTES
DATE: 2/15/2021    POD 3 Lap Appy with drainage of beth-appendiceal abscess    Interval Events:  Pain controlled  Reports feeling great  Voiding  Tolerating PO    -CBC pending, anticipating it will be stable  -Set up for discharge  -Discussed with Day RN    PHYSICAL EXAMINATION:  Vital Signs: /90   Pulse 73   Temp 36.8 °C (98.2 °F) (Temporal)   Resp 18   Wt 95.6 kg (210 lb 12.2 oz)   SpO2 94%   GENERAL:  No acute distress  HEENT: Pupils equal, round and reactive to light bilaterally.  Sclera are clear bilaterally.  No otorrhea.  No rhinorrhea.  Mucus membranes are moist.  CHEST:  Lungs are clear to auscultation bilaterally  CARDIOVASCULAR:  Regular rate and rhythm  ABDOMEN: Soft, appropriately tender, non-distended, incisions clean and intact x3  GENITOURINARY:  No burden in place, voiding without issues  EXTREMITIES:  Good range of motion through out  NEUROLOGIC:  Alert and oriented.  Cranial Nerves II through XII are grossly intact, no focal deficits appreciated  PSYCHIATRIC:  Normal affect and mood appropriate for age and condition  SKIN:  Warm and well perfused, no rashes    Laboratory Values:   Recent Labs     02/12/21  1300 02/13/21  0344 02/14/21  0505   WBC 9.4 9.5 9.1   RBC 4.18* 3.65* 3.61*   HEMOGLOBIN 12.6 10.8* 10.8*   HEMATOCRIT 38.6 34.6* 33.8*   MCV 92.3 94.8 93.6   MCH 30.1 29.6 29.9   MCHC 32.6* 31.2* 32.0*   RDW 45.9 46.4 45.9   PLATELETCT 233 183 180   MPV 10.5 10.8 10.1     Recent Labs     02/12/21  1300 02/13/21  0344 02/14/21  0505   SODIUM 138 137 136   POTASSIUM 4.1 4.2 3.6   CHLORIDE 101 106 104   CO2 21 20 22   GLUCOSE 166* 142* 161*   BUN 20 24* 16   CREATININE 0.97 1.18 0.81   CALCIUM 9.6 8.4* 8.3*     Recent Labs     02/12/21  1300   ASTSGOT 17   ALTSGPT 19   TBILIRUBIN 0.3   ALKPHOSPHAT 117*   GLOBULIN 3.3              ASSESSMENT AND PLAN:     Acute appendicitis with appendiceal abscess- (present on admission)  Assessment & Plan  13mm beth-appendiceal abscess  2/12 Lap  appy with drainage of abscess  Zosyn commenced  [facility]    Depression- (present on admission)  Assessment & Plan  Outpatient paroxetine ordered    Essential hypertension- (present on admission)  Assessment & Plan  Outpatient metoprolol and losartan ordered    Chronic insomnia- (present on admission)  Assessment & Plan  Outpatient trazodone ordered    Hyperlipidemia- (present on admission)  Assessment & Plan  Outpatient rosuvastatin ordered    BMI 36.0-36.9,adult- (present on admission)  Assessment & Plan  Long standing issue    Type 2 diabetes mellitus without complication, without long-term current use of insulin (HCC)- (present on admission)  Assessment & Plan  Metformin as an outpatient  Admit   Will manage with low insulin sliding scale while inpatient         ____________________________________     Juan Dawson M.D.

## 2021-02-15 NOTE — CARE PLAN
Problem: Communication  Goal: The ability to communicate needs accurately and effectively will improve  2/15/2021 0852 by Maksim Casanova R.N.  Outcome: MET  2/15/2021 0822 by Maksim Casanova R.N.  Outcome: PROGRESSING AS EXPECTED     Problem: Safety  Goal: Will remain free from injury  2/15/2021 0852 by Maksim Casanova R.N.  Outcome: MET  2/15/2021 0822 by Maksim Casanova R.N.  Outcome: PROGRESSING AS EXPECTED  Goal: Will remain free from falls  2/15/2021 0852 by Maksim Casanova R.N.  Outcome: MET  2/15/2021 0822 by Maksim Casanova R.N.  Outcome: PROGRESSING AS EXPECTED     Problem: Infection  Goal: Will remain free from infection  Outcome: MET     Problem: Venous Thromboembolism (VTW)/Deep Vein Thrombosis (DVT) Prevention:  Goal: Patient will participate in Venous Thrombosis (VTE)/Deep Vein Thrombosis (DVT)Prevention Measures  Outcome: MET     Problem: Bowel/Gastric:  Goal: Normal bowel function is maintained or improved  Outcome: MET  Goal: Will not experience complications related to bowel motility  Outcome: MET     Problem: Knowledge Deficit  Goal: Knowledge of disease process/condition, treatment plan, diagnostic tests, and medications will improve  2/15/2021 0852 by Maksim Casanova R.N.  Outcome: MET  2/15/2021 0822 by Maksim Casanova R.N.  Outcome: PROGRESSING AS EXPECTED  Goal: Knowledge of the prescribed therapeutic regimen will improve  2/15/2021 0852 by Maksim Casanova R.N.  Outcome: MET  2/15/2021 0822 by Maksim Casanova R.N.  Outcome: PROGRESSING AS EXPECTED     Problem: Discharge Barriers/Planning  Goal: Patient's continuum of care needs will be met  2/15/2021 0852 by Maksim Casanova R.N.  Outcome: MET  2/15/2021 0822 by Maksim Casanova R.N.  Outcome: PROGRESSING AS EXPECTED     Problem: Pain Management  Goal: Pain level will decrease to patient's comfort goal  2/15/2021 0852 by Maksim Casanova R.N.  Outcome: MET  2/15/2021 0822 by Maksim Casanova R.N.  Outcome: PROGRESSING AS EXPECTED     Problem: Respiratory:  Goal: Respiratory status  will improve  2/15/2021 0852 by Maksim Casanova R.N.  Outcome: MET  2/15/2021 0822 by Maksim Casanova R.N.  Outcome: PROGRESSING AS EXPECTED

## 2021-02-16 LAB
GLUCOSE BLD-MCNC: 118 MG/DL (ref 65–99)
GLUCOSE BLD-MCNC: 138 MG/DL (ref 65–99)
GLUCOSE BLD-MCNC: 144 MG/DL (ref 65–99)

## 2021-02-17 ASSESSMENT — ENCOUNTER SYMPTOMS
SHORTNESS OF BREATH: 0
CHEST TIGHTNESS: 1
HEMOPTYSIS: 0
WHEEZING: 1
DYSPNEA AT REST: 0

## 2021-02-19 NOTE — DISCHARGE SUMMARY
Discharge Summary    Reason for Admission  Sent by      Admission Date  2/12/2021    HPI & HOSPITAL COURSE  This is a 65 y.o. female here with appendicitis     Therefore, she is discharged in good and stable condition to home with close outpatient follow-up.    Discharge Date  2/15/2021      FOLLOW UP  Future Appointments   Date Time Provider Department Center   3/1/2021  9:10 AM Marietta Hu M.D. [facility] None     Juan Dawson M.D.  [address]  [address]  [facility] [address] [phone]      Call to make an appointment in 1-2 weeks    Karol Medina P.A.-C.  [address]  [facility] [address] [phone]            MEDICATIONS ON DISCHARGE     Medication List      START taking these medications      Instructions   amoxicillin-clavulanate 875-125 MG Tabs  Commonly known as: AUGMENTIN   Take 1 tablet by mouth 2 times a day for 5 days.  Dose: 1 tablet        CHANGE how you take these medications      Instructions   losartan 100 MG Tabs  What changed: when to take this  Commonly known as: COZAAR   Doctor's comments: Increase dose to 100mg daily  Take 1 Tab by mouth every day.  Dose: 100 mg     metFORMIN 500 MG Tabs  What changed:   · how much to take  · when to take this  Commonly known as: GLUCOPHAGE   TAKE ONE TABLET BY MOUTH TWICE A DAY WITH A MEAL     metoprolol tartrate 25 MG Tabs  What changed: when to take this  Commonly known as: LOPRESSOR   Take 1 Tab by mouth 2 Times a Day.  Dose: 25 mg     PARoxetine 30 MG Tabs  What changed: when to take this  Commonly known as: PAXIL   Take 1 Tab by mouth every day.  Dose: 30 mg     traZODone 50 MG Tabs  What changed:   · when to take this  · reasons to take this  · additional instructions  Commonly known as: DESYREL   TAKE ONE TABLET BY MOUTH EVERY NIGHT AT BEDTIME AS NEEDED FOR SLEEP        CONTINUE taking these medications      Instructions   albuterol 2.5mg/3ml Nebu solution for nebulization  Commonly known as: PROVENTIL   Doctor's comments:  "Dispense #25 3ml unit dose vials  3 mL by Nebulization route every four hours as needed for Shortness of Breath.  Dose: 2.5 mg     aspirin EC 81 MG Tbec  Commonly known as: ECOTRIN   Take 81 mg by mouth every morning.  Dose: 81 mg     ibuprofen 200 MG Tabs  Commonly known as: MOTRIN   Take 600 mg by mouth 1 time a day as needed (Moderate pain). 3 tablets = 600 mg.  Dose: 600 mg     rosuvastatin 20 MG Tabs  Commonly known as: CRESTOR   Take 20 mg by mouth every evening.  Dose: 20 mg     vitamin D 1000 Unit (25 mcg) Tabs  Commonly known as: cholecalciferol   Take 3,000 Units by mouth every morning. 3 tablets = 3000 units.  Dose: 3,000 Units        ASK your doctor about these medications      Instructions   oxyCODONE immediate-release 5 MG Tabs  Commonly known as: ROXICODONE  Ask about: Should I take this medication?   Take 1 tablet by mouth every four hours as needed for Severe Pain for up to 3 days.  Dose: 5 mg            Allergies  Allergies   Allergen Reactions   • Sulfa Drugs Hives   • Shellfish Allergy Itching     \"Itchy eyes\"       DIET  No orders of the defined types were placed in this encounter.      ACTIVITY  As tolerated.  Weight bearing as tolerated    CONSULTATIONS  No    PROCEDURES  See [facility]    LABORATORY  Lab Results   Component Value Date    SODIUM 136 02/14/2021    POTASSIUM 3.6 02/14/2021    CHLORIDE 104 02/14/2021    CO2 22 02/14/2021    GLUCOSE 161 (H) 02/14/2021    BUN 16 02/14/2021    CREATININE 0.81 02/14/2021        Lab Results   Component Value Date    WBC 6.4 02/15/2021    HEMOGLOBIN 12.0 02/15/2021    HEMATOCRIT 37.0 02/15/2021    PLATELETCT 146 (L) 02/15/2021        Total time of the discharge process exceeds 3 minutes.  "

## 2021-03-01 ENCOUNTER — OFFICE VISIT (OUTPATIENT)
Dept: SLEEP MEDICINE | Facility: MEDICAL CENTER | Age: 66
End: 2021-03-01
Payer: MEDICARE

## 2021-03-01 VITALS
TEMPERATURE: 97.7 F | HEIGHT: 66 IN | OXYGEN SATURATION: 95 % | BODY MASS INDEX: 34.87 KG/M2 | HEART RATE: 62 BPM | RESPIRATION RATE: 16 BRPM | SYSTOLIC BLOOD PRESSURE: 116 MMHG | DIASTOLIC BLOOD PRESSURE: 66 MMHG | WEIGHT: 217 LBS

## 2021-03-01 DIAGNOSIS — E66.9 CLASS 2 OBESITY WITH BODY MASS INDEX (BMI) OF 35.0 TO 35.9 IN ADULT, UNSPECIFIED OBESITY TYPE, UNSPECIFIED WHETHER SERIOUS COMORBIDITY PRESENT: ICD-10-CM

## 2021-03-01 DIAGNOSIS — J41.1 BRONCHITIS, MUCOPURULENT RECURRENT (HCC): ICD-10-CM

## 2021-03-01 DIAGNOSIS — Z87.891 FORMER SMOKER: ICD-10-CM

## 2021-03-01 DIAGNOSIS — R91.1 LUNG NODULE: ICD-10-CM

## 2021-03-01 PROCEDURE — 99204 OFFICE O/P NEW MOD 45 MIN: CPT | Performed by: INTERNAL MEDICINE

## 2021-03-01 ASSESSMENT — ENCOUNTER SYMPTOMS
SPEECH CHANGE: 0
NAUSEA: 0
SPUTUM PRODUCTION: 1
SINUS PAIN: 0
DIZZINESS: 0
DIAPHORESIS: 0
MYALGIAS: 0
WEAKNESS: 0
ORTHOPNEA: 0
EYE PAIN: 0
COUGH: 1
PHOTOPHOBIA: 0
SORE THROAT: 0
EYE REDNESS: 0
CLAUDICATION: 0
VOMITING: 0
HEARTBURN: 0
FALLS: 0
TREMORS: 0
CHILLS: 0
WEIGHT LOSS: 0
DIARRHEA: 0
BLURRED VISION: 0
CONSTIPATION: 0
BACK PAIN: 0
FOCAL WEAKNESS: 0
HEMOPTYSIS: 0
EYE DISCHARGE: 0
HEADACHES: 0
NECK PAIN: 0
FEVER: 0
WHEEZING: 1
DOUBLE VISION: 0
ABDOMINAL PAIN: 0
PALPITATIONS: 0
SHORTNESS OF BREATH: 0
PND: 0
DEPRESSION: 0
STRIDOR: 0

## 2021-03-01 ASSESSMENT — FIBROSIS 4 INDEX: FIB4 SCORE: 1.76

## 2021-03-02 ENCOUNTER — TELEPHONE (OUTPATIENT)
Dept: SLEEP MEDICINE | Facility: MEDICAL CENTER | Age: 66
End: 2021-03-02

## 2021-03-02 DIAGNOSIS — J41.1 BRONCHITIS, MUCOPURULENT RECURRENT (HCC): ICD-10-CM

## 2021-03-02 NOTE — TELEPHONE ENCOUNTER
Patient called to let us know her Incruse inhaler cost $300. I have asked our prior authorization desk Jami BUSCH to try to get prior auth from her insurance. If that does not work to lower her cost then we may want to prescribe something else.    Message routed to [facility].    **Jami found out insurance won't approve as patient has not tried & failed on other inhalers. Also Bronchitis diagnosis will not work as they are looking for COPD, Respiratory failure etc.    Did you want to Rx something different? Please advise.

## 2021-03-03 DIAGNOSIS — Z23 NEED FOR VACCINATION: ICD-10-CM

## 2021-03-03 NOTE — TELEPHONE ENCOUNTER
I called [facility] and all of the anticholinergics are tier 3 which is very expensive.  Trelegy is a tier 4.  The only thing that is a tier 2 is inhaled Ipratropium bromide.

## 2021-03-05 ENCOUNTER — HOSPITAL ENCOUNTER (OUTPATIENT)
Dept: RADIOLOGY | Facility: MEDICAL CENTER | Age: 66
End: 2021-03-05
Attending: INTERNAL MEDICINE
Payer: MEDICARE

## 2021-03-05 DIAGNOSIS — R91.1 LUNG NODULE: ICD-10-CM

## 2021-03-05 PROCEDURE — 71250 CT THORAX DX C-: CPT

## 2021-03-31 ENCOUNTER — IMMUNIZATION (OUTPATIENT)
Dept: FAMILY PLANNING/WOMEN'S HEALTH CLINIC | Facility: IMMUNIZATION CENTER | Age: 66
End: 2021-03-31
Attending: INTERNAL MEDICINE
Payer: MEDICARE

## 2021-03-31 DIAGNOSIS — Z23 ENCOUNTER FOR VACCINATION: Primary | ICD-10-CM

## 2021-03-31 DIAGNOSIS — Z23 NEED FOR VACCINATION: ICD-10-CM

## 2021-03-31 PROCEDURE — 91300 PFIZER SARS-COV-2 VACCINE: CPT | Performed by: INTERNAL MEDICINE

## 2021-03-31 PROCEDURE — 0001A PFIZER SARS-COV-2 VACCINE: CPT | Performed by: INTERNAL MEDICINE

## 2021-04-12 ENCOUNTER — NON-PROVIDER VISIT (OUTPATIENT)
Dept: SLEEP MEDICINE | Facility: MEDICAL CENTER | Age: 66
End: 2021-04-12
Attending: INTERNAL MEDICINE
Payer: MEDICARE

## 2021-04-12 VITALS — WEIGHT: 211 LBS | HEIGHT: 66 IN | BODY MASS INDEX: 33.91 KG/M2

## 2021-04-12 DIAGNOSIS — Z87.891 FORMER SMOKER: ICD-10-CM

## 2021-04-12 DIAGNOSIS — J41.1 BRONCHITIS, MUCOPURULENT RECURRENT (HCC): ICD-10-CM

## 2021-04-12 PROCEDURE — 94060 EVALUATION OF WHEEZING: CPT | Performed by: INTERNAL MEDICINE

## 2021-04-12 PROCEDURE — 94726 PLETHYSMOGRAPHY LUNG VOLUMES: CPT | Performed by: INTERNAL MEDICINE

## 2021-04-12 PROCEDURE — 94729 DIFFUSING CAPACITY: CPT | Performed by: INTERNAL MEDICINE

## 2021-04-12 ASSESSMENT — PULMONARY FUNCTION TESTS
FEV1/FVC_PREDICTED: 78
FEV1_PERCENT_PREDICTED: 68
FVC_PERCENT_PREDICTED: 84
FEV1/FVC_PERCENT_CHANGE: 140
FEV1_PREDICTED: 2.44
FEV1/FVC: 69
FVC: 2.67
FEV1/FVC_PERCENT_PREDICTED: 88
FEV1/FVC_PERCENT_LLN: 65
FEV1/FVC_PERCENT_PREDICTED: 91
FEV1_PERCENT_CHANGE: 14
FVC_PREDICTED: 3.14
FEV1/FVC: 72
FEV1/FVC_PERCENT_CHANGE: 3
FEV1/FVC: 71.91
FEV1_PERCENT_CHANGE: 10
FVC: 2.42
FEV1_LLN: 2.04
FEV1: 1.92
FEV1_PERCENT_PREDICTED: 78
FEV1/FVC_PERCENT_PREDICTED: 89
FVC_PERCENT_PREDICTED: 76
FEV1: 1.68
FEV1/FVC_PERCENT_PREDICTED: 93
FEV1/FVC_PERCENT_PREDICTED: 78
FVC_LLN: 2.62
FEV1/FVC: 70

## 2021-04-12 ASSESSMENT — FIBROSIS 4 INDEX: FIB4 SCORE: 1.76

## 2021-04-12 NOTE — PROCEDURES
Technician: SHANNAN Kendrick    Technician Comment:  Good patient effort & cooperation.  The results of this test meet the ATS/ERS standards for acceptability & reproducibility.  Test was performed on the [employer] Body Plethysmograph-Elite DX system.  Predicted values were GLI-2012 for spirometry, GLI-2017 for DLCO, ITS for Lung Volumes.  The DLCO was uncorrected for Hgb.  A bronchodilator of Ventolin HFA -2puffs via spacer administered.  DLCO performed during dilation period.    Interpretation:  1.  Baseline spirometry shows borderline airflow obstruction with an FEV1/FVC ratio of 70 and an FEV1 of 1.68 L or 68% predicted.  2.  There is significant bronchodilator spots with reversal of airflow obstruction.  Postbronchodilator FEV1 is 1.92 L or 78% predicted.  Postbronchodilator FEV1/FVC ratio 72.  3.  Total lung capacity is within normal limits at 5.57 L or 105% predicted.  There is evidence of mild air trapping.  4.  Diffusion capacity is low normal at 83% predicted.  Pulmonary function testing shows obstructive airways disease with significant bronchodilator response, mild air trapping and low normal DLCO.  This may be consistent with reactive airways disease versus mild COPD given tobacco history.  Suggest clinical correlation.

## 2021-04-17 DIAGNOSIS — I25.10 CORONARY ARTERY CALCIFICATION SEEN ON CT SCAN: ICD-10-CM

## 2021-04-17 DIAGNOSIS — I10 ESSENTIAL HYPERTENSION: ICD-10-CM

## 2021-04-19 RX ORDER — LOSARTAN POTASSIUM 100 MG/1
TABLET ORAL
Qty: 90 TABLET | Refills: 0 | Status: SHIPPED | OUTPATIENT
Start: 2021-04-19 | End: 2021-07-16

## 2021-05-14 ENCOUNTER — OFFICE VISIT (OUTPATIENT)
Dept: SLEEP MEDICINE | Facility: MEDICAL CENTER | Age: 66
End: 2021-05-14
Payer: MEDICARE

## 2021-05-14 VITALS
BODY MASS INDEX: 34.07 KG/M2 | WEIGHT: 212 LBS | SYSTOLIC BLOOD PRESSURE: 124 MMHG | HEART RATE: 62 BPM | TEMPERATURE: 97.7 F | DIASTOLIC BLOOD PRESSURE: 76 MMHG | OXYGEN SATURATION: 95 % | RESPIRATION RATE: 16 BRPM | HEIGHT: 66 IN

## 2021-05-14 DIAGNOSIS — R91.1 LUNG NODULE: ICD-10-CM

## 2021-05-14 DIAGNOSIS — J45.20 MILD INTERMITTENT REACTIVE AIRWAY DISEASE WITHOUT COMPLICATION: ICD-10-CM

## 2021-05-14 DIAGNOSIS — E66.9 CLASS 1 OBESITY WITH BODY MASS INDEX (BMI) OF 34.0 TO 34.9 IN ADULT, UNSPECIFIED OBESITY TYPE, UNSPECIFIED WHETHER SERIOUS COMORBIDITY PRESENT: ICD-10-CM

## 2021-05-14 DIAGNOSIS — Z87.891 FORMER SMOKER: ICD-10-CM

## 2021-05-14 PROCEDURE — 99214 OFFICE O/P EST MOD 30 MIN: CPT | Performed by: INTERNAL MEDICINE

## 2021-05-14 RX ORDER — AZITHROMYCIN 250 MG/1
TABLET, FILM COATED ORAL
Qty: 6 TABLET | Refills: 0 | Status: SHIPPED
Start: 2021-05-14 | End: 2021-06-22

## 2021-05-14 RX ORDER — PREDNISONE 10 MG/1
TABLET ORAL
Qty: 18 TABLET | Refills: 0 | Status: SHIPPED
Start: 2021-05-14 | End: 2021-06-22

## 2021-05-14 ASSESSMENT — ENCOUNTER SYMPTOMS
STRIDOR: 0
DOUBLE VISION: 0
HEARTBURN: 0
EYE DISCHARGE: 0
DIZZINESS: 0
NECK PAIN: 0
ORTHOPNEA: 0
FEVER: 0
HEADACHES: 0
SORE THROAT: 0
SPEECH CHANGE: 0
NAUSEA: 0
SHORTNESS OF BREATH: 0
TREMORS: 0
SPUTUM PRODUCTION: 1
FOCAL WEAKNESS: 0
FALLS: 0
CLAUDICATION: 0
BLURRED VISION: 0
EYE PAIN: 0
VOMITING: 0
DEPRESSION: 0
PHOTOPHOBIA: 0
COUGH: 1
PND: 0
MYALGIAS: 0
WEAKNESS: 0
CONSTIPATION: 0
WEIGHT LOSS: 0
WHEEZING: 0
DIAPHORESIS: 0
CHILLS: 0
PALPITATIONS: 0
DIARRHEA: 0
EYE REDNESS: 0
BACK PAIN: 0
HEMOPTYSIS: 0
ABDOMINAL PAIN: 0
SINUS PAIN: 0

## 2021-05-14 ASSESSMENT — FIBROSIS 4 INDEX: FIB4 SCORE: 1.76

## 2021-05-14 NOTE — PROGRESS NOTES
Chief Complaint   Patient presents with   • Pulmonary Nodule     last seen 3/1/21    • Results     PFT 4/12/21, Ct thorax 3/5/21          HPI: This patient is a 65 y.o. female whom is followed in our clinic for recurrent bronchitis presumed reactive airways disease and pulmonary nodule last seen by me on 3/1/2021. The patient's past medical history is significant for coronary artery disease as detected by calcium scoring, type 2 diabetes, hypertension, dyslipidemia all currently controlled on medication, obesity.  She is a former tobacco smoker with 50-pack-year history and quit in 2017.  Patient was referred to me for episodes of recurrent bronchitis typically 1-2 times per year prior to 2020 although did not suffer acute bronchitis during the year 2020 presumably due to wearing mask regularly.  Episodes are typically treated with short-acting bronchodilator as needed.  Hospitalization.  No wheezing or shortness of breath outside of her episodes.  She had had a CT angiogram from April 2020 that showed 1.1 cm groundglass nodule in the medial right upper lobe and mild bibasilar atelectasis with no associated lymphadenopathy or parenchymal lung disease.  We had plan to start a trial of inhaled corticosteroid with long-acting beta agonist which she was unable to afford although has been using her rescue inhaler less than once per week.  CT chest done for follow-up on March 5 showed stable groundglass opacity in the right upper lobe with no new nodules or lymphadenopathy.  Pulmonary function testing from April 12 did show borderline airflow obstruction with normalization of FEV1/FVC ratio postbronchodilator, positive bronchodilator response, low normal diffusion capacity of 83% predicted and normal total lung capacity with mild air trapping.  Patient has done quite well although she does note some increased congestion in her chest over the past few weeks feeling as though she may be coming into another episode of  bronchitis.  No fevers or chills.  Mucus is thicker but has not changed color.  She is status post vaccination for coronavirus and remains tobacco free.    Past Medical History:   Diagnosis Date   • Hyperlipidemia LDL goal < 100 10/2/2014   • Hypertension    • Obesity (BMI 30-39.9) 10/2/2014   • Pre-diabetes 10/2/2014   • Recurrent sinusitis    • Type II or unspecified type diabetes mellitus without mention of complication, not stated as uncontrolled     pre-diabetes       Social History     Socioeconomic History   • Marital status:      Spouse name: Not on file   • Number of children: Not on file   • Years of education: Not on file   • Highest education level: Not on file   Occupational History   • Not on file   Tobacco Use   • Smoking status: Former Smoker     Packs/day: 1.00     Years: 48.00     Pack years: 48.00     Types: Cigarettes     Start date: 1968     Quit date: 6/7/2017     Years since quitting: 3.9   • Smokeless tobacco: Never Used   Vaping Use   • Vaping Use: Never used   Substance and Sexual Activity   • Alcohol use: No     Alcohol/week: 0.0 oz   • Drug use: Never   • Sexual activity: Yes     Partners: Male     Comment:    Other Topics Concern   • Not on file   Social History Narrative   • Not on file     Social Determinants of Health     Financial Resource Strain:    • Difficulty of Paying Living Expenses:    Food Insecurity:    • Worried About Running Out of Food in the Last Year:    • Ran Out of Food in the Last Year:    Transportation Needs:    • Lack of Transportation (Medical):    • Lack of Transportation (Non-Medical):    Physical Activity:    • Days of Exercise per Week:    • Minutes of Exercise per Session:    Stress:    • Feeling of Stress :    Social Connections:    • Frequency of Communication with Friends and Family:    • Frequency of Social Gatherings with Friends and Family:    • Attends [employer] Services:    • Active Member of Clubs or Organizations:    • Attends Club or  Organization Meetings:    • Marital Status:    Intimate Partner Violence:    • Fear of Current or Ex-Partner:    • Emotionally Abused:    • Physically Abused:    • Sexually Abused:        Family History   Problem Relation Age of Onset   • Cancer Mother         pancreatic    • Heart Disease Father    • Heart Attack Father    • Diabetes Father         pre-diabetes   • Stroke Neg Hx        Current Outpatient Medications on File Prior to Visit   Medication Sig Dispense Refill   • losartan (COZAAR) 100 MG Tab TAKE ONE TABLET BY MOUTH DAILY 90 tablet 0   • Albuterol Sulfate 108 (90 Base) MCG/ACT AEROSOL POWDER, BREATH ACTIVATED Inhale.     • rosuvastatin (CRESTOR) 20 MG Tab TAKE ONE TABLET BY MOUTH EVERY EVENING 90 tablet 0   • ibuprofen (MOTRIN) 200 MG Tab Take 600 mg by mouth 1 time a day as needed (Moderate pain). 3 tablets = 600 mg.     • metFORMIN (GLUCOPHAGE) 500 MG Tab TAKE ONE TABLET BY MOUTH TWICE A DAY WITH A MEAL (Patient taking differently: Take 500 mg by mouth every evening.) 180 Tab 1   • traZODone (DESYREL) 50 MG Tab TAKE ONE TABLET BY MOUTH EVERY NIGHT AT BEDTIME AS NEEDED FOR SLEEP (Patient taking differently: Take 50 mg by mouth at bedtime as needed for Sleep.) 90 Tab 1   • PARoxetine (PAXIL) 30 MG Tab Take 1 Tab by mouth every day. (Patient taking differently: Take 30 mg by mouth every morning.) 90 Tab 1   • metoprolol (LOPRESSOR) 25 MG Tab Take 1 Tab by mouth 2 Times a Day. (Patient taking differently: Take 25 mg by mouth 2 times a day.) 180 Tab 1   • aspirin EC (ECOTRIN) 81 MG Tablet Delayed Response Take 81 mg by mouth every morning.     • albuterol (PROVENTIL) 2.5mg/3ml Nebu Soln solution for nebulization 3 mL by Nebulization route every four hours as needed for Shortness of Breath. 25 Bullet 0   • Fluticasone Furoate-Vilanterol (BREO ELLIPTA) 100-25 MCG/INH AEROSOL POWDER, BREATH ACTIVATED Inhale 1 Puff every day. Rinse mouth after use. (Patient not taking: Reported on 5/14/2021) 1 Each 11   •  "Umeclidinium Bromide (INCRUSE ELLIPTA) 62.5 MCG/INH AEROSOL POWDER, BREATH ACTIVATED Inhale 1 Puff every day. (Patient not taking: Reported on 5/14/2021) 1 Each 11   • vitamin D (CHOLECALCIFEROL) 1000 Unit (25 mcg) Tab Take 3,000 Units by mouth every morning. 3 tablets = 3000 units.       No current facility-administered medications on file prior to visit.       Sulfa drugs and Shellfish allergy      ROS:   Review of Systems   Constitutional: Positive for malaise/fatigue. Negative for chills, diaphoresis, fever and weight loss.   HENT: Negative for congestion, ear discharge, ear pain, hearing loss, nosebleeds, sinus pain, sore throat and tinnitus.    Eyes: Negative for blurred vision, double vision, photophobia, pain, discharge and redness.   Respiratory: Positive for cough and sputum production. Negative for hemoptysis, shortness of breath, wheezing and stridor.    Cardiovascular: Negative for chest pain, palpitations, orthopnea, claudication, leg swelling and PND.   Gastrointestinal: Negative for abdominal pain, constipation, diarrhea, heartburn, nausea and vomiting.   Genitourinary: Negative for dysuria and urgency.   Musculoskeletal: Negative for back pain, falls, joint pain, myalgias and neck pain.   Skin: Negative for itching and rash.   Neurological: Negative for dizziness, tremors, speech change, focal weakness, weakness and headaches.   Endo/Heme/Allergies: Negative for environmental allergies.   Psychiatric/Behavioral: Negative for depression.       /76 (BP Location: Right arm, Patient Position: Sitting, BP Cuff Size: Large adult)   Pulse 62   Temp 36.5 °C (97.7 °F) (Temporal)   Resp 16   Ht 1.664 m (5' 5.5\")   Wt 96.2 kg (212 lb)   SpO2 95%   Physical Exam  Vitals reviewed.   Constitutional:       General: She is not in acute distress.     Appearance: Normal appearance. She is well-developed. She is obese.   HENT:      Head: Normocephalic and atraumatic.      Right Ear: External ear normal.    "   Left Ear: External ear normal.      Nose: Nose normal. No congestion.      Mouth/Throat:      Mouth: Mucous membranes are moist.      Pharynx: Oropharynx is clear. No oropharyngeal exudate.   Eyes:      General: No scleral icterus.     Extraocular Movements: Extraocular movements intact.      Conjunctiva/sclera: Conjunctivae normal.      Pupils: Pupils are equal, round, and reactive to light.   Neck:      Vascular: No JVD.      Trachea: No tracheal deviation.   Cardiovascular:      Rate and Rhythm: Normal rate and regular rhythm.      Heart sounds: Normal heart sounds. No murmur heard.   No friction rub. No gallop.    Pulmonary:      Effort: Pulmonary effort is normal. No accessory muscle usage or respiratory distress.      Breath sounds: Normal breath sounds. No wheezing or rales.   Abdominal:      General: There is no distension.      Palpations: Abdomen is soft.      Tenderness: There is no abdominal tenderness.   Musculoskeletal:         General: No tenderness or deformity. Normal range of motion.      Cervical back: Normal range of motion and neck supple.      Right lower leg: No edema.      Left lower leg: No edema.   Lymphadenopathy:      Cervical: No cervical adenopathy.   Skin:     General: Skin is warm and dry.      Findings: No rash.      Nails: There is no clubbing.   Neurological:      Mental Status: She is alert and oriented to person, place, and time.      Cranial Nerves: No cranial nerve deficit.      Gait: Gait normal.   Psychiatric:         Mood and Affect: Mood normal.         Behavior: Behavior normal.         PFTs as reviewed by me personally: As per HPI    Imaging as reviewed by me personally: As per HPI    Assessment:  1. Lung nodule  CT-CHEST (THORAX) W/O   2. Mild intermittent reactive airway disease without complication  azithromycin (ZITHROMAX) 250 MG Tab    predniSONE (DELTASONE) 10 MG Tab   3. Former smoker     4. Class 1 obesity with body mass index (BMI) of 34.0 to 34.9 in adult,  unspecified obesity type, unspecified whether serious comorbidity present         Plan:  1.  Stable findings for 1 year however patient remains high risk due to tobacco use.  I would like to repeat in 6 months to ensure stability given size.  If still no change we can resume annual CT surveillance imaging.  2.  He does have obstructive lung disease that is borderline and reversible following bronchodilator more consistent with asthma than COPD.  She has not been able to afford controller therapy but currently symptoms are well controlled and as needed bronchodilators only.  Suspect there is an atopic component to her symptoms.  I did give her emergency prednisone and azithromycin.  If we end up with recurrent episodes of bronchitis this year then we can try alternative controller therapy.  She is up-to-date on vaccines.  3.  Patient is tobacco free.  This does put her at risk for primary pulmonary malignancy.  Encouraged ongoing abstinence.  4.  This is per patient increased risk for obesity related pulmonary complications.  Encouraged healthy lifestyle habits.  Return in about 6 months (around 11/14/2021) for CT chest.

## 2021-05-24 ENCOUNTER — TELEPHONE (OUTPATIENT)
Dept: CARDIOLOGY | Facility: MEDICAL CENTER | Age: 66
End: 2021-05-24

## 2021-05-24 DIAGNOSIS — E78.5 HYPERLIPIDEMIA LDL GOAL <70: ICD-10-CM

## 2021-05-24 NOTE — TELEPHONE ENCOUNTER
Spoke to patient in regards to appointment with Dr. Bajwa on 06/02/2021 at 09:00am. All recent records in epic including blood work, cardiac testing, and EKG. Confirmed appt date, time and location.

## 2021-05-25 RX ORDER — ROSUVASTATIN CALCIUM 20 MG/1
TABLET, COATED ORAL
Qty: 90 TABLET | Refills: 0 | Status: SHIPPED | OUTPATIENT
Start: 2021-05-25 | End: 2021-08-30

## 2021-06-17 ENCOUNTER — OFFICE VISIT (OUTPATIENT)
Dept: CARDIOLOGY | Facility: MEDICAL CENTER | Age: 66
End: 2021-06-17
Payer: MEDICARE

## 2021-06-17 VITALS
WEIGHT: 212 LBS | SYSTOLIC BLOOD PRESSURE: 120 MMHG | DIASTOLIC BLOOD PRESSURE: 70 MMHG | HEART RATE: 64 BPM | OXYGEN SATURATION: 94 % | BODY MASS INDEX: 35.32 KG/M2 | HEIGHT: 65 IN

## 2021-06-17 DIAGNOSIS — I25.10 CORONARY ARTERY CALCIFICATION SEEN ON CT SCAN: ICD-10-CM

## 2021-06-17 DIAGNOSIS — E78.5 HYPERLIPIDEMIA LDL GOAL <70: ICD-10-CM

## 2021-06-17 DIAGNOSIS — Z00.00 HEALTHCARE MAINTENANCE: ICD-10-CM

## 2021-06-17 DIAGNOSIS — I73.9 PAD (PERIPHERAL ARTERY DISEASE) (HCC): ICD-10-CM

## 2021-06-17 DIAGNOSIS — E11.9 TYPE 2 DIABETES MELLITUS WITHOUT COMPLICATION, WITHOUT LONG-TERM CURRENT USE OF INSULIN (HCC): ICD-10-CM

## 2021-06-17 DIAGNOSIS — I10 ESSENTIAL HYPERTENSION: ICD-10-CM

## 2021-06-17 DIAGNOSIS — I25.10 ASCVD (ARTERIOSCLEROTIC CARDIOVASCULAR DISEASE): ICD-10-CM

## 2021-06-17 PROCEDURE — 99215 OFFICE O/P EST HI 40 MIN: CPT | Performed by: INTERNAL MEDICINE

## 2021-06-17 RX ORDER — EMPAGLIFLOZIN 10 MG/1
1 TABLET, FILM COATED ORAL DAILY
Qty: 90 TABLET | Refills: 3 | Status: SHIPPED
Start: 2021-06-17 | End: 2021-06-22

## 2021-06-17 ASSESSMENT — ENCOUNTER SYMPTOMS
FEVER: 0
WEIGHT LOSS: 0
FLANK PAIN: 0
HEARTBURN: 0
DIARRHEA: 0
SYNCOPE: 0
BACK PAIN: 0
DYSPNEA ON EXERTION: 0
ABDOMINAL PAIN: 0
WEIGHT GAIN: 0
NAUSEA: 0
DECREASED APPETITE: 0
DEPRESSION: 0
DIZZINESS: 0
ORTHOPNEA: 0
COUGH: 0
IRREGULAR HEARTBEAT: 0
CLAUDICATION: 1
PND: 0
ALTERED MENTAL STATUS: 0
BLURRED VISION: 0
CONSTIPATION: 0
NEAR-SYNCOPE: 0
SHORTNESS OF BREATH: 0
VOMITING: 0
PALPITATIONS: 0

## 2021-06-17 ASSESSMENT — FIBROSIS 4 INDEX: FIB4 SCORE: 1.79

## 2021-06-17 NOTE — PATIENT INSTRUCTIONS
Empagliflozin oral tablets  What is this medicine?  EMPAGLIFLOZIN (EM pa gli FLOE zin) helps to treat type 2 diabetes. It helps to control blood sugar. This drug may also reduce the risk of heart attack or stroke if you have type 2 diabetes and risk factors for heart disease. Treatment is combined with diet and exercise.  This medicine may be used for other purposes; ask your health care provider or pharmacist if you have questions.  COMMON BRAND NAME(S): JARDIANCE  What should I tell my health care provider before I take this medicine?  They need to know if you have any of these conditions:  · dehydration  · diabetic ketoacidosis  · diet low in salt  · eating less due to illness, surgery, dieting, or any other reason  · having surgery  · high cholesterol  · high levels of potassium in the blood  · history of pancreatitis or pancreas problems  · history of yeast infection of the penis or vagina  · if you often drink alcohol  · infections in the bladder, kidneys, or urinary tract  · kidney disease  · liver disease  · low blood pressure  · on hemodialysis  · problems urinating  · type 1 diabetes  · uncircumcised male  · an unusual or allergic reaction to empagliflozin, other medicines, foods, dyes, or preservatives  · pregnant or trying to get pregnant  · breast-feeding  How should I use this medicine?  Take this medicine by mouth with a glass of water. Follow the directions on the prescription label. Take it in the morning, with or without food. Take your dose at the same time each day. Do not take more often than directed. Do not stop taking except on your doctor's advice.  Talk to your pediatrician regarding the use of this medicine in children. Special care may be needed.  Overdosage: If you think you have taken too much of this medicine contact a poison control center or emergency room at once.  NOTE: This medicine is only for you. Do not share this medicine with others.  What if I miss a dose?  If you miss a  dose, take it as soon as you can. If it is almost time for your next dose, take only that dose. Do not take double or extra doses.  What may interact with this medicine?  Do not take this medicine with any of the following medications:  · gatifloxacin  This medicine may also interact with the following medications:  · alcohol  · certain medicines for blood pressure, heart disease  · diuretics  This list may not describe all possible interactions. Give your health care provider a list of all the medicines, herbs, non-prescription drugs, or dietary supplements you use. Also tell them if you smoke, drink alcohol, or use illegal drugs. Some items may interact with your medicine.  What should I watch for while using this medicine?  Visit your doctor or health care professional for regular checks on your progress.  This medicine can cause a serious condition in which there is too much acid in the blood. If you develop nausea, vomiting, stomach pain, unusual tiredness, or breathing problems, stop taking this medicine and call your doctor right away. If possible, use a ketone dipstick to check for ketones in your urine.  A test called the HbA1C (A1C) will be monitored. This is a simple blood test. It measures your blood sugar control over the last 2 to 3 months. You will receive this test every 3 to 6 months.  Learn how to check your blood sugar. Learn the symptoms of low and high blood sugar and how to manage them.  Always carry a quick-source of sugar with you in case you have symptoms of low blood sugar. Examples include hard sugar candy or glucose tablets. Make sure others know that you can choke if you eat or drink when you develop serious symptoms of low blood sugar, such as seizures or unconsciousness. They must get medical help at once.  Tell your doctor or health care professional if you have high blood sugar. You might need to change the dose of your medicine. If you are sick or exercising more than usual, you  might need to change the dose of your medicine.  Do not skip meals. Ask your doctor or health care professional if you should avoid alcohol. Many nonprescription cough and cold products contain sugar or alcohol. These can affect blood sugar.  Wear a medical ID bracelet or chain, and carry a card that describes your disease and details of your medicine and dosage times.  What side effects may I notice from receiving this medicine?  Side effects that you should report to your doctor or health care professional as soon as possible:  · allergic reactions like skin rash, itching or hives, swelling of the face, lips, or tongue  · breathing problems  · dizziness  · feeling faint or lightheaded, falls  · muscle weakness  · nausea, vomiting, unusual stomach upset or pain  · penile discharge, itching, or pain in men  · signs and symptoms of a genital infection, such as fever; tenderness, redness, or swelling in the genitals or area from the genitals to the back of the rectum  · signs and symptoms of low blood sugar such as feeling anxious, confusion, dizziness, increased hunger, unusually weak or tired, sweating, shakiness, cold, irritable, headache, blurred vision, fast heartbeat, loss of consciousness  · signs and symptoms of a urinary tract infection, such as fever, chills, a burning feeling when urinating, blood in the urine, back pain  · trouble passing urine or change in the amount of urine, including an urgent need to urinate more often, in larger amounts, or at night  · unusual tiredness  · vaginal discharge, itching, or odor in women  Side effects that usually do not require medical attention (report to your doctor or health care professional if they continue or are bothersome):  · mild increase in urination  · thirsty  This list may not describe all possible side effects. Call your doctor for medical advice about side effects. You may report side effects to FDA at 1-989-FDA-5469.  Where should I keep my  medicine?  Keep out of the reach of children.  Store at room temperature between 20 and 25 degrees C (68 and 77 degrees F). Throw away any unused medicine after the expiration date.  NOTE: This sheet is a summary. It may not cover all possible information. If you have questions about this medicine, talk to your doctor, pharmacist, or health care provider.  © 2020 [employer]/Gold Standard (2018-08-30 10:25:34)

## 2021-06-17 NOTE — PROGRESS NOTES
Cardiology Note    Chief Complaint   Patient presents with   • Arteriosclerotic Cardiovascular Disease (ASCVD)     F/V DX:ASCVD (arteriosclerotic cardiovascular disease)   • Hypertension       History of Present Illness: Dunia Her is a 66 y.o. female PMH former smoker 50 pack years, asthma, DM2, MVP, HTN, HLD, CAD, PAD, and ASCVD  presents for follow up.     Walks 4 min gets about one block. Has to rest due to claudication. Symptoms resolve with rest and able to continue. She has an appointment pending with vascular surgery. Denies cardiac symptoms currently. Tolerating new medications without adverse effects.    ED visit 4/2/2020 for chest pain found noncardiac. Blood pressure elevated on arrival, but quickly improved. She is attempting to get blood pressure cuff for home.    Review of Systems   Constitutional: Negative for decreased appetite, fever, malaise/fatigue, weight gain and weight loss.   HENT: Negative for congestion and nosebleeds.    Eyes: Negative for blurred vision.   Cardiovascular: Positive for claudication. Negative for chest pain, dyspnea on exertion, irregular heartbeat, leg swelling, near-syncope, orthopnea, palpitations, paroxysmal nocturnal dyspnea and syncope.   Respiratory: Negative for cough and shortness of breath.    Endocrine: Negative for cold intolerance and heat intolerance.   Skin: Negative for rash.   Musculoskeletal: Negative for back pain.   Gastrointestinal: Negative for abdominal pain, constipation, diarrhea, heartburn, melena, nausea and vomiting.   Genitourinary: Negative for dysuria, flank pain and hematuria.   Neurological: Negative for dizziness.   Psychiatric/Behavioral: Negative for altered mental status and depression.         Past Medical History:   Diagnosis Date   • Hyperlipidemia LDL goal < 100 10/2/2014   • Hypertension    • Obesity (BMI 30-39.9) 10/2/2014   • Pre-diabetes 10/2/2014   • Recurrent sinusitis    • Type II or unspecified type diabetes mellitus  without mention of complication, not stated as uncontrolled     pre-diabetes         Past Surgical History:   Procedure Laterality Date   • PB LAP,APPENDECTOMY N/A 2/12/2021    Procedure: APPENDECTOMY, LAPAROSCOPIC;  Surgeon: Juan Dawson M.D.;  Location: SURGERY [facility];  Service: General   • ABDOMINAL HYSTERECTOMY TOTAL  1993    benign cysts, total         Current Outpatient Medications   Medication Sig Dispense Refill   • Empagliflozin (JARDIANCE) 10 MG Tab Take 1 tablet by mouth every day. 90 tablet 3   • rosuvastatin (CRESTOR) 20 MG Tab TAKE ONE TABLET BY MOUTH EVERY EVENING 90 tablet 0   • azithromycin (ZITHROMAX) 250 MG Tab Take 2 tablets on day 1, then take 1 tablet a day for 4 days. 6 tablet 0   • predniSONE (DELTASONE) 10 MG Tab Take 30mg x 3 days, then take 20mg x 3 days, then take 10mg x 3 days, with food, then discontinue. 18 tablet 0   • losartan (COZAAR) 100 MG Tab TAKE ONE TABLET BY MOUTH DAILY 90 tablet 0   • Albuterol Sulfate 108 (90 Base) MCG/ACT AEROSOL POWDER, BREATH ACTIVATED Inhale.     • ibuprofen (MOTRIN) 200 MG Tab Take 600 mg by mouth 1 time a day as needed (Moderate pain). 3 tablets = 600 mg.     • vitamin D (CHOLECALCIFEROL) 1000 Unit (25 mcg) Tab Take 3,000 Units by mouth every morning. 3 tablets = 3000 units.     • metFORMIN (GLUCOPHAGE) 500 MG Tab TAKE ONE TABLET BY MOUTH TWICE A DAY WITH A MEAL (Patient taking differently: Take 500 mg by mouth every evening.) 180 Tab 1   • traZODone (DESYREL) 50 MG Tab TAKE ONE TABLET BY MOUTH EVERY NIGHT AT BEDTIME AS NEEDED FOR SLEEP (Patient taking differently: Take 50 mg by mouth at bedtime as needed for Sleep.) 90 Tab 1   • PARoxetine (PAXIL) 30 MG Tab Take 1 Tab by mouth every day. (Patient taking differently: Take 30 mg by mouth every morning.) 90 Tab 1   • metoprolol (LOPRESSOR) 25 MG Tab Take 1 Tab by mouth 2 Times a Day. (Patient taking differently: Take 25 mg by mouth 2 times a day.) 180 Tab 1   • aspirin EC (ECOTRIN) 81 MG Tablet  "Delayed Response Take 81 mg by mouth every morning.     • albuterol (PROVENTIL) 2.5mg/3ml Nebu Soln solution for nebulization 3 mL by Nebulization route every four hours as needed for Shortness of Breath. 25 Bullet 0     No current facility-administered medications for this visit.         Allergies   Allergen Reactions   • Sulfa Drugs Hives   • Shellfish Allergy Itching     \"Itchy eyes\"         Family History   Problem Relation Age of Onset   • Cancer Mother         pancreatic    • Heart Disease Father    • Heart Attack Father    • Diabetes Father         pre-diabetes   • Stroke Neg Hx          Social History     Socioeconomic History   • Marital status:      Spouse name: Not on file   • Number of children: Not on file   • Years of education: Not on file   • Highest education level: Not on file   Occupational History   • Not on file   Tobacco Use   • Smoking status: Former Smoker     Packs/day: 1.00     Years: 48.00     Pack years: 48.00     Types: Cigarettes     Start date:      Quit date: 2017     Years since quittin.0   • Smokeless tobacco: Never Used   Vaping Use   • Vaping Use: Never used   Substance and Sexual Activity   • Alcohol use: No     Alcohol/week: 0.0 oz   • Drug use: Never   • Sexual activity: Yes     Partners: Male     Comment:    Other Topics Concern   • Not on file   Social History Narrative   • Not on file     Social Determinants of Health     Financial Resource Strain:    • Difficulty of Paying Living Expenses:    Food Insecurity:    • Worried About Running Out of Food in the Last Year:    • Ran Out of Food in the Last Year:    Transportation Needs:    • Lack of Transportation (Medical):    • Lack of Transportation (Non-Medical):    Physical Activity:    • Days of Exercise per Week:    • Minutes of Exercise per Session:    Stress:    • Feeling of Stress :    Social Connections:    • Frequency of Communication with Friends and Family:    • Frequency of Social Gatherings " "with Friends and Family:    • Attends Y[employer] Services:    • Active Member of Clubs or Organizations:    • Attends Club or Organization Meetings:    • Marital Status:    Intimate Partner Violence:    • Fear of Current or Ex-Partner:    • Emotionally Abused:    • Physically Abused:    • Sexually Abused:          Physical Exam:  Ambulatory Vitals  /70 (BP Location: Left arm, Patient Position: Sitting, BP Cuff Size: Adult)   Pulse 64   Ht 1.651 m (5' 5\")   Wt 96.2 kg (212 lb)   SpO2 94%    BP Readings from Last 4 Encounters:   06/17/21 120/70   05/14/21 124/76   03/01/21 116/66   02/15/21 146/65     Weight/BMI:   Vitals:    06/17/21 1342   BP: 120/70   Weight: 96.2 kg (212 lb)   Height: 1.651 m (5' 5\")    Body mass index is 35.28 kg/m².  Wt Readings from Last 4 Encounters:   06/17/21 96.2 kg (212 lb)   05/14/21 96.2 kg (212 lb)   04/12/21 95.7 kg (211 lb)   03/01/21 98.4 kg (217 lb)       Physical Exam  Constitutional:       General: She is not in acute distress.  HENT:      Head: Normocephalic and atraumatic.   Eyes:      Conjunctiva/sclera: Conjunctivae normal.      Pupils: Pupils are equal, round, and reactive to light.   Neck:      Vascular: No JVD.   Cardiovascular:      Rate and Rhythm: Normal rate and regular rhythm.      Heart sounds: Normal heart sounds. No murmur heard.   No friction rub. No gallop.    Pulmonary:      Effort: Pulmonary effort is normal. No respiratory distress.      Breath sounds: Normal breath sounds. No wheezing or rales.   Chest:      Chest wall: No tenderness.   Abdominal:      General: Bowel sounds are normal. There is no distension.      Palpations: Abdomen is soft.   Musculoskeletal:      Cervical back: Normal range of motion and neck supple.   Skin:     General: Skin is warm and dry.   Neurological:      Mental Status: She is alert and oriented to person, place, and time.   Psychiatric:         Mood and Affect: Affect normal.         Judgment: Judgment normal.         Lab " Data Review:  Lab Results   Component Value Date/Time    CHOLSTRLTOT 162 10/26/2020 09:17 AM    LDL 62 10/26/2020 09:17 AM    HDL 76 10/26/2020 09:17 AM    TRIGLYCERIDE 118 10/26/2020 09:17 AM       Lab Results   Component Value Date/Time    SODIUM 136 02/14/2021 05:05 AM    POTASSIUM 3.6 02/14/2021 05:05 AM    CHLORIDE 104 02/14/2021 05:05 AM    CO2 22 02/14/2021 05:05 AM    GLUCOSE 161 (H) 02/14/2021 05:05 AM    BUN 16 02/14/2021 05:05 AM    CREATININE 0.81 02/14/2021 05:05 AM     CrCl cannot be calculated (Patient's most recent lab result is older than the maximum 7 days allowed.).  Lab Results   Component Value Date/Time    ALKPHOSPHAT 117 (H) 02/12/2021 01:00 PM    ASTSGOT 17 02/12/2021 01:00 PM    ALTSGPT 19 02/12/2021 01:00 PM    TBILIRUBIN 0.3 02/12/2021 01:00 PM      Lab Results   Component Value Date/Time    WBC 6.4 02/15/2021 05:58 AM     Lab Results   Component Value Date/Time    HBA1C 7.2 (H) 10/26/2020 09:17 AM     No components found for: TROP      Cardiac Imaging and Procedures Review:      LE art vascular ultrasound 2/21/20  Left HERNANDEZ:  0.79  Right HERNANDEZ: 1.02  1.  Post occlusive waveform is noted in the distal left peroneal artery. Short segment occlusion or high-grade stenosis proximal to this location is likely present, although not directly visualized.  2.  No other evidence of occlusion or significant stenosis bilaterally.  3.  Moderate calcified atherosclerotic plaque is identified in each lower extremity.    TTE 2/2020  CONCLUSIONS  Left ventricular ejection fraction is visually estimated to be 65%.  Mild concentric left ventricular hypertrophy.  Decreased left ventricular compliance with indeterminate diastolic   function.  Normal right ventricular size and systolic function.    Nuclear stress spect sachi 4/3/20   NUCLEAR IMAGING INTERPRETATION   Normal left ventricular size, ejection fraction, and wall motion.   Small fixed defect in the inferolateral wall could be artifact. Infarct is in the  differential but consider less likely.    No reversible defect.    CAC CT 2/20/20  Coronary calcification:  LMA - 0.0  LCX - 0.0  LAD - 315  RCA - 260.8  Total Calcium Score: 575.8    Medical Decision Making:  Problem List Items Addressed This Visit     Type 2 diabetes mellitus without complication, without long-term current use of insulin (HCC)    Relevant Medications    Empagliflozin (JARDIANCE) 10 MG Tab    BMI 36.0-36.9,adult    Relevant Medications    Empagliflozin (JARDIANCE) 10 MG Tab    Hyperlipidemia    PAD (peripheral artery disease) (HCC)    ASCVD (arteriosclerotic cardiovascular disease)    Coronary artery calcification seen on CT scan    Essential hypertension    Healthcare maintenance    Relevant Orders    CBC WITH DIFFERENTIAL    Comp Metabolic Panel    LIPID PANEL    TSH    HEMOGLOBIN A1C        DM2 - continue statin and aspirin. Add jardiance. Discussed side effects and mechanism of action as well as printed information. Repeat labs.    HTN - BP at goal 120/80. Continue antihypertensives BB and ARB.    CAD/ASCVD - continue aspirin and statin. LDL at goal <70 and trig <150. Repeat labs.     PAD - continue aspirin and statin. Continue symptom limited exercise.     Healthcare maintenance. She has visit coming up with PMD. Added annual labs for review.    It was my pleasure to meet with Ms. Her.

## 2021-06-18 ENCOUNTER — HOSPITAL ENCOUNTER (OUTPATIENT)
Dept: LAB | Facility: MEDICAL CENTER | Age: 66
End: 2021-06-18
Attending: INTERNAL MEDICINE
Payer: MEDICARE

## 2021-06-18 DIAGNOSIS — Z00.00 HEALTHCARE MAINTENANCE: ICD-10-CM

## 2021-06-21 SDOH — ECONOMIC STABILITY: TRANSPORTATION INSECURITY
IN THE PAST 12 MONTHS, HAS THE LACK OF TRANSPORTATION KEPT YOU FROM MEDICAL APPOINTMENTS OR FROM GETTING MEDICATIONS?: NO

## 2021-06-21 SDOH — ECONOMIC STABILITY: HOUSING INSECURITY: IN THE LAST 12 MONTHS, HOW MANY PLACES HAVE YOU LIVED?: 1

## 2021-06-21 SDOH — ECONOMIC STABILITY: TRANSPORTATION INSECURITY
IN THE PAST 12 MONTHS, HAS LACK OF TRANSPORTATION KEPT YOU FROM MEETINGS, WORK, OR FROM GETTING THINGS NEEDED FOR DAILY LIVING?: NO

## 2021-06-21 SDOH — HEALTH STABILITY: PHYSICAL HEALTH: ON AVERAGE, HOW MANY DAYS PER WEEK DO YOU ENGAGE IN MODERATE TO STRENUOUS EXERCISE (LIKE A BRISK WALK)?: 2 DAYS

## 2021-06-21 SDOH — ECONOMIC STABILITY: HOUSING INSECURITY
IN THE LAST 12 MONTHS, WAS THERE A TIME WHEN YOU DID NOT HAVE A STEADY PLACE TO SLEEP OR SLEPT IN A SHELTER (INCLUDING NOW)?: NO

## 2021-06-21 SDOH — HEALTH STABILITY: PHYSICAL HEALTH: ON AVERAGE, HOW MANY MINUTES DO YOU ENGAGE IN EXERCISE AT THIS LEVEL?: 30 MIN

## 2021-06-21 SDOH — ECONOMIC STABILITY: TRANSPORTATION INSECURITY
IN THE PAST 12 MONTHS, HAS LACK OF RELIABLE TRANSPORTATION KEPT YOU FROM MEDICAL APPOINTMENTS, MEETINGS, WORK OR FROM GETTING THINGS NEEDED FOR DAILY LIVING?: NO

## 2021-06-21 SDOH — ECONOMIC STABILITY: INCOME INSECURITY: IN THE LAST 12 MONTHS, WAS THERE A TIME WHEN YOU WERE NOT ABLE TO PAY THE MORTGAGE OR RENT ON TIME?: NO

## 2021-06-21 SDOH — ECONOMIC STABILITY: INCOME INSECURITY: HOW HARD IS IT FOR YOU TO PAY FOR THE VERY BASICS LIKE FOOD, HOUSING, MEDICAL CARE, AND HEATING?: NOT VERY HARD

## 2021-06-21 SDOH — ECONOMIC STABILITY: FOOD INSECURITY: WITHIN THE PAST 12 MONTHS, THE FOOD YOU BOUGHT JUST DIDN'T LAST AND YOU DIDN'T HAVE MONEY TO GET MORE.: NEVER TRUE

## 2021-06-21 SDOH — ECONOMIC STABILITY: FOOD INSECURITY: WITHIN THE PAST 12 MONTHS, YOU WORRIED THAT YOUR FOOD WOULD RUN OUT BEFORE YOU GOT MONEY TO BUY MORE.: NEVER TRUE

## 2021-06-21 SDOH — HEALTH STABILITY: MENTAL HEALTH
STRESS IS WHEN SOMEONE FEELS TENSE, NERVOUS, ANXIOUS, OR CAN'T SLEEP AT NIGHT BECAUSE THEIR MIND IS TROUBLED. HOW STRESSED ARE YOU?: ONLY A LITTLE

## 2021-06-21 ASSESSMENT — LIFESTYLE VARIABLES
HOW OFTEN DO YOU HAVE A DRINK CONTAINING ALCOHOL: NEVER
HOW OFTEN DO YOU HAVE SIX OR MORE DRINKS ON ONE OCCASION: NEVER

## 2021-06-21 ASSESSMENT — SOCIAL DETERMINANTS OF HEALTH (SDOH)
WITHIN THE PAST 12 MONTHS, YOU WORRIED THAT YOUR FOOD WOULD RUN OUT BEFORE YOU GOT THE MONEY TO BUY MORE: NEVER TRUE
IN A TYPICAL WEEK, HOW MANY TIMES DO YOU TALK ON THE PHONE WITH FAMILY, FRIENDS, OR NEIGHBORS?: MORE THAN THREE TIMES A WEEK
HOW HARD IS IT FOR YOU TO PAY FOR THE VERY BASICS LIKE FOOD, HOUSING, MEDICAL CARE, AND HEATING?: NOT VERY HARD
HOW OFTEN DO YOU ATTEND CHURCH OR RELIGIOUS SERVICES?: 1 TO 4 TIMES PER YEAR
DO YOU BELONG TO ANY CLUBS OR ORGANIZATIONS SUCH AS CHURCH GROUPS UNIONS, FRATERNAL OR ATHLETIC GROUPS, OR SCHOOL GROUPS?: NO
DO YOU BELONG TO ANY CLUBS OR ORGANIZATIONS SUCH AS CHURCH GROUPS UNIONS, FRATERNAL OR ATHLETIC GROUPS, OR SCHOOL GROUPS?: NO
HOW OFTEN DO YOU ATTENT MEETINGS OF THE CLUB OR ORGANIZATION YOU BELONG TO?: NEVER
HOW OFTEN DO YOU HAVE SIX OR MORE DRINKS ON ONE OCCASION: NEVER
HOW OFTEN DO YOU HAVE A DRINK CONTAINING ALCOHOL: NEVER
HOW OFTEN DO YOU ATTEND CHURCH OR RELIGIOUS SERVICES?: 1 TO 4 TIMES PER YEAR
IN A TYPICAL WEEK, HOW MANY TIMES DO YOU TALK ON THE PHONE WITH FAMILY, FRIENDS, OR NEIGHBORS?: MORE THAN THREE TIMES A WEEK
HOW OFTEN DO YOU ATTENT MEETINGS OF THE CLUB OR ORGANIZATION YOU BELONG TO?: NEVER

## 2021-06-22 ENCOUNTER — HOSPITAL ENCOUNTER (OUTPATIENT)
Dept: LAB | Facility: MEDICAL CENTER | Age: 66
End: 2021-06-22
Attending: PHYSICIAN ASSISTANT
Payer: MEDICARE

## 2021-06-22 ENCOUNTER — OFFICE VISIT (OUTPATIENT)
Dept: MEDICAL GROUP | Facility: PHYSICIAN GROUP | Age: 66
End: 2021-06-22
Payer: MEDICARE

## 2021-06-22 VITALS
SYSTOLIC BLOOD PRESSURE: 125 MMHG | RESPIRATION RATE: 14 BRPM | DIASTOLIC BLOOD PRESSURE: 80 MMHG | HEART RATE: 71 BPM | OXYGEN SATURATION: 100 % | WEIGHT: 213 LBS | HEIGHT: 65 IN | TEMPERATURE: 97 F | BODY MASS INDEX: 35.49 KG/M2

## 2021-06-22 DIAGNOSIS — Z13.0 SCREENING FOR DEFICIENCY ANEMIA: ICD-10-CM

## 2021-06-22 DIAGNOSIS — E78.5 HYPERLIPIDEMIA LDL GOAL <70: ICD-10-CM

## 2021-06-22 DIAGNOSIS — I73.9 PAD (PERIPHERAL ARTERY DISEASE) (HCC): ICD-10-CM

## 2021-06-22 DIAGNOSIS — Z13.29 SCREENING FOR ENDOCRINE DISORDER: ICD-10-CM

## 2021-06-22 DIAGNOSIS — E11.9 TYPE 2 DIABETES MELLITUS WITHOUT COMPLICATION, WITHOUT LONG-TERM CURRENT USE OF INSULIN (HCC): ICD-10-CM

## 2021-06-22 DIAGNOSIS — F33.41 RECURRENT MAJOR DEPRESSIVE DISORDER, IN PARTIAL REMISSION (HCC): ICD-10-CM

## 2021-06-22 DIAGNOSIS — Z79.899 HIGH RISK MEDICATION USE: ICD-10-CM

## 2021-06-22 LAB
ALBUMIN SERPL BCP-MCNC: 4.4 G/DL (ref 3.2–4.9)
ALBUMIN/GLOB SERPL: 1.8 G/DL
ALP SERPL-CCNC: 71 U/L (ref 30–99)
ALT SERPL-CCNC: 23 U/L (ref 2–50)
ANION GAP SERPL CALC-SCNC: 12 MMOL/L (ref 7–16)
AST SERPL-CCNC: 23 U/L (ref 12–45)
BASOPHILS # BLD AUTO: 0.6 % (ref 0–1.8)
BASOPHILS # BLD: 0.04 K/UL (ref 0–0.12)
BILIRUB CONJ SERPL-MCNC: <0.2 MG/DL (ref 0.1–0.5)
BILIRUB INDIRECT SERPL-MCNC: NORMAL MG/DL (ref 0–1)
BILIRUB SERPL-MCNC: 0.3 MG/DL (ref 0.1–1.5)
BUN SERPL-MCNC: 15 MG/DL (ref 8–22)
CALCIUM SERPL-MCNC: 9.1 MG/DL (ref 8.5–10.5)
CHLORIDE SERPL-SCNC: 108 MMOL/L (ref 96–112)
CHOLEST SERPL-MCNC: 174 MG/DL (ref 100–199)
CO2 SERPL-SCNC: 23 MMOL/L (ref 20–33)
CREAT SERPL-MCNC: 0.75 MG/DL (ref 0.5–1.4)
EOSINOPHIL # BLD AUTO: 0.2 K/UL (ref 0–0.51)
EOSINOPHIL NFR BLD: 3 % (ref 0–6.9)
ERYTHROCYTE [DISTWIDTH] IN BLOOD BY AUTOMATED COUNT: 46.9 FL (ref 35.9–50)
FASTING STATUS PATIENT QL REPORTED: NORMAL
GLOBULIN SER CALC-MCNC: 2.4 G/DL (ref 1.9–3.5)
GLUCOSE SERPL-MCNC: 152 MG/DL (ref 65–99)
HBA1C MFR BLD: 7 % (ref 0–5.6)
HCT VFR BLD AUTO: 42 % (ref 37–47)
HDLC SERPL-MCNC: 68 MG/DL
HGB BLD-MCNC: 13.2 G/DL (ref 12–16)
IMM GRANULOCYTES # BLD AUTO: 0.02 K/UL (ref 0–0.11)
IMM GRANULOCYTES NFR BLD AUTO: 0.3 % (ref 0–0.9)
INT CON NEG: ABNORMAL
INT CON POS: ABNORMAL
LDLC SERPL CALC-MCNC: 77 MG/DL
LYMPHOCYTES # BLD AUTO: 1.76 K/UL (ref 1–4.8)
LYMPHOCYTES NFR BLD: 26.4 % (ref 22–41)
MCH RBC QN AUTO: 29.6 PG (ref 27–33)
MCHC RBC AUTO-ENTMCNC: 31.4 G/DL (ref 33.6–35)
MCV RBC AUTO: 94.2 FL (ref 81.4–97.8)
MONOCYTES # BLD AUTO: 0.47 K/UL (ref 0–0.85)
MONOCYTES NFR BLD AUTO: 7 % (ref 0–13.4)
NEUTROPHILS # BLD AUTO: 4.18 K/UL (ref 2–7.15)
NEUTROPHILS NFR BLD: 62.7 % (ref 44–72)
NRBC # BLD AUTO: 0 K/UL
NRBC BLD-RTO: 0 /100 WBC
PLATELET # BLD AUTO: 214 K/UL (ref 164–446)
PMV BLD AUTO: 11.1 FL (ref 9–12.9)
POTASSIUM SERPL-SCNC: 4.4 MMOL/L (ref 3.6–5.5)
PROT SERPL-MCNC: 6.8 G/DL (ref 6–8.2)
RBC # BLD AUTO: 4.46 M/UL (ref 4.2–5.4)
SODIUM SERPL-SCNC: 143 MMOL/L (ref 135–145)
TRIGL SERPL-MCNC: 144 MG/DL (ref 0–149)
TSH SERPL DL<=0.005 MIU/L-ACNC: 1.81 UIU/ML (ref 0.38–5.33)
WBC # BLD AUTO: 6.7 K/UL (ref 4.8–10.8)

## 2021-06-22 PROCEDURE — 84443 ASSAY THYROID STIM HORMONE: CPT

## 2021-06-22 PROCEDURE — 99214 OFFICE O/P EST MOD 30 MIN: CPT | Performed by: PHYSICIAN ASSISTANT

## 2021-06-22 PROCEDURE — 82248 BILIRUBIN DIRECT: CPT

## 2021-06-22 PROCEDURE — 80061 LIPID PANEL: CPT

## 2021-06-22 PROCEDURE — 36415 COLL VENOUS BLD VENIPUNCTURE: CPT

## 2021-06-22 PROCEDURE — 83036 HEMOGLOBIN GLYCOSYLATED A1C: CPT | Performed by: PHYSICIAN ASSISTANT

## 2021-06-22 PROCEDURE — 85025 COMPLETE CBC W/AUTO DIFF WBC: CPT

## 2021-06-22 PROCEDURE — 80053 COMPREHEN METABOLIC PANEL: CPT

## 2021-06-22 RX ORDER — PAROXETINE 30 MG/1
30 TABLET, FILM COATED ORAL DAILY
COMMUNITY
End: 2021-09-09

## 2021-06-22 ASSESSMENT — FIBROSIS 4 INDEX: FIB4 SCORE: 1.79

## 2021-06-22 NOTE — ASSESSMENT & PLAN NOTE
Patient saw cardiologist on 6-.  For her type 2 diabetes patient was continued on her statin and aspirin, Jardiance was added.  Blood pressure was stable.  Patient is continued on her beta-blocker and ARB.  For her peripheral artery disease in monitoring continue aspirin and statin therapy.    Hga1c at 7.0 in office today improved from 7.2.

## 2021-06-22 NOTE — PROGRESS NOTES
CC:   Chief Complaint   Patient presents with   • Medication Follow-up   • Diabetes          HISTORY OF PRESENT ILLNESS: Patient is a 66 y.o. female established patient who presents today to follow up on the following conditions:       Health Maintenance: Completed    Type 2 diabetes mellitus without complication, without long-term current use of insulin ([facility])  Patient saw cardiologist on 6-.  For her type 2 diabetes patient was continued on her statin and aspirin, Jardiance was added.  Blood pressure was stable.  Patient is continued on her beta-blocker and ARB.  For her peripheral artery disease in monitoring continue aspirin and statin therapy.    Hga1c at 7.0 in office today improved from 7.2.     PAD (peripheral artery disease) ([facility])  Patient was referred to vascular, continue statin therapy.  No longer smoker.  Continues baby aspirin as well.    Recurrent major depressive disorder, in partial remission ([facility])  Chronic condition.  Stable.  Patient continues paroxetine 30 mg daily.    Hyperlipidemia  This is a chronic condition.   Latest Labs:   Lab Results   Component Value Date/Time    CHOLSTRLTOT 162 10/26/2020 09:17 AM    LDL 62 10/26/2020 09:17 AM    HDL 76 10/26/2020 09:17 AM    TRIGLYCERIDE 118 10/26/2020 09:17 AM      Medications: crestor 20 mg    Risk calculator: The 10-year ASCVD risk score ([place] CORTEZ Jr., et al., 2013) is: 15%         Patient Active Problem List    Diagnosis Date Noted   • Healthcare maintenance 06/17/2021   • Acute appendicitis with appendiceal abscess 02/12/2021   • Depression 02/12/2021   • Essential hypertension 10/06/2020   • ASCVD (arteriosclerotic cardiovascular disease) 04/07/2020   • Coronary artery calcification seen on CT scan 04/07/2020   • PAD (peripheral artery disease) ([facility]) 02/07/2020   • Other specified personal risk factors, not elsewhere classified 02/07/2020   • Bronchitis 01/22/2020   • Recurrent major depressive disorder, in partial remission ([facility]) 02/10/2019    • Left elbow pain 2018   • Rash 2018   • Chronic insomnia 2016   • Type 2 diabetes mellitus without complication, without long-term current use of insulin (HCC) 10/02/2014   • BMI 36.0-36.9,adult 10/02/2014   • Hyperlipidemia 10/02/2014      Allergies:Sulfa drugs and Shellfish allergy    Current Outpatient Medications   Medication Sig Dispense Refill   • PARoxetine (PAXIL) 30 MG Tab Take 30 mg by mouth every day.     • metoprolol tartrate (LOPRESSOR) 25 MG Tab Take 25 mg by mouth every day.     • metFORMIN (GLUCOPHAGE) 500 MG Tab Take 500 mg by mouth every day.     • rosuvastatin (CRESTOR) 20 MG Tab TAKE ONE TABLET BY MOUTH EVERY EVENING 90 tablet 0   • losartan (COZAAR) 100 MG Tab TAKE ONE TABLET BY MOUTH DAILY 90 tablet 0   • Albuterol Sulfate 108 (90 Base) MCG/ACT AEROSOL POWDER, BREATH ACTIVATED Inhale.     • ibuprofen (MOTRIN) 200 MG Tab Take 600 mg by mouth 1 time a day as needed (Moderate pain). 3 tablets = 600 mg.     • vitamin D (CHOLECALCIFEROL) 1000 Unit (25 mcg) Tab Take 3,000 Units by mouth every morning. 3 tablets = 3000 units.     • aspirin EC (ECOTRIN) 81 MG Tablet Delayed Response Take 81 mg by mouth every morning.     • albuterol (PROVENTIL) 2.5mg/3ml Nebu Soln solution for nebulization 3 mL by Nebulization route every four hours as needed for Shortness of Breath. 25 Bullet 0     No current facility-administered medications for this visit.       Social History     Tobacco Use   • Smoking status: Former Smoker     Packs/day: 1.00     Years: 48.00     Pack years: 48.00     Types: Cigarettes     Start date:      Quit date: 2017     Years since quittin.0   • Smokeless tobacco: Never Used   Vaping Use   • Vaping Use: Never used   Substance Use Topics   • Alcohol use: No     Alcohol/week: 0.0 oz   • Drug use: Never     Social History     Social History Narrative   • Not on file       Family History   Problem Relation Age of Onset   • Cancer Mother         pancreatic    •  "Heart Disease Father    • Heart Attack Father    • Diabetes Father         pre-diabetes   • Stroke Neg Hx        Review of Systems:    Constitutional: No Fevers, Chills  Eyes: No vision changes  ENT: No hearing changes  Resp: No Shortness of breath  CV: No Chest pain  GI: No Nausea/Vomiting  MSK: No weakness  Skin: No rashes  Neuro: No Headaches  Psych: No Suicidal ideations    All remaining systems reviewed and found to be negative, except as stated above.    Exam:    /80   Pulse 71   Temp 36.1 °C (97 °F) (Temporal)   Resp 14   Ht 1.651 m (5' 5\")   Wt 96.6 kg (213 lb)   SpO2 100%  Body mass index is 35.45 kg/m².    General:  Well nourished, well developed female in NAD  HENT: Atraumatic, normocephalic  EYES: Extraocular movements intact  NECK: Supple, FROM  CHEST: No deformities, Equal chest expansion  RESP: Unlabored, no stridor or audible wheeze  HEART: Regular Rate and rhythm.   Extremities: No Clubbing, Cyanosis, or Edema  Skin: Warm/dry, without rashes  Neuro: A/O x 4, due to COVID-19- did not have patient remove face mask to test cranial nerves.  Motor/sensory grossly intact  Psych: Normal behavior, normal affect      Lab review:  Labs are reviewed and discussed with a patient  Lab Results   Component Value Date/Time    CHOLSTRLTOT 162 10/26/2020 09:17 AM    LDL 62 10/26/2020 09:17 AM    HDL 76 10/26/2020 09:17 AM    TRIGLYCERIDE 118 10/26/2020 09:17 AM       Lab Results   Component Value Date/Time    SODIUM 136 02/14/2021 05:05 AM    POTASSIUM 3.6 02/14/2021 05:05 AM    CHLORIDE 104 02/14/2021 05:05 AM    CO2 22 02/14/2021 05:05 AM    GLUCOSE 161 (H) 02/14/2021 05:05 AM    BUN 16 02/14/2021 05:05 AM    CREATININE 0.81 02/14/2021 05:05 AM     Lab Results   Component Value Date/Time    ALKPHOSPHAT 117 (H) 02/12/2021 01:00 PM    ASTSGOT 17 02/12/2021 01:00 PM    ALTSGPT 19 02/12/2021 01:00 PM    TBILIRUBIN 0.3 02/12/2021 01:00 PM      Lab Results   Component Value Date/Time    HBA1C 7.2 (H) " 10/26/2020 09:17 AM    HBA1C 6.8 (H) 04/03/2020 04:35 AM    HBA1C 6.9 (H) 02/20/2019 09:17 AM     No results found for: TSH  No results found for: FREET4    Lab Results   Component Value Date/Time    WBC 6.4 02/15/2021 05:58 AM    RBC 4.85 02/15/2021 05:58 AM    HEMOGLOBIN 12.0 02/15/2021 05:58 AM    HEMATOCRIT 37.0 02/15/2021 05:58 AM    MCV 93.8 02/15/2021 05:58 AM    MCH 29.7 02/15/2021 05:58 AM    MCHC 31.6 (L) 02/15/2021 05:58 AM    MPV 11.2 02/15/2021 05:58 AM    NEUTSPOLYS 64.80 02/15/2021 05:58 AM    LYMPHOCYTES 23.00 02/15/2021 05:58 AM    MONOCYTES 7.20 02/15/2021 05:58 AM    EOSINOPHILS 4.40 02/15/2021 05:58 AM    BASOPHILS 0.30 02/15/2021 05:58 AM          Assessment/Plan:  1. Type 2 diabetes mellitus without complication, without long-term current use of insulin (HCC)  - POCT Hemoglobin A1C  Hga1c in office doing better at 7.0. She's working on losing weight. Having difficulty with this. She will go back to on Mediterranean Diet. She walks frequently.   Will continue Metformin 500 mg daily for now.  Jardiance was unfortunately not covered by insurance and too expensive for the patient to start.  Discussed with patient I feel as though we can continue lifestyle modifications, to look for stability or improvement of her A1c in the next 3 months.  2. PAD (peripheral artery disease) (HCC)  Continue follow-up with vascular.  3. Recurrent major depressive disorder, in partial remission (HCC)  Chronic addition.  Stable.  Patient continues paroxetine 30 minutes daily.  4. Hyperlipidemia  - Lipid Profile; Future  Chronic condition, due for updated labs.  Patient continues Crestor 20 mg daily.  5. Screening for deficiency anemia  - CBC WITH DIFFERENTIAL; Future    6. High risk medication use  - CBC WITH DIFFERENTIAL; Future  - Comp Metabolic Panel; Future  Patient is on statin therapy and an ARB for her blood pressure, in addition she has diabetes.  She is also on a baby aspirin daily.  Going to update her CBC  and CMP for screening of anemia and renal function and liver enzymes.  7. Screening for endocrine disorder  - TSH; Future    Other orders  - PARoxetine (PAXIL) 30 MG Tab; Take 30 mg by mouth every day.  - metoprolol tartrate (LOPRESSOR) 25 MG Tab; Take 25 mg by mouth every day.  - metFORMIN (GLUCOPHAGE) 500 MG Tab; Take 500 mg by mouth every day.       Follow-up: Return in about 4 weeks (around 7/20/2021) for Follow up on labs.    Please note that this dictation was created using voice recognition software. I have made every reasonable attempt to correct obvious errors, but I expect that there are errors of grammar and possibly content that I did not discover before finalizing the note.

## 2021-06-22 NOTE — Clinical Note
Good morning Dr. Bajwa, I wanted to update you I saw Heidy this morning.  Unfortunately Medicare would not cover her Jardiance prescription.  I did update her hemoglobin A1c in the office and it was improved from 7.2 now at 7.0.  Discussed with patient that this medication would reduce her cardiovascular risk, but she is unable to afford at this time. Just wanted to update you!    Karol Medina PA-C

## 2021-06-22 NOTE — ASSESSMENT & PLAN NOTE
This is a chronic condition.   Latest Labs:   Lab Results   Component Value Date/Time    CHOLSTRLTOT 162 10/26/2020 09:17 AM    LDL 62 10/26/2020 09:17 AM    HDL 76 10/26/2020 09:17 AM    TRIGLYCERIDE 118 10/26/2020 09:17 AM      Medications: crestor 20 mg    Risk calculator: The 10-year ASCVD risk score ([place] CORTEZ Jr., et al., 2013) is: 15%

## 2021-06-22 NOTE — ASSESSMENT & PLAN NOTE
Patient was referred to vascular, continue statin therapy.  No longer smoker.  Continues baby aspirin as well.

## 2021-06-23 ENCOUNTER — TELEPHONE (OUTPATIENT)
Dept: MEDICAL GROUP | Facility: PHYSICIAN GROUP | Age: 66
End: 2021-06-23

## 2021-06-23 NOTE — TELEPHONE ENCOUNTER
----- Message from Karol Meidna P.A.-C. sent at 6/23/2021  8:32 AM PDT -----  Please call patient about their results.     Results showed: Labs look stable, will make no medication changes at this time.    Thank you,    Karol Medina PA-C

## 2021-07-16 DIAGNOSIS — I10 ESSENTIAL HYPERTENSION: ICD-10-CM

## 2021-07-16 DIAGNOSIS — I25.10 CORONARY ARTERY CALCIFICATION SEEN ON CT SCAN: ICD-10-CM

## 2021-07-16 RX ORDER — LOSARTAN POTASSIUM 100 MG/1
TABLET ORAL
Qty: 90 TABLET | Refills: 3 | Status: SHIPPED
Start: 2021-07-16 | End: 2021-12-06

## 2021-07-19 NOTE — TELEPHONE ENCOUNTER
-Karol- Trazodone was d/c'd 6/22. Please refill as you see fit.  -Has upcoming appt w/ PCP. Will send 3 months of fills to pharmacy of metformin.

## 2021-07-26 RX ORDER — TRAZODONE HYDROCHLORIDE 50 MG/1
TABLET ORAL
Qty: 90 TABLET | Refills: 0 | Status: SHIPPED | OUTPATIENT
Start: 2021-07-26 | End: 2021-10-15

## 2021-07-27 ENCOUNTER — OFFICE VISIT (OUTPATIENT)
Dept: MEDICAL GROUP | Facility: PHYSICIAN GROUP | Age: 66
End: 2021-07-27
Payer: MEDICARE

## 2021-07-27 VITALS
TEMPERATURE: 97 F | WEIGHT: 211 LBS | BODY MASS INDEX: 35.16 KG/M2 | HEART RATE: 60 BPM | OXYGEN SATURATION: 96 % | RESPIRATION RATE: 12 BRPM | HEIGHT: 65 IN | DIASTOLIC BLOOD PRESSURE: 72 MMHG | SYSTOLIC BLOOD PRESSURE: 114 MMHG

## 2021-07-27 DIAGNOSIS — F51.04 CHRONIC INSOMNIA: ICD-10-CM

## 2021-07-27 DIAGNOSIS — E78.5 DYSLIPIDEMIA ASSOCIATED WITH TYPE 2 DIABETES MELLITUS (HCC): ICD-10-CM

## 2021-07-27 DIAGNOSIS — R53.83 OTHER FATIGUE: ICD-10-CM

## 2021-07-27 DIAGNOSIS — E11.69 DYSLIPIDEMIA ASSOCIATED WITH TYPE 2 DIABETES MELLITUS (HCC): ICD-10-CM

## 2021-07-27 DIAGNOSIS — E78.5 HYPERLIPIDEMIA LDL GOAL <70: ICD-10-CM

## 2021-07-27 PROCEDURE — 99214 OFFICE O/P EST MOD 30 MIN: CPT | Performed by: PHYSICIAN ASSISTANT

## 2021-07-27 ASSESSMENT — FIBROSIS 4 INDEX: FIB4 SCORE: 1.5

## 2021-07-27 NOTE — ASSESSMENT & PLAN NOTE
This is a chronic condition.   Latest Labs:   Lab Results   Component Value Date/Time    CHOLSTRLTOT 174 06/22/2021 11:10 AM    LDL 77 06/22/2021 11:10 AM    HDL 68 06/22/2021 11:10 AM    TRIGLYCERIDE 144 06/22/2021 11:10 AM      Medications: crestor 20 mg.   Medication side effects: none    Risk calculator: The 10-year ASCVD risk score ([place] CORTEZ Jr., et al., 2013) is: 12.1%

## 2021-07-27 NOTE — PROGRESS NOTES
CC:   Chief Complaint   Patient presents with   • Lab Results   • Hyperlipidemia          HISTORY OF PRESENT ILLNESS: Patient is a 66 y.o. female established patient who presents today to follow up on the following conditions:       Health Maintenance: Completed    Dyslipidemia associated with type 2 diabetes mellitus (HCC)  Patient's last visit we discussed her diabetes. She did see her cardiologist who wanted her to trial Jardiance. Unfortunately insurance would not pay for the Jardiance so she did not start this medication. I did contact the  who encouraged her to enroll in their discount program if she qualifies. Discussed this again with the patient today.    Hyperlipidemia  This is a chronic condition.   Latest Labs:   Lab Results   Component Value Date/Time    CHOLSTRLTOT 174 06/22/2021 11:10 AM    LDL 77 06/22/2021 11:10 AM    HDL 68 06/22/2021 11:10 AM    TRIGLYCERIDE 144 06/22/2021 11:10 AM      Medications: crestor 20 mg.   Medication side effects: none    Risk calculator: The 10-year ASCVD risk score (Metairiedaniel CORTEZ Jr., et al., 2013) is: 12.1%       Chronic insomnia  She is having more fatigue recently. Started about 3-4 months ago. Doesn't feel she is sleeping well. Still using trazodone. Pulmonology did talk to her about getting sleep study, encouraged her to get this as well.       Patient Active Problem List    Diagnosis Date Noted   • Healthcare maintenance 06/17/2021   • Acute appendicitis with appendiceal abscess 02/12/2021   • Depression 02/12/2021   • Essential hypertension 10/06/2020   • ASCVD (arteriosclerotic cardiovascular disease) 04/07/2020   • Coronary artery calcification seen on CT scan 04/07/2020   • PAD (peripheral artery disease) ([facility]) 02/07/2020   • Other specified personal risk factors, not elsewhere classified 02/07/2020   • Bronchitis 01/22/2020   • Recurrent major depressive disorder, in partial remission ([facility]) 02/10/2019   • Left elbow pain 09/04/2018   •  Rash 2018   • Chronic insomnia 2016   • Dyslipidemia associated with type 2 diabetes mellitus (HCC) 10/02/2014   • BMI 36.0-36.9,adult 10/02/2014   • Hyperlipidemia 10/02/2014      Allergies:Sulfa drugs and Shellfish allergy    Current Outpatient Medications   Medication Sig Dispense Refill   • traZODone (DESYREL) 50 MG Tab TAKE ONE TABLET BY MOUTH EVERY NIGHT AT BEDTIME AS NEEDED FOR SLEEP 90 tablet 0   • metFORMIN (GLUCOPHAGE) 500 MG Tab TAKE ONE TABLET BY MOUTH TWICE A DAY WITH A MEAL 180 tablet 0   • losartan (COZAAR) 100 MG Tab TAKE ONE TABLET BY MOUTH DAILY 90 tablet 3   • PARoxetine (PAXIL) 30 MG Tab Take 30 mg by mouth every day.     • metoprolol tartrate (LOPRESSOR) 25 MG Tab Take 25 mg by mouth every day.     • rosuvastatin (CRESTOR) 20 MG Tab TAKE ONE TABLET BY MOUTH EVERY EVENING 90 tablet 0   • Albuterol Sulfate 108 (90 Base) MCG/ACT AEROSOL POWDER, BREATH ACTIVATED Inhale.     • ibuprofen (MOTRIN) 200 MG Tab Take 600 mg by mouth 1 time a day as needed (Moderate pain). 3 tablets = 600 mg.     • vitamin D (CHOLECALCIFEROL) 1000 Unit (25 mcg) Tab Take 3,000 Units by mouth every morning. 3 tablets = 3000 units.     • aspirin EC (ECOTRIN) 81 MG Tablet Delayed Response Take 81 mg by mouth every morning.     • albuterol (PROVENTIL) 2.5mg/3ml Nebu Soln solution for nebulization 3 mL by Nebulization route every four hours as needed for Shortness of Breath. 25 Bullet 0     No current facility-administered medications for this visit.       Social History     Tobacco Use   • Smoking status: Former Smoker     Packs/day: 1.00     Years: 48.00     Pack years: 48.00     Types: Cigarettes     Start date:      Quit date: 2017     Years since quittin.1   • Smokeless tobacco: Never Used   Vaping Use   • Vaping Use: Never used   Substance Use Topics   • Alcohol use: No     Alcohol/week: 0.0 oz   • Drug use: Never     Social History     Social History Narrative   • Not on file       Family History  "  Problem Relation Age of Onset   • Cancer Mother         pancreatic    • Heart Disease Father    • Heart Attack Father    • Diabetes Father         pre-diabetes   • Stroke Neg Hx        Review of Systems:    Constitutional: No Fevers, Chills  Eyes: No vision changes  ENT: No hearing changes  Resp: No Shortness of breath  CV: No Chest pain  GI: No Nausea/Vomiting  MSK: No weakness  Skin: No rashes  Neuro: No Headaches  Psych: No Suicidal ideations    All remaining systems reviewed and found to be negative, except as stated above.    Exam:    /72   Pulse 60   Temp 36.1 °C (97 °F) (Temporal)   Resp 12   Ht 1.651 m (5' 5\")   Wt 95.7 kg (211 lb)   SpO2 96%  Body mass index is 35.11 kg/m².    General:  Well nourished, well developed female in NAD  HENT: Atraumatic, normocephalic  EYES: Extraocular movements intact  NECK: Supple, FROM  CHEST: No deformities, Equal chest expansion  RESP: Unlabored, no stridor or audible wheeze  HEART: Regular Rate and rhythm.   Extremities: No Clubbing, Cyanosis, or Edema  Skin: Warm/dry, without rashes  Neuro: A/O x 4, due to COVID-19- did not have patient remove face mask to test cranial nerves.  Motor/sensory grossly intact  Psych: Normal behavior, normal affect      Lab review:  Labs are reviewed and discussed with a patient  Lab Results   Component Value Date/Time    CHOLSTRLTOT 174 06/22/2021 11:10 AM    LDL 77 06/22/2021 11:10 AM    HDL 68 06/22/2021 11:10 AM    TRIGLYCERIDE 144 06/22/2021 11:10 AM       Lab Results   Component Value Date/Time    SODIUM 143 06/22/2021 11:10 AM    POTASSIUM 4.4 06/22/2021 11:10 AM    CHLORIDE 108 06/22/2021 11:10 AM    CO2 23 06/22/2021 11:10 AM    GLUCOSE 152 (H) 06/22/2021 11:10 AM    BUN 15 06/22/2021 11:10 AM    CREATININE 0.75 06/22/2021 11:10 AM     Lab Results   Component Value Date/Time    ALKPHOSPHAT 71 06/22/2021 11:10 AM    ASTSGOT 23 06/22/2021 11:10 AM    ALTSGPT 23 06/22/2021 11:10 AM    TBILIRUBIN 0.3 06/22/2021 11:10 AM    "   Lab Results   Component Value Date/Time    HBA1C 7.0 (A) 06/22/2021 10:45 AM    HBA1C 7.2 (H) 10/26/2020 09:17 AM    HBA1C 6.8 (H) 04/03/2020 04:35 AM     No results found for: TSH  No results found for: FREET4    Lab Results   Component Value Date/Time    WBC 6.7 06/22/2021 11:10 AM    RBC 4.46 06/22/2021 11:10 AM    HEMOGLOBIN 13.2 06/22/2021 11:10 AM    HEMATOCRIT 42.0 06/22/2021 11:10 AM    MCV 94.2 06/22/2021 11:10 AM    MCH 29.6 06/22/2021 11:10 AM    MCHC 31.4 (L) 06/22/2021 11:10 AM    MPV 11.1 06/22/2021 11:10 AM    NEUTSPOLYS 62.70 06/22/2021 11:10 AM    LYMPHOCYTES 26.40 06/22/2021 11:10 AM    MONOCYTES 7.00 06/22/2021 11:10 AM    EOSINOPHILS 3.00 06/22/2021 11:10 AM    BASOPHILS 0.60 06/22/2021 11:10 AM          Assessment/Plan:  1. Dyslipidemia associated with type 2 diabetes mellitus (HCC)  Chronic condition.  Stable.  Repeat POCT A1c in office next visit.  Did encourage patient to enroll in the [employer] savings program.  Gave her the information on this today.  Continue Metformin as well.    2. Hyperlipidemia  Chronic condition.  Stable.  Continue Crestor 20 mg daily.  3. Chronic insomnia  Continues to use trazodone 50 mg nightly, I do want her to proceed with a sleep study with pulmonology.  4. Other fatigue   She is having more fatigue recently. Started about 3-4 months ago. Doesn't feel she is sleeping well. Pulmonology did talk to her about getting sleep study, encouraged her to get this as well.     Follow-up: Return in 3 months (on 10/27/2021) for please schedule AWV seperately. Follow up on labs, with hga1c in office. .    Please note that this dictation was created using voice recognition software. I have made every reasonable attempt to correct obvious errors, but I expect that there are errors of grammar and possibly content that I did not discover before finalizing the note.

## 2021-07-27 NOTE — Clinical Note
Wanted to let you know, discussed some daytime fatigue with patient today, she doesn't feel she is sleeping well. Encouraged her to contact you about possible sleep study. Thanks!

## 2021-07-27 NOTE — ASSESSMENT & PLAN NOTE
Patient's last visit we discussed her diabetes. She did see her cardiologist who wanted her to trial Jardiance. Unfortunately insurance would not pay for the Jardiance so she did not start this medication. I did contact the  who encouraged her to enroll in their discount program if she qualifies. Discussed this again with the patient today.

## 2021-07-27 NOTE — ASSESSMENT & PLAN NOTE
She is having more fatigue recently. Started about 3-4 months ago. Doesn't feel she is sleeping well. Still using trazodone. Pulmonology did talk to her about getting sleep study, encouraged her to get this as well.

## 2021-08-13 ENCOUNTER — HOSPITAL ENCOUNTER (OUTPATIENT)
Facility: MEDICAL CENTER | Age: 66
End: 2021-08-13
Attending: FAMILY MEDICINE
Payer: MEDICARE

## 2021-08-13 ENCOUNTER — OFFICE VISIT (OUTPATIENT)
Dept: URGENT CARE | Facility: PHYSICIAN GROUP | Age: 66
End: 2021-08-13
Payer: MEDICARE

## 2021-08-13 VITALS
HEART RATE: 63 BPM | BODY MASS INDEX: 34.17 KG/M2 | OXYGEN SATURATION: 95 % | DIASTOLIC BLOOD PRESSURE: 80 MMHG | WEIGHT: 212.6 LBS | RESPIRATION RATE: 18 BRPM | HEIGHT: 66 IN | SYSTOLIC BLOOD PRESSURE: 126 MMHG | TEMPERATURE: 97 F

## 2021-08-13 DIAGNOSIS — R06.02 SHORTNESS OF BREATH: ICD-10-CM

## 2021-08-13 DIAGNOSIS — R05.9 COUGH: ICD-10-CM

## 2021-08-13 PROCEDURE — U0005 INFEC AGEN DETEC AMPLI PROBE: HCPCS

## 2021-08-13 PROCEDURE — 99213 OFFICE O/P EST LOW 20 MIN: CPT | Performed by: FAMILY MEDICINE

## 2021-08-13 PROCEDURE — U0003 INFECTIOUS AGENT DETECTION BY NUCLEIC ACID (DNA OR RNA); SEVERE ACUTE RESPIRATORY SYNDROME CORONAVIRUS 2 (SARS-COV-2) (CORONAVIRUS DISEASE [COVID-19]), AMPLIFIED PROBE TECHNIQUE, MAKING USE OF HIGH THROUGHPUT TECHNOLOGIES AS DESCRIBED BY CMS-2020-01-R: HCPCS

## 2021-08-13 ASSESSMENT — ENCOUNTER SYMPTOMS
WEIGHT LOSS: 0
VOMITING: 0
MYALGIAS: 0
NAUSEA: 0
EYE REDNESS: 0
EYE DISCHARGE: 0

## 2021-08-13 ASSESSMENT — FIBROSIS 4 INDEX: FIB4 SCORE: 1.5

## 2021-08-13 NOTE — PROGRESS NOTES
"Subjective     Heidy Annelise Her is a 66 y.o. female who presents with Runny Nose (pt has runny nose, fatigue x 2 days. pt exposed to covid )            2 days productive cough without blood in sputum, nasal congestion, rhinorrhea, and fatigue.  +SOB. No wheeze. +PMH COPD. +PMH CAP. She has had a COVID-19 exposure.  No fever.  No loss of taste or smell.  Heidy is COVID-19 vaccinated.  No other aggravating or alleviating factors.      Review of Systems   Constitutional: Negative for malaise/fatigue and weight loss.   Eyes: Negative for discharge and redness.   Gastrointestinal: Negative for nausea and vomiting.   Musculoskeletal: Negative for joint pain and myalgias.   Skin: Negative for itching and rash.              Objective     /80 (BP Location: Left arm, Patient Position: Sitting, BP Cuff Size: Adult)   Pulse 63   Temp 36.1 °C (97 °F) (Temporal)   Resp 18   Ht 1.664 m (5' 5.5\")   Wt 96.4 kg (212 lb 9.6 oz)   SpO2 95%   BMI 34.84 kg/m²      Physical Exam  Constitutional:       General: She is not in acute distress.     Appearance: She is well-developed.   HENT:      Head: Normocephalic and atraumatic.      Nose: Congestion present.   Eyes:      Conjunctiva/sclera: Conjunctivae normal.   Cardiovascular:      Rate and Rhythm: Normal rate and regular rhythm.      Heart sounds: Normal heart sounds. No murmur heard.     Pulmonary:      Effort: Pulmonary effort is normal.      Breath sounds: Normal breath sounds. No wheezing.   Skin:     General: Skin is warm and dry.      Findings: No rash.   Neurological:      Mental Status: She is alert and oriented to person, place, and time.                             Assessment & Plan             1. Cough  COVID/SARS CoV-2 PCR   2. Shortness of breath  COVID/SARS CoV-2 PCR     Differential diagnosis, natural history, supportive care, and indications for immediate follow-up discussed at length.     Shared decision to hold chest x-ray today.  She will return to clinic " with worsening short of breath, wheezing, or fever.

## 2021-08-14 DIAGNOSIS — R05.9 COUGH: ICD-10-CM

## 2021-08-14 DIAGNOSIS — R06.02 SHORTNESS OF BREATH: ICD-10-CM

## 2021-08-14 LAB — COVID ORDER STATUS COVID19: NORMAL

## 2021-08-15 LAB
SARS-COV-2 RNA RESP QL NAA+PROBE: NOTDETECTED
SPECIMEN SOURCE: NORMAL

## 2021-09-09 RX ORDER — PAROXETINE 30 MG/1
TABLET, FILM COATED ORAL
Qty: 90 TABLET | Refills: 3 | Status: SHIPPED | OUTPATIENT
Start: 2021-09-09 | End: 2022-06-08 | Stop reason: SDUPTHER

## 2021-09-20 ENCOUNTER — TELEPHONE (OUTPATIENT)
Dept: SLEEP MEDICINE | Facility: MEDICAL CENTER | Age: 66
End: 2021-09-20

## 2021-09-20 NOTE — TELEPHONE ENCOUNTER
Caller: Dunia    Phone Number:[phone] (home)     Message: Pt called and lm.  She has an appt in Nov.  She gave me a paper to have the CT done.  I lost the paper.       09/20/21:  Called pt and lm, I received her VM.  I will mail her order with the number to call to schedule.    CT scan order form mailed to pt with the address on file.

## 2021-10-15 RX ORDER — TRAZODONE HYDROCHLORIDE 50 MG/1
TABLET ORAL
Qty: 30 TABLET | Refills: 0 | Status: SHIPPED | OUTPATIENT
Start: 2021-10-15 | End: 2022-01-19

## 2021-10-25 ENCOUNTER — HOSPITAL ENCOUNTER (OUTPATIENT)
Dept: RADIOLOGY | Facility: MEDICAL CENTER | Age: 66
End: 2021-10-25
Attending: INTERNAL MEDICINE
Payer: MEDICARE

## 2021-10-25 DIAGNOSIS — R91.1 LUNG NODULE: ICD-10-CM

## 2021-10-25 PROCEDURE — 71250 CT THORAX DX C-: CPT | Mod: MH

## 2021-11-05 NOTE — TELEPHONE ENCOUNTER
Received request via: Pharmacy    Was the patient seen in the last year in this department? Yes    Does the patient have an active prescription (recently filled or refills available) for medication(s) requested? Yes. Mail in pharmacy requsting 90 day supply

## 2021-11-09 ENCOUNTER — OFFICE VISIT (OUTPATIENT)
Dept: CARDIOLOGY | Facility: MEDICAL CENTER | Age: 66
End: 2021-11-09
Payer: MEDICARE

## 2021-11-09 VITALS
WEIGHT: 216.3 LBS | DIASTOLIC BLOOD PRESSURE: 82 MMHG | HEART RATE: 69 BPM | HEIGHT: 65 IN | SYSTOLIC BLOOD PRESSURE: 152 MMHG | BODY MASS INDEX: 36.04 KG/M2 | RESPIRATION RATE: 12 BRPM | OXYGEN SATURATION: 95 %

## 2021-11-09 DIAGNOSIS — E11.69 DYSLIPIDEMIA ASSOCIATED WITH TYPE 2 DIABETES MELLITUS (HCC): ICD-10-CM

## 2021-11-09 DIAGNOSIS — I25.10 CORONARY ARTERY CALCIFICATION SEEN ON CT SCAN: ICD-10-CM

## 2021-11-09 DIAGNOSIS — I73.9 PAD (PERIPHERAL ARTERY DISEASE) (HCC): ICD-10-CM

## 2021-11-09 DIAGNOSIS — I25.10 ASCVD (ARTERIOSCLEROTIC CARDIOVASCULAR DISEASE): ICD-10-CM

## 2021-11-09 DIAGNOSIS — I10 ESSENTIAL HYPERTENSION: ICD-10-CM

## 2021-11-09 DIAGNOSIS — E78.5 DYSLIPIDEMIA ASSOCIATED WITH TYPE 2 DIABETES MELLITUS (HCC): ICD-10-CM

## 2021-11-09 PROCEDURE — 99215 OFFICE O/P EST HI 40 MIN: CPT | Performed by: INTERNAL MEDICINE

## 2021-11-09 RX ORDER — AMLODIPINE BESYLATE 5 MG/1
5 TABLET ORAL
Qty: 30 TABLET | Refills: 3 | Status: SHIPPED | OUTPATIENT
Start: 2021-11-09 | End: 2021-12-06 | Stop reason: SDUPTHER

## 2021-11-09 RX ORDER — METOPROLOL SUCCINATE 25 MG/1
25 TABLET, EXTENDED RELEASE ORAL DAILY
Qty: 90 TABLET | Refills: 3 | Status: SHIPPED
Start: 2021-11-09 | End: 2021-12-06

## 2021-11-09 ASSESSMENT — ENCOUNTER SYMPTOMS
ABDOMINAL PAIN: 0
SHORTNESS OF BREATH: 0
ALTERED MENTAL STATUS: 0
DECREASED APPETITE: 0
DYSPNEA ON EXERTION: 0
CLAUDICATION: 0
DIARRHEA: 0
SYNCOPE: 0
COUGH: 0
FEVER: 0
IRREGULAR HEARTBEAT: 0
BACK PAIN: 0
ORTHOPNEA: 0
DIZZINESS: 0
CONSTIPATION: 0
DEPRESSION: 0
FLANK PAIN: 0
PALPITATIONS: 0
NEAR-SYNCOPE: 0
VOMITING: 0
BLURRED VISION: 0
WEIGHT LOSS: 0
WEIGHT GAIN: 0
NAUSEA: 0
HEARTBURN: 0
PND: 0

## 2021-11-09 ASSESSMENT — FIBROSIS 4 INDEX: FIB4 SCORE: 1.5

## 2021-11-09 NOTE — PROGRESS NOTES
Cardiology Note    Chief Complaint   Patient presents with   • Hypertension     F/V Dx: Essential Hypertension   • Hyperlipidemia       History of Present Illness: Dunia Her is a 66 y.o. female PMH former smoker 50 pack years, asthma, DM2, MVP, HTN, HLD, CAD/PAD/ASCVD presents for follow up.     This visit recounts more stressed as caring for 8 year old granddaughter. She has attempted to diet but unfortunately gained weight. She is not sure what to do. She does add salt to her food. Compliant with medications and denies adverse effects. Wasn't able to afford jardiance. She doesn't check her blood pressure at home. Claudication improved, rare exertional symptoms. No other cardiac complaints.     Review of Systems   Constitutional: Negative for decreased appetite, fever, malaise/fatigue, weight gain and weight loss.   HENT: Negative for congestion and nosebleeds.    Eyes: Negative for blurred vision.   Cardiovascular: Negative for chest pain, claudication, dyspnea on exertion, irregular heartbeat, leg swelling, near-syncope, orthopnea, palpitations, paroxysmal nocturnal dyspnea and syncope.   Respiratory: Negative for cough and shortness of breath.    Endocrine: Negative for cold intolerance and heat intolerance.   Skin: Negative for rash.   Musculoskeletal: Negative for back pain.   Gastrointestinal: Negative for abdominal pain, constipation, diarrhea, heartburn, melena, nausea and vomiting.   Genitourinary: Negative for dysuria, flank pain and hematuria.   Neurological: Negative for dizziness.   Psychiatric/Behavioral: Negative for altered mental status and depression.         Past Medical History:   Diagnosis Date   • Hyperlipidemia LDL goal < 100 10/2/2014   • Hypertension    • Obesity (BMI 30-39.9) 10/2/2014   • Pre-diabetes 10/2/2014   • Recurrent sinusitis    • Type II or unspecified type diabetes mellitus without mention of complication, not stated as uncontrolled     pre-diabetes         Past Surgical  "History:   Procedure Laterality Date   • PB LAP,APPENDECTOMY N/A 2/12/2021    Procedure: APPENDECTOMY, LAPAROSCOPIC;  Surgeon: Juan Dawson M.D.;  Location: SURGERY [facility];  Service: General   • ABDOMINAL HYSTERECTOMY TOTAL  1993    benign cysts, total         Current Outpatient Medications   Medication Sig Dispense Refill   • amLODIPine (NORVASC) 5 MG Tab Take 1 Tablet by mouth 1 time a day as needed (for blood pressure over 140/90). 30 Tablet 3   • metoprolol SR (TOPROL XL) 25 MG TABLET SR 24 HR Take 1 Tablet by mouth every day. 90 Tablet 3   • metFORMIN (GLUCOPHAGE) 500 MG Tab TAKE ONE TABLET BY MOUTH TWICE A DAY WITH A MEAL 180 Tablet 0   • traZODone (DESYREL) 50 MG Tab TAKE ONE TABLET BY MOUTH EVERY NIGHT AT BEDTIME AS NEEDED FOR SLEEP 30 Tablet 0   • PARoxetine (PAXIL) 30 MG Tab TAKE ONE TABLET BY MOUTH DAILY 90 Tablet 3   • rosuvastatin (CRESTOR) 20 MG Tab TAKE ONE TABLET BY MOUTH EVERY EVENING 90 Tablet 3   • losartan (COZAAR) 100 MG Tab TAKE ONE TABLET BY MOUTH DAILY 90 tablet 3   • ibuprofen (MOTRIN) 200 MG Tab Take 600 mg by mouth 1 time a day as needed (Moderate pain). 3 tablets = 600 mg.     • vitamin D (CHOLECALCIFEROL) 1000 Unit (25 mcg) Tab Take 3,000 Units by mouth every morning. 3 tablets = 3000 units.     • aspirin EC (ECOTRIN) 81 MG Tablet Delayed Response Take 81 mg by mouth every morning.     • albuterol (PROVENTIL) 2.5mg/3ml Nebu Soln solution for nebulization 3 mL by Nebulization route every four hours as needed for Shortness of Breath. 25 Bullet 0     No current facility-administered medications for this visit.         Allergies   Allergen Reactions   • Sulfa Drugs Hives   • Shellfish Allergy Itching     \"Itchy eyes\"         Family History   Problem Relation Age of Onset   • Cancer Mother         pancreatic    • Heart Disease Father    • Heart Attack Father    • Diabetes Father         pre-diabetes   • Stroke Neg Hx          Social History     Socioeconomic History   • Marital " status:      Spouse name: Not on file   • Number of children: Not on file   • Years of education: Not on file   • Highest education level: 12th grade   Occupational History   • Not on file   Tobacco Use   • Smoking status: Former Smoker     Packs/day: 1.00     Years: 48.00     Pack years: 48.00     Types: Cigarettes     Start date:      Quit date: 2017     Years since quittin.4   • Smokeless tobacco: Never Used   Vaping Use   • Vaping Use: Never used   Substance and Sexual Activity   • Alcohol use: No     Alcohol/week: 0.0 oz   • Drug use: Never   • Sexual activity: Yes     Partners: Male     Comment:    Other Topics Concern   • Not on file   Social History Narrative   • Not on file     Social Determinants of Health     Financial Resource Strain: Low Risk    • Difficulty of Paying Living Expenses: Not very hard   Food Insecurity: No Food Insecurity   • Worried About Running Out of Food in the Last Year: Never true   • Ran Out of Food in the Last Year: Never true   Transportation Needs: No Transportation Needs   • Lack of Transportation (Medical): No   • Lack of Transportation (Non-Medical): No   Physical Activity: Insufficiently Active   • Days of Exercise per Week: 2 days   • Minutes of Exercise per Session: 30 min   Stress: No Stress Concern Present   • Feeling of Stress : Only a little   Social Connections: Moderately Integrated   • Frequency of Communication with Friends and Family: More than three times a week   • Frequency of Social Gatherings with Friends and Family: Not on file   • Attends Taoist Services: 1 to 4 times per year   • Active Member of Clubs or Organizations: No   • Attends Club or Organization Meetings: Never   • Marital Status:    Intimate Partner Violence:    • Fear of Current or Ex-Partner: Not on file   • Emotionally Abused: Not on file   • Physically Abused: Not on file   • Sexually Abused: Not on file   Housing Stability: Low Risk    • Unable to Pay for  "Housing in the Last Year: No   • Number of Places Lived in the Last Year: 1   • Unstable Housing in the Last Year: No         Physical Exam:  Ambulatory Vitals  /82 (BP Location: Left arm, Patient Position: Sitting, BP Cuff Size: Adult)   Pulse 69   Resp 12   Ht 1.651 m (5' 5\")   Wt 98.1 kg (216 lb 4.8 oz)   SpO2 95%    BP Readings from Last 4 Encounters:   11/09/21 152/82   08/13/21 126/80   07/27/21 114/72   06/22/21 125/80     Weight/BMI:   Vitals:    11/09/21 0902   BP: 152/82   Weight: 98.1 kg (216 lb 4.8 oz)   Height: 1.651 m (5' 5\")    Body mass index is 35.99 kg/m².  Wt Readings from Last 4 Encounters:   11/09/21 98.1 kg (216 lb 4.8 oz)   08/13/21 96.4 kg (212 lb 9.6 oz)   07/27/21 95.7 kg (211 lb)   06/22/21 96.6 kg (213 lb)       Physical Exam  Constitutional:       General: She is not in acute distress.  HENT:      Head: Normocephalic and atraumatic.   Eyes:      Conjunctiva/sclera: Conjunctivae normal.      Pupils: Pupils are equal, round, and reactive to light.   Neck:      Vascular: No JVD.   Cardiovascular:      Rate and Rhythm: Normal rate and regular rhythm.      Heart sounds: Normal heart sounds. No murmur heard.  No friction rub. No gallop.    Pulmonary:      Effort: Pulmonary effort is normal. No respiratory distress.      Breath sounds: Normal breath sounds. No wheezing or rales.   Chest:      Chest wall: No tenderness.   Abdominal:      General: Bowel sounds are normal. There is no distension.      Palpations: Abdomen is soft.   Musculoskeletal:      Cervical back: Normal range of motion and neck supple.   Skin:     General: Skin is warm and dry.   Neurological:      Mental Status: She is alert and oriented to person, place, and time.   Psychiatric:         Mood and Affect: Affect normal.         Judgment: Judgment normal.         Lab Data Review:  Lab Results   Component Value Date/Time    CHOLSTRLTOT 174 06/22/2021 11:10 AM    LDL 77 06/22/2021 11:10 AM    HDL 68 06/22/2021 11:10 " AM    TRIGLYCERIDE 144 06/22/2021 11:10 AM       Lab Results   Component Value Date/Time    SODIUM 143 06/22/2021 11:10 AM    POTASSIUM 4.4 06/22/2021 11:10 AM    CHLORIDE 108 06/22/2021 11:10 AM    CO2 23 06/22/2021 11:10 AM    GLUCOSE 152 (H) 06/22/2021 11:10 AM    BUN 15 06/22/2021 11:10 AM    CREATININE 0.75 06/22/2021 11:10 AM     CrCl cannot be calculated (Patient's most recent lab result is older than the maximum 7 days allowed.).  Lab Results   Component Value Date/Time    ALKPHOSPHAT 71 06/22/2021 11:10 AM    ASTSGOT 23 06/22/2021 11:10 AM    ALTSGPT 23 06/22/2021 11:10 AM    TBILIRUBIN 0.3 06/22/2021 11:10 AM      Lab Results   Component Value Date/Time    WBC 6.7 06/22/2021 11:10 AM     Lab Results   Component Value Date/Time    HBA1C 7.0 (A) 06/22/2021 10:45 AM     No components found for: TROP      Cardiac Imaging and Procedures Review:      LE art vascular ultrasound 2/21/20  Left HERNANDEZ:  0.79  Right HERNANDEZ: 1.02  1.  Post occlusive waveform is noted in the distal left peroneal artery. Short segment occlusion or high-grade stenosis proximal to this location is likely present, although not directly visualized.  2.  No other evidence of occlusion or significant stenosis bilaterally.  3.  Moderate calcified atherosclerotic plaque is identified in each lower extremity.    TTE 2/2020  CONCLUSIONS  Left ventricular ejection fraction is visually estimated to be 65%.  Mild concentric left ventricular hypertrophy.  Decreased left ventricular compliance with indeterminate diastolic   function.  Normal right ventricular size and systolic function.    Nuclear stress spect sachi 4/3/20   NUCLEAR IMAGING INTERPRETATION   Normal left ventricular size, ejection fraction, and wall motion.   Small fixed defect in the inferolateral wall could be artifact. Infarct is in the differential but consider less likely.    No reversible defect.    CAC CT 2/20/20  Coronary calcification:  LMA - 0.0  LCX - 0.0  LAD - 315  RCA -  260.8  Total Calcium Score: 575.8    Medical Decision Making:  Problem List Items Addressed This Visit     PAD (peripheral artery disease) (HCC)    Relevant Medications    amLODIPine (NORVASC) 5 MG Tab    metoprolol SR (TOPROL XL) 25 MG TABLET SR 24 HR    ASCVD (arteriosclerotic cardiovascular disease)    Relevant Medications    amLODIPine (NORVASC) 5 MG Tab    metoprolol SR (TOPROL XL) 25 MG TABLET SR 24 HR    Coronary artery calcification seen on CT scan    Relevant Medications    amLODIPine (NORVASC) 5 MG Tab    metoprolol SR (TOPROL XL) 25 MG TABLET SR 24 HR    Dyslipidemia associated with type 2 diabetes mellitus (HCC)    Essential hypertension    Relevant Medications    amLODIPine (NORVASC) 5 MG Tab    metoprolol SR (TOPROL XL) 25 MG TABLET SR 24 HR        DM2 / HLD - continue statin and aspirin. Refer to nutrition to optimize diet.    HTN - BP at goal 120/80. Continue antihypertensives BB and ARB. Add amlodipine prn for elevated blood pressures. If has to take most days will change to standing. Educated on salt reduction and printed information.     CAD/ASCVD - continue aspirin and statin. LDL at goal <70 and trig <150. Allow diet optimization and repeat lipids as previously controlled.    PAD - continue aspirin and statin. Continue symptom limited exercise.     It was my pleasure to meet with MsShaun Arden.    A total of 60 minutes of time was spent on day of encounter reviewing medical record, performing history and examination, counseling, ordering medication/test/consults and documentation.

## 2021-11-09 NOTE — PATIENT INSTRUCTIONS
"2 Gram Low Sodium Diet  A 2 gram sodium diet restricts the amount of sodium in the diet to no more than 2 g or 2000 mg daily. Limiting the amount of sodium is often used to help lower blood pressure. It is important if you have heart, liver, or kidney problems. Many foods contain sodium for flavor and sometimes as a preservative. When the amount of sodium in a diet needs to be low, it is important to know what to look for when choosing foods and drinks. The following includes some information and guidelines to help make it easier for you to adapt to a low sodium diet.  QUICK TIPS  · Do not add salt to food.  · Avoid convenience items and fast food.  · Choose unsalted snack foods.  · Buy lower sodium products, often labeled as \"lower sodium\" or \"no salt added.\"  · Check food labels to learn how much sodium is in 1 serving.  · When eating at a restaurant, ask that your food be prepared with less salt or none, if possible.  READING FOOD LABELS FOR SODIUM INFORMATION  The nutrition facts label is a good place to find how much sodium is in foods. Look for products with no more than 500 to 600 mg of sodium per meal and no more than 150 mg per serving.  Remember that 2 g = 2000 mg.  The food label may also list foods as:  · Sodium-free: Less than 5 mg in a serving.  · Very low sodium: 35 mg or less in a serving.  · Low-sodium: 140 mg or less in a serving.  · Light in sodium: 50% less sodium in a serving. For example, if a food that usually has 300 mg of sodium is changed to become light in sodium, it will have 150 mg of sodium.  · Reduced sodium: 25% less sodium in a serving. For example, if a food that usually has 400 mg of sodium is changed to reduced sodium, it will have 300 mg of sodium.  CHOOSING FOODS  Grains  · Avoid: Salted crackers and snack items. Some cereals, including instant hot cereals. Bread stuffing and biscuit mixes. Seasoned rice or pasta mixes.  · Choose: Unsalted snack items. Low-sodium cereals, oats, " puffed wheat and rice, shredded wheat. English muffins and bread. Pasta.  Meats  · Avoid: Salted, canned, smoked, spiced, pickled meats, including fish and poultry. Sarah, ham, sausage, cold cuts, hot dogs, anchovies.  · Choose: Low-sodium canned tuna and salmon. Fresh or frozen meat, poultry, and fish.  Dairy  · Avoid: Processed cheese and spreads. Cottage cheese. Buttermilk and condensed milk. Regular cheese.  · Choose: Milk. Low-sodium cottage cheese. Yogurt. Sour cream. Low-sodium cheese.  Fruits and Vegetables  · Avoid: Regular canned vegetables. Regular canned tomato sauce and paste. Frozen vegetables in sauces. Olives. Pickles. Relishes. Sauerkraut.  · Choose: Low-sodium canned vegetables. Low-sodium tomato sauce and paste. Frozen or fresh vegetables. Fresh and frozen fruit.  Condiments  · Avoid: Canned and packaged gravies. Worcestershire sauce. Tartar sauce. Barbecue sauce. Soy sauce. Steak sauce. Ketchup. Onion, garlic, and table salt. Meat flavorings and tenderizers.  · Choose: Fresh and dried herbs and spices. Low-sodium varieties of mustard and ketchup. Lemon juice. Tabasco sauce. Horseradish.  SAMPLE 2 GRAM SODIUM MEAL PLAN  Breakfast / Sodium (mg)  · 1 cup low-fat milk / 143 mg  · 2 slices whole-wheat toast / 270 mg  · 1 tbs heart-healthy margarine / 153 mg  · 1 hard-boiled egg / 139 mg  · 1 small orange / 0 mg  Lunch / Sodium (mg)  · 1 cup raw carrots / 76 mg  · ½ cup hummus / 298 mg  · 1 cup low-fat milk / 143 mg  · ½ cup red grapes / 2 mg  · 1 whole-wheat brenna bread / 356 mg  Dinner / Sodium (mg)  · 1 cup whole-wheat pasta / 2 mg  · 1 cup low-sodium tomato sauce / 73 mg  · 3 oz lean ground beef / 57 mg  · 1 small side salad (1 cup raw spinach leaves, ½ cup cucumber, ¼ cup yellow bell pepper) with 1 tsp olive oil and 1 tsp red wine vinegar / 25 mg  Snack / Sodium (mg)  · 1 container low-fat vanilla yogurt / 107 mg  · 3 carlyle cracker squares / 127 mg  Nutrient Analysis  · Calories: 2033  · Protein:  77 g  · Carbohydrate: 282 g  · Fat: 72 g  · Sodium: 1971 mg  Document Released: 12/18/2006 Document Revised: 03/11/2013 Document Reviewed: 03/21/2011  [employer]® Patient Information ©2014 [employer].

## 2021-11-15 ENCOUNTER — DOCUMENTATION (OUTPATIENT)
Dept: VASCULAR LAB | Facility: MEDICAL CENTER | Age: 66
End: 2021-11-15

## 2021-11-15 NOTE — PROGRESS NOTES
[facility]    Received HTN pharmacotherapy referral  from Dr. Bajwa on 11/9/21    [facility] to establish care    Insurance: Medicare  PCP: [facility]  Locations to be seen: Any    [facility] at [phone], fax [phone]    Sun Valdes, RommelD

## 2021-11-16 ENCOUNTER — OFFICE VISIT (OUTPATIENT)
Dept: SLEEP MEDICINE | Facility: MEDICAL CENTER | Age: 66
End: 2021-11-16
Payer: MEDICARE

## 2021-11-16 VITALS
WEIGHT: 215 LBS | SYSTOLIC BLOOD PRESSURE: 142 MMHG | TEMPERATURE: 97.5 F | RESPIRATION RATE: 16 BRPM | HEIGHT: 65 IN | OXYGEN SATURATION: 95 % | HEART RATE: 68 BPM | DIASTOLIC BLOOD PRESSURE: 80 MMHG | BODY MASS INDEX: 35.82 KG/M2

## 2021-11-16 DIAGNOSIS — G47.00 INSOMNIA, UNSPECIFIED TYPE: ICD-10-CM

## 2021-11-16 DIAGNOSIS — J45.20 MILD INTERMITTENT REACTIVE AIRWAY DISEASE WITHOUT COMPLICATION: ICD-10-CM

## 2021-11-16 DIAGNOSIS — E66.9 CLASS 2 OBESITY WITH BODY MASS INDEX (BMI) OF 35.0 TO 35.9 IN ADULT, UNSPECIFIED OBESITY TYPE, UNSPECIFIED WHETHER SERIOUS COMORBIDITY PRESENT: ICD-10-CM

## 2021-11-16 DIAGNOSIS — R91.1 LUNG NODULE: ICD-10-CM

## 2021-11-16 DIAGNOSIS — Z87.891 FORMER SMOKER: ICD-10-CM

## 2021-11-16 PROCEDURE — 99214 OFFICE O/P EST MOD 30 MIN: CPT | Performed by: INTERNAL MEDICINE

## 2021-11-16 ASSESSMENT — ENCOUNTER SYMPTOMS
SHORTNESS OF BREATH: 0
CHILLS: 0
HEARTBURN: 0
DIZZINESS: 0
MYALGIAS: 0
CLAUDICATION: 0
DIAPHORESIS: 0
EYE REDNESS: 0
SPEECH CHANGE: 0
PHOTOPHOBIA: 0
WEAKNESS: 0
DEPRESSION: 0
DIARRHEA: 0
FOCAL WEAKNESS: 0
FALLS: 0
EYE PAIN: 0
COUGH: 0
EYE DISCHARGE: 0
PALPITATIONS: 0
HEMOPTYSIS: 0
TREMORS: 0
ABDOMINAL PAIN: 0
SINUS PAIN: 0
INSOMNIA: 1
DOUBLE VISION: 0
SPUTUM PRODUCTION: 0
NAUSEA: 0
PND: 0
CONSTIPATION: 0
HEADACHES: 0
SORE THROAT: 0
VOMITING: 0
BLURRED VISION: 0
BACK PAIN: 0
FEVER: 0
NECK PAIN: 0
WEIGHT LOSS: 0
STRIDOR: 0
ORTHOPNEA: 0
WHEEZING: 0

## 2021-11-16 ASSESSMENT — FIBROSIS 4 INDEX: FIB4 SCORE: 1.5

## 2021-11-16 NOTE — PROGRESS NOTES
Chief Complaint   Patient presents with   • Pulmonary Nodule     LAST SEEN 5/14/21    • Results     Ct Chest Thorax 10/26/21         HPI: This patient is a 66 y.o. female whom is followed in our clinic for pulmonary nodule and mild obstructive airway diseae last seen by me on 5/14/21. The patient's past medical history is significant for coronary artery disease as detected by calcium scoring, type 2 diabetes, hypertension, dyslipidemia all currently controlled on medication, obesity.  She is a former tobacco smoker with 50-pack-year history and quit in 2017.  Patient was referred to me for episodes of recurrent bronchitis typically 1-2 times per year prior to 2020.  She had never been hospitalized but was tx prn with MICHEL. She was unable to afford ICS/LABA but since she has seen me, sxs have become less mild with no acute bronchitis over the past 18 mos. She had had a CT angiogram from April 2020 that showed 1.1 cm groundglass nodule in the medial right upper lobe and mild bibasilar atelectasis with no associated lymphadenopathy or parenchymal lung disease. This has remained stable based on most recent CT 10/26/21.  Pulmonary function testing from April 12 did show borderline airflow obstruction with normalization of FEV1/FVC ratio postbronchodilator, positive bronchodilator response, low normal diffusion capacity of 83% predicted and normal total lung capacity with mild air trapping. Pt is up to date on vaccines. She remains tobacco free. She has two concerns today. One is right sided breast pain which she states comes and goes and has been present for a year. No masses. Most recent mammography from 10/2017 was benign. She is also c/o insomnia. She has both a difficult time falling asleep and staying asleep. She tells me she will wake an hour after falling asleep often to use the rest room or because of her dog. No morning HA, no falling asleep inappropriately during the day. She does take care of her 8 year old  grand daughter. No caffeine past 11 AM.     Past Medical History:   Diagnosis Date   • Hyperlipidemia LDL goal < 100 10/2/2014   • Hypertension    • Obesity (BMI 30-39.9) 10/2/2014   • Pre-diabetes 10/2/2014   • Recurrent sinusitis    • Type II or unspecified type diabetes mellitus without mention of complication, not stated as uncontrolled     pre-diabetes       Social History     Socioeconomic History   • Marital status:      Spouse name: Not on file   • Number of children: Not on file   • Years of education: Not on file   • Highest education level: 12th grade   Occupational History   • Not on file   Tobacco Use   • Smoking status: Former Smoker     Packs/day: 1.00     Years: 48.00     Pack years: 48.00     Types: Cigarettes     Start date:      Quit date: 2017     Years since quittin.4   • Smokeless tobacco: Never Used   Vaping Use   • Vaping Use: Never used   Substance and Sexual Activity   • Alcohol use: No     Alcohol/week: 0.0 oz   • Drug use: Never   • Sexual activity: Yes     Partners: Male     Comment:    Other Topics Concern   • Not on file   Social History Narrative   • Not on file     Social Determinants of Health     Financial Resource Strain: Low Risk    • Difficulty of Paying Living Expenses: Not very hard   Food Insecurity: No Food Insecurity   • Worried About Running Out of Food in the Last Year: Never true   • Ran Out of Food in the Last Year: Never true   Transportation Needs: No Transportation Needs   • Lack of Transportation (Medical): No   • Lack of Transportation (Non-Medical): No   Physical Activity: Insufficiently Active   • Days of Exercise per Week: 2 days   • Minutes of Exercise per Session: 30 min   Stress: No Stress Concern Present   • Feeling of Stress : Only a little   Social Connections: Moderately Integrated   • Frequency of Communication with Friends and Family: More than three times a week   • Frequency of Social Gatherings with Friends and Family: Not  on file   • Attends [employer] Services: 1 to 4 times per year   • Active Member of Clubs or Organizations: No   • Attends Club or Organization Meetings: Never   • Marital Status:    Intimate Partner Violence:    • Fear of Current or Ex-Partner: Not on file   • Emotionally Abused: Not on file   • Physically Abused: Not on file   • Sexually Abused: Not on file   Housing Stability: Low Risk    • Unable to Pay for Housing in the Last Year: No   • Number of Places Lived in the Last Year: 1   • Unstable Housing in the Last Year: No       Family History   Problem Relation Age of Onset   • Cancer Mother         pancreatic    • Heart Disease Father    • Heart Attack Father    • Diabetes Father         pre-diabetes   • Stroke Neg Hx        Current Outpatient Medications on File Prior to Visit   Medication Sig Dispense Refill   • amLODIPine (NORVASC) 5 MG Tab Take 1 Tablet by mouth 1 time a day as needed (for blood pressure over 140/90). 30 Tablet 3   • metoprolol SR (TOPROL XL) 25 MG TABLET SR 24 HR Take 1 Tablet by mouth every day. 90 Tablet 3   • metFORMIN (GLUCOPHAGE) 500 MG Tab TAKE ONE TABLET BY MOUTH TWICE A DAY WITH A MEAL 180 Tablet 0   • traZODone (DESYREL) 50 MG Tab TAKE ONE TABLET BY MOUTH EVERY NIGHT AT BEDTIME AS NEEDED FOR SLEEP 30 Tablet 0   • PARoxetine (PAXIL) 30 MG Tab TAKE ONE TABLET BY MOUTH DAILY 90 Tablet 3   • rosuvastatin (CRESTOR) 20 MG Tab TAKE ONE TABLET BY MOUTH EVERY EVENING 90 Tablet 3   • losartan (COZAAR) 100 MG Tab TAKE ONE TABLET BY MOUTH DAILY 90 tablet 3   • ibuprofen (MOTRIN) 200 MG Tab Take 600 mg by mouth 1 time a day as needed (Moderate pain). 3 tablets = 600 mg.     • vitamin D (CHOLECALCIFEROL) 1000 Unit (25 mcg) Tab Take 3,000 Units by mouth every morning. 3 tablets = 3000 units.     • aspirin EC (ECOTRIN) 81 MG Tablet Delayed Response Take 81 mg by mouth every morning.     • albuterol (PROVENTIL) 2.5mg/3ml Nebu Soln solution for nebulization 3 mL by Nebulization route every  "four hours as needed for Shortness of Breath. 25 Bullet 0     No current facility-administered medications on file prior to visit.       Sulfa drugs and Shellfish allergy      ROS:   Review of Systems   Constitutional: Negative for chills, diaphoresis, fever, malaise/fatigue and weight loss.   HENT: Negative for congestion, ear discharge, ear pain, hearing loss, nosebleeds, sinus pain, sore throat and tinnitus.    Eyes: Negative for blurred vision, double vision, photophobia, pain, discharge and redness.   Respiratory: Negative for cough, hemoptysis, sputum production, shortness of breath, wheezing and stridor.    Cardiovascular: Negative for chest pain, palpitations, orthopnea, claudication, leg swelling and PND.        R breast pain   Gastrointestinal: Negative for abdominal pain, constipation, diarrhea, heartburn, nausea and vomiting.   Genitourinary: Negative for dysuria and urgency.   Musculoskeletal: Negative for back pain, falls, joint pain, myalgias and neck pain.   Skin: Negative for itching and rash.   Neurological: Negative for dizziness, tremors, speech change, focal weakness, weakness and headaches.   Endo/Heme/Allergies: Negative for environmental allergies.   Psychiatric/Behavioral: Negative for depression. The patient has insomnia.        /80 (BP Location: Right arm, Patient Position: Sitting, BP Cuff Size: Large adult)   Pulse 68   Temp 36.4 °C (97.5 °F) (Temporal)   Resp 16   Ht 1.651 m (5' 5\")   Wt 97.5 kg (215 lb)   SpO2 95%   Physical Exam  Vitals reviewed.   Constitutional:       General: She is not in acute distress.     Appearance: Normal appearance. She is well-developed. She is obese.   HENT:      Head: Normocephalic and atraumatic.      Right Ear: External ear normal.      Left Ear: External ear normal.      Nose: Nose normal. No congestion.      Mouth/Throat:      Pharynx: No oropharyngeal exudate.   Eyes:      General: No scleral icterus.     Conjunctiva/sclera: Conjunctivae " normal.      Pupils: Pupils are equal, round, and reactive to light.   Neck:      Vascular: No JVD.      Trachea: No tracheal deviation.   Cardiovascular:      Rate and Rhythm: Normal rate and regular rhythm.      Heart sounds: Normal heart sounds. No murmur heard.  No friction rub. No gallop.    Pulmonary:      Effort: Pulmonary effort is normal. No accessory muscle usage or respiratory distress.      Breath sounds: Normal breath sounds. No wheezing or rales.      Comments: Mild expiratory upper airway wheezing  Abdominal:      General: There is no distension.      Palpations: Abdomen is soft.      Tenderness: There is no abdominal tenderness.   Musculoskeletal:         General: No tenderness or deformity. Normal range of motion.      Cervical back: Normal range of motion and neck supple.      Right lower leg: No edema.      Left lower leg: No edema.   Lymphadenopathy:      Cervical: No cervical adenopathy.   Skin:     General: Skin is warm and dry.      Findings: No rash.      Nails: There is no clubbing.   Neurological:      Mental Status: She is alert and oriented to person, place, and time.      Cranial Nerves: No cranial nerve deficit.      Gait: Gait normal.   Psychiatric:         Mood and Affect: Mood normal.         Behavior: Behavior normal.         PFTs as reviewed by me personally:as per HPI    Imaging as reviewed by me personally:  As per hPI    Assessment:  1. Mild intermittent reactive airway disease without complication  PULMONARY FUNCTION TESTS -Test requested: Complete Pulmonary Function Test   2. Lung nodule     3. Former smoker     4. Insomnia, unspecified type     5. Class 2 obesity with body mass index (BMI) of 35.0 to 35.9 in adult, unspecified obesity type, unspecified whether serious comorbidity present         Plan:  1. Chronic, mild and stable on no controller therapy. Continue prn MICHEL. Update pfts at f/u  2. This is stable for 18 mos. We will repeat CT in one year  3. Tobacco free.  Encouraged ongoing abstinence.  4. We discussed lifestyle modification such as day light (SUN) exposure in the early hours of the day, daily exercise and regular bedtime routine. We discussed referral to sleep medicine and while she does have hypertension, no other sxs to suggest LARA. I am hesitant to order a sleep study for insomnia and most patient as it is not the indicated test and CPAP therapy for mild or clinically insignificant sleep apnea sometimes worsens sleep quality in patients with primary insomnia as is makes it more difficult to sleep. Pt will discuss with PCP. I am happy to refer to sleep for a formal assessment.   5. Encouraged healthy lifestyle habits.   Return in about 5 months (around 4/16/2022) for PFTs.

## 2021-11-29 ENCOUNTER — NON-PROVIDER VISIT (OUTPATIENT)
Dept: MEDICAL GROUP | Facility: PHYSICIAN GROUP | Age: 66
End: 2021-11-29
Payer: MEDICARE

## 2021-11-29 PROCEDURE — 99211 OFF/OP EST MAY X REQ PHY/QHP: CPT | Performed by: INTERNAL MEDICINE

## 2021-11-29 NOTE — PROGRESS NOTES
"Pharmacotherapy Hypertension Visit  11/29/21     Goal Date when HTN will be controlled: Three months    Type of Visit:  Initial Visit  Start Time:  9:31am  End time: 10:04am    Dunia Her has been referred for evaluation and management of hypertension    HPI:   There were no vitals filed for this visit.  Both arms - in future use arm with higher reading    Age at Initial Diagnosis: unsure, many years      Home BP readings:   No logs at today's visit, she states typically \"around 120/80-95\".  Any readings above  180/110:  None   Christine symptoms of high blood pressure (TIA, Stroke, Head ache, vision changes): Blurry vision on occasion, she is unsure when this began.      Current Prescription HTN Medications - including dose:    Losartan 100mg QD  Metoprolol ER 25mg QD    Current side effects potentially related to antihypertensive medications (lightheaded, dizziness, leg swelling): None    Current Adherence to Blood Pressure Medications Complete     Previously attempted BP medications:   None    Any current interfering substances: Two cups of coffee every morning.    Any current lifestyle issues affecting BP:  Added salt to foods in the last few months.  Discussed need for decreasing salt and techniques to replace.    In the past 6 months have pt had the following (if no, then order)  EKG - no  Microalbumin - no  Electrolytes and eGFR - 6/2021  TSH - 6/2021  CBC - 6/2021  Fasting lipid panel - 6/2021  Fasting glucose - 6/2021    Since last visit any of the below - No  Creatinine increase > 0.5  Potassium > 5 or < 3.5  New edema present  Hyponatremia    Lab Results   Component Value Date/Time    SODIUM 143 06/22/2021 11:10 AM    POTASSIUM 4.4 06/22/2021 11:10 AM    CHLORIDE 108 06/22/2021 11:10 AM    CO2 23 06/22/2021 11:10 AM    GLUCOSE 152 (H) 06/22/2021 11:10 AM    BUN 15 06/22/2021 11:10 AM    CREATININE 0.75 06/22/2021 11:10 AM        Medications Reconciled  CURRENT MEDICATIONS:   Current Outpatient " Medications:   •  amLODIPine, 5 mg, Oral, QDAY PRN  •  metoprolol SR, 25 mg, Oral, DAILY  •  metFORMIN, TAKE ONE TABLET BY MOUTH TWICE A DAY WITH A MEAL  •  traZODone, TAKE ONE TABLET BY MOUTH EVERY NIGHT AT BEDTIME AS NEEDED FOR SLEEP  •  PARoxetine, TAKE ONE TABLET BY MOUTH DAILY  •  rosuvastatin, TAKE ONE TABLET BY MOUTH EVERY EVENING  •  losartan, TAKE ONE TABLET BY MOUTH DAILY  •  ibuprofen, 600 mg, Oral, QDAY PRN  •  vitamin D3, 3,000 Units, Oral, QAM  •  aspirin EC, 81 mg, Oral, QAM  •  albuterol, 2.5 mg, Nebulization, Q4HRS PRN  ALLERGIES: Sulfa drugs and Shellfish allergy    SOCIAL HISTORY   Social History     Tobacco Use   Smoking Status Former Smoker   • Packs/day: 1.00   • Years: 48.00   • Pack years: 48.00   • Types: Cigarettes   • Start date:    • Quit date: 2017   • Years since quittin.4   Smokeless Tobacco Never Used     Change in weight: Stable  Exercise habits: no regular exercise program  Diet: common adult        ASSESSMENT AND PLAN    BP is Elevated   BP Goal 130/80    Does pt have known end organ damage? (ventricular hypertension [LVH], hypertensive retinopathy, ischemic cardiovascular disease) NO      BP Monitoring Recommendations - Home BP     Proper technique for blood pressure monitoring reviewed with patient.  Pt instructed to check BP at varying times through out the day 4 times per week.  Provided pt log for blood pressure monitoring and pt instructed to bring in at each visit for reviews    Lifestyle Recommendations From Today’s Visit:    Weight reduction  DASH or mediterranean style eating plan  Dietary Sodium Restriction  Increase Physical Activity      Medication recommendations from today's visit:   ARB/ACEI: Continue Losartan 100mg QD  Diuretic: n/a  Calcium Channel Blocker: Hold off on starting amlodipine (had been prescribed PRN by cardiology, but she has not taken a dose), see below.  Other class: Continue Metoprolol ER 25mg QD  Importance of adherence  discussed    Patient referred by cardiologist, seen for initial visit today.  Somewhat poor historian in regards to HTN hx.  Has been stable on losartan and metoprolol for many years.  Life stresses with caring for 8 year old granddaughter likely influencing BP.  She also started salting her food recently, is more than willing to discontinue.  Discussed at length additional pharmacotherapy as well as Radiance II trial.  Patient would like to pursue inclusion to Radiance II trial.  Will hold off on starting amlodipine at this time.  Chart forwarded to medical director for review.    Blood Work Ordered At Today’s visit: None order BMP if not done in last 6 months    Follow-Up: 4 weeks (every 1-4 weeks until at goal)     Pillo Truong, PharmD, BCACP    CC:  CYNTHIA Welch M.D.  Michael Bloch, M.D.

## 2021-12-02 ENCOUNTER — TELEPHONE (OUTPATIENT)
Dept: VASCULAR LAB | Facility: MEDICAL CENTER | Age: 66
End: 2021-12-02

## 2021-12-02 NOTE — TELEPHONE ENCOUNTER
Pt scheduled for initial visit with Dr. Browning for hypertension, as that's what her referral was placed for.    ----- Message from Michael J Bloch, M.D. sent at 12/1/2021  6:51 PM PST -----  Regarding: vasc med referral  Please schedule initial visit with Nam for L OT and evaluation of IVC filter

## 2021-12-06 ENCOUNTER — OFFICE VISIT (OUTPATIENT)
Dept: VASCULAR LAB | Facility: MEDICAL CENTER | Age: 66
End: 2021-12-06
Attending: FAMILY MEDICINE
Payer: MEDICARE

## 2021-12-06 VITALS
SYSTOLIC BLOOD PRESSURE: 145 MMHG | HEIGHT: 65 IN | HEART RATE: 68 BPM | WEIGHT: 214 LBS | DIASTOLIC BLOOD PRESSURE: 84 MMHG | BODY MASS INDEX: 35.65 KG/M2

## 2021-12-06 DIAGNOSIS — I25.10 CORONARY ARTERY CALCIFICATION SEEN ON CT SCAN: ICD-10-CM

## 2021-12-06 DIAGNOSIS — I73.9 PAD (PERIPHERAL ARTERY DISEASE) (HCC): ICD-10-CM

## 2021-12-06 DIAGNOSIS — I10 PRIMARY HYPERTENSION: ICD-10-CM

## 2021-12-06 DIAGNOSIS — E11.51 TYPE 2 DIABETES MELLITUS WITH DIABETIC PERIPHERAL ANGIOPATHY WITHOUT GANGRENE, WITHOUT LONG-TERM CURRENT USE OF INSULIN (HCC): ICD-10-CM

## 2021-12-06 DIAGNOSIS — F51.04 CHRONIC INSOMNIA: ICD-10-CM

## 2021-12-06 DIAGNOSIS — E66.9 OBESITY, CLASS II, BMI 35-39.9: ICD-10-CM

## 2021-12-06 DIAGNOSIS — E11.69 DYSLIPIDEMIA ASSOCIATED WITH TYPE 2 DIABETES MELLITUS (HCC): ICD-10-CM

## 2021-12-06 DIAGNOSIS — E78.5 DYSLIPIDEMIA ASSOCIATED WITH TYPE 2 DIABETES MELLITUS (HCC): ICD-10-CM

## 2021-12-06 PROBLEM — E66.812 OBESITY, CLASS II, BMI 35-39.9: Status: ACTIVE | Noted: 2021-12-06

## 2021-12-06 PROCEDURE — 99204 OFFICE O/P NEW MOD 45 MIN: CPT | Performed by: FAMILY MEDICINE

## 2021-12-06 PROCEDURE — 99212 OFFICE O/P EST SF 10 MIN: CPT

## 2021-12-06 RX ORDER — AMLODIPINE BESYLATE 5 MG/1
5 TABLET ORAL
Qty: 90 TABLET | Refills: 3 | Status: SHIPPED | OUTPATIENT
Start: 2021-12-06 | End: 2022-12-01

## 2021-12-06 RX ORDER — TELMISARTAN 80 MG/1
80 TABLET ORAL
Qty: 90 TABLET | Refills: 3 | Status: SHIPPED | OUTPATIENT
Start: 2021-12-06 | End: 2022-08-29

## 2021-12-06 ASSESSMENT — ENCOUNTER SYMPTOMS
ORTHOPNEA: 0
CHILLS: 0
MYALGIAS: 0
WEAKNESS: 0
NAUSEA: 0
BRUISES/BLEEDS EASILY: 0
PALPITATIONS: 0
CLAUDICATION: 0
COUGH: 0
FEVER: 0

## 2021-12-06 ASSESSMENT — FIBROSIS 4 INDEX: FIB4 SCORE: 1.5

## 2021-12-06 NOTE — PROGRESS NOTES
RESISTANT HTN CLINIC - INITIAL EVALUATION   12/06/21     ASSESSEMENT AND PLAN:      1. BLOOD PRESSURE CONTROL   Qualifies as Resistant HTN (RH):  No, not on optimized, max-dosed or max-tolerated meds from 3 classes   Office BP Goal ACC/AHA (2017) goal <130/80  Home BP at goal:  no  Office BP at goal:  no  24h ABPM:  UNDECIDED  Device candidate? Not for further current as on 3 meds  Contributing factors: obesity  Plan:   Monitoring:   - start/continue home BP monitoring, reviewed correct technique:  Yes   - order 24h ABPM: consider in future   - monitor lytes/gfr routinely   - advised that stabilizing BP may require multiple med changes and close monitoring over the next several months, reviewed that initially there will be additional imaging and labs and the possibility of adverse drug rxn's and variability of his BP and HR until we have things stabilized.  Advised to contact our office as needed for questions or concerns.      2. WORK UP FOR SECONDARY CAUSES OF RH:  Obstructive Sleep Apnea: Not Yet Assessed  Renal parenchymal disease: Excluded  Renovascular HTN: Not Yet Assessed  Primary Aldosteronism: Not Yet Assessed  Thyroid Function: Excluded  Pheochromocytoma: Not Yet Assessed   Cushing's:   Not Yet Assessed   Coarctation of Aorta: excluded  Other: none identified    Recommendations At This Visit: as noted in orders         3. MEDICATION USE / ADHERENCE:   Assessment: Complete  Recommendations: Instructed to Continue Taking All Medications As Prescribed         4. HTN-MEDIATED END-ORGAN DAMAGE:  Left Ventricular Hypertrophy: Absent on  ECG Date: 2020  Urine Albuminuria: Not Yet Assessed on Date: pending  Renal Function: Chronic Kidney Disease  CKD G2 at worst   Ophthalmologic: Absent per patient report and/or eye specialist   Established Cardiovascular Disease:     # CAD per CT scan - continue current med mgmt, no further surveillance and no prior ASCVD events     # PAD, LLE inflow - stable, limited sx   -  "continue exercise, TLC, med mgmt     5. LIFESTYLE INTERVENTIONS:    SMOKING:   reports that she quit smoking about 4 years ago. Her smoking use included cigarettes. She started smoking about 53 years ago. She has a 48.00 pack-year smoking history. She has never used smokeless tobacco.   - continued complete avoidance of all tobacco products     PHYSICAL ACTIVITY: continue healthy activity to improve CV fitness.  In general, targeting >150min/week of moderate-level activity.  Additional details reviewed with patient and/or outlined in care instructions     WEIGHT MANAGEMENT AND NUTRITION: Dietary plan was discussed with patient at this visit including DASH-dietary approaches, substitution of MUFA/PUFA for saturated fats, substituting whole grains for simple carbs, substituting lean meats for fatty meats, increase use of plant-based protein vs animal-based, lowered sodium.  Additional details reviewed with patient and/or outlined in care instructions      6. STANDARD HTN PHARMACOTHERAPY:  ACEI/ARB: switch to telmisartan 80mg daily   Thiazide Type Diuretic: consider as next agent   DHP-CCB: start amlodipine 5mg QHS     7. ADDITIONAL AGENTS   Aldosterone Receptor Antagonist: not indicated   Loop Diuretic: not indicated   Peripheral Alpha Blocker: not indicated   BB: not indicated, so I have stopped metoprolol as unlikely beneficial for BP at current dosing    Non-DHP-CCB: not indicated   Direct Vasodilator: not indicated   Centrally Acting Alpha Agonist: not indicated   Potassium-sparing diuretic: not indicated   DRI: not indicated     LIPID MANAGEMENT:   Qualifies for Statin Therapy Based on 2018 ACC/AHA Guidelines: yes, Secondary Prevention - Very high risk ASCVD - 2 major events or 1 event with other high-risk conditions, Diabetes and HERNANDEZ <0.9  The 10-year ASCVD risk score (Bill DANA Jr., et al., 2013) is: 19%, 7.5 - <20% \"intermediate risk\"   Major ASCVD events: Symptomatic PAD (hx of claudication with HERNANDEZ <0.85, " previous revascularization/amputation  High-risk conditions: >64yr old , Diabetes , Hypertension  and N/A  Risk-enhancers: N/A  Currently on Statin: Yes  Treatment goals: reduce LDL-C >50%  and LDL-C <70 (consider non-HDL-C <100, apoB <80)  At goal? no  Plan:   - reinforced ongoing TLC measures as noted   - monitor labs and lp(a)  Meds:   - continue rosuvastatin 20mg daily, consider intensification and/or zetia     GLYCEMIA MANAGEMENT:  Diabetic   Goal A1c < 7.0  Lab Results   Component Value Date    HBA1C 7.0 (A) 06/22/2021      Lab Results   Component Value Date/Time    MALBCRT 12 10/26/2020 09:17 AM    MICROALBUR 1.7 10/26/2020 09:17 AM     Plan:  - continue current medication plan   - consider SGLT2i   - recommmend for routine care with PCP (or endocrine) to include regular A1c monitoring, annual albumin/creatinine ratio (ACR), annual diabetic retinopathy screening, foot exams, annual flu vaccine, and updates to pneumonia vaccines as appropriate     ANTIPLATELET/ANTICOAGULANT THERAPY:  Continue ASA 81mg     OTHER ISSUES:     # MDD, recurrent - stable, continue current meds, f/u with PCP    Studies Ordered At This Visit: none    Blood Work to Be Obtained Prior to Next Visit: as noted below   Disposition: RTC in Jan    1. Primary hypertension  amLODIPine (NORVASC) 5 MG Tab    telmisartan (MICARDIS) 80 MG Tab    Lipid Profile    Lipoprotein (a)    Comp Metabolic Panel    MICROALBUMIN CREAT RATIO URINE    TSH WITH REFLEX TO FT4    CBC WITHOUT DIFFERENTIAL    URINALYSIS    HEMOGLOBIN A1C (Glycohemoglobin GHB Total/A1C with MBG Estimate)   2. Coronary artery calcification seen on CT scan  Lipoprotein (a)   3. PAD (peripheral artery disease) (HCC)  Lipoprotein (a)   4. Obesity, Class II, BMI 35-39.9     5. Dyslipidemia associated with type 2 diabetes mellitus (HCC)  Lipid Profile    Lipoprotein (a)   6. Type 2 diabetes mellitus with diabetic peripheral angiopathy without gangrene, without long-term current use of  insulin (HCC)  CBC WITHOUT DIFFERENTIAL    URINALYSIS    HEMOGLOBIN A1C (Glycohemoglobin GHB Total/A1C with MBG Estimate)   7. Chronic insomnia           History of Present Illness:   Dunia Her is a female seen for evaluation of resistant HTN and management.   Dunia Her is initially referred by Ref Not In Computer     Subjective    HTN:  No major sx.  Has seen cardiology and pharm HTN clinic to date.    Home BP los/80s  24h ABPM completed: not tested  Adherence to current HTN meds: compliant all of the time  Hx of clinical ASCVD events: Symptomatic PAD (hx of claudication with HERNANDEZ <0.85, previous revascularization/amputation   Lifestyle factors:  Weight Change: stable   BMI Readings from Last 5 Encounters:   21 35.61 kg/m²   21 35.78 kg/m²   21 35.99 kg/m²   21 34.84 kg/m²   21 35.11 kg/m²     Diet pattern: low red meat, increase veg, has lost weight on Med diet in past, pending with RD on Wed   Daily salt intake estimate:  Low   - none   EtOH: No  Exercise habits: minimal exercise  Perceived barriers to care: leg pain  Pertinent HTN hx (reviewed at initial visit):   Age at HTN dx: several years   (if female, any hx of pregnancy-related HTN): n/a   Past HTN medications: as noted   HTN crises:  No prior hospitalization or crises   Interfering substances/contributing factors:  None    Hyperlipidemia:    Stable, tolerating rosuva   Clinical evidence of ASCVD: Symptomatic PAD (hx of claudication with HERNANDEZ <0.85, previous revascularization/amputation    Antiplatelet/anticoagulation: Yes, Details: ASA, no bleeding noted     T2D:  Stable. No current symptoms reported.   Tolerating meds and no recent med changes.   Last A1c   Lab Results   Component Value Date    HBA1C 7.0 (A) 2021     Lab Results   Component Value Date/Time    MALBCRT 12 10/26/2020 09:17 AM    MICROALBUR 1.7 10/26/2020 09:17 AM       CKD: No     Sleeping disorder/LARA: No     Hypothyroidism: No       Problem List:     Patient Active Problem List    Diagnosis Date Noted   • Type 2 diabetes mellitus with diabetic peripheral angiopathy without gangrene, without long-term current use of insulin ([facility]) 12/06/2021   • Obesity, Class II, BMI 35-39.9 12/06/2021   • Healthcare maintenance 06/17/2021   • Acute appendicitis with appendiceal abscess 02/12/2021   • Depression 02/12/2021   • Essential hypertension 10/06/2020   • ASCVD (arteriosclerotic cardiovascular disease) 04/07/2020   • Coronary artery calcification seen on CT scan 04/07/2020   • PAD (peripheral artery disease) ([facility]) 02/07/2020   • Other specified personal risk factors, not elsewhere classified 02/07/2020   • Bronchitis 01/22/2020   • Recurrent major depressive disorder, in partial remission ([facility]) 02/10/2019   • Left elbow pain 09/04/2018   • Rash 09/04/2018   • Chronic insomnia 04/18/2016   • Dyslipidemia associated with type 2 diabetes mellitus ([facility]) 10/02/2014   • BMI 36.0-36.9,adult 10/02/2014   • Hyperlipidemia 10/02/2014      Surgical History:     Past Surgical History:   Procedure Laterality Date   • PB LAP,APPENDECTOMY N/A 2/12/2021    Procedure: APPENDECTOMY, LAPAROSCOPIC;  Surgeon: Juan Dawson M.D.;  Location: SURGERY [facility];  Service: General   • ABDOMINAL HYSTERECTOMY TOTAL  1993    benign cysts, total       Current Outpatient Medications:   •  amLODIPine, 5 mg, Oral, QHS  •  telmisartan, 80 mg, Oral, QHS  •  metFORMIN, TAKE ONE TABLET BY MOUTH TWICE A DAY WITH A MEAL, Taking  •  traZODone, TAKE ONE TABLET BY MOUTH EVERY NIGHT AT BEDTIME AS NEEDED FOR SLEEP, Taking  •  PARoxetine, TAKE ONE TABLET BY MOUTH DAILY, Taking  •  rosuvastatin, TAKE ONE TABLET BY MOUTH EVERY EVENING, Taking  •  ibuprofen, 600 mg, Oral, QDAY PRN, Taking  •  vitamin D3, 3,000 Units, Oral, QAM, Taking  •  aspirin EC, 81 mg, Oral, QAM, Taking  •  albuterol, 2.5 mg, Nebulization, Q4HRS PRN, Taking   Sulfa drugs and Shellfish allergy     Family History:     Family  "History   Problem Relation Age of Onset   • Cancer Mother         pancreatic    • Heart Disease Father    • Heart Attack Father    • Diabetes Father         pre-diabetes   • Stroke Neg Hx       Social History:     Social History     Tobacco Use   • Smoking status: Former Smoker     Packs/day: 1.00     Years: 48.00     Pack years: 48.00     Types: Cigarettes     Start date:      Quit date: 2017     Years since quittin.5   • Smokeless tobacco: Never Used   Vaping Use   • Vaping Use: Never used   Substance Use Topics   • Alcohol use: No     Alcohol/week: 0.0 oz   • Drug use: Never       Review of Systems:   Review of Systems   Constitutional: Negative for chills and fever.   Respiratory: Negative for cough.    Cardiovascular: Negative for chest pain, palpitations, orthopnea, claudication and leg swelling.   Gastrointestinal: Negative for nausea.   Musculoskeletal: Negative for myalgias.   Neurological: Negative for weakness.   Endo/Heme/Allergies: Does not bruise/bleed easily.         Objective     Objective:     Vitals:    21 0916 21 0920   BP: 155/82 145/84   BP Location: Left arm Left arm   Patient Position: Sitting Sitting   BP Cuff Size: Adult Adult   Pulse: 71 68   Weight: 97.1 kg (214 lb)    Height: 1.651 m (5' 5\")      Body mass index is 35.61 kg/m².  BP Readings from Last 5 Encounters:   21 145/84   21 142/80   21 152/82   21 126/80   21 114/72      Physical Exam  Vitals reviewed.   Constitutional:       General: She is not in acute distress.     Appearance: Normal appearance.   HENT:      Head: Normocephalic and atraumatic.   Neck:      Thyroid: No thyroid mass.      Vascular: Normal carotid pulses.      Trachea: Trachea normal.   Cardiovascular:      Rate and Rhythm: Normal rate and regular rhythm.      Chest Wall: PMI is not displaced.      Pulses: Normal pulses.           Carotid pulses are 2+ on the right side and 2+ on the left side.       Radial " pulses are 2+ on the right side and 2+ on the left side.        Dorsalis pedis pulses are 2+ on the right side and 2+ on the left side.        Posterior tibial pulses are 2+ on the right side and 2+ on the left side.      Heart sounds: Normal heart sounds.   Pulmonary:      Effort: Pulmonary effort is normal.      Breath sounds: Normal breath sounds.   Musculoskeletal:      Cervical back: Full passive range of motion without pain.      Right lower leg: No edema.      Left lower leg: No edema.   Skin:     General: Skin is warm and dry.      Capillary Refill: Capillary refill takes less than 2 seconds.      Coloration: Skin is not cyanotic.      Nails: There is no clubbing.   Neurological:      General: No focal deficit present.      Mental Status: She is alert and oriented to person, place, and time. Mental status is at baseline.      Cranial Nerves: Cranial nerves are intact. No cranial nerve deficit.      Coordination: Coordination is intact. Coordination normal.      Gait: Gait is intact. Gait normal.   Psychiatric:         Mood and Affect: Mood normal.         Behavior: Behavior normal.        Accessory Clinical Findings:     Lab Results   Component Value Date    CHOLSTRLTOT 174 06/22/2021    LDL 77 06/22/2021    HDL 68 06/22/2021    TRIGLYCERIDE 144 06/22/2021      No results found for: LIPOPROTA   No results found for: APOB         Lab Results   Component Value Date    HBA1C 7.0 (A) 06/22/2021      Lab Results   Component Value Date    SODIUM 143 06/22/2021    POTASSIUM 4.4 06/22/2021    CHLORIDE 108 06/22/2021    CO2 23 06/22/2021    GLUCOSE 152 (H) 06/22/2021    BUN 15 06/22/2021    CREATININE 0.75 06/22/2021          Lab Results   Component Value Date    URCREAT 146.86 10/26/2020    MICROALBUR 1.7 10/26/2020    MALBCRT 12 10/26/2020     VASCULAR IMAGING:     Last EKG:  Reviewed     HERNANDEZ 2/2020   Right HERNANDEZ: 1.02  Left HERNANDEZ:  0.79   IMPRESSION:   1.  Post occlusive waveform is noted in the distal left peroneal  artery. Short segment occlusion or high-grade stenosis proximal to this location is likely present, although not directly visualized.   2.  No other evidence of occlusion or significant stenosis bilaterally.   3.  Moderate calcified atherosclerotic plaque is identified in each lower extremity.    CAC 2/2020  Coronary calcification:  LMA - 0.0  LCX - 0.0  LAD - 315  RCA - 260.8   Total Calcium Score: 575.8   Percentile: Calcium score is above the 90th percentile for the patient's age and sex.   Other findings:  Heart: Normal size.  Lungs: Tiny granuloma in the left upper lobe.  Mediastinum: Normal.  Upper abdomen: Normal.  Spondylotic changes of the spine.    MPI 4/2020  NUCLEAR IMAGING INTERPRETATION   Normal left ventricular size, ejection fraction, and wall motion.    Small fixed defect in the inferolateral wall could be artifact. Infarct is in    the differential but consider less likely.     No reversible defect.    Sinus rhythm.   Nondiagnostic ECG portion of a Regadenoson nuclear stress test.   ECG INTERPRETATION   Nondiagnostic ECG portion of a Regadenoson nuclear stress test.    CT abd 2/2021   The abdominal aorta is normal in caliber with aortic atherosclerosis.    CT chest 10/2021   1.  Stable 1.1 cm right upper lobe groundglass density nodule. Continued follow-up is recommended.  2.  Cholelithiasis.  3.  Hepatic steatosis  Cardiac: Heart normal in size without pericardial effusion.   Vascular: Atherosclerotic calcifications of the coronary arteries, aorta and branches...          Silvestre Browning M.D.  [facility]   [facility]   [phone]

## 2021-12-06 NOTE — PATIENT INSTRUCTIONS
SODIUM RESTRICTIONS:   - consume no more than 2,300mg of sodium daily (look at food labels) ,or if you have high BP then reduce to no more than 1,500mg of sodium daily   For more information visit: https://www.[employer]/contents/low-sodium-diet-the-basics/print?lcegpKbp=1101&source=see_link                 Helpful links:     Https://www.nhlbi.nih.gov/files/docs/public/heart/dash_brief.pdf    Https://www.nhlbi.nih.gov/files/docs/public/heart/new_dash.pdf    http://www.[employer]/consumers/ho/nut.dash.diet.pdf    Free 7-day menu download: https://gestigon/DASH-Diet-Meal-Plan-Menu/dp/N19O2Z73UB    - continue all current medications, see updated medication list for changes     BLOOD PRESSURE:  - if you notice BP > 140/>90 for more than 3 days, then contact our office ([facility], [phone]) for further instructions    If you are being treated for blood pressure today: please be advised that stabilizing BP may require multiple med changes and close monitoring over the next several months and initially there may be additional imaging and labs and the possibility of adverse drug reactions. Variability of your blood pressure and heart rate may occur until we have things stabilized.  Please feel free to contact our office as needed for questions or concerns.        SODIUM RESTRICTIONS:   - consume no more than 2,300mg of sodium daily (look at food labels) ,or if you have high BP then reduce to no more than 1,500mg of sodium daily   For more information visit: https://[employer]/contents/low-sodium-diet-the-basics/print?bvrftVbm=0448&source=see_link         DASH DIET:  Overview: (https://www.heart.org/en/health-topics/high-blood-pressure/changes-you-can-make-to-manage-high-blood-pressure/managing-blood-pressure-with-a-heart-healthy-diet)  Tips for shopping:  https://www.[facility].org/healthy-lifestyle/nutrition-and-healthy-eating/in-depth/dash-diet/art-65524338?p=1   DASH is a flexible  and balanced eating plan that helps create a heart-healthy eating style for life.  The DASH eating plan requires no special foods and instead provides daily and weekly nutritional goals. This plan recommends:  · Eating vegetables, fruits, and whole grains  · Including fat-free or low-fat dairy products, fish, poultry, beans, nuts, and vegetable oils  · Limiting foods that are high in saturated fat, such as fatty meats, full-fat dairy products, and tropical oils such as coconut, palm kernel, and palm oils  · Limiting sugar-sweetened beverages and sweets.  Based on these recommendations, the following table shows examples of daily and weekly servings that meet DASH eating plan targets for a 2,908-qoaczol-o-day diet.    CHOLESTEROL-REDUCING DIETS:  1) AHA diet  (http://www.heart.org/en/healthy-living/healthy-eating/eat-smart/nutrition-basics/aha-diet-and-lifestyle-recommendations)   2) Mediterranean diet (http://www.heart.org/en/healthy-living/healthy-eating/eat-smart/nutrition-basics/mediterranean-diet)   3) Lowering triglycerides: (http://my.americanheart.org/idc/groups/ahamah-public/@wc/@sop/@[facility]/documents/downloadable/m_425988.pdf)     PHYSICAL ACTIVITY:  Unless directed otherwise at today's visit or you are physically incapable due to your current health or medical conditions, it is advised per the American Heart Association to engage in moderate to vigorous physical activity such as brisk walking, swimming, cycling, >150 minutes per week at 30-40 minutes per session, 3 to 5 times weekly.      GENERAL HEALTHY DIET ADVICE:  - the USDA food pattern (https://www.cnpp.usda.gov/USDAFoodPatterns)  - plate method (https://www.choosemyplate.gov/)  - consume diet that emphasizes intake of vegetables, fruits, and whole grains,  - use low fat diary products, poultry, fish, legumes, nontropical vegetable oils, nuts  - limit intake of sweets, sugar-sweetned beverages, and red meats   - reduce saturated and trans fats to <6%  of your daily calories

## 2021-12-07 PROBLEM — I25.10 ASCVD (ARTERIOSCLEROTIC CARDIOVASCULAR DISEASE): Status: RESOLVED | Noted: 2020-04-07 | Resolved: 2021-12-07

## 2021-12-16 NOTE — TELEPHONE ENCOUNTER
Patient requests change in pharmacy.     [facility] [id number] - [place] [facility] [place] [address] [address] [address]  Phone: [phone] Fax: [phone]

## 2021-12-27 ENCOUNTER — HOSPITAL ENCOUNTER (OUTPATIENT)
Dept: LAB | Facility: MEDICAL CENTER | Age: 66
End: 2021-12-27
Attending: FAMILY MEDICINE
Payer: MEDICARE

## 2021-12-27 DIAGNOSIS — E11.69 DYSLIPIDEMIA ASSOCIATED WITH TYPE 2 DIABETES MELLITUS (HCC): ICD-10-CM

## 2021-12-27 DIAGNOSIS — I73.9 PAD (PERIPHERAL ARTERY DISEASE) (HCC): ICD-10-CM

## 2021-12-27 DIAGNOSIS — I25.10 CORONARY ARTERY CALCIFICATION SEEN ON CT SCAN: ICD-10-CM

## 2021-12-27 DIAGNOSIS — I10 PRIMARY HYPERTENSION: ICD-10-CM

## 2021-12-27 DIAGNOSIS — E11.51 TYPE 2 DIABETES MELLITUS WITH DIABETIC PERIPHERAL ANGIOPATHY WITHOUT GANGRENE, WITHOUT LONG-TERM CURRENT USE OF INSULIN (HCC): ICD-10-CM

## 2021-12-27 DIAGNOSIS — E78.5 DYSLIPIDEMIA ASSOCIATED WITH TYPE 2 DIABETES MELLITUS (HCC): ICD-10-CM

## 2021-12-27 LAB
ALBUMIN SERPL BCP-MCNC: 4.5 G/DL (ref 3.2–4.9)
ALBUMIN/GLOB SERPL: 1.8 G/DL
ALP SERPL-CCNC: 75 U/L (ref 30–99)
ALT SERPL-CCNC: 27 U/L (ref 2–50)
ANION GAP SERPL CALC-SCNC: 15 MMOL/L (ref 7–16)
AST SERPL-CCNC: 17 U/L (ref 12–45)
BILIRUB SERPL-MCNC: 0.4 MG/DL (ref 0.1–1.5)
BUN SERPL-MCNC: 16 MG/DL (ref 8–22)
CALCIUM SERPL-MCNC: 9.2 MG/DL (ref 8.5–10.5)
CHLORIDE SERPL-SCNC: 108 MMOL/L (ref 96–112)
CHOLEST SERPL-MCNC: 162 MG/DL (ref 100–199)
CO2 SERPL-SCNC: 18 MMOL/L (ref 20–33)
CREAT SERPL-MCNC: 0.72 MG/DL (ref 0.5–1.4)
CREAT UR-MCNC: 381.64 MG/DL
ERYTHROCYTE [DISTWIDTH] IN BLOOD BY AUTOMATED COUNT: 44.9 FL (ref 35.9–50)
EST. AVERAGE GLUCOSE BLD GHB EST-MCNC: 169 MG/DL
FASTING STATUS PATIENT QL REPORTED: NORMAL
GLOBULIN SER CALC-MCNC: 2.5 G/DL (ref 1.9–3.5)
GLUCOSE SERPL-MCNC: 172 MG/DL (ref 65–99)
HBA1C MFR BLD: 7.5 % (ref 4–5.6)
HCT VFR BLD AUTO: 41.4 % (ref 37–47)
HDLC SERPL-MCNC: 78 MG/DL
HGB BLD-MCNC: 13.3 G/DL (ref 12–16)
LDLC SERPL CALC-MCNC: 60 MG/DL
MCH RBC QN AUTO: 30.4 PG (ref 27–33)
MCHC RBC AUTO-ENTMCNC: 32.1 G/DL (ref 33.6–35)
MCV RBC AUTO: 94.5 FL (ref 81.4–97.8)
MICROALBUMIN UR-MCNC: 6.2 MG/DL
MICROALBUMIN/CREAT UR: 16 MG/G (ref 0–30)
PLATELET # BLD AUTO: 213 K/UL (ref 164–446)
PMV BLD AUTO: 10.9 FL (ref 9–12.9)
POTASSIUM SERPL-SCNC: 4.3 MMOL/L (ref 3.6–5.5)
PROT SERPL-MCNC: 7 G/DL (ref 6–8.2)
RBC # BLD AUTO: 4.38 M/UL (ref 4.2–5.4)
SODIUM SERPL-SCNC: 141 MMOL/L (ref 135–145)
TRIGL SERPL-MCNC: 122 MG/DL (ref 0–149)
TSH SERPL DL<=0.005 MIU/L-ACNC: 2.1 UIU/ML (ref 0.38–5.33)
WBC # BLD AUTO: 7.1 K/UL (ref 4.8–10.8)

## 2021-12-27 PROCEDURE — 83695 ASSAY OF LIPOPROTEIN(A): CPT

## 2021-12-27 PROCEDURE — 85027 COMPLETE CBC AUTOMATED: CPT

## 2021-12-27 PROCEDURE — 84443 ASSAY THYROID STIM HORMONE: CPT

## 2021-12-27 PROCEDURE — 80053 COMPREHEN METABOLIC PANEL: CPT

## 2021-12-27 PROCEDURE — 83036 HEMOGLOBIN GLYCOSYLATED A1C: CPT | Mod: GA

## 2021-12-27 PROCEDURE — 82043 UR ALBUMIN QUANTITATIVE: CPT

## 2021-12-27 PROCEDURE — 80061 LIPID PANEL: CPT

## 2021-12-27 PROCEDURE — 36415 COLL VENOUS BLD VENIPUNCTURE: CPT | Mod: GA

## 2021-12-27 PROCEDURE — 82570 ASSAY OF URINE CREATININE: CPT

## 2021-12-29 PROBLEM — E78.41 ELEVATED LIPOPROTEIN(A): Status: ACTIVE | Noted: 2021-12-29

## 2021-12-29 LAB — LPA SERPL-MCNC: 125 MG/DL

## 2022-01-18 DIAGNOSIS — F51.04 CHRONIC INSOMNIA: ICD-10-CM

## 2022-01-19 RX ORDER — TRAZODONE HYDROCHLORIDE 50 MG/1
TABLET ORAL
Qty: 90 TABLET | Refills: 0 | Status: SHIPPED | OUTPATIENT
Start: 2022-01-19 | End: 2022-05-19 | Stop reason: SDUPTHER

## 2022-01-19 NOTE — TELEPHONE ENCOUNTER
Requested Prescriptions     Pending Prescriptions Disp Refills   • traZODone (DESYREL) 50 MG Tab [Pharmacy Med Name: traZODone 50 MG TABLET] 90 Tablet 0     Sig: TAKE ONE TABLET BY MOUTH EVERY NIGHT AT BEDTIME AS NEEDED FOR SLEEP

## 2022-01-31 ENCOUNTER — OFFICE VISIT (OUTPATIENT)
Dept: MEDICAL GROUP | Facility: PHYSICIAN GROUP | Age: 67
End: 2022-01-31
Payer: MEDICARE

## 2022-01-31 VITALS
HEART RATE: 86 BPM | RESPIRATION RATE: 16 BRPM | WEIGHT: 213.1 LBS | DIASTOLIC BLOOD PRESSURE: 78 MMHG | TEMPERATURE: 97.6 F | BODY MASS INDEX: 35.5 KG/M2 | HEIGHT: 65 IN | SYSTOLIC BLOOD PRESSURE: 128 MMHG | OXYGEN SATURATION: 96 %

## 2022-01-31 DIAGNOSIS — E78.5 DYSLIPIDEMIA ASSOCIATED WITH TYPE 2 DIABETES MELLITUS (HCC): ICD-10-CM

## 2022-01-31 DIAGNOSIS — M70.62 GREATER TROCHANTERIC BURSITIS OF LEFT HIP: Primary | ICD-10-CM

## 2022-01-31 DIAGNOSIS — F33.41 RECURRENT MAJOR DEPRESSIVE DISORDER, IN PARTIAL REMISSION (HCC): ICD-10-CM

## 2022-01-31 DIAGNOSIS — E11.69 DYSLIPIDEMIA ASSOCIATED WITH TYPE 2 DIABETES MELLITUS (HCC): ICD-10-CM

## 2022-01-31 DIAGNOSIS — Z12.31 ENCOUNTER FOR SCREENING MAMMOGRAM FOR MALIGNANT NEOPLASM OF BREAST: ICD-10-CM

## 2022-01-31 PROBLEM — I73.9 PAD (PERIPHERAL ARTERY DISEASE) (HCC): Status: RESOLVED | Noted: 2020-02-07 | Resolved: 2022-01-31

## 2022-01-31 PROBLEM — E11.51 TYPE 2 DIABETES MELLITUS WITH DIABETIC PERIPHERAL ANGIOPATHY WITHOUT GANGRENE, WITHOUT LONG-TERM CURRENT USE OF INSULIN (HCC): Status: RESOLVED | Noted: 2021-12-06 | Resolved: 2022-01-31

## 2022-01-31 PROBLEM — M25.552 LEFT HIP PAIN: Status: ACTIVE | Noted: 2022-01-31

## 2022-01-31 PROCEDURE — 99214 OFFICE O/P EST MOD 30 MIN: CPT | Performed by: NURSE PRACTITIONER

## 2022-01-31 ASSESSMENT — PATIENT HEALTH QUESTIONNAIRE - PHQ9
7. TROUBLE CONCENTRATING ON THINGS, SUCH AS READING THE NEWSPAPER OR WATCHING TELEVISION: NOT AT ALL
1. LITTLE INTEREST OR PLEASURE IN DOING THINGS: NOT AT ALL
SUM OF ALL RESPONSES TO PHQ QUESTIONS 1-9: 0
2. FEELING DOWN, DEPRESSED, IRRITABLE, OR HOPELESS: NOT AT ALL
3. TROUBLE FALLING OR STAYING ASLEEP OR SLEEPING TOO MUCH: NOT AT ALL
5. POOR APPETITE OR OVEREATING: NOT AT ALL
SUM OF ALL RESPONSES TO PHQ9 QUESTIONS 1 AND 2: 0
8. MOVING OR SPEAKING SO SLOWLY THAT OTHER PEOPLE COULD HAVE NOTICED. OR THE OPPOSITE, BEING SO FIGETY OR RESTLESS THAT YOU HAVE BEEN MOVING AROUND A LOT MORE THAN USUAL: NOT AT ALL
4. FEELING TIRED OR HAVING LITTLE ENERGY: NOT AT ALL
6. FEELING BAD ABOUT YOURSELF - OR THAT YOU ARE A FAILURE OR HAVE LET YOURSELF OR YOUR FAMILY DOWN: NOT AL ALL
9. THOUGHTS THAT YOU WOULD BE BETTER OFF DEAD, OR OF HURTING YOURSELF: NOT AT ALL

## 2022-01-31 ASSESSMENT — FIBROSIS 4 INDEX: FIB4 SCORE: 1.03

## 2022-01-31 NOTE — ASSESSMENT & PLAN NOTE
Acute condition.  Patient reports a dull pain in her left hip, which is intense.  It has worsened over the past 5 days.  She states that she cannot lay on her left side.  She reports no specific injury.  On examination today, patient is exquisitely tender over the left GTB.  Discussed with patient that I believe she has left greater trochanteric bursitis.  This is often treated with a steroid injection into the area.  As I am not trained to do this for her, I have referred her to sports medicine.  Patient would like to do this soon as possible.

## 2022-02-01 NOTE — PROGRESS NOTES
Subjective:     CC: The primary encounter diagnosis was Greater trochanteric bursitis of left hip. Diagnoses of Dyslipidemia associated with type 2 diabetes mellitus (HCC), Recurrent major depressive disorder, in partial remission (HCC), and Encounter for screening mammogram for malignant neoplasm of breast were also pertinent to this visit.      HPI:   Dunia is a new patient to me today wanting to establish care.  We discussed the following problems:    1. Greater trochanteric bursitis of left hip  Acute condition.  Here for evaluation today.    2. Dyslipidemia associated with type 2 diabetes mellitus (HCC)  Chronic condition, stable.  Here for evaluation today.    3. Recurrent major depressive disorder, in partial remission (HCC)  Chronic condition, stable.  Here for evaluation today.    4. Encounter for screening mammogram for malignant neoplasm of breast  Screening mammogram ordered for patient today.       Past Medical History:   Diagnosis Date   • Hyperlipidemia LDL goal < 100 10/2/2014   • Hypertension    • Obesity (BMI 30-39.9) 10/2/2014   • Pre-diabetes 10/2/2014   • Recurrent sinusitis    • Type II or unspecified type diabetes mellitus without mention of complication, not stated as uncontrolled     pre-diabetes       Social History     Tobacco Use   • Smoking status: Former Smoker     Packs/day: 1.00     Years: 48.00     Pack years: 48.00     Types: Cigarettes     Start date:      Quit date: 2017     Years since quittin.6   • Smokeless tobacco: Never Used   Vaping Use   • Vaping Use: Never used   Substance Use Topics   • Alcohol use: No     Alcohol/week: 0.0 oz   • Drug use: Never       Current Outpatient Medications Ordered in Epic   Medication Sig Dispense Refill   • traZODone (DESYREL) 50 MG Tab TAKE ONE TABLET BY MOUTH EVERY NIGHT AT BEDTIME AS NEEDED FOR SLEEP 90 Tablet 0   • metFORMIN (GLUCOPHAGE) 500 MG Tab TAKE ONE TABLET BY MOUTH TWICE A DAY WITH A MEAL 180 Tablet 0   • amLODIPine  "(NORVASC) 5 MG Tab Take 1 Tablet by mouth at bedtime for 360 days. 90 Tablet 3   • telmisartan (MICARDIS) 80 MG Tab Take 1 Tablet by mouth at bedtime for 360 days. 90 Tablet 3   • PARoxetine (PAXIL) 30 MG Tab TAKE ONE TABLET BY MOUTH DAILY 90 Tablet 3   • rosuvastatin (CRESTOR) 20 MG Tab TAKE ONE TABLET BY MOUTH EVERY EVENING 90 Tablet 3   • ibuprofen (MOTRIN) 200 MG Tab Take 600 mg by mouth 1 time a day as needed (Moderate pain). 3 tablets = 600 mg.     • vitamin D (CHOLECALCIFEROL) 1000 Unit (25 mcg) Tab Take 3,000 Units by mouth every morning. 3 tablets = 3000 units.     • aspirin EC (ECOTRIN) 81 MG Tablet Delayed Response Take 81 mg by mouth every morning.     • albuterol (PROVENTIL) 2.5mg/3ml Nebu Soln solution for nebulization 3 mL by Nebulization route every four hours as needed for Shortness of Breath. 25 Bullet 0     No current Epic-ordered facility-administered medications on file.       Allergies:  Sulfa drugs and Shellfish allergy    Health Maintenance: Completed    ROS:  Gen: no fevers/chills, no changes in weight  Eyes: no changes in vision  ENT: no sore throat, no hearing loss, no bloody nose  Pulm: no sob, no cough  CV: no chest pain, no palpitations  GI: no nausea/vomiting, no diarrhea  : no dysuria  MSk: no myalgias  Skin: no rash  Neuro: no headaches, no numbness/tingling  Heme/Lymph: no easy bruising      Objective:       Exam:  /78   Pulse 86   Temp 36.4 °C (97.6 °F)   Resp 16   Ht 1.651 m (5' 5\")   Wt 96.7 kg (213 lb 1.6 oz)   SpO2 96%   BMI 35.46 kg/m²  Body mass index is 35.46 kg/m².    Gen: Alert and oriented, No apparent distress.  Neck: Neck is supple without lymphadenopathy.  No carotid bruits.  Lungs: Normal effort, CTA bilaterally, no wheezes, rhonchi, or rales  CV: Regular rate and rhythm. No murmurs, rubs, or gallops.  Ext: No clubbing, cyanosis, edema.    Labs: Labs from 12/27/2021 reviewed with patient today.    Assessment & Plan:     66 y.o. female with the following " -     Greater trochanteric bursitis of left hip  Acute condition.  Patient reports a dull pain in her left hip, which is intense.  It has worsened over the past 5 days.  She states that she cannot lay on her left side.  She reports no specific injury.  On examination today, patient is exquisitely tender over the left GTB.  Discussed with patient that I believe she has left greater trochanteric bursitis.  This is often treated with a steroid injection into the area.  As I am not trained to do this for her, I have referred her to sports medicine.  Patient would like to do this soon as possible.    Recurrent major depressive disorder, in partial remission (HCC)  Chronic condition, stable.  Patient takes paroxetine 30 mg daily.  She feels this controls her mood and does not wish to make changes today.  She will continue her current program.    Dyslipidemia associated with type 2 diabetes mellitus (HCC)  Chronic condition, stable.  Patient is currently taking Metformin 500 mg daily.  She takes rosuvastatin 20 mg daily for her dyslipidemia.  She tolerates both medicines without adverse side effects.  Her last hemoglobin A1c from December 2021 was 7.5.  This was up slightly from prior 7.0.  Her lipid panel was entirely with in normal limits, with LDL at 60.  Patient will continue to work on dietary changes.  She will return in 2 months with repeat A1c and CMP.  Then she will follow-up in 6 months intervals thereafter.      Orders Placed This Encounter   • MA-SCREENING MAMMO BILAT W/TOMOSYNTHESIS W/CAD   • HEMOGLOBIN A1C   • Comp Metabolic Panel   • Referral to Sports Medicine          Return in about 2 months (around 3/31/2022).    I have placed the below orders and discussed them with an approved delegating provider.  The MA is performing the below orders under the direction of Madison Hu MD.    Please note that this dictation was created using voice recognition software. I have made every reasonable attempt to  correct obvious errors, but I expect that there are errors of grammar and possibly content that I did not discover before finalizing the note.

## 2022-02-01 NOTE — ASSESSMENT & PLAN NOTE
Chronic condition, stable.  Patient is currently taking Metformin 500 mg daily.  She takes rosuvastatin 20 mg daily for her dyslipidemia.  She tolerates both medicines without adverse side effects.  Her last hemoglobin A1c from December 2021 was 7.5.  This was up slightly from prior 7.0.  Her lipid panel was entirely with in normal limits, with LDL at 60.  Patient will continue to work on dietary changes.  She will return in 2 months with repeat A1c and CMP.  Then she will follow-up in 6 months intervals thereafter.

## 2022-02-03 ENCOUNTER — OFFICE VISIT (OUTPATIENT)
Dept: SPORTS MEDICINE | Facility: CLINIC | Age: 67
End: 2022-02-03
Payer: MEDICARE

## 2022-02-03 VITALS
BODY MASS INDEX: 35.5 KG/M2 | RESPIRATION RATE: 18 BRPM | TEMPERATURE: 98.2 F | HEIGHT: 65 IN | OXYGEN SATURATION: 95 % | SYSTOLIC BLOOD PRESSURE: 138 MMHG | WEIGHT: 213.1 LBS | DIASTOLIC BLOOD PRESSURE: 82 MMHG | HEART RATE: 84 BPM

## 2022-02-03 DIAGNOSIS — M76.02 GLUTEAL TENDINITIS, LEFT HIP: ICD-10-CM

## 2022-02-03 DIAGNOSIS — M70.62 TROCHANTERIC BURSITIS, LEFT HIP: ICD-10-CM

## 2022-02-03 PROCEDURE — 20610 DRAIN/INJ JOINT/BURSA W/O US: CPT | Mod: LT | Performed by: FAMILY MEDICINE

## 2022-02-03 PROCEDURE — 99213 OFFICE O/P EST LOW 20 MIN: CPT | Mod: 25 | Performed by: FAMILY MEDICINE

## 2022-02-03 RX ORDER — TRIAMCINOLONE ACETONIDE 40 MG/ML
40 INJECTION, SUSPENSION INTRA-ARTICULAR; INTRAMUSCULAR ONCE
Status: COMPLETED | OUTPATIENT
Start: 2022-02-03 | End: 2022-02-03

## 2022-02-03 RX ADMIN — TRIAMCINOLONE ACETONIDE 40 MG: 40 INJECTION, SUSPENSION INTRA-ARTICULAR; INTRAMUSCULAR at 10:49

## 2022-02-03 ASSESSMENT — ENCOUNTER SYMPTOMS
SHORTNESS OF BREATH: 0
NAUSEA: 0
VOMITING: 0
HEADACHES: 1
FEVER: 0
CHILLS: 0
DIZZINESS: 0

## 2022-02-03 ASSESSMENT — FIBROSIS 4 INDEX: FIB4 SCORE: 1.03

## 2022-02-03 NOTE — PROCEDURES
PROCEDURE NOTE:  left HIP, trochanteric corticosteroid injection  Consent was obtained, using sterile technique the hip was prepped and 5 ml's of 2% Marcaine  lateral approach.   Vapocoolant spray used  kenalog 40 mg and 5 ml 2% lidocaine was injected and the needle withdrawn.  The procedure was well tolerated.    Watch for fever, or increased swelling or persistent pain in injection site.   Call or return to clinic prn if such symptoms occur or the hip fails to improve as anticipated.

## 2022-02-03 NOTE — Clinical Note
Adam Steel,  Thank you for referring Sarah to our sports medicine clinic.  I agree with your assessment of trochanteric bursitis.  Her symptoms are pretty severe.  We did provide her with a corticosteroid injection of the trochanteric bursa at today's visit.  We also recommended some formal physical therapy and provided her with some home exercises to start working on in the meantime.  We will see her back next month to see how things are coming along.  Hope you are well!  L

## 2022-02-03 NOTE — PROGRESS NOTES
Chief Complaint   Patient presents with   • Hip Pain     Referral from PCP/ L hip pain        Subjective     Referred by WILLY Marrero  for evaluation of LEFT hip pain  Insidious onset a few years ago without any specific incident or injury  WORSE around the third week of January 2022  Pressure type pain at the LEFT lateral hip  No radiation  Improved with resting and heating pad as well as ice pack  Worse with walking/ambulation  POSITIVE night symptoms, worse with sleeping on the LEFT side  Denies any prior injuries or issues to the LEFT hip  Ibuprofen, which does help  Seen by PCP and referred for further evaluation and management    Retired  Activities include raising 7 y/o her granddaughter    Review of Systems   Constitutional: Negative for chills and fever.   Respiratory: Negative for shortness of breath.    Cardiovascular: Negative for chest pain.   Gastrointestinal: Negative for nausea and vomiting.   Neurological: Positive for headaches. Negative for dizziness.       PMH:  has a past medical history of Hyperlipidemia LDL goal < 100 (10/2/2014), Hypertension, Obesity (BMI 30-39.9) (10/2/2014), Pre-diabetes (10/2/2014), Recurrent sinusitis, and Type II or unspecified type diabetes mellitus without mention of complication, not stated as uncontrolled. She also has no past medical history of ASTHMA or Diabetes.  MEDS:   Current Outpatient Medications:   •  traZODone (DESYREL) 50 MG Tab, TAKE ONE TABLET BY MOUTH EVERY NIGHT AT BEDTIME AS NEEDED FOR SLEEP, Disp: 90 Tablet, Rfl: 0  •  metFORMIN (GLUCOPHAGE) 500 MG Tab, TAKE ONE TABLET BY MOUTH TWICE A DAY WITH A MEAL, Disp: 180 Tablet, Rfl: 0  •  amLODIPine (NORVASC) 5 MG Tab, Take 1 Tablet by mouth at bedtime for 360 days., Disp: 90 Tablet, Rfl: 3  •  telmisartan (MICARDIS) 80 MG Tab, Take 1 Tablet by mouth at bedtime for 360 days., Disp: 90 Tablet, Rfl: 3  •  PARoxetine (PAXIL) 30 MG Tab, TAKE ONE TABLET BY MOUTH DAILY, Disp: 90 Tablet, Rfl: 3  •   "rosuvastatin (CRESTOR) 20 MG Tab, TAKE ONE TABLET BY MOUTH EVERY EVENING, Disp: 90 Tablet, Rfl: 3  •  ibuprofen (MOTRIN) 200 MG Tab, Take 600 mg by mouth 1 time a day as needed (Moderate pain). 3 tablets = 600 mg., Disp: , Rfl:   •  vitamin D (CHOLECALCIFEROL) 1000 Unit (25 mcg) Tab, Take 3,000 Units by mouth every morning. 3 tablets = 3000 units., Disp: , Rfl:   •  aspirin EC (ECOTRIN) 81 MG Tablet Delayed Response, Take 81 mg by mouth every morning., Disp: , Rfl:   •  albuterol (PROVENTIL) 2.5mg/3ml Nebu Soln solution for nebulization, 3 mL by Nebulization route every four hours as needed for Shortness of Breath., Disp: 25 Bullet, Rfl: 0  ALLERGIES:   Allergies   Allergen Reactions   • Sulfa Drugs Hives   • Shellfish Allergy Itching     \"Itchy eyes\"     SURGHX:   Past Surgical History:   Procedure Laterality Date   • PA LAP,APPENDECTOMY N/A 2/12/2021    Procedure: APPENDECTOMY, LAPAROSCOPIC;  Surgeon: Juan Dawson M.D.;  Location: SURGERY [facility];  Service: General   • ABDOMINAL HYSTERECTOMY TOTAL  1993    benign cysts, total     SOCHX:  reports that she quit smoking about 4 years ago. Her smoking use included cigarettes. She started smoking about 54 years ago. She has a 48.00 pack-year smoking history. She has never used smokeless tobacco. She reports that she does not drink alcohol and does not use drugs.  FH: Family history was reviewed, no pertinent findings to report    Objective   /82 (BP Location: Left arm, Patient Position: Sitting, BP Cuff Size: Large adult)   Pulse 84   Temp 36.8 °C (98.2 °F) (Temporal)   Resp 18   Ht 1.651 m (5' 5\")   Wt 96.7 kg (213 lb 1.6 oz)   SpO2 95%   BMI 35.46 kg/m²     HIP EXAM:  NORMAL gait    Right hip: Range of motion is intact  NEGATIVE pain with internal rotation  shira's test is NEGATIVE  NO tenderness of the trochanteric bursa  NO tenderness of the gluteus medius  Cristian's test is NEGATIVE    Left hip: Range of motion is intact  NEGATIVE pain with " internal rotation  shira's test is NEGATIVE  POSITIVE tenderness of the trochanteric bursa  POSITIVE tenderness of the gluteus medius  Cristian's test is NEGATIVE    1. Trochanteric bursitis, left hip  Referral to Physical Therapy    triamcinolone acetonide (KENALOG-40) injection 40 mg   2. Gluteal tendinitis, left hip  Referral to Physical Therapy     Insidious onset a few years ago without any specific incident or injury  WORSE around the third week of January 2022  Pressure type pain at the LEFT lateral hip    LEFT hip trochanteric bursa corticosteroid injection performed the office TODAY (February 3, 2022)    She was also provided with some home exercises  Given the length of time she has been suffering with symptoms and her level of pain and presentation, I recommend she does undergo some formal physical therapy to work on her issue    Return in about 4 weeks (around 3/3/2022).  To see how she is doing after LEFT trochanteric bursa corticosteroid injection and see if she started formal physical therapy at that point    Thank you WILLY Marrero for allowing me to participate in caring for your patient.

## 2022-02-14 ENCOUNTER — TELEPHONE (OUTPATIENT)
Dept: MEDICAL GROUP | Facility: PHYSICIAN GROUP | Age: 67
End: 2022-02-14

## 2022-02-14 DIAGNOSIS — E78.5 DYSLIPIDEMIA ASSOCIATED WITH TYPE 2 DIABETES MELLITUS (HCC): ICD-10-CM

## 2022-02-14 DIAGNOSIS — L60.0 INGROWN TOENAIL OF RIGHT FOOT: ICD-10-CM

## 2022-02-14 DIAGNOSIS — E11.69 DYSLIPIDEMIA ASSOCIATED WITH TYPE 2 DIABETES MELLITUS (HCC): ICD-10-CM

## 2022-02-14 NOTE — TELEPHONE ENCOUNTER
VOICEMAIL  1. Caller Name: Dunia                        Call Back Number: [phone] (home)       2. Message: Pt called asking for a referral to a podiatrist and a new Rx refill for Accucheck test strips.    3. Patient approves office to leave a detailed voicemail/MyChart message: N\A

## 2022-02-14 NOTE — TELEPHONE ENCOUNTER
1. Caller Name: Dunia                          Call Back Number: [phone] (home)         How would the patient prefer to be contacted with a response: Phone call do NOT leave a detailed message      When I called the pt back she asked for a referral to a podiatrist for bilateral callouses on her feet and an Ingrown toenail on her right foot.     Pt states that a dietician that she saw got her an accucheck meter but no test strips. Pt would like an Rx for Accucheck test strips.

## 2022-02-15 ENCOUNTER — OFFICE VISIT (OUTPATIENT)
Dept: VASCULAR LAB | Facility: MEDICAL CENTER | Age: 67
End: 2022-02-15
Attending: INTERNAL MEDICINE
Payer: MEDICARE

## 2022-02-15 VITALS
SYSTOLIC BLOOD PRESSURE: 121 MMHG | BODY MASS INDEX: 33.43 KG/M2 | HEIGHT: 66 IN | HEART RATE: 78 BPM | WEIGHT: 208 LBS | DIASTOLIC BLOOD PRESSURE: 78 MMHG

## 2022-02-15 DIAGNOSIS — E78.5 HYPERLIPIDEMIA LDL GOAL <70: ICD-10-CM

## 2022-02-15 DIAGNOSIS — E78.5 DYSLIPIDEMIA ASSOCIATED WITH TYPE 2 DIABETES MELLITUS (HCC): ICD-10-CM

## 2022-02-15 DIAGNOSIS — E11.69 DYSLIPIDEMIA ASSOCIATED WITH TYPE 2 DIABETES MELLITUS (HCC): ICD-10-CM

## 2022-02-15 DIAGNOSIS — I10 ESSENTIAL HYPERTENSION: ICD-10-CM

## 2022-02-15 DIAGNOSIS — E78.41 ELEVATED LIPOPROTEIN(A): ICD-10-CM

## 2022-02-15 DIAGNOSIS — E11.51 TYPE 2 DIABETES MELLITUS WITH DIABETIC PERIPHERAL ANGIOPATHY WITHOUT GANGRENE, WITHOUT LONG-TERM CURRENT USE OF INSULIN (HCC): ICD-10-CM

## 2022-02-15 PROCEDURE — 99212 OFFICE O/P EST SF 10 MIN: CPT

## 2022-02-15 PROCEDURE — 99214 OFFICE O/P EST MOD 30 MIN: CPT | Performed by: NURSE PRACTITIONER

## 2022-02-15 RX ORDER — METFORMIN HYDROCHLORIDE EXTENDED-RELEASE TABLETS 1000 MG/1
1000 TABLET, FILM COATED, EXTENDED RELEASE ORAL
Qty: 180 TABLET | Refills: 3 | Status: SHIPPED | OUTPATIENT
Start: 2022-02-15 | End: 2023-02-15

## 2022-02-15 ASSESSMENT — ENCOUNTER SYMPTOMS
CLAUDICATION: 0
WEAKNESS: 0
BRUISES/BLEEDS EASILY: 0
CHILLS: 0
FEVER: 0
PALPITATIONS: 0
MYALGIAS: 0
COUGH: 0
ORTHOPNEA: 0
NAUSEA: 0

## 2022-02-15 ASSESSMENT — FIBROSIS 4 INDEX: FIB4 SCORE: 1.03

## 2022-02-15 NOTE — PROGRESS NOTES
[facility] - Follow Up EVALUATION   02/15/2022    ASSESSEMENT AND PLAN:      1. BLOOD PRESSURE CONTROL   Qualifies as Resistant HTN (RH):  No, not on optimized, max-dosed or max-tolerated meds from 3 classes   Office BP Goal ACC/AHA (2017) goal <130/80  Home BP at goal:  No  Office BP at goal:  yes  24h ABPM:  UNDECIDED  Device candidate? Not for further current as on 3 meds  Contributing factors: obesity  Much improved on office readings   Home readings 120-150/70-80's  Unclear accuracy of home monitor  Plan:   Monitoring:   - start/continue home BP monitoring, reviewed correct technique:  Yes   - order 24h ABPM: consider in future  - bring monitor to next visit to check against office machine    - monitor lytes/gfr routinely   - advised that stabilizing BP may require multiple med changes and close monitoring over the next several months, reviewed that initially there will be additional imaging and labs and the possibility of adverse drug rxn's and variability of his BP and HR until we have things stabilized.  Advised to contact our office as needed for questions or concerns.      2. WORK UP FOR SECONDARY CAUSES OF RH:  Obstructive Sleep Apnea: Not Yet Assessed  Renal parenchymal disease: Excluded  Renovascular HTN: Not Yet Assessed  Primary Aldosteronism: Not Yet Assessed  Thyroid Function: Excluded  Pheochromocytoma: Not Yet Assessed   Cushing's:   Not Yet Assessed   Coarctation of Aorta: excluded  Other: none identified    Recommendations At This Visit: as noted in orders         3. MEDICATION USE / ADHERENCE:   Assessment: Complete  Recommendations: Instructed to Continue Taking All Medications As Prescribed         4. HTN-MEDIATED END-ORGAN DAMAGE:  Left Ventricular Hypertrophy: Absent on  ECG Date: 2020  Urine Albuminuria: Not Yet Assessed on Date: pending  Renal Function: Chronic Kidney Disease  CKD G2 at worst   Ophthalmologic: Absent per patient report and/or eye specialist   Established  Cardiovascular Disease:     # CAD per CT scan - continue current med mgmt, no further surveillance and no prior ASCVD events     # PAD, LLE inflow - stable, limited sx   - continue exercise, TLC, med mgmt     5. LIFESTYLE INTERVENTIONS:    SMOKING:   reports that she quit smoking about 4 years ago. Her smoking use included cigarettes. She started smoking about 54 years ago. She has a 48.00 pack-year smoking history. She has never used smokeless tobacco.   - continued complete avoidance of all tobacco products     PHYSICAL ACTIVITY: continue healthy activity to improve CV fitness.  In general, targeting >150min/week of moderate-level activity.  Additional details reviewed with patient and/or outlined in care instructions     WEIGHT MANAGEMENT AND NUTRITION: Dietary plan was discussed with patient at this visit including DASH-dietary approaches, substitution of MUFA/PUFA for saturated fats, substituting whole grains for simple carbs, substituting lean meats for fatty meats, increase use of plant-based protein vs animal-based, lowered sodium.  Additional details reviewed with patient and/or outlined in care instructions.    6. STANDARD HTN PHARMACOTHERAPY:  ACEI/ARB: continue telmisartan 80mg daily   Thiazide Type Diuretic: consider as next agent   DHP-CCB: continue amlodipine 5mg QHS     7. ADDITIONAL AGENTS   Aldosterone Receptor Antagonist: not indicated   Loop Diuretic: not indicated   Peripheral Alpha Blocker: not indicated   BB: not indicated, so I have stopped metoprolol as unlikely beneficial for BP at current dosing    Non-DHP-CCB: not indicated   Direct Vasodilator: not indicated   Centrally Acting Alpha Agonist: not indicated   Potassium-sparing diuretic: not indicated   DRI: not indicated     LIPID MANAGEMENT:   Qualifies for Statin Therapy Based on 2018 ACC/AHA Guidelines: yes, Secondary Prevention - Very high risk ASCVD - 2 major events or 1 event with other high-risk conditions, Diabetes and HERNANDEZ  "<0.9  The 10-year ASCVD risk score (Bill DANA Jr., et al., 2013) is: 12.6%, 7.5 - <20% \"intermediate risk\"   Major ASCVD events: Symptomatic PAD (hx of claudication with HERNANDEZ <0.85, previous revascularization/amputation  High-risk conditions: >64yr old , Diabetes , Hypertension  and N/A  Risk-enhancers: N/A  Currently on Statin: Yes  Treatment goals: reduce LDL-C >50%  and LDL-C <70 (consider non-HDL-C <100, apoB <80)  At goal? yes  Plan:   - reinforced ongoing TLC measures as noted   - monitor labs and lp(a)  Meds:   - continue rosuvastatin 20mg daily, consider intensification and/or zetia     GLYCEMIA MANAGEMENT:  Diabetic   Goal A1c < 7.0  Lab Results   Component Value Date    HBA1C 7.5 (H) 12/27/2021      Lab Results   Component Value Date/Time    MALBCRT 16 12/27/2021 10:40 AM    MICROALBUR 6.2 12/27/2021 10:40 AM   Recommend aggressive treatment due to pt's overall risk.  Increase metformin as discussed below, if does not tolerate would trial different drug class.   Plan:  - change to metformin ER 1000mg twice daily    - consider SGLT2i   - continue to see dietician   - consider referral to T2DM clinic in future if continues to struggle with FSBS monitoring at home   - recommmend for routine care with PCP (or endocrine) to include regular A1c monitoring, annual albumin/creatinine ratio (ACR), annual diabetic retinopathy screening, foot exams, annual flu vaccine, and updates to pneumonia vaccines as appropriate     ANTIPLATELET/ANTICOAGULANT THERAPY:  Continue ASA 81mg     OTHER ISSUES:     # MDD, recurrent - stable, continue current meds, f/u with PCP    # GERD, restart PPI consistently.  No CP today.  If CP returns, given warning s/s to seek immediate medical attention to rule out cardiac involvement. Pt states understanding.    Studies Ordered At This Visit: none    Blood Work to Be Obtained Prior to Next Visit: as noted below   Disposition: 2 months for A1C in visit     1. Type 2 diabetes mellitus with " diabetic peripheral angiopathy without gangrene, without long-term current use of insulin (HCC)  metFORMIN ER 1000 MG TABLET SR 24 HR   2. Dyslipidemia associated with type 2 diabetes mellitus (HCC)     3. Hyperlipidemia     4. Essential hypertension     5. Elevated lipoprotein(a)         History of Present Illness:   Dunia Her is a female seen for evaluation of resistant HTN and management.   Dunia Her is initially referred by Ref Not In Computer     Subjective    HTN:  No major sx.  Has seen cardiology and pharm HTN clinic to date.    Home BP lo-150's/70-80's  24h ABPM completed: consider in future   Adherence to current HTN meds: compliant all of the time  Hx of clinical ASCVD events: Symptomatic PAD (hx of claudication with HERNANDEZ <0.85, previous revascularization/amputation   Recent increase in dull chest pain which she attributes to GERD since she stopped her PPI recently   Lifestyle factors:  Weight Change: stable   BMI Readings from Last 5 Encounters:   02/15/22 34.09 kg/m²   22 35.46 kg/m²   22 35.46 kg/m²   21 35.61 kg/m²   21 35.78 kg/m²     Diet pattern: low red meat, increase veg, has lost weight on Med diet in past, continue with RD   Daily salt intake estimate:  Low   - none   EtOH: No  Exercise habits: minimal exercise  Perceived barriers to care: leg pain  Pertinent HTN hx (reviewed at initial visit):   Age at HTN dx: several years   (if female, any hx of pregnancy-related HTN): n/a   Past HTN medications: as noted   HTN crises:  No prior hospitalization or crises   Interfering substances/contributing factors:  None    Hyperlipidemia:    Stable, tolerating rosuva   Clinical evidence of ASCVD: Symptomatic PAD (hx of claudication with HERNANDEZ <0.85, previous revascularization/amputation    Antiplatelet/anticoagulation: Yes, Details: ASA, no bleeding noted     T2D:  A1C increasing   Only taking metformin 500mg once daily- been on this for a long time  Not able  to check finger sticks at home due to difficulty with monitor- may benefit from T2DM clinic in future  No current symptoms reported.   Tolerating meds and no recent med changes.   Last A1c   Lab Results   Component Value Date    HBA1C 7.5 (H) 12/27/2021     Lab Results   Component Value Date/Time    MALBCRT 16 12/27/2021 10:40 AM    MICROALBUR 6.2 12/27/2021 10:40 AM       CKD: No     Sleeping disorder/LARA: No     Hypothyroidism: No      Problem List:     Patient Active Problem List    Diagnosis Date Noted   • Greater trochanteric bursitis of left hip 01/31/2022   • Elevated lipoprotein(a) 12/29/2021   • Obesity, Class II, BMI 35-39.9 12/06/2021   • Healthcare maintenance 06/17/2021   • Acute appendicitis with appendiceal abscess 02/12/2021   • Depression 02/12/2021   • Essential hypertension 10/06/2020   • Coronary artery calcification seen on CT scan 04/07/2020   • Other specified personal risk factors, not elsewhere classified 02/07/2020   • Bronchitis 01/22/2020   • Recurrent major depressive disorder, in partial remission (HCC) 02/10/2019   • Left elbow pain 09/04/2018   • Rash 09/04/2018   • Chronic insomnia 04/18/2016   • Dyslipidemia associated with type 2 diabetes mellitus (HCC) 10/02/2014   • BMI 36.0-36.9,adult 10/02/2014   • Hyperlipidemia 10/02/2014       Current Outpatient Medications:   •  metFORMIN ER, 1,000 mg, Oral, PM MEAL  •  Blood Glucose Test Strips, Use one Accucheck Tana strip to test blood sugar three times daily before meals., Taking  •  traZODone, TAKE ONE TABLET BY MOUTH EVERY NIGHT AT BEDTIME AS NEEDED FOR SLEEP, Taking  •  amLODIPine, 5 mg, Oral, QHS, Taking  •  telmisartan, 80 mg, Oral, QHS, Taking  •  PARoxetine, TAKE ONE TABLET BY MOUTH DAILY, Taking  •  rosuvastatin, TAKE ONE TABLET BY MOUTH EVERY EVENING, Taking  •  ibuprofen, 600 mg, Oral, QDAY PRN, Taking  •  vitamin D3, 3,000 Units, Oral, QAM, Taking  •  aspirin EC, 81 mg, Oral, QAM, Taking  •  albuterol, 2.5 mg, Nebulization,  "Q4HRS PRN, Taking   Sulfa drugs and Shellfish allergy     Social History:     Social History     Tobacco Use   • Smoking status: Former Smoker     Packs/day: 1.00     Years: 48.00     Pack years: 48.00     Types: Cigarettes     Start date:      Quit date: 2017     Years since quittin.6   • Smokeless tobacco: Never Used   Vaping Use   • Vaping Use: Never used   Substance Use Topics   • Alcohol use: No     Alcohol/week: 0.0 oz   • Drug use: Never       Review of Systems:   Review of Systems   Constitutional: Negative for chills and fever.   Respiratory: Negative for cough.    Cardiovascular: Negative for chest pain, palpitations, orthopnea, claudication and leg swelling.   Gastrointestinal: Negative for nausea.   Musculoskeletal: Negative for myalgias.   Neurological: Negative for weakness.   Endo/Heme/Allergies: Does not bruise/bleed easily.         Objective     Objective:     Vitals:    02/15/22 1030 02/15/22 1034   BP: 144/83 121/78   BP Location: Left arm Left arm   Patient Position: Sitting Sitting   BP Cuff Size: Adult Adult   Pulse: 82 78   Weight: 94.3 kg (208 lb)    Height: 1.664 m (5' 5.5\")      Body mass index is 34.09 kg/m².  BP Readings from Last 5 Encounters:   02/15/22 121/78   22 138/82   22 128/78   21 145/84   21 142/80      Physical Exam  Vitals reviewed.   Constitutional:       General: She is not in acute distress.     Appearance: Normal appearance.   HENT:      Head: Normocephalic and atraumatic.   Neck:      Thyroid: No thyroid mass.      Vascular: Normal carotid pulses.      Trachea: Trachea normal.   Cardiovascular:      Rate and Rhythm: Normal rate and regular rhythm.      Chest Wall: PMI is not displaced.      Pulses: Normal pulses.           Carotid pulses are 2+ on the right side and 2+ on the left side.       Radial pulses are 2+ on the right side and 2+ on the left side.        Dorsalis pedis pulses are 2+ on the right side and 2+ on the left side.    "     Posterior tibial pulses are 2+ on the right side and 2+ on the left side.      Heart sounds: Normal heart sounds.   Pulmonary:      Effort: Pulmonary effort is normal.      Breath sounds: Normal breath sounds.   Musculoskeletal:      Cervical back: Full passive range of motion without pain.      Right lower leg: No edema.      Left lower leg: No edema.   Skin:     General: Skin is warm and dry.      Capillary Refill: Capillary refill takes less than 2 seconds.      Coloration: Skin is not cyanotic.      Nails: There is no clubbing.   Neurological:      General: No focal deficit present.      Mental Status: She is alert and oriented to person, place, and time. Mental status is at baseline.      Cranial Nerves: Cranial nerves are intact. No cranial nerve deficit.      Coordination: Coordination is intact. Coordination normal.      Gait: Gait is intact. Gait normal.   Psychiatric:         Mood and Affect: Mood normal.         Behavior: Behavior normal.        Accessory Clinical Findings:     Lab Results   Component Value Date    CHOLSTRLTOT 162 12/27/2021    LDL 60 12/27/2021    HDL 78 12/27/2021    TRIGLYCERIDE 122 12/27/2021      Lab Results   Component Value Date/Time    LIPOPROTA 125 (H) 12/27/2021 10:41 AM      No results found for: APOB         Lab Results   Component Value Date    HBA1C 7.5 (H) 12/27/2021      Lab Results   Component Value Date    SODIUM 141 12/27/2021    POTASSIUM 4.3 12/27/2021    CHLORIDE 108 12/27/2021    CO2 18 (L) 12/27/2021    GLUCOSE 172 (H) 12/27/2021    BUN 16 12/27/2021    CREATININE 0.72 12/27/2021          Lab Results   Component Value Date    URCREAT 381.64 12/27/2021    MICROALBUR 6.2 12/27/2021    MALBCRT 16 12/27/2021     VASCULAR IMAGING:     Last EKG:  Reviewed     HERNANDEZ 2/2020   Right HERNANDEZ: 1.02  Left HERNANDEZ:  0.79   IMPRESSION:   1.  Post occlusive waveform is noted in the distal left peroneal artery. Short segment occlusion or high-grade stenosis proximal to this location is  likely present, although not directly visualized.   2.  No other evidence of occlusion or significant stenosis bilaterally.   3.  Moderate calcified atherosclerotic plaque is identified in each lower extremity.    CAC 2/2020  Coronary calcification:  LMA - 0.0  LCX - 0.0  LAD - 315  RCA - 260.8   Total Calcium Score: 575.8   Percentile: Calcium score is above the 90th percentile for the patient's age and sex.   Other findings:  Heart: Normal size.  Lungs: Tiny granuloma in the left upper lobe.  Mediastinum: Normal.  Upper abdomen: Normal.  Spondylotic changes of the spine.    MPI 4/2020  NUCLEAR IMAGING INTERPRETATION   Normal left ventricular size, ejection fraction, and wall motion.    Small fixed defect in the inferolateral wall could be artifact. Infarct is in    the differential but consider less likely.     No reversible defect.    Sinus rhythm.   Nondiagnostic ECG portion of a Regadenoson nuclear stress test.   ECG INTERPRETATION   Nondiagnostic ECG portion of a Regadenoson nuclear stress test.    CT abd 2/2021   The abdominal aorta is normal in caliber with aortic atherosclerosis.    CT chest 10/2021   1.  Stable 1.1 cm right upper lobe groundglass density nodule. Continued follow-up is recommended.  2.  Cholelithiasis.  3.  Hepatic steatosis  Cardiac: Heart normal in size without pericardial effusion.   Vascular: Atherosclerotic calcifications of the coronary arteries, aorta and branches...          MARIAN Pennington.  [facility]   [facility]   [phone]

## 2022-03-03 ENCOUNTER — OFFICE VISIT (OUTPATIENT)
Dept: SPORTS MEDICINE | Facility: CLINIC | Age: 67
End: 2022-03-03
Payer: MEDICARE

## 2022-03-03 VITALS
SYSTOLIC BLOOD PRESSURE: 114 MMHG | RESPIRATION RATE: 16 BRPM | BODY MASS INDEX: 33.43 KG/M2 | HEIGHT: 66 IN | OXYGEN SATURATION: 96 % | TEMPERATURE: 98.1 F | DIASTOLIC BLOOD PRESSURE: 72 MMHG | WEIGHT: 208 LBS | HEART RATE: 80 BPM

## 2022-03-03 DIAGNOSIS — M76.02 GLUTEAL TENDINITIS, LEFT HIP: ICD-10-CM

## 2022-03-03 DIAGNOSIS — M70.62 TROCHANTERIC BURSITIS, LEFT HIP: ICD-10-CM

## 2022-03-03 PROCEDURE — 99213 OFFICE O/P EST LOW 20 MIN: CPT | Performed by: FAMILY MEDICINE

## 2022-03-03 ASSESSMENT — FIBROSIS 4 INDEX: FIB4 SCORE: 1.03

## 2022-03-03 NOTE — PROGRESS NOTES
"Chief Complaint   Patient presents with   • Hip Pain     Referral from PCP/ L hip pain        Subjective     Referred by WILLY Marrero  for evaluation of LEFT hip pain  Insidious onset a few years ago without any specific incident or injury  WORSE around the third week of January 2022  Pressure type pain at the LEFT lateral hip  No radiation  Improved with resting and heating pad as well as ice pack  Worse with walking/ambulation  POSITIVE night symptoms, worse with sleeping on the LEFT side  Denies any prior injuries or issues to the LEFT hip    After LEFT trochanteric bursa corticosteroid injection she is improved, but still in quite a bit of pain  And she is still having night sxs  Limping    Retired  Activities include raising 7 y/o her granddaughter      Objective   /72 (BP Location: Left arm, Patient Position: Sitting, BP Cuff Size: Large adult)   Pulse 80   Temp 36.7 °C (98.1 °F) (Temporal)   Resp 16   Ht 1.664 m (5' 5.5\")   Wt 94.3 kg (208 lb)   SpO2 96%   BMI 34.09 kg/m²     HIP EXAM:  NORMAL gait    Right hip: Range of motion is intact  NEGATIVE pain with internal rotation  shira's test is NEGATIVE  NO tenderness of the trochanteric bursa  NO tenderness of the gluteus medius  Cristian's test is NEGATIVE    Left hip: Range of motion is intact  NEGATIVE pain with internal rotation  shira's test is NEGATIVE  POSITIVE tenderness of the trochanteric bursa  POSITIVE tenderness of the gluteus medius  Cristian's test is NEGATIVE    1. Trochanteric bursitis, left hip  Referral to Physical Therapy   2. Gluteal tendinitis, left hip  Referral to Physical Therapy     Referral to PT   Juan Manuel cooley    Insidious onset a few years ago without any specific incident or injury  WORSE around the third week of January 2022  Pressure type pain at the LEFT lateral hip    LEFT hip trochanteric bursa corticosteroid injection performed (February 3, 2022) helped some    She was also provided with some home " exercises  Given the length of time she has been suffering with symptoms and her level of pain and presentation, I recommend she does undergo some formal physical therapy to work on her issue    Return in about 4 weeks (around 3/31/2022).  Unfortunately, she did not start physical therapy because they are booked out until late April  Therapy reordered    Thank you WILLY Marrero for allowing me to participate in caring for your patient.

## 2022-03-21 ENCOUNTER — HOSPITAL ENCOUNTER (OUTPATIENT)
Dept: LAB | Facility: MEDICAL CENTER | Age: 67
End: 2022-03-21
Attending: NURSE PRACTITIONER
Payer: MEDICARE

## 2022-03-21 DIAGNOSIS — E78.5 DYSLIPIDEMIA ASSOCIATED WITH TYPE 2 DIABETES MELLITUS (HCC): ICD-10-CM

## 2022-03-21 DIAGNOSIS — E11.69 DYSLIPIDEMIA ASSOCIATED WITH TYPE 2 DIABETES MELLITUS (HCC): ICD-10-CM

## 2022-03-21 LAB
ALBUMIN SERPL BCP-MCNC: 4.5 G/DL (ref 3.2–4.9)
ALBUMIN/GLOB SERPL: 2 G/DL
ALP SERPL-CCNC: 74 U/L (ref 30–99)
ALT SERPL-CCNC: 23 U/L (ref 2–50)
ANION GAP SERPL CALC-SCNC: 12 MMOL/L (ref 7–16)
AST SERPL-CCNC: 18 U/L (ref 12–45)
BILIRUB SERPL-MCNC: 0.2 MG/DL (ref 0.1–1.5)
BUN SERPL-MCNC: 22 MG/DL (ref 8–22)
CALCIUM SERPL-MCNC: 9.5 MG/DL (ref 8.5–10.5)
CHLORIDE SERPL-SCNC: 108 MMOL/L (ref 96–112)
CO2 SERPL-SCNC: 21 MMOL/L (ref 20–33)
CREAT SERPL-MCNC: 0.68 MG/DL (ref 0.5–1.4)
EST. AVERAGE GLUCOSE BLD GHB EST-MCNC: 160 MG/DL
GFR SERPLBLD CREATININE-BSD FMLA CKD-EPI: 95 ML/MIN/1.73 M 2
GLOBULIN SER CALC-MCNC: 2.3 G/DL (ref 1.9–3.5)
GLUCOSE SERPL-MCNC: 159 MG/DL (ref 65–99)
HBA1C MFR BLD: 7.2 % (ref 4–5.6)
POTASSIUM SERPL-SCNC: 4.4 MMOL/L (ref 3.6–5.5)
PROT SERPL-MCNC: 6.8 G/DL (ref 6–8.2)
SODIUM SERPL-SCNC: 141 MMOL/L (ref 135–145)

## 2022-03-21 PROCEDURE — 36415 COLL VENOUS BLD VENIPUNCTURE: CPT

## 2022-03-21 PROCEDURE — 83036 HEMOGLOBIN GLYCOSYLATED A1C: CPT | Mod: GA

## 2022-03-21 PROCEDURE — 80053 COMPREHEN METABOLIC PANEL: CPT

## 2022-03-22 RX ORDER — METFORMIN HYDROCHLORIDE 500 MG/1
1000 TABLET, EXTENDED RELEASE ORAL DAILY
COMMUNITY
Start: 2022-02-15 | End: 2022-04-13

## 2022-04-05 ENCOUNTER — OFFICE VISIT (OUTPATIENT)
Dept: MEDICAL GROUP | Facility: PHYSICIAN GROUP | Age: 67
End: 2022-04-05
Payer: MEDICARE

## 2022-04-05 VITALS
WEIGHT: 209.3 LBS | HEART RATE: 78 BPM | RESPIRATION RATE: 18 BRPM | HEIGHT: 65 IN | TEMPERATURE: 97.5 F | SYSTOLIC BLOOD PRESSURE: 118 MMHG | OXYGEN SATURATION: 99 % | BODY MASS INDEX: 34.87 KG/M2 | DIASTOLIC BLOOD PRESSURE: 74 MMHG

## 2022-04-05 DIAGNOSIS — E11.69 DYSLIPIDEMIA ASSOCIATED WITH TYPE 2 DIABETES MELLITUS (HCC): Primary | ICD-10-CM

## 2022-04-05 DIAGNOSIS — E78.5 DYSLIPIDEMIA ASSOCIATED WITH TYPE 2 DIABETES MELLITUS (HCC): Primary | ICD-10-CM

## 2022-04-05 PROCEDURE — 99213 OFFICE O/P EST LOW 20 MIN: CPT | Performed by: NURSE PRACTITIONER

## 2022-04-05 ASSESSMENT — FIBROSIS 4 INDEX: FIB4 SCORE: 1.18

## 2022-04-05 NOTE — ASSESSMENT & PLAN NOTE
Chronic condition, stable.  Patient's most recent hemoglobin A1c was 7.2 on March 21, 2022.  Patient had had her Metformin increased to 1000 mg twice daily about 1 month prior to her lab by her vascular provider.  She states that she is tolerating the dose increase okay but she does have chronic diarrhea which she attributes to Metformin.  I did discuss with patient that this is a known side effect of Metformin, and if she would like to rotate to a different medication regimen we can certainly do that.  Patient wishes to defer that at this time, but will let me know if she changes her mind.  Discussed with patient that we will repeat her labs in 3 months, which will be an more accurate measurement of her A1c at that time.  Patient does see Dr. Jaimes for ophthalmology, and states that she will make an appointment to get her annual eye exam soon.  I will see her back in approximately 3 months for annual wellness and repeat labs at that time.

## 2022-04-11 NOTE — PROGRESS NOTES
Subjective:     CC: The encounter diagnosis was Dyslipidemia associated with type 2 diabetes mellitus (HCC).      HPI:   Dunia is an established patient of [facility] here for follow-up.  We discussed the following problems:    1. Dyslipidemia associated with type 2 diabetes mellitus (HCC)  Chronic condition, stable.  Patient here for follow up today.       Past Medical History:   Diagnosis Date   • Hyperlipidemia LDL goal < 100 10/2/2014   • Hypertension    • Obesity (BMI 30-39.9) 10/2/2014   • Pre-diabetes 10/2/2014   • Recurrent sinusitis    • Type II or unspecified type diabetes mellitus without mention of complication, not stated as uncontrolled     pre-diabetes       Social History     Tobacco Use   • Smoking status: Former Smoker     Packs/day: 1.00     Years: 48.00     Pack years: 48.00     Types: Cigarettes     Start date:      Quit date: 2017     Years since quittin.8   • Smokeless tobacco: Never Used   Vaping Use   • Vaping Use: Never used   Substance Use Topics   • Alcohol use: No     Alcohol/week: 0.0 oz   • Drug use: Never       Current Outpatient Medications Ordered in Epic   Medication Sig Dispense Refill   • metFORMIN ER (GLUCOPHAGE XR) 500 MG TABLET SR 24 HR Take 1,000 mg by mouth every day.     • metFORMIN ER 1000 MG TABLET SR 24 HR Take 1 Tablet by mouth with dinner. 180 Tablet 3   • Blood Glucose Test Strips Use one Accucheck Tana strip to test blood sugar three times daily before meals. 100 Strip 0   • traZODone (DESYREL) 50 MG Tab TAKE ONE TABLET BY MOUTH EVERY NIGHT AT BEDTIME AS NEEDED FOR SLEEP 90 Tablet 0   • amLODIPine (NORVASC) 5 MG Tab Take 1 Tablet by mouth at bedtime for 360 days. 90 Tablet 3   • telmisartan (MICARDIS) 80 MG Tab Take 1 Tablet by mouth at bedtime for 360 days. 90 Tablet 3   • PARoxetine (PAXIL) 30 MG Tab TAKE ONE TABLET BY MOUTH DAILY 90 Tablet 3   • rosuvastatin (CRESTOR) 20 MG Tab TAKE ONE TABLET BY MOUTH EVERY EVENING 90 Tablet 3   • ibuprofen (MOTRIN) 200  "MG Tab Take 600 mg by mouth 1 time a day as needed (Moderate pain). 3 tablets = 600 mg.     • vitamin D (CHOLECALCIFEROL) 1000 Unit (25 mcg) Tab Take 3,000 Units by mouth every morning. 3 tablets = 3000 units.     • aspirin EC (ECOTRIN) 81 MG Tablet Delayed Response Take 81 mg by mouth every morning.     • albuterol (PROVENTIL) 2.5mg/3ml Nebu Soln solution for nebulization 3 mL by Nebulization route every four hours as needed for Shortness of Breath. 25 Bullet 0     No current Epic-ordered facility-administered medications on file.       Allergies:  Sulfa drugs and Shellfish allergy    Health Maintenance: Completed    ROS:  Gen: no fevers/chills, no changes in weight  Eyes: no changes in vision  ENT: no sore throat, no hearing loss, no bloody nose  Pulm: no sob, no cough  CV: no chest pain, no palpitations  GI: no nausea/vomiting, no diarrhea  : no dysuria  MSk: no myalgias  Skin: no rash  Neuro: no headaches, no numbness/tingling  Heme/Lymph: no easy bruising      Objective:       Exam:  /74   Pulse 78   Temp 36.4 °C (97.5 °F) (Temporal)   Resp 18   Ht 1.651 m (5' 5\")   Wt 94.9 kg (209 lb 4.8 oz)   SpO2 99%   BMI 34.83 kg/m²  Body mass index is 34.83 kg/m².    Gen: Alert and oriented, No apparent distress.  Neck: Neck is supple without lymphadenopathy.  No carotid bruits.  Lungs: Normal effort, CTA bilaterally, no wheezes, rhonchi, or rales  CV: Regular rate and rhythm. No murmurs, rubs, or gallops.  Ext: No clubbing, cyanosis, edema.  Monofilament testing with a 10 gram force: sensation intact: decreased on left - Left great toe  Visual Inspection: Feet without maceration, ulcers, fissures.  Pedal pulses: intact bilaterally      Labs: Dated: 3/21/22 , discussed results today and all questions answered.  Assessment & Plan:     66 y.o. female with the following -     Dyslipidemia associated with type 2 diabetes mellitus (HCC)  Chronic condition, stable.  Patient's most recent hemoglobin A1c was 7.2 on " March 21, 2022.  Patient had had her Metformin increased to 1000 mg twice daily about 1 month prior to her lab by her vascular provider.  She states that she is tolerating the dose increase okay but she does have chronic diarrhea which she attributes to Metformin.  I did discuss with patient that this is a known side effect of Metformin, and if she would like to rotate to a different medication regimen we can certainly do that.  Patient wishes to defer that at this time, but will let me know if she changes her mind.  Discussed with patient that we will repeat her labs in 3 months, which will be an more accurate measurement of her A1c at that time.  Patient does see Dr. Jaimes for ophthalmology, and states that she will make an appointment to get her annual eye exam soon.  I will see her back in approximately 3 months for annual wellness and repeat labs at that time.      Orders Placed This Encounter   • Diabetic Monofilament LE Exam   • HEMOGLOBIN A1C          Return in about 3 months (around 7/5/2022) for annual wellness.    I have placed the below orders and discussed them with an approved delegating provider.  The MA is performing the below orders under the direction of Madison Hu MD.    Please note that this dictation was created using voice recognition software. I have made every reasonable attempt to correct obvious errors, but I expect that there are errors of grammar and possibly content that I did not discover before finalizing the note.

## 2022-04-13 ENCOUNTER — OFFICE VISIT (OUTPATIENT)
Dept: VASCULAR LAB | Facility: MEDICAL CENTER | Age: 67
End: 2022-04-13
Attending: NURSE PRACTITIONER
Payer: MEDICARE

## 2022-04-13 VITALS
SYSTOLIC BLOOD PRESSURE: 141 MMHG | BODY MASS INDEX: 34.66 KG/M2 | HEIGHT: 65 IN | WEIGHT: 208 LBS | HEART RATE: 80 BPM | DIASTOLIC BLOOD PRESSURE: 79 MMHG

## 2022-04-13 DIAGNOSIS — E11.69 DYSLIPIDEMIA ASSOCIATED WITH TYPE 2 DIABETES MELLITUS (HCC): Chronic | ICD-10-CM

## 2022-04-13 DIAGNOSIS — I10 ESSENTIAL HYPERTENSION: ICD-10-CM

## 2022-04-13 DIAGNOSIS — Z79.02 LONG TERM CURRENT USE OF ANTITHROMBOTICS/ANTIPLATELETS: ICD-10-CM

## 2022-04-13 DIAGNOSIS — E78.5 DYSLIPIDEMIA ASSOCIATED WITH TYPE 2 DIABETES MELLITUS (HCC): Chronic | ICD-10-CM

## 2022-04-13 DIAGNOSIS — I25.10 CORONARY ARTERY CALCIFICATION SEEN ON CT SCAN: ICD-10-CM

## 2022-04-13 DIAGNOSIS — E78.41 ELEVATED LIPOPROTEIN(A): ICD-10-CM

## 2022-04-13 PROCEDURE — 99212 OFFICE O/P EST SF 10 MIN: CPT

## 2022-04-13 PROCEDURE — 99214 OFFICE O/P EST MOD 30 MIN: CPT | Performed by: NURSE PRACTITIONER

## 2022-04-13 ASSESSMENT — ENCOUNTER SYMPTOMS
BRUISES/BLEEDS EASILY: 0
CLAUDICATION: 0
ORTHOPNEA: 0
NAUSEA: 0
CHILLS: 0
MYALGIAS: 0
FEVER: 0
COUGH: 0
WEAKNESS: 0
PALPITATIONS: 0
BLOOD IN STOOL: 0

## 2022-04-13 ASSESSMENT — FIBROSIS 4 INDEX: FIB4 SCORE: 1.18

## 2022-04-13 NOTE — PROGRESS NOTES
[facility] - Follow Up EVALUATION   04/13/2022    ASSESSEMENT AND PLAN:      1. BLOOD PRESSURE CONTROL   Qualifies as Resistant HTN (RH):  No, not on optimized, max-dosed or max-tolerated meds from 3 classes   Office BP Goal ACC/AHA (2017) goal <130/80  Home BP at goal:  yes  Office BP at goal:  No  24h ABPM:  UNDECIDED  Device candidate? Not for further current as on 3 meds  Contributing factors: obesity  Much improved on office readings   Home readings 120-130's/70-80's  Monitor checked with ours and is fairly accurate  Plan:   Monitoring:   - start/continue home BP monitoring, reviewed correct technique:  Yes   - order 24h ABPM: consider in future  - bring monitor to next visit to check against office machine    - monitor lytes/gfr routinely   - advised that stabilizing BP may require multiple med changes and close monitoring over the next several months, reviewed that initially there will be additional imaging and labs and the possibility of adverse drug rxn's and variability of his BP and HR until we have things stabilized.  Advised to contact our office as needed for questions or concerns.      2. WORK UP FOR SECONDARY CAUSES OF RH:  Obstructive Sleep Apnea: Not Yet Assessed  Renal parenchymal disease: Excluded  Renovascular HTN: Not Yet Assessed  Primary Aldosteronism: Not Yet Assessed  Thyroid Function: Excluded  Pheochromocytoma: Not Yet Assessed   Cushing's:   Not Yet Assessed   Coarctation of Aorta: excluded  Other: none identified    Recommendations At This Visit: as noted in orders         3. MEDICATION USE / ADHERENCE:   Assessment: Complete  Recommendations: Instructed to Continue Taking All Medications As Prescribed         4. HTN-MEDIATED END-ORGAN DAMAGE:  Left Ventricular Hypertrophy: Absent on  ECG Date: 2020  Urine Albuminuria: Not Yet Assessed on Date: pending  Renal Function: Chronic Kidney Disease  CKD G2 at worst   Ophthalmologic: Absent per patient report and/or eye specialist    Established Cardiovascular Disease:     # CAD per CT scan - continue current med mgmt, no further surveillance and no prior ASCVD events     # PAD, LLE inflow - stable, limited sx   - continue exercise, TLC, med mgmt     5. LIFESTYLE INTERVENTIONS:    SMOKING:   reports that she quit smoking about 4 years ago. Her smoking use included cigarettes. She started smoking about 54 years ago. She has a 48.00 pack-year smoking history. She has never used smokeless tobacco.   - continued complete avoidance of all tobacco products     PHYSICAL ACTIVITY: continue healthy activity to improve CV fitness.  In general, targeting >150min/week of moderate-level activity.  Additional details reviewed with patient and/or outlined in care instructions     WEIGHT MANAGEMENT AND NUTRITION: Dietary plan was discussed with patient at this visit including DASH-dietary approaches, substitution of MUFA/PUFA for saturated fats, substituting whole grains for simple carbs, substituting lean meats for fatty meats, increase use of plant-based protein vs animal-based, lowered sodium.  Additional details reviewed with patient and/or outlined in care instructions.    6. STANDARD HTN PHARMACOTHERAPY:  ACEI/ARB: continue telmisartan 80mg daily   Thiazide Type Diuretic: consider as next agent   DHP-CCB: continue amlodipine 5mg QHS     7. ADDITIONAL AGENTS   Aldosterone Receptor Antagonist: not indicated   Loop Diuretic: not indicated   Peripheral Alpha Blocker: not indicated   BB: not indicated, stopped on prior visits as unlikely beneficial for BP at current dosing    Non-DHP-CCB: not indicated   Direct Vasodilator: not indicated   Centrally Acting Alpha Agonist: not indicated   Potassium-sparing diuretic: not indicated   DRI: not indicated     LIPID MANAGEMENT:   Qualifies for Statin Therapy Based on 2018 ACC/AHA Guidelines: yes, Secondary Prevention - Very high risk ASCVD - 2 major events or 1 event with other high-risk conditions, Diabetes and HERNANDEZ  "<0.9  The 10-year ASCVD risk score (Billdaniel CORTEZ Jr., et al., 2013) is: 16.8%, 7.5 - <20% \"intermediate risk\"   Major ASCVD events: Symptomatic PAD (hx of claudication with HERNANDEZ <0.85, previous revascularization/amputation  High-risk conditions: >64yr old , Diabetes , Hypertension  and N/A  Risk-enhancers: N/A  Currently on Statin: Yes  Treatment goals: reduce LDL-C >50%  and LDL-C <70 (consider non-HDL-C <100, apoB <80)  At goal? yes  Plan:   - reinforced ongoing TLC measures as noted   - monitor labs and lp(a)  Meds:   - continue rosuvastatin 20mg daily, consider intensification and/or zetia     GLYCEMIA MANAGEMENT:  Diabetic   Goal A1c < 7.0  Lab Results   Component Value Date    HBA1C 7.2 (H) 03/21/2022      Lab Results   Component Value Date/Time    MALBCRT 16 12/27/2021 10:40 AM    MICROALBUR 6.2 12/27/2021 10:40 AM   Recommend aggressive treatment due to pt's overall risk.  Increase metformin as discussed below, if does not tolerate would trial different drug class.  Good effect for just one month of the increased dose of Metformin need to test in 3 months to assess the A1c  Plan:  - Continue metformin ER 1000mg twice daily    - consider SGLT2i   - continue to see dietician   - consider referral to T2DM clinic in future if continues to struggle with FSBS monitoring at home   - recommmend for routine care with PCP (or endocrine) to include regular A1c monitoring, annual albumin/creatinine ratio (ACR), annual diabetic retinopathy screening, foot exams, annual flu vaccine, and updates to pneumonia vaccines as appropriate     ANTIPLATELET/ANTICOAGULANT THERAPY:  Continue ASA 81mg     OTHER ISSUES:     # MDD, recurrent - stable, continue current meds, f/u with PCP    # GERD, restart PPI consistently.  No CP today.  If CP returns, given warning s/s to seek immediate medical attention to rule out cardiac involvement. Pt states understanding.    Studies Ordered At This Visit: none    Blood Work to Be Obtained Prior to Next " Visit: as noted below   Disposition: 2 months for A1C in visit   Time: 38-min - chart review/prep, review of other providers' records, imaging/lab review, face-to-face time for history/examination, ordering, prescribing,  review of results/meds/ treatment plan with patient/family/caregiver, documentation in EMR, care coordination (as needed)    1. Coronary artery calcification seen on CT scan     2. Dyslipidemia associated with type 2 diabetes mellitus (HCC)     3. Elevated lipoprotein(a)     4. Essential hypertension         History of Present Illness:   Dunia Her is a female seen for evaluation of resistant HTN and management.   Dunia Her is initially referred by Ref Not In Computer     Subjective    HTN:  No major sx.  Has seen cardiology and pharm HTN clinic to date.    Home BP lo-130's/70-80's   24h ABPM completed: consider in future   Adherence to current HTN meds: compliant all of the time  Hx of clinical ASCVD events: Symptomatic PAD (hx of claudication with HERNANDEZ <0.85, previous revascularization/amputation   Recent increase in dull chest pain which she attributes to GERD since she stopped her PPI recently   Lifestyle factors:  Weight Change: stable   BMI Readings from Last 5 Encounters:   22 34.61 kg/m²   22 34.83 kg/m²   22 34.09 kg/m²   02/15/22 34.09 kg/m²   22 35.46 kg/m²     Diet pattern: low red meat, increase veg, has lost weight on Med diet in past, continue with RD   Daily salt intake estimate:  Low   - none   EtOH: No  Exercise habits: minimal exercise  Perceived barriers to care: leg pain  Pertinent HTN hx (reviewed at initial visit):   Age at HTN dx: several years   (if female, any hx of pregnancy-related HTN): n/a   Past HTN medications: as noted   HTN crises:  No prior hospitalization or crises   Interfering substances/contributing factors:  None    Hyperlipidemia:    Stable, tolerating rosuva   Clinical evidence of ASCVD: Symptomatic PAD (hx of  claudication with HERNANDEZ <0.85, previous revascularization/amputation    Antiplatelet/anticoagulation: Yes, Details: ASA, no bleeding noted     T2D:  A1C increasing   Currently taking metformin 1,000mg onceBID daily-tolerating increase   Not able to check finger sticks at home due to difficulty with monitor- may benefit from T2DM clinic in future  No current symptoms reported.   Tolerating meds and no recent med changes.   Last A1c   Lab Results   Component Value Date    HBA1C 7.2 (H) 03/21/2022     Lab Results   Component Value Date/Time    MALBCRT 16 12/27/2021 10:40 AM    MICROALBUR 6.2 12/27/2021 10:40 AM       CKD: No     Sleeping disorder/LARA: No     Hypothyroidism: No      Problem List:     Patient Active Problem List    Diagnosis Date Noted   • Greater trochanteric bursitis of left hip 01/31/2022   • Elevated lipoprotein(a) 12/29/2021   • Obesity, Class II, BMI 35-39.9 12/06/2021   • Healthcare maintenance 06/17/2021   • Acute appendicitis with appendiceal abscess 02/12/2021   • Depression 02/12/2021   • Essential hypertension 10/06/2020   • Coronary artery calcification seen on CT scan 04/07/2020   • Other specified personal risk factors, not elsewhere classified 02/07/2020   • Bronchitis 01/22/2020   • Recurrent major depressive disorder, in partial remission (HCC) 02/10/2019   • Left elbow pain 09/04/2018   • Rash 09/04/2018   • Chronic insomnia 04/18/2016   • Dyslipidemia associated with type 2 diabetes mellitus (HCC) 10/02/2014   • BMI 36.0-36.9,adult 10/02/2014   • Hyperlipidemia 10/02/2014       Current Outpatient Medications:   •  metFORMIN ER, 1,000 mg, Oral, DAILY, Taking  •  metFORMIN ER, 1,000 mg, Oral, PM MEAL, Taking  •  Blood Glucose Test Strips, Use one Accucheck Tana strip to test blood sugar three times daily before meals., Taking  •  traZODone, TAKE ONE TABLET BY MOUTH EVERY NIGHT AT BEDTIME AS NEEDED FOR SLEEP, PRN  •  amLODIPine, 5 mg, Oral, QHS, Taking  •  telmisartan, 80 mg, Oral, QHS,  "Taking  •  PARoxetine, TAKE ONE TABLET BY MOUTH DAILY, Taking  •  rosuvastatin, TAKE ONE TABLET BY MOUTH EVERY EVENING, Taking  •  ibuprofen, 600 mg, Oral, QDAY PRN, PRN  •  vitamin D3, 3,000 Units, Oral, QAM, Taking  •  aspirin EC, 81 mg, Oral, QAM, Taking  •  albuterol, 2.5 mg, Nebulization, Q4HRS PRN, PRN   Sulfa drugs and Shellfish allergy     Social History:     Social History     Tobacco Use   • Smoking status: Former Smoker     Packs/day: 1.00     Years: 48.00     Pack years: 48.00     Types: Cigarettes     Start date:      Quit date: 2017     Years since quittin.8   • Smokeless tobacco: Never Used   Vaping Use   • Vaping Use: Never used   Substance Use Topics   • Alcohol use: No     Alcohol/week: 0.0 oz   • Drug use: Never       Review of Systems:   Review of Systems   Constitutional: Negative for chills and fever.   Respiratory: Negative for cough.    Cardiovascular: Negative for chest pain, palpitations, orthopnea, claudication and leg swelling.   Gastrointestinal: Negative for blood in stool and nausea.   Genitourinary: Negative for hematuria.   Musculoskeletal: Negative for myalgias.   Neurological: Negative for weakness.   Endo/Heme/Allergies: Does not bruise/bleed easily.         Objective     Objective:     Vitals:    22 1024 22 1027   BP: (!) 165/76 141/79   BP Location: Left arm Left arm   Patient Position: Sitting Sitting   BP Cuff Size: Adult Adult   Pulse: 82 80   Weight: 94.3 kg (208 lb)    Height: 1.651 m (5' 5\")      Body mass index is 34.61 kg/m².  BP Readings from Last 5 Encounters:   22 141/79   22 118/74   22 114/72   02/15/22 121/78   22 138/82      Physical Exam  Vitals reviewed.   Constitutional:       General: She is not in acute distress.     Appearance: Normal appearance.   HENT:      Head: Normocephalic and atraumatic.   Neck:      Thyroid: No thyroid mass.      Vascular: Normal carotid pulses.      Trachea: Trachea normal. "   Cardiovascular:      Rate and Rhythm: Normal rate and regular rhythm.      Chest Wall: PMI is not displaced.      Pulses: Normal pulses.           Carotid pulses are 2+ on the right side and 2+ on the left side.       Radial pulses are 2+ on the right side and 2+ on the left side.        Dorsalis pedis pulses are 2+ on the right side and 2+ on the left side.        Posterior tibial pulses are 2+ on the right side and 2+ on the left side.      Heart sounds: Normal heart sounds.   Pulmonary:      Effort: Pulmonary effort is normal.      Breath sounds: Normal breath sounds.   Musculoskeletal:      Cervical back: Full passive range of motion without pain.      Right lower leg: No edema.      Left lower leg: No edema.   Skin:     General: Skin is warm and dry.      Capillary Refill: Capillary refill takes less than 2 seconds.      Coloration: Skin is not cyanotic.      Nails: There is no clubbing.   Neurological:      General: No focal deficit present.      Mental Status: She is alert and oriented to person, place, and time. Mental status is at baseline.      Cranial Nerves: Cranial nerves are intact. No cranial nerve deficit.      Coordination: Coordination is intact. Coordination normal.      Gait: Gait is intact. Gait normal.   Psychiatric:         Mood and Affect: Mood normal.         Behavior: Behavior normal.        Accessory Clinical Findings:     Lab Results   Component Value Date    CHOLSTRLTOT 162 12/27/2021    LDL 60 12/27/2021    HDL 78 12/27/2021    TRIGLYCERIDE 122 12/27/2021      Lab Results   Component Value Date/Time    LIPOPROTA 125 (H) 12/27/2021 10:41 AM      No results found for: APOB         Lab Results   Component Value Date    HBA1C 7.2 (H) 03/21/2022      Lab Results   Component Value Date    SODIUM 141 03/21/2022    POTASSIUM 4.4 03/21/2022    CHLORIDE 108 03/21/2022    CO2 21 03/21/2022    GLUCOSE 159 (H) 03/21/2022    BUN 22 03/21/2022    CREATININE 0.68 03/21/2022          Lab Results    Component Value Date    URCREAT 381.64 12/27/2021    MICROALBUR 6.2 12/27/2021    MALBCRT 16 12/27/2021     VASCULAR IMAGING:     Last EKG:  Reviewed     HERNANDEZ 2/2020   Right HERANNDEZ: 1.02  Left HERNANDEZ:  0.79   IMPRESSION:   1.  Post occlusive waveform is noted in the distal left peroneal artery. Short segment occlusion or high-grade stenosis proximal to this location is likely present, although not directly visualized.   2.  No other evidence of occlusion or significant stenosis bilaterally.   3.  Moderate calcified atherosclerotic plaque is identified in each lower extremity.    CAC 2/2020  Coronary calcification:  LMA - 0.0  LCX - 0.0  LAD - 315  RCA - 260.8   Total Calcium Score: 575.8   Percentile: Calcium score is above the 90th percentile for the patient's age and sex.   Other findings:  Heart: Normal size.  Lungs: Tiny granuloma in the left upper lobe.  Mediastinum: Normal.  Upper abdomen: Normal.  Spondylotic changes of the spine.    MPI 4/2020  NUCLEAR IMAGING INTERPRETATION   Normal left ventricular size, ejection fraction, and wall motion.    Small fixed defect in the inferolateral wall could be artifact. Infarct is in    the differential but consider less likely.     No reversible defect.    Sinus rhythm.   Nondiagnostic ECG portion of a Regadenoson nuclear stress test.   ECG INTERPRETATION   Nondiagnostic ECG portion of a Regadenoson nuclear stress test.    CT abd 2/2021   The abdominal aorta is normal in caliber with aortic atherosclerosis.    CT chest 10/2021   1.  Stable 1.1 cm right upper lobe groundglass density nodule. Continued follow-up is recommended.  2.  Cholelithiasis.  3.  Hepatic steatosis  Cardiac: Heart normal in size without pericardial effusion.   Vascular: Atherosclerotic calcifications of the coronary arteries, aorta and branches...          MARIAN Magana.  [facility]   [facility]   [phone]

## 2022-04-18 ENCOUNTER — NON-PROVIDER VISIT (OUTPATIENT)
Dept: SLEEP MEDICINE | Facility: MEDICAL CENTER | Age: 67
End: 2022-04-18
Attending: INTERNAL MEDICINE
Payer: MEDICARE

## 2022-04-18 VITALS — HEIGHT: 66 IN | WEIGHT: 208 LBS | BODY MASS INDEX: 33.43 KG/M2

## 2022-04-18 DIAGNOSIS — J45.20 MILD INTERMITTENT REACTIVE AIRWAY DISEASE WITHOUT COMPLICATION: ICD-10-CM

## 2022-04-18 PROCEDURE — 94726 PLETHYSMOGRAPHY LUNG VOLUMES: CPT | Performed by: INTERNAL MEDICINE

## 2022-04-18 PROCEDURE — 94060 EVALUATION OF WHEEZING: CPT | Performed by: INTERNAL MEDICINE

## 2022-04-18 PROCEDURE — 94729 DIFFUSING CAPACITY: CPT | Performed by: INTERNAL MEDICINE

## 2022-04-18 ASSESSMENT — FIBROSIS 4 INDEX: FIB4 SCORE: 1.18

## 2022-04-18 ASSESSMENT — PULMONARY FUNCTION TESTS
FVC_LLN: 2.59
FEV1/FVC_PREDICTED: 78
FEV1/FVC_PERCENT_PREDICTED: 93
FEV1_PERCENT_CHANGE: -2
FEV1/FVC: 69
FEV1/FVC_PERCENT_CHANGE: 5
FEV1: 1.99
FVC: 2.82
FVC_PERCENT_PREDICTED: 88
FEV1_PERCENT_PREDICTED: 82
FEV1_LLN: 2.01
FVC_PREDICTED: 3.1
FVC: 2.74
FEV1/FVC_PERCENT_CHANGE: -100
FEV1/FVC_PERCENT_PREDICTED: 92
FEV1_PERCENT_PREDICTED: 80
FEV1/FVC_PERCENT_LLN: 65
FEV1/FVC: 72.63
FEV1_PREDICTED: 2.41
FEV1/FVC: 73
FEV1/FVC_PERCENT_PREDICTED: 88
FEV1: 1.95
FVC_PERCENT_PREDICTED: 90
FEV1/FVC_PERCENT_PREDICTED: 89
FEV1/FVC_PERCENT_PREDICTED: 78
FEV1_PERCENT_CHANGE: 2
FEV1/FVC: 69

## 2022-04-18 NOTE — PROCEDURES
Technician: SHANNAN Kendrick    Technician Comment:  Good patient effort & cooperation.  The results of this test meet the ATS/ERS standards for acceptability & reproducibility.  Test was performed on the [employer] Body Plethysmograph-Elite DX system.  Predicted values were GLI-2012 for spirometry, GLI-2017 for DLCO, ITS for Lung Volumes.  The DLCO was uncorrected for Hgb.  A bronchodilator of Ventolin HFA -2puffs via spacer administered.  DLCO performed during dilation period.    Interpretation:  Baseline spirometry shows airflow obstruction with FEV1/FVC ratio of 69 and an FEV1 that is low normal at 1.95 L or 80% predicted.  No significant bronchodilator response.  Total lung capacity is within normal limits however there is borderline air trapping with residual volume of 123% predicted.  Diffusion capacity is within normal limits at 98% predicted.  Pulmonary function testing shows mild airflow obstruction with borderline air trapping and preserved DLCO consistent with stated diagnosis of reactive airways disease.

## 2022-04-20 ENCOUNTER — OFFICE VISIT (OUTPATIENT)
Dept: SLEEP MEDICINE | Facility: MEDICAL CENTER | Age: 67
End: 2022-04-20
Payer: MEDICARE

## 2022-04-20 VITALS
WEIGHT: 208.19 LBS | OXYGEN SATURATION: 95 % | HEIGHT: 66 IN | SYSTOLIC BLOOD PRESSURE: 122 MMHG | DIASTOLIC BLOOD PRESSURE: 66 MMHG | HEART RATE: 98 BPM | BODY MASS INDEX: 33.46 KG/M2

## 2022-04-20 DIAGNOSIS — J44.9 CHRONIC OBSTRUCTIVE PULMONARY DISEASE, UNSPECIFIED COPD TYPE (HCC): ICD-10-CM

## 2022-04-20 DIAGNOSIS — R91.1 LUNG NODULE: ICD-10-CM

## 2022-04-20 PROCEDURE — 99214 OFFICE O/P EST MOD 30 MIN: CPT | Performed by: STUDENT IN AN ORGANIZED HEALTH CARE EDUCATION/TRAINING PROGRAM

## 2022-04-20 ASSESSMENT — ENCOUNTER SYMPTOMS
CHILLS: 0
WEIGHT LOSS: 0
COUGH: 0
SPUTUM PRODUCTION: 0
FEVER: 0
WHEEZING: 0
SHORTNESS OF BREATH: 0
HEMOPTYSIS: 0

## 2022-04-20 ASSESSMENT — FIBROSIS 4 INDEX: FIB4 SCORE: 1.18

## 2022-04-20 NOTE — ASSESSMENT & PLAN NOTE
PFTs 4/18/22 w/mild obstruction w/o significant BD response - patient has significant smoking history. She is doing well clinically, GOLD A - has not had to use her MICHEL and is active    - MICHEL PRN  - encouraged to stay active/exercise

## 2022-04-20 NOTE — PROGRESS NOTES
"Pulmonary Clinic Note    Chief Complaint:  Chief Complaint   Patient presents with   • Follow-Up     Mild intermittent reactive airway disease without complication.Last seen 11/16/21   • Results     PFT 04/18/22     HPI:   Per Dr. Hu's last note on 11/16/21  \"This patient is a 66 y.o. female whom is followed in our clinic for pulmonary nodule and mild obstructive airway diseae last seen by me on 5/14/21. The patient's past medical history is significant for coronary artery disease as detected by calcium scoring, type 2 diabetes, hypertension, dyslipidemia all currently controlled on medication, obesity.  She is a former tobacco smoker with 50-pack-year history and quit in 2017.  Patient was referred to me for episodes of recurrent bronchitis typically 1-2 times per year prior to 2020.  She had never been hospitalized but was tx prn with MICHEL. She was unable to afford ICS/LABA but since she has seen me, sxs have become less mild with no acute bronchitis over the past 18 mos. She had had a CT angiogram from April 2020 that showed 1.1 cm groundglass nodule in the medial right upper lobe and mild bibasilar atelectasis with no associated lymphadenopathy or parenchymal lung disease. This has remained stable based on most recent CT 10/26/21.  Pulmonary function testing from April 12 did show borderline airflow obstruction with normalization of FEV1/FVC ratio postbronchodilator, positive bronchodilator response, low normal diffusion capacity of 83% predicted and normal total lung capacity with mild air trapping. Pt is up to date on vaccines. She remains tobacco free. She has two concerns today. One is right sided breast pain which she states comes and goes and has been present for a year. No masses. Most recent mammography from 10/2017 was benign. She is also c/o insomnia. She has both a difficult time falling asleep and staying asleep. She tells me she will wake an hour after falling asleep often to use the rest room " "or because of her dog. No morning HA, no falling asleep inappropriately during the day. She does take care of her 8 year old grand daughter. No caffeine past 11 AM.\"    22: here to review her PFT results - doing well, hasn't had to use her albuterol at all. Has been staying active and gardening which she enjoys.     Past Medical History:   Diagnosis Date   • Hyperlipidemia LDL goal < 100 10/2/2014   • Hypertension    • Obesity (BMI 30-39.9) 10/2/2014   • Pre-diabetes 10/2/2014   • Recurrent sinusitis    • Type II or unspecified type diabetes mellitus without mention of complication, not stated as uncontrolled     pre-diabetes       Past Surgical History:   Procedure Laterality Date   • [place] LAP,APPENDECTOMY N/A 2021    Procedure: APPENDECTOMY, LAPAROSCOPIC;  Surgeon: Juan Dawson M.D.;  Location: SURGERY [facility];  Service: General   • ABDOMINAL HYSTERECTOMY TOTAL      benign cysts, total       Social History     Socioeconomic History   • Marital status:      Spouse name: Not on file   • Number of children: Not on file   • Years of education: Not on file   • Highest education level: 12th grade   Occupational History   • Not on file   Tobacco Use   • Smoking status: Former Smoker     Packs/day: 1.00     Years: 48.00     Pack years: 48.00     Types: Cigarettes     Start date:      Quit date: 2017     Years since quittin.8   • Smokeless tobacco: Never Used   Vaping Use   • Vaping Use: Never used   Substance and Sexual Activity   • Alcohol use: No     Alcohol/week: 0.0 oz   • Drug use: Never   • Sexual activity: Yes     Partners: Male     Comment:    Other Topics Concern   • Not on file   Social History Narrative   • Not on file     Social Determinants of Health     Financial Resource Strain: Low Risk    • Difficulty of Paying Living Expenses: Not very hard   Food Insecurity: No Food Insecurity   • Worried About Running Out of Food in the Last Year: Never true   • Ran Out of " Food in the Last Year: Never true   Transportation Needs: No Transportation Needs   • Lack of Transportation (Medical): No   • Lack of Transportation (Non-Medical): No   Physical Activity: Insufficiently Active   • Days of Exercise per Week: 2 days   • Minutes of Exercise per Session: 30 min   Stress: No Stress Concern Present   • Feeling of Stress : Only a little   Social Connections: Moderately Integrated   • Frequency of Communication with Friends and Family: More than three times a week   • Frequency of Social Gatherings with Friends and Family: Not on file   • Attends Christianity Services: 1 to 4 times per year   • Active Member of Clubs or Organizations: No   • Attends Club or Organization Meetings: Never   • Marital Status:    Intimate Partner Violence: Not on file   Housing Stability: Low Risk    • Unable to Pay for Housing in the Last Year: No   • Number of Places Lived in the Last Year: 1   • Unstable Housing in the Last Year: No          Family History   Problem Relation Age of Onset   • Cancer Mother         pancreatic    • Heart Disease Father    • Heart Attack Father    • Diabetes Father         pre-diabetes   • Stroke Neg Hx        Current Outpatient Medications on File Prior to Visit   Medication Sig Dispense Refill   • Lansoprazole (PREVACID 24HR PO) Take  by mouth.     • metFORMIN ER 1000 MG TABLET SR 24 HR Take 1 Tablet by mouth with dinner. (Patient taking differently: Take 1,000 mg by mouth with dinner. Taking 2 tabs daily) 180 Tablet 3   • Blood Glucose Test Strips Use one Accucheck Tana strip to test blood sugar three times daily before meals. 100 Strip 0   • traZODone (DESYREL) 50 MG Tab TAKE ONE TABLET BY MOUTH EVERY NIGHT AT BEDTIME AS NEEDED FOR SLEEP 90 Tablet 0   • amLODIPine (NORVASC) 5 MG Tab Take 1 Tablet by mouth at bedtime for 360 days. 90 Tablet 3   • telmisartan (MICARDIS) 80 MG Tab Take 1 Tablet by mouth at bedtime for 360 days. 90 Tablet 3   • PARoxetine (PAXIL) 30 MG Tab  "TAKE ONE TABLET BY MOUTH DAILY 90 Tablet 3   • rosuvastatin (CRESTOR) 20 MG Tab TAKE ONE TABLET BY MOUTH EVERY EVENING 90 Tablet 3   • ibuprofen (MOTRIN) 200 MG Tab Take 600 mg by mouth 1 time a day as needed (Moderate pain). 3 tablets = 600 mg.     • vitamin D (CHOLECALCIFEROL) 1000 Unit (25 mcg) Tab Take 3,000 Units by mouth every morning. 3 tablets = 3000 units.     • aspirin EC (ECOTRIN) 81 MG Tablet Delayed Response Take 81 mg by mouth every morning.     • albuterol (PROVENTIL) 2.5mg/3ml Nebu Soln solution for nebulization 3 mL by Nebulization route every four hours as needed for Shortness of Breath. 25 Bullet 0     No current facility-administered medications on file prior to visit.       Allergies: Sulfa drugs and Shellfish allergy      ROS:   Review of Systems   Constitutional: Negative for chills, fever, malaise/fatigue and weight loss.   HENT: Negative for congestion.    Respiratory: Negative for cough, hemoptysis, sputum production, shortness of breath and wheezing.    Cardiovascular: Negative for chest pain.   All other systems reviewed and are negative.      Vitals:  /66 (BP Location: Right arm, Patient Position: Sitting, BP Cuff Size: Large adult)   Pulse 98   Ht 1.664 m (5' 5.5\")   Wt 94.4 kg (208 lb 3 oz)   SpO2 95%     Physical Exam:  Physical Exam  Vitals and nursing note reviewed.   Constitutional:       General: She is not in acute distress.     Appearance: Normal appearance. She is not ill-appearing or toxic-appearing.   HENT:      Head: Normocephalic and atraumatic.      Nose: Nose normal.      Mouth/Throat:      Mouth: Mucous membranes are moist.   Eyes:      General: No scleral icterus.     Conjunctiva/sclera: Conjunctivae normal.   Cardiovascular:      Rate and Rhythm: Normal rate and regular rhythm.   Pulmonary:      Effort: Pulmonary effort is normal. No respiratory distress.      Breath sounds: No wheezing, rhonchi or rales.   Abdominal:      Palpations: Abdomen is soft. "   Musculoskeletal:         General: No deformity or signs of injury. Normal range of motion.      Cervical back: Normal range of motion.   Skin:     General: Skin is warm and dry.   Neurological:      General: No focal deficit present.      Mental Status: She is alert. Mental status is at baseline.   Psychiatric:         Mood and Affect: Mood normal.         Behavior: Behavior normal.         Data:  PFT 4/18/22: mild obstruction w/o significant BD response. Normal diffusion.     Assessment/Plan:    Problem List Items Addressed This Visit     COPD (chronic obstructive pulmonary disease) ([facility])     PFTs 4/18/22 w/mild obstruction w/o significant BD response - patient has significant smoking history. She is doing well clinically, GOLD A - has not had to use her MICHEL and is active    - MICHEL PRN  - encouraged to stay active/exercise         Lung nodule     Stable for 18 mos    - repeat CT chest in 10/2022 (one year after prior)         Relevant Orders    CT-CHEST (THORAX) W/O          Return in about 7 months (around 11/20/2022).       Time spent in record review prior to patient arrival, reviewing results, and in face-to-face encounter totaled 30 min, excluding any procedures if performed.    Lili Mari MD  Pulmonary and Critical Care Medicine  [facility]

## 2022-05-19 DIAGNOSIS — F51.04 CHRONIC INSOMNIA: ICD-10-CM

## 2022-05-19 RX ORDER — TRAZODONE HYDROCHLORIDE 50 MG/1
50 TABLET ORAL
Qty: 90 TABLET | Refills: 0 | Status: SHIPPED | OUTPATIENT
Start: 2022-05-19 | End: 2022-08-19

## 2022-05-31 ENCOUNTER — OFFICE VISIT (OUTPATIENT)
Dept: CARDIOLOGY | Facility: MEDICAL CENTER | Age: 67
End: 2022-05-31
Payer: MEDICARE

## 2022-05-31 VITALS
HEIGHT: 65 IN | SYSTOLIC BLOOD PRESSURE: 130 MMHG | OXYGEN SATURATION: 96 % | WEIGHT: 212 LBS | DIASTOLIC BLOOD PRESSURE: 70 MMHG | RESPIRATION RATE: 14 BRPM | BODY MASS INDEX: 35.32 KG/M2 | HEART RATE: 78 BPM

## 2022-05-31 DIAGNOSIS — E78.5 HYPERLIPIDEMIA LDL GOAL <70: ICD-10-CM

## 2022-05-31 DIAGNOSIS — I25.10 CORONARY ARTERY CALCIFICATION SEEN ON CT SCAN: ICD-10-CM

## 2022-05-31 DIAGNOSIS — E11.69 DYSLIPIDEMIA ASSOCIATED WITH TYPE 2 DIABETES MELLITUS (HCC): Chronic | ICD-10-CM

## 2022-05-31 DIAGNOSIS — I10 ESSENTIAL HYPERTENSION: ICD-10-CM

## 2022-05-31 DIAGNOSIS — E78.41 ELEVATED LIPOPROTEIN(A): ICD-10-CM

## 2022-05-31 DIAGNOSIS — E78.5 DYSLIPIDEMIA ASSOCIATED WITH TYPE 2 DIABETES MELLITUS (HCC): Chronic | ICD-10-CM

## 2022-05-31 DIAGNOSIS — I73.9 PAD (PERIPHERAL ARTERY DISEASE) (HCC): ICD-10-CM

## 2022-05-31 PROCEDURE — 99215 OFFICE O/P EST HI 40 MIN: CPT | Performed by: INTERNAL MEDICINE

## 2022-05-31 RX ORDER — METFORMIN HYDROCHLORIDE 500 MG/1
TABLET, EXTENDED RELEASE ORAL
COMMUNITY
Start: 2022-05-18 | End: 2022-05-31

## 2022-05-31 RX ORDER — ROSUVASTATIN CALCIUM 20 MG/1
TABLET, COATED ORAL
Qty: 90 TABLET | Refills: 3 | Status: SHIPPED | OUTPATIENT
Start: 2022-05-31 | End: 2022-07-13 | Stop reason: SDUPTHER

## 2022-05-31 ASSESSMENT — ENCOUNTER SYMPTOMS
FLANK PAIN: 0
ALTERED MENTAL STATUS: 0
CONSTIPATION: 0
WEIGHT LOSS: 0
BACK PAIN: 0
COUGH: 0
ORTHOPNEA: 0
HEARTBURN: 0
DIZZINESS: 0
NEAR-SYNCOPE: 0
SHORTNESS OF BREATH: 0
WEIGHT GAIN: 0
PND: 0
ABDOMINAL PAIN: 0
DIARRHEA: 0
DYSPNEA ON EXERTION: 0
NAUSEA: 0
PALPITATIONS: 0
FEVER: 0
BLURRED VISION: 0
VOMITING: 0
CLAUDICATION: 0
DEPRESSION: 0
DECREASED APPETITE: 0
IRREGULAR HEARTBEAT: 0
SYNCOPE: 0

## 2022-05-31 ASSESSMENT — FIBROSIS 4 INDEX: FIB4 SCORE: 1.18

## 2022-05-31 NOTE — PROGRESS NOTES
Cardiology Note    Chief Complaint   Patient presents with   • Other     F/V Dx: PAD (peripheral artery disease) (HCC)  F/V Dx: ASCVD (arteriosclerotic cardiovascular disease)     • Dyslipidemia     F/V Dx: Dyslipidemia associated with type 2 diabetes mellitus (HCC)       History of Present Illness: Dunia Her is a 66 y.o. female PMH former smoker 50 pack years, asthma, DM2, MVP, HTN, HLD, CAD/PAD/ASCVD presents for follow up.     Doing well this visit. No active cardiac complaints. Limited ambulation due to left leg pain which is mostly localized in the hip. No signs/symptoms of vascular insufficiency. Was able to visit with vascular surgery and remains with mild arterial insufficiency by repeat HERNANDEZ 2/2022. Compliant with medications and denies adverse effects. Was able to follow with vascular medicine who improved blood pressure control and found elevated lipoprotein a.     Review of Systems   Constitutional: Negative for decreased appetite, fever, malaise/fatigue, weight gain and weight loss.   HENT: Negative for congestion and nosebleeds.    Eyes: Negative for blurred vision.   Cardiovascular: Negative for chest pain, claudication, dyspnea on exertion, irregular heartbeat, leg swelling, near-syncope, orthopnea, palpitations, paroxysmal nocturnal dyspnea and syncope.   Respiratory: Negative for cough and shortness of breath.    Endocrine: Negative for cold intolerance and heat intolerance.   Skin: Negative for rash.   Musculoskeletal: Negative for back pain.   Gastrointestinal: Negative for abdominal pain, constipation, diarrhea, heartburn, melena, nausea and vomiting.   Genitourinary: Negative for dysuria, flank pain and hematuria.   Neurological: Negative for dizziness.   Psychiatric/Behavioral: Negative for altered mental status and depression.         Past Medical History:   Diagnosis Date   • Hyperlipidemia LDL goal < 100 10/2/2014   • Hypertension    • Obesity (BMI 30-39.9) 10/2/2014   • Pre-diabetes  10/2/2014   • Recurrent sinusitis    • Type II or unspecified type diabetes mellitus without mention of complication, not stated as uncontrolled     pre-diabetes         Past Surgical History:   Procedure Laterality Date   • [place] LAP,APPENDECTOMY N/A 2/12/2021    Procedure: APPENDECTOMY, LAPAROSCOPIC;  Surgeon: Juan Dawson M.D.;  Location: SURGERY [facility];  Service: General   • ABDOMINAL HYSTERECTOMY TOTAL  1993    benign cysts, total         Current Outpatient Medications   Medication Sig Dispense Refill   • traZODone (DESYREL) 50 MG Tab Take 1 Tablet by mouth at bedtime as needed for Sleep. 90 Tablet 0   • Lansoprazole (PREVACID 24HR PO) Take  by mouth.     • metFORMIN ER 1000 MG TABLET SR 24 HR Take 1 Tablet by mouth with dinner. (Patient taking differently: Take 1,000 mg by mouth every day.) 180 Tablet 3   • Blood Glucose Test Strips Use one Accucheck Tana strip to test blood sugar three times daily before meals. 100 Strip 0   • amLODIPine (NORVASC) 5 MG Tab Take 1 Tablet by mouth at bedtime for 360 days. 90 Tablet 3   • telmisartan (MICARDIS) 80 MG Tab Take 1 Tablet by mouth at bedtime for 360 days. 90 Tablet 3   • PARoxetine (PAXIL) 30 MG Tab TAKE ONE TABLET BY MOUTH DAILY 90 Tablet 3   • rosuvastatin (CRESTOR) 20 MG Tab TAKE ONE TABLET BY MOUTH EVERY EVENING 90 Tablet 3   • ibuprofen (MOTRIN) 200 MG Tab Take 600 mg by mouth 1 time a day as needed (Moderate pain). 3 tablets = 600 mg.     • vitamin D (CHOLECALCIFEROL) 1000 Unit (25 mcg) Tab Take 3,000 Units by mouth every morning. 3 tablets = 3000 units.     • aspirin EC (ECOTRIN) 81 MG Tablet Delayed Response Take 81 mg by mouth every morning.     • albuterol (PROVENTIL) 2.5mg/3ml Nebu Soln solution for nebulization 3 mL by Nebulization route every four hours as needed for Shortness of Breath. 25 Bullet 0     No current facility-administered medications for this visit.         Allergies   Allergen Reactions   • Sulfa Drugs Hives   • Shellfish Allergy  "Itching     \"Itchy eyes\"         Family History   Problem Relation Age of Onset   • Cancer Mother         pancreatic    • Heart Disease Father    • Heart Attack Father    • Diabetes Father         pre-diabetes   • Stroke Neg Hx          Social History     Socioeconomic History   • Marital status:      Spouse name: Not on file   • Number of children: Not on file   • Years of education: Not on file   • Highest education level: 12th grade   Occupational History   • Not on file   Tobacco Use   • Smoking status: Former Smoker     Packs/day: 1.00     Years: 48.00     Pack years: 48.00     Types: Cigarettes     Start date:      Quit date: 2017     Years since quittin.9   • Smokeless tobacco: Never Used   Vaping Use   • Vaping Use: Never used   Substance and Sexual Activity   • Alcohol use: No     Alcohol/week: 0.0 oz   • Drug use: Never   • Sexual activity: Yes     Partners: Male     Comment:    Other Topics Concern   • Not on file   Social History Narrative   • Not on file     Social Determinants of Health     Financial Resource Strain: Low Risk    • Difficulty of Paying Living Expenses: Not very hard   Food Insecurity: No Food Insecurity   • Worried About Running Out of Food in the Last Year: Never true   • Ran Out of Food in the Last Year: Never true   Transportation Needs: No Transportation Needs   • Lack of Transportation (Medical): No   • Lack of Transportation (Non-Medical): No   Physical Activity: Insufficiently Active   • Days of Exercise per Week: 2 days   • Minutes of Exercise per Session: 30 min   Stress: No Stress Concern Present   • Feeling of Stress : Only a little   Social Connections: Moderately Integrated   • Frequency of Communication with Friends and Family: More than three times a week   • Frequency of Social Gatherings with Friends and Family: Not on file   • Attends Zoroastrian Services: 1 to 4 times per year   • Active Member of Clubs or Organizations: No   • Attends Club or " "Organization Meetings: Never   • Marital Status:    Intimate Partner Violence: Not on file   Housing Stability: Low Risk    • Unable to Pay for Housing in the Last Year: No   • Number of Places Lived in the Last Year: 1   • Unstable Housing in the Last Year: No         Physical Exam:  Ambulatory Vitals  /70 (BP Location: Left arm, Patient Position: Sitting, BP Cuff Size: Adult)   Pulse 78   Resp 14   Ht 1.651 m (5' 5\")   Wt 96.2 kg (212 lb)   SpO2 96%    BP Readings from Last 4 Encounters:   05/31/22 130/70   04/20/22 122/66   04/13/22 141/79   04/05/22 118/74     Weight/BMI:   Vitals:    05/31/22 0924   BP: 130/70   Weight: 96.2 kg (212 lb)   Height: 1.651 m (5' 5\")    Body mass index is 35.28 kg/m².  Wt Readings from Last 4 Encounters:   05/31/22 96.2 kg (212 lb)   04/20/22 94.4 kg (208 lb 3 oz)   04/18/22 94.3 kg (208 lb)   04/13/22 94.3 kg (208 lb)       Physical Exam  Constitutional:       General: She is not in acute distress.  HENT:      Head: Normocephalic and atraumatic.   Eyes:      Conjunctiva/sclera: Conjunctivae normal.      Pupils: Pupils are equal, round, and reactive to light.   Neck:      Vascular: No JVD.   Cardiovascular:      Rate and Rhythm: Normal rate and regular rhythm.      Heart sounds: Normal heart sounds. No murmur heard.    No friction rub. No gallop.   Pulmonary:      Effort: Pulmonary effort is normal. No respiratory distress.      Breath sounds: Normal breath sounds. No wheezing or rales.   Chest:      Chest wall: No tenderness.   Abdominal:      General: Bowel sounds are normal. There is no distension.      Palpations: Abdomen is soft.   Musculoskeletal:      Cervical back: Normal range of motion and neck supple.   Skin:     General: Skin is warm and dry.   Neurological:      Mental Status: She is alert and oriented to person, place, and time.   Psychiatric:         Mood and Affect: Affect normal.         Judgment: Judgment normal.         Lab Data Review:  Lab " Results   Component Value Date/Time    CHOLSTRLTOT 162 12/27/2021 10:41 AM    LDL 60 12/27/2021 10:41 AM    HDL 78 12/27/2021 10:41 AM    TRIGLYCERIDE 122 12/27/2021 10:41 AM       Lab Results   Component Value Date/Time    SODIUM 141 03/21/2022 09:36 AM    POTASSIUM 4.4 03/21/2022 09:36 AM    CHLORIDE 108 03/21/2022 09:36 AM    CO2 21 03/21/2022 09:36 AM    GLUCOSE 159 (H) 03/21/2022 09:36 AM    BUN 22 03/21/2022 09:36 AM    CREATININE 0.68 03/21/2022 09:36 AM     CrCl cannot be calculated (Patient's most recent lab result is older than the maximum 7 days allowed.).  Lab Results   Component Value Date/Time    ALKPHOSPHAT 74 03/21/2022 09:36 AM    ASTSGOT 18 03/21/2022 09:36 AM    ALTSGPT 23 03/21/2022 09:36 AM    TBILIRUBIN 0.2 03/21/2022 09:36 AM      Lab Results   Component Value Date/Time    WBC 7.1 12/27/2021 10:41 AM     Lab Results   Component Value Date/Time    HBA1C 7.2 (H) 03/21/2022 09:36 AM     No components found for: TROP      Cardiac Imaging and Procedures Review:      LE art vascular ultrasound 2/21/20  Left HERNANDEZ:  0.79  Right HERNANDEZ: 1.02  1.  Post occlusive waveform is noted in the distal left peroneal artery. Short segment occlusion or high-grade stenosis proximal to this location is likely present, although not directly visualized.  2.  No other evidence of occlusion or significant stenosis bilaterally.  3.  Moderate calcified atherosclerotic plaque is identified in each lower extremity.    TTE 2/2020  CONCLUSIONS  Left ventricular ejection fraction is visually estimated to be 65%.  Mild concentric left ventricular hypertrophy.  Decreased left ventricular compliance with indeterminate diastolic   function.  Normal right ventricular size and systolic function.    Nuclear stress spect sachi 4/3/20   NUCLEAR IMAGING INTERPRETATION   Normal left ventricular size, ejection fraction, and wall motion.   Small fixed defect in the inferolateral wall could be artifact. Infarct is in the differential but  consider less likely.    No reversible defect.    CAC CT 2/20/20  Coronary calcification:  LMA - 0.0  LCX - 0.0  LAD - 315  RCA - 260.8  Total Calcium Score: 575.8    Medical Decision Making:  Problem List Items Addressed This Visit     Dyslipidemia associated with type 2 diabetes mellitus (HCC) (Chronic)    Hyperlipidemia    PAD (peripheral artery disease) (HCC)    Relevant Orders    US-EXTREMITY ARTERY LOWER BILAT W/HERNANDEZ (COMBO)    Coronary artery calcification seen on CT scan    Essential hypertension    Elevated lipoprotein(a)        DM2 / HLD - continue statin and aspirin. Messaged Dr Crocker to consider ozempic or equivalent for greater a1c control and weight loss benefits.    HTN - BP at goal 120/80. Continue antihypertensives ARB and CCB.     CAD/PAD/ASCVD - continue aspirin and statin. LDL at goal <70 and trig <150. Elevated Lp (a). Messaged Dr Browning to discuss options. Can consider vascular dose xarelto in the future. Continue symptom limited exercise. Serial vascular testing.    It was my pleasure to meet with Ms. Arden.    A total of 45 minutes of time was spent on day of encounter reviewing medical record, performing history and examination, counseling, ordering medication/test/consults and documentation.

## 2022-06-02 ENCOUNTER — TELEPHONE (OUTPATIENT)
Dept: MEDICAL GROUP | Facility: PHYSICIAN GROUP | Age: 67
End: 2022-06-02
Payer: MEDICARE

## 2022-06-02 NOTE — TELEPHONE ENCOUNTER
----- Message from RIOS Davis sent at 6/2/2022  3:35 PM PDT -----  Hi there,  Can you call this patient and have her come in for follow up?  Dr. Huerta recommended we change her diabetes medication and I would like to see her about this please,  D

## 2022-06-02 NOTE — TELEPHONE ENCOUNTER
Called pt and schedule an appointment with pt on Thursday 6/9 to further discuss with Milvia AGUILERA.

## 2022-06-08 RX ORDER — PAROXETINE 30 MG/1
30 TABLET, FILM COATED ORAL DAILY
Qty: 90 TABLET | Refills: 3 | Status: SHIPPED | OUTPATIENT
Start: 2022-06-08 | End: 2023-06-29

## 2022-07-03 SDOH — ECONOMIC STABILITY: HOUSING INSECURITY: IN THE LAST 12 MONTHS, HOW MANY PLACES HAVE YOU LIVED?: 1

## 2022-07-03 SDOH — HEALTH STABILITY: PHYSICAL HEALTH: ON AVERAGE, HOW MANY MINUTES DO YOU ENGAGE IN EXERCISE AT THIS LEVEL?: 40 MIN

## 2022-07-03 SDOH — ECONOMIC STABILITY: INCOME INSECURITY: HOW HARD IS IT FOR YOU TO PAY FOR THE VERY BASICS LIKE FOOD, HOUSING, MEDICAL CARE, AND HEATING?: NOT VERY HARD

## 2022-07-03 SDOH — ECONOMIC STABILITY: FOOD INSECURITY: WITHIN THE PAST 12 MONTHS, THE FOOD YOU BOUGHT JUST DIDN'T LAST AND YOU DIDN'T HAVE MONEY TO GET MORE.: NEVER TRUE

## 2022-07-03 SDOH — ECONOMIC STABILITY: INCOME INSECURITY: IN THE LAST 12 MONTHS, WAS THERE A TIME WHEN YOU WERE NOT ABLE TO PAY THE MORTGAGE OR RENT ON TIME?: NO

## 2022-07-03 SDOH — HEALTH STABILITY: PHYSICAL HEALTH: ON AVERAGE, HOW MANY DAYS PER WEEK DO YOU ENGAGE IN MODERATE TO STRENUOUS EXERCISE (LIKE A BRISK WALK)?: 4 DAYS

## 2022-07-03 SDOH — ECONOMIC STABILITY: FOOD INSECURITY: WITHIN THE PAST 12 MONTHS, YOU WORRIED THAT YOUR FOOD WOULD RUN OUT BEFORE YOU GOT MONEY TO BUY MORE.: NEVER TRUE

## 2022-07-03 SDOH — HEALTH STABILITY: MENTAL HEALTH
STRESS IS WHEN SOMEONE FEELS TENSE, NERVOUS, ANXIOUS, OR CAN'T SLEEP AT NIGHT BECAUSE THEIR MIND IS TROUBLED. HOW STRESSED ARE YOU?: TO SOME EXTENT

## 2022-07-03 ASSESSMENT — LIFESTYLE VARIABLES
AUDIT-C TOTAL SCORE: 0
SKIP TO QUESTIONS 9-10: 1
HOW OFTEN DO YOU HAVE SIX OR MORE DRINKS ON ONE OCCASION: NEVER
HOW MANY STANDARD DRINKS CONTAINING ALCOHOL DO YOU HAVE ON A TYPICAL DAY: PATIENT DOES NOT DRINK
HOW OFTEN DO YOU HAVE A DRINK CONTAINING ALCOHOL: NEVER

## 2022-07-03 ASSESSMENT — SOCIAL DETERMINANTS OF HEALTH (SDOH)
DO YOU BELONG TO ANY CLUBS OR ORGANIZATIONS SUCH AS CHURCH GROUPS UNIONS, FRATERNAL OR ATHLETIC GROUPS, OR SCHOOL GROUPS?: NO
DO YOU BELONG TO ANY CLUBS OR ORGANIZATIONS SUCH AS CHURCH GROUPS UNIONS, FRATERNAL OR ATHLETIC GROUPS, OR SCHOOL GROUPS?: NO
HOW OFTEN DO YOU GET TOGETHER WITH FRIENDS OR RELATIVES?: ONCE A WEEK
IN A TYPICAL WEEK, HOW MANY TIMES DO YOU TALK ON THE PHONE WITH FAMILY, FRIENDS, OR NEIGHBORS?: MORE THAN THREE TIMES A WEEK
HOW OFTEN DO YOU HAVE SIX OR MORE DRINKS ON ONE OCCASION: NEVER
HOW OFTEN DO YOU GET TOGETHER WITH FRIENDS OR RELATIVES?: ONCE A WEEK
IN A TYPICAL WEEK, HOW MANY TIMES DO YOU TALK ON THE PHONE WITH FAMILY, FRIENDS, OR NEIGHBORS?: MORE THAN THREE TIMES A WEEK
HOW HARD IS IT FOR YOU TO PAY FOR THE VERY BASICS LIKE FOOD, HOUSING, MEDICAL CARE, AND HEATING?: NOT VERY HARD
HOW OFTEN DO YOU HAVE A DRINK CONTAINING ALCOHOL: NEVER
WITHIN THE PAST 12 MONTHS, YOU WORRIED THAT YOUR FOOD WOULD RUN OUT BEFORE YOU GOT THE MONEY TO BUY MORE: NEVER TRUE
HOW OFTEN DO YOU ATTEND CHURCH OR RELIGIOUS SERVICES?: 1 TO 4 TIMES PER YEAR
HOW OFTEN DO YOU ATTEND CHURCH OR RELIGIOUS SERVICES?: 1 TO 4 TIMES PER YEAR
HOW MANY DRINKS CONTAINING ALCOHOL DO YOU HAVE ON A TYPICAL DAY WHEN YOU ARE DRINKING: PATIENT DOES NOT DRINK

## 2022-07-05 ENCOUNTER — HOSPITAL ENCOUNTER (OUTPATIENT)
Dept: LAB | Facility: MEDICAL CENTER | Age: 67
End: 2022-07-05
Attending: NURSE PRACTITIONER
Payer: MEDICARE

## 2022-07-05 DIAGNOSIS — E78.41 ELEVATED LIPOPROTEIN(A): ICD-10-CM

## 2022-07-05 DIAGNOSIS — E78.5 DYSLIPIDEMIA ASSOCIATED WITH TYPE 2 DIABETES MELLITUS (HCC): Chronic | ICD-10-CM

## 2022-07-05 DIAGNOSIS — I10 ESSENTIAL HYPERTENSION: ICD-10-CM

## 2022-07-05 DIAGNOSIS — E11.69 DYSLIPIDEMIA ASSOCIATED WITH TYPE 2 DIABETES MELLITUS (HCC): Chronic | ICD-10-CM

## 2022-07-05 DIAGNOSIS — Z79.02 LONG TERM CURRENT USE OF ANTITHROMBOTICS/ANTIPLATELETS: ICD-10-CM

## 2022-07-05 LAB
ALBUMIN SERPL BCP-MCNC: 4.4 G/DL (ref 3.2–4.9)
ALBUMIN/GLOB SERPL: 1.9 G/DL
ALP SERPL-CCNC: 82 U/L (ref 30–99)
ALT SERPL-CCNC: 21 U/L (ref 2–50)
ANION GAP SERPL CALC-SCNC: 13 MMOL/L (ref 7–16)
AST SERPL-CCNC: 19 U/L (ref 12–45)
BASOPHILS # BLD AUTO: 0.7 % (ref 0–1.8)
BASOPHILS # BLD: 0.05 K/UL (ref 0–0.12)
BILIRUB SERPL-MCNC: 0.4 MG/DL (ref 0.1–1.5)
BUN SERPL-MCNC: 16 MG/DL (ref 8–22)
CALCIUM SERPL-MCNC: 9.6 MG/DL (ref 8.5–10.5)
CHLORIDE SERPL-SCNC: 105 MMOL/L (ref 96–112)
CHOLEST SERPL-MCNC: 180 MG/DL (ref 100–199)
CO2 SERPL-SCNC: 21 MMOL/L (ref 20–33)
CREAT SERPL-MCNC: 0.76 MG/DL (ref 0.5–1.4)
EOSINOPHIL # BLD AUTO: 0.26 K/UL (ref 0–0.51)
EOSINOPHIL NFR BLD: 3.9 % (ref 0–6.9)
ERYTHROCYTE [DISTWIDTH] IN BLOOD BY AUTOMATED COUNT: 44 FL (ref 35.9–50)
EST. AVERAGE GLUCOSE BLD GHB EST-MCNC: 171 MG/DL
FASTING STATUS PATIENT QL REPORTED: NORMAL
GFR SERPLBLD CREATININE-BSD FMLA CKD-EPI: 85 ML/MIN/1.73 M 2
GLOBULIN SER CALC-MCNC: 2.3 G/DL (ref 1.9–3.5)
GLUCOSE SERPL-MCNC: 176 MG/DL (ref 65–99)
HBA1C MFR BLD: 7.6 % (ref 4–5.6)
HCT VFR BLD AUTO: 40.1 % (ref 37–47)
HDLC SERPL-MCNC: 72 MG/DL
HGB BLD-MCNC: 13 G/DL (ref 12–16)
IMM GRANULOCYTES # BLD AUTO: 0.02 K/UL (ref 0–0.11)
IMM GRANULOCYTES NFR BLD AUTO: 0.3 % (ref 0–0.9)
LDLC SERPL CALC-MCNC: 77 MG/DL
LYMPHOCYTES # BLD AUTO: 1.86 K/UL (ref 1–4.8)
LYMPHOCYTES NFR BLD: 27.8 % (ref 22–41)
MCH RBC QN AUTO: 30.4 PG (ref 27–33)
MCHC RBC AUTO-ENTMCNC: 32.4 G/DL (ref 33.6–35)
MCV RBC AUTO: 93.9 FL (ref 81.4–97.8)
MONOCYTES # BLD AUTO: 0.53 K/UL (ref 0–0.85)
MONOCYTES NFR BLD AUTO: 7.9 % (ref 0–13.4)
NEUTROPHILS # BLD AUTO: 3.98 K/UL (ref 2–7.15)
NEUTROPHILS NFR BLD: 59.4 % (ref 44–72)
NRBC # BLD AUTO: 0 K/UL
NRBC BLD-RTO: 0 /100 WBC
PLATELET # BLD AUTO: 215 K/UL (ref 164–446)
PMV BLD AUTO: 11 FL (ref 9–12.9)
POTASSIUM SERPL-SCNC: 4.4 MMOL/L (ref 3.6–5.5)
PROT SERPL-MCNC: 6.7 G/DL (ref 6–8.2)
RBC # BLD AUTO: 4.27 M/UL (ref 4.2–5.4)
SODIUM SERPL-SCNC: 139 MMOL/L (ref 135–145)
TRIGL SERPL-MCNC: 154 MG/DL (ref 0–149)
WBC # BLD AUTO: 6.7 K/UL (ref 4.8–10.8)

## 2022-07-05 PROCEDURE — 80053 COMPREHEN METABOLIC PANEL: CPT

## 2022-07-05 PROCEDURE — 80061 LIPID PANEL: CPT

## 2022-07-05 PROCEDURE — 85025 COMPLETE CBC W/AUTO DIFF WBC: CPT

## 2022-07-05 PROCEDURE — 36415 COLL VENOUS BLD VENIPUNCTURE: CPT

## 2022-07-05 PROCEDURE — 83036 HEMOGLOBIN GLYCOSYLATED A1C: CPT | Mod: GA

## 2022-07-06 ENCOUNTER — OFFICE VISIT (OUTPATIENT)
Dept: MEDICAL GROUP | Facility: PHYSICIAN GROUP | Age: 67
End: 2022-07-06
Payer: MEDICARE

## 2022-07-06 VITALS — HEIGHT: 66 IN | WEIGHT: 210.4 LBS | BODY MASS INDEX: 33.82 KG/M2

## 2022-07-06 DIAGNOSIS — F33.41 RECURRENT MAJOR DEPRESSIVE DISORDER, IN PARTIAL REMISSION (HCC): ICD-10-CM

## 2022-07-06 DIAGNOSIS — J44.9 CHRONIC OBSTRUCTIVE PULMONARY DISEASE, UNSPECIFIED COPD TYPE (HCC): ICD-10-CM

## 2022-07-06 DIAGNOSIS — Z00.00 MEDICARE ANNUAL WELLNESS VISIT, SUBSEQUENT: Primary | ICD-10-CM

## 2022-07-06 DIAGNOSIS — E78.5 DYSLIPIDEMIA ASSOCIATED WITH TYPE 2 DIABETES MELLITUS (HCC): Chronic | ICD-10-CM

## 2022-07-06 DIAGNOSIS — J40 BRONCHITIS: ICD-10-CM

## 2022-07-06 DIAGNOSIS — E11.69 DYSLIPIDEMIA ASSOCIATED WITH TYPE 2 DIABETES MELLITUS (HCC): Chronic | ICD-10-CM

## 2022-07-06 DIAGNOSIS — I73.9 PAD (PERIPHERAL ARTERY DISEASE) (HCC): ICD-10-CM

## 2022-07-06 DIAGNOSIS — E78.5 HYPERLIPIDEMIA LDL GOAL <70: ICD-10-CM

## 2022-07-06 PROCEDURE — G0439 PPPS, SUBSEQ VISIT: HCPCS | Performed by: NURSE PRACTITIONER

## 2022-07-06 RX ORDER — METHYLPREDNISOLONE 4 MG/1
TABLET ORAL
Qty: 21 TABLET | Refills: 0 | Status: SHIPPED
Start: 2022-07-06 | End: 2022-11-03

## 2022-07-06 RX ORDER — DOXYCYCLINE HYCLATE 100 MG
100 TABLET ORAL 2 TIMES DAILY
Qty: 14 TABLET | Refills: 0 | Status: SHIPPED
Start: 2022-07-06 | End: 2022-09-23

## 2022-07-06 ASSESSMENT — ACTIVITIES OF DAILY LIVING (ADL): BATHING_REQUIRES_ASSISTANCE: 0

## 2022-07-06 ASSESSMENT — ENCOUNTER SYMPTOMS: GENERAL WELL-BEING: GOOD

## 2022-07-06 ASSESSMENT — FIBROSIS 4 INDEX: FIB4 SCORE: 1.31

## 2022-07-06 ASSESSMENT — PATIENT HEALTH QUESTIONNAIRE - PHQ9: CLINICAL INTERPRETATION OF PHQ2 SCORE: 0

## 2022-07-06 NOTE — ASSESSMENT & PLAN NOTE
Chronic condition, stable.  Patient reports good control of her COPD with albuterol nebulizer as needed.  She follows with pulmonology.  She will continue her current regimen.

## 2022-07-06 NOTE — PROGRESS NOTES
Chief Complaint   Patient presents with   • Annual Wellness Visit         HPI:  Sarah is a 67 y.o. here for Medicare Annual Wellness Visit    Patient Active Problem List    Diagnosis Date Noted   • COPD (chronic obstructive pulmonary disease) ([facility]) 04/20/2022   • Lung nodule 04/20/2022   • Greater trochanteric bursitis of left hip 01/31/2022   • Elevated lipoprotein(a) 12/29/2021   • Obesity, Class II, BMI 35-39.9 12/06/2021   • Healthcare maintenance 06/17/2021   • Acute appendicitis with appendiceal abscess 02/12/2021   • Depression 02/12/2021   • Essential hypertension 10/06/2020   • Coronary artery calcification seen on CT scan 04/07/2020   • PAD (peripheral artery disease) ([facility]) 02/07/2020   • Other specified personal risk factors, not elsewhere classified 02/07/2020   • Bronchitis 01/22/2020   • Recurrent major depressive disorder, in partial remission ([facility]) 02/10/2019   • Left elbow pain 09/04/2018   • Rash 09/04/2018   • Chronic insomnia 04/18/2016   • Dyslipidemia associated with type 2 diabetes mellitus ([facility]) 10/02/2014   • BMI 36.0-36.9,adult 10/02/2014   • Hyperlipidemia 10/02/2014       Current Outpatient Medications   Medication Sig Dispense Refill   • doxycycline (VIBRAMYCIN) 100 MG Tab Take 1 Tablet by mouth 2 times a day. 14 Tablet 0   • methylPREDNISolone (MEDROL DOSEPAK) 4 MG Tablet Therapy Pack As directed on the packaging label. 21 Tablet 0   • Semaglutide,0.25 or 0.5MG/DOS, 2 MG/1.5ML Solution Pen-injector Inject 0.25 mg under the skin every 7 days for 28 days, THEN 0.5 mg every 7 days for 28 days, THEN 1 mg every 7 days for 28 days. 3 mL 11   • PARoxetine (PAXIL) 30 MG Tab Take 1 Tablet by mouth every day. 90 Tablet 3   • rosuvastatin (CRESTOR) 20 MG Tab TAKE ONE TABLET BY MOUTH EVERY EVENING 90 Tablet 3   • traZODone (DESYREL) 50 MG Tab Take 1 Tablet by mouth at bedtime as needed for Sleep. 90 Tablet 0   • Lansoprazole (PREVACID 24HR PO) Take  by mouth.     • metFORMIN ER 1000 MG TABLET SR 24  HR Take 1 Tablet by mouth with dinner. (Patient taking differently: Take 1,000 mg by mouth every day.) 180 Tablet 3   • Blood Glucose Test Strips Use one Accucheck Tana strip to test blood sugar three times daily before meals. 100 Strip 0   • amLODIPine (NORVASC) 5 MG Tab Take 1 Tablet by mouth at bedtime for 360 days. 90 Tablet 3   • telmisartan (MICARDIS) 80 MG Tab Take 1 Tablet by mouth at bedtime for 360 days. 90 Tablet 3   • ibuprofen (MOTRIN) 200 MG Tab Take 600 mg by mouth 1 time a day as needed (Moderate pain). 3 tablets = 600 mg.     • vitamin D (CHOLECALCIFEROL) 1000 Unit (25 mcg) Tab Take 3,000 Units by mouth every morning. 3 tablets = 3000 units.     • aspirin EC (ECOTRIN) 81 MG Tablet Delayed Response Take 81 mg by mouth every morning.     • albuterol (PROVENTIL) 2.5mg/3ml Nebu Soln solution for nebulization 3 mL by Nebulization route every four hours as needed for Shortness of Breath. 25 Bullet 0     No current facility-administered medications for this visit.        Patient is taking medications as noted in medication list.  Current supplements as per medication list.     Allergies: Sulfa drugs and Shellfish allergy    Current social contact/activities: family     Is patient current with immunizations? No, due for SHINGRIX (Shingles). Patient is interested in receiving NONE today.    She  reports that she quit smoking about 5 years ago. Her smoking use included cigarettes. She started smoking about 54 years ago. She has a 48.00 pack-year smoking history. She has never used smokeless tobacco. She reports that she does not drink alcohol and does not use drugs.  Counseling given: Not Answered        DPA/Advanced directive: Patient does not have an Advanced Directive.  A packet and workshop information was given on Advanced Directives.    ROS:    Gait: Uses no assistive device   Ostomy: No   Other tubes: No   Amputations: No   Chronic oxygen use No   Last eye exam 6/2022   Wears hearing aids: No   :  Reports urinary leakage during the last 6 months that has not interfered at all with their daily activities or sleep.    Screening:    Depression Screening  Little interest or pleasure in doing things?  0 - not at all  Feeling down, depressed, or hopeless? 0 - not at all  Patient Health Questionnaire Score: 0    If depressive symptoms identified deferred to follow up visit unless specifically addressed in assessment and plan.    Interpretation of PHQ-9 Total Score   Score Severity   1-4 No Depression   5-9 Mild Depression   10-14 Moderate Depression   15-19 Moderately Severe Depression   20-27 Severe Depression    Screening for Cognitive Impairment  Three Minute Recall (daughter, heaven, mountain)  3/3    Ryan clock face with all 12 numbers and set the hands to show 10 past 11.  Yes 5/5  If cognitive concerns identified, deferred for follow up unless specifically addressed in assessment and plan.    Fall Risk Assessment  Has the patient had two or more falls in the last year or any fall with injury in the last year?  No  If fall risk identified, deferred for follow up unless specifically addressed in assessment and plan.    Safety Assessment  Throw rugs on floor.  Yes  Handrails on all stairs.  No  Good lighting in all hallways.  Yes  Difficulty hearing.  Yes  Patient counseled about all safety risks that were identified.    Functional Assessment ADLs  Are there any barriers preventing you from cooking for yourself or meeting nutritional needs?  No.    Are there any barriers preventing you from driving safely or obtaining transportation?  No.    Are there any barriers preventing you from using a telephone or calling for help?  No.    Are there any barriers preventing you from shopping?  No.    Are there any barriers preventing you from taking care of your own finances?  No.    Are there any barriers preventing you from managing your medications?  No.    Are there any barriers preventing you from showering, bathing  or dressing yourself?  No.    Are you currently engaging in any exercise or physical activity?  Yes.     What is your perception of your health?  Good.    Health Maintenance Summary          Ordered - MAMMOGRAM (Yearly) Ordered on 1/31/2022    10/18/2017  MA-SCREEN MAMMO W/CAD-BILAT    05/16/2016  MA-SCREEN MAMMO W/CAD-BILAT    10/21/2014  MA-SCREENING MAMMOGRAM W/ CAD    10/02/1994  Previously completed          Overdue - COVID-19 Vaccine (4 - Booster for Pfizer series) Overdue since 4/30/2022 12/30/2021  Imm Admin: MODERNA SARS-COV-2 VACCINE (12+)    04/23/2021  Imm Admin: PFIZER PURPLE CAP SARS-COV-2 VACCINATION (12+)    03/31/2021  Imm Admin: PFIZER PURPLE CAP SARS-COV-2 VACCINATION (12+)          Postponed - IMM ZOSTER VACCINES (2 of 3) Postponed until 12/6/2022 02/24/2015  Imm Admin: Zoster Vaccine Live (ZVL) (Zostavax) - HISTORICAL DATA          URINE ACR / MICROALBUMIN (Yearly) Next due on 12/27/2022 12/27/2021  MICROALBUMIN CREAT RATIO URINE    10/26/2020  MICROALBUMIN CREAT RATIO URINE    02/20/2019  MICROALBUMIN CREAT RATIO URINE    03/04/2016  MICROALBUMIN CREAT RATIO URINE    10/03/2014  MICROALBUMIN CREAT RATIO URINE    Only the first 5 history entries have been loaded, but more history exists.          Ordered - A1C SCREENING (Every 6 Months) Ordered on 7/6/2022 07/05/2022  HEMOGLOBIN A1C (Glycohemoglobin GHB Total/A1C with MBG Estimate)    03/21/2022  HEMOGLOBIN A1C    12/27/2021  HEMOGLOBIN A1C (Glycohemoglobin GHB Total/A1C with MBG Estimate)    06/22/2021  POCT Hemoglobin A1C    10/26/2020  HEMOGLOBIN A1C    Only the first 5 history entries have been loaded, but more history exists.          DIABETES MONOFILAMENT / LE EXAM (Yearly) Next due on 4/5/2023 04/05/2022  Diabetic Monofilament LE Exam    04/05/2022  Smart[employer]: WORKFLOW - DIABETES - DIABETIC FOOT EXAM PERFORMED    10/06/2020  SmartData: WORKFLOW - DIABETES - DIABETIC FOOT EXAM PERFORMED    10/06/2020  Diabetic Monofilament  LE Exam    06/01/2017  Diabetic Monofilament LE Exam    Only the first 5 history entries have been loaded, but more history exists.          Annual Pulmonary Function Test / Spirometry (Yearly) Next due on 4/18/2023 04/18/2022  PULMONARY FUNCTION TESTS -Test requested: Complete Pulmonary Function Test    04/12/2021  PULMONARY FUNCTION TESTS -Test requested: Complete Pulmonary Function Test          RETINAL SCREENING (Yearly) Next due on 6/6/2023 06/06/2022  Done    02/24/2021  REFERRAL FOR RETINAL SCREENING EXAM    02/24/2021  REFERRAL FOR RETINAL SCREENING EXAM    05/09/2019  REFERRAL FOR RETINAL SCREENING EXAM          FASTING LIPID PROFILE (Yearly) Next due on 7/5/2023 07/05/2022  Lipid Profile    12/27/2021  Lipid Profile    06/22/2021  Lipid Profile    10/26/2020  Lipid Profile    04/03/2020  Lipid Profile    Only the first 5 history entries have been loaded, but more history exists.          SERUM CREATININE (Yearly) Next due on 7/5/2023 07/05/2022  Comp Metabolic Panel    03/21/2022  Comp Metabolic Panel    12/27/2021  Comp Metabolic Panel    06/22/2021  Comp Metabolic Panel    02/14/2021  Basic Metabolic Panel    Only the first 5 history entries have been loaded, but more history exists.          COLORECTAL CANCER SCREENING (COLONOSCOPY - Every 10 Years) Next due on 1/31/2029 01/31/2019  REFERRAL TO GI FOR COLONOSCOPY    05/02/2013  COLONOSCOPY (Previously completed)          IMM DTaP/Tdap/Td Vaccine (2 - Td or Tdap) Next due on 11/10/2030    11/10/2020  Imm Admin: Tdap Vaccine          HEPATITIS C SCREENING  Completed    04/18/2014  HEP C VIRUS ANTIBODY          IMM PNEUMOCOCCAL VACCINE: 65+ Years (Series Information) Completed    10/06/2020  Imm Admin: Pneumococcal polysaccharide vaccine (PPSV-23)    04/18/2016  Imm Admin: Pneumococcal Conjugate Vaccine (Prevnar/PCV-13)    02/24/2015  Imm Admin: Pneumococcal polysaccharide vaccine (PPSV-23)          Annual Wellness Visit  Completed     2022  Visit Dx: Medicare annual wellness visit, subsequent          IMM MENINGOCOCCAL VACCINE (MCV4) (Series Information) Aged Out    No completion history exists for this topic.          Discontinued - PAP SMEAR  Discontinued    No completion history exists for this topic.          Discontinued - IMM HEP B VACCINE  Discontinued    No completion history exists for this topic.          Discontinued - IMM INFLUENZA  Discontinued    2020  Imm Admin: Influenza Vaccine Adult HD    2019  Imm Admin: Influenza Vaccine Adult HD    2019  Imm Admin: Influenza Vaccine Quad Inj (Pf)    2016  Imm Admin: Influenza Vac Subunit Quad Inj (Pf)    10/02/2014  Imm Admin: Influenza Vaccine Quad Inj (Pf)                Patient Care Team:  RIOS Davis as PCP - General (Nurse Practitioner Family)  Gunnar Jaimes O.D. (Optometry)    Social History     Tobacco Use   • Smoking status: Former Smoker     Packs/day: 1.00     Years: 48.00     Pack years: 48.00     Types: Cigarettes     Start date:      Quit date: 2017     Years since quittin.0   • Smokeless tobacco: Never Used   Vaping Use   • Vaping Use: Never used   Substance Use Topics   • Alcohol use: No     Alcohol/week: 0.0 oz   • Drug use: Never     Family History   Problem Relation Age of Onset   • Cancer Mother         pancreatic    • Heart Disease Father    • Heart Attack Father    • Diabetes Father         pre-diabetes   • Stroke Neg Hx      She  has a past medical history of Hyperlipidemia LDL goal < 100 (10/2/2014), Hypertension, Obesity (BMI 30-39.9) (10/2/2014), Pre-diabetes (10/2/2014), Recurrent sinusitis, and Type II or unspecified type diabetes mellitus without mention of complication, not stated as uncontrolled.    She has no past medical history of ASTHMA or Diabetes.   Past Surgical History:   Procedure Laterality Date   • AR LAP,APPENDECTOMY N/A 2021    Procedure: APPENDECTOMY, LAPAROSCOPIC;  Surgeon: Juan HUNTER  "ANNA Dawson;  Location: SURGERY [facility];  Service: General   • ABDOMINAL HYSTERECTOMY TOTAL  1993    benign cysts, total           Exam:     Ht 1.664 m (5' 5.5\")   Wt 95.4 kg (210 lb 6.4 oz)  Body mass index is 34.48 kg/m².    Hearing excellent.    Dentition good  Alert, oriented in no acute distress.  Eye contact is good, speech goal directed, affect calm      Assessment and Plan. The following treatment and monitoring plan is recommended:    1. Medicare annual wellness visit, subsequent     2. Dyslipidemia associated with type 2 diabetes mellitus (HCC)  HEMOGLOBIN A1C    Semaglutide,0.25 or 0.5MG/DOS, 2 MG/1.5ML Solution Pen-injector   3. PAD (peripheral artery disease) ([facility])     4. Recurrent major depressive disorder, in partial remission ([facility])     5. Chronic obstructive pulmonary disease, unspecified COPD type ([facility])     6. Hyperlipidemia     7. Bronchitis  doxycycline (VIBRAMYCIN) 100 MG Tab    methylPREDNISolone (MEDROL DOSEPAK) 4 MG Tablet Therapy Pack     COPD (chronic obstructive pulmonary disease) ([facility])  Chronic condition, stable.  Patient reports good control of her COPD with albuterol nebulizer as needed.  She follows with pulmonology.  She will continue her current regimen.      Dyslipidemia associated with type 2 diabetes mellitus (HCC)  Chronic condition, stable.  Patient's most recent A1c was 7.6 on 7/5/22.  Patient is taking metformin ER 1000 mg daily.  Discussed with patient adding semaglutide to her regimen due to the many benefits this would have for patient including help with weight reduction, as well would be cardioprotective.  Patient agrees and is would like to try this. We will send in a preauthorization for patient today. We will let her know the results.     Hyperlipidemia  Chronic condition, stable.  Patient takes rosuvastatin 20 mg daily.  Her last lipid profile on 7/5/22 was WNL except for elevated triglycerides. She states that she has been eating more sugar than usual as her " sister is visiting. She will work on this.  Continue current regimen.      Recurrent major depressive disorder, in partial remission (HCC)  Chronic condition, stable. Patient takes paroxetine 30 mg daily.  She states that she is continuing to do well with this and does not feel we need to make any changes.  Continue current regimen.      Bronchitis  Acute on-chronic condition, stable.  Patient states that she had a recent cold which she thinks has developed into bronchitis. She is coughing up green/yellow mucus.  She does think she needs antibiotics at this time. She was given doxycycline 100 mg BID. She will follow up in two weeks if she is not improved, sooner if she is worse.      PAD (peripheral artery disease) (HCC)  Chronic condition, stable.  Patient takes rosuvastatin 20 mg daily.  She is no longer smoking.  She takes an 81 mg aspirin daily.  She will continue her current regimen.        Services suggested: No services needed at this time  Health Care Screening recommendations as per orders if indicated.  Referrals offered: PT/OT/Nutrition counseling/Behavioral Health/Smoking cessation as per orders if indicated.    Discussion today about general wellness and lifestyle habits:    · Prevent falls and reduce trip hazards; Cautioned about securing or removing rugs.  · Have a working fire alarm and carbon monoxide detector;   · Engage in regular physical activity and social activities       Follow-up: Return in about 3 months (around 10/6/2022).

## 2022-07-06 NOTE — LETTER
[facility]  RIOS Davis  [address]  [facility] [address]  Fax: [phone]   Authorization for Release/Disclosure of   Protected Health Information   Name: DUNIA HER : 1955 SSN: [id number]   Address: [address] [place] [address] Phone:    [phone] (home)    I authorize the entity listed below to release/disclose the PHI below to:   [facility]/RIOS Davis and RIOS Davis   Provider or Entity Name:Gunnar Wolf      Address   City, [facility], [facility]   Phone:      Fax:[phone]     Reason for request: continuity of care   Information to be released:    [  ] LAST COLONOSCOPY,  including any PATH REPORT and follow-up  [  ] LAST FIT/COLOGUARD RESULT [  ] LAST DEXA  [  ] LAST MAMMOGRAM  [  ] LAST PAP  [  ] LAST LABS [xx ] RETINA EXAM REPORT  [  ] IMMUNIZATION RECORDS  [  ] Release all info      [  ] Check here and initial the line next to each item to release ALL health information INCLUDING  _____ Care and treatment for drug and / or alcohol abuse  _____ HIV testing, infection status, or AIDS  _____ Genetic Testing    DATES OF SERVICE OR TIME PERIOD TO BE DISCLOSED: _____________  I understand and acknowledge that:  * This Authorization may be revoked at any time by you in writing, except if your health information has already been used or disclosed.  * Your health information that will be used or disclosed as a result of you signing this authorization could be re-disclosed by the recipient. If this occurs, your re-disclosed health information may no longer be protected by State or Federal laws.  * You may refuse to sign this Authorization. Your refusal will not affect your ability to obtain treatment.  * This Authorization becomes effective upon signing and will  on (date) __________.      If no date is indicated, this Authorization will  one (1) year from the signature date.    Name: Dunia Her    Signature:   Date:      7/6/2022       PLEASE FAX REQUESTED RECORDS BACK TO: [phone]

## 2022-07-11 NOTE — ASSESSMENT & PLAN NOTE
Acute on-chronic condition, stable.  Patient states that she had a recent cold which she thinks has developed into bronchitis. She is coughing up green/yellow mucus.  She does think she needs antibiotics at this time. She was given doxycycline 100 mg BID. She will follow up in two weeks if she is not improved, sooner if she is worse.

## 2022-07-11 NOTE — ASSESSMENT & PLAN NOTE
Chronic condition, stable.  Patient takes rosuvastatin 20 mg daily.  Her last lipid profile on 7/5/22 was WNL except for elevated triglycerides. She states that she has been eating more sugar than usual as her sister is visiting. She will work on this.  Continue current regimen.

## 2022-07-11 NOTE — ASSESSMENT & PLAN NOTE
Chronic condition, stable.  Patient takes rosuvastatin 20 mg daily.  She is no longer smoking.  She takes an 81 mg aspirin daily.  She will continue her current regimen.

## 2022-07-11 NOTE — ASSESSMENT & PLAN NOTE
Chronic condition, stable. Patient takes paroxetine 30 mg daily.  She states that she is continuing to do well with this and does not feel we need to make any changes.  Continue current regimen.

## 2022-07-11 NOTE — ASSESSMENT & PLAN NOTE
Chronic condition, stable.  Patient's most recent A1c was 7.6 on 7/5/22.  Patient is taking metformin ER 1000 mg daily.  Discussed with patient adding semaglutide to her regimen due to the many benefits this would have for patient including help with weight reduction, as well would be cardioprotective.  Patient agrees and is would like to try this. We will send in a preauthorization for patient today. We will let her know the results.

## 2022-07-13 ENCOUNTER — OFFICE VISIT (OUTPATIENT)
Dept: VASCULAR LAB | Facility: MEDICAL CENTER | Age: 67
End: 2022-07-13
Attending: NURSE PRACTITIONER
Payer: MEDICARE

## 2022-07-13 VITALS
SYSTOLIC BLOOD PRESSURE: 132 MMHG | DIASTOLIC BLOOD PRESSURE: 84 MMHG | HEIGHT: 66 IN | BODY MASS INDEX: 33.51 KG/M2 | WEIGHT: 208.5 LBS | HEART RATE: 74 BPM

## 2022-07-13 DIAGNOSIS — E78.5 DYSLIPIDEMIA ASSOCIATED WITH TYPE 2 DIABETES MELLITUS (HCC): Chronic | ICD-10-CM

## 2022-07-13 DIAGNOSIS — E78.5 HYPERLIPIDEMIA LDL GOAL <70: ICD-10-CM

## 2022-07-13 DIAGNOSIS — I10 ESSENTIAL HYPERTENSION: ICD-10-CM

## 2022-07-13 DIAGNOSIS — I73.9 PAD (PERIPHERAL ARTERY DISEASE) (HCC): Chronic | ICD-10-CM

## 2022-07-13 DIAGNOSIS — E11.69 DYSLIPIDEMIA ASSOCIATED WITH TYPE 2 DIABETES MELLITUS (HCC): Chronic | ICD-10-CM

## 2022-07-13 DIAGNOSIS — I25.10 CORONARY ARTERY CALCIFICATION SEEN ON CT SCAN: ICD-10-CM

## 2022-07-13 DIAGNOSIS — E78.41 ELEVATED LIPOPROTEIN(A): ICD-10-CM

## 2022-07-13 PROCEDURE — 99212 OFFICE O/P EST SF 10 MIN: CPT

## 2022-07-13 PROCEDURE — 99214 OFFICE O/P EST MOD 30 MIN: CPT | Performed by: NURSE PRACTITIONER

## 2022-07-13 RX ORDER — ROSUVASTATIN CALCIUM 20 MG/1
TABLET, COATED ORAL
Qty: 135 TABLET | Refills: 1 | Status: SHIPPED | OUTPATIENT
Start: 2022-07-13 | End: 2023-01-26

## 2022-07-13 ASSESSMENT — ENCOUNTER SYMPTOMS
MYALGIAS: 0
BLOOD IN STOOL: 0
FEVER: 0
NAUSEA: 0
PALPITATIONS: 0
WEAKNESS: 0
CLAUDICATION: 0
CHILLS: 0
COUGH: 0
BRUISES/BLEEDS EASILY: 0
ORTHOPNEA: 0

## 2022-07-13 ASSESSMENT — FIBROSIS 4 INDEX: FIB4 SCORE: 1.31

## 2022-07-13 NOTE — PROGRESS NOTES
[facility] - Follow Up EVALUATION   07/13/2022    ASSESSEMENT AND PLAN:      1. BLOOD PRESSURE CONTROL   Qualifies as Resistant HTN (RH):  No, not on optimized, max-dosed or max-tolerated meds from 3 classes   Office BP Goal ACC/AHA (2017) goal <130/80  Home BP at goal:  yes  Office BP at goal:  No  24h ABPM:  UNDECIDED  Device candidate? Not for further current as on 3 meds  Contributing factors: obesity  Much improved on office readings   Home readings 120-130's/70-80's   Monitor checked with ours and is fairly accurate  Plan:   Monitoring:   - start/continue home BP monitoring, reviewed correct technique:  Yes   - order 24h ABPM: consider in future  - bring monitor to next visit to check against office machine    - monitor lytes/gfr routinely   - advised that stabilizing BP may require multiple med changes and close monitoring over the next several months, reviewed that initially there will be additional imaging and labs and the possibility of adverse drug rxn's and variability of his BP and HR until we have things stabilized.  Advised to contact our office as needed for questions or concerns.      2. WORK UP FOR SECONDARY CAUSES OF RH:  Obstructive Sleep Apnea: Not Yet Assessed  Renal parenchymal disease: Excluded  Renovascular HTN: Not Yet Assessed  Primary Aldosteronism: Not Yet Assessed  Thyroid Function: Excluded  Pheochromocytoma: Not Yet Assessed   Cushing's:   Not Yet Assessed   Coarctation of Aorta: excluded  Other: none identified    Recommendations At This Visit: as noted in orders         3. MEDICATION USE / ADHERENCE:   Assessment: Complete  Recommendations: Instructed to Continue Taking All Medications As Prescribed         4. HTN-MEDIATED END-ORGAN DAMAGE:  Left Ventricular Hypertrophy: Absent on  ECG Date: 2020  Urine Albuminuria: Not Yet Assessed on Date: pending  Renal Function: Chronic Kidney Disease  CKD G2 at worst   Ophthalmologic: Absent per patient report and/or eye specialist    Established Cardiovascular Disease:     # CAD per CT scan - continue current med mgmt, no further surveillance and no prior ASCVD events     # PAD, LLE inflow - stable, limited sx   - continue exercise, TLC, med mgmt  -Art duplex ordered her cardiologist-Pt just had it done at [facility] in Feb will ask for report and let Dr. Huerta know when it is back.      5. LIFESTYLE INTERVENTIONS:    SMOKING:   reports that she quit smoking about 5 years ago. Her smoking use included cigarettes. She started smoking about 54 years ago. She has a 48.00 pack-year smoking history. She has never used smokeless tobacco.   - continued complete avoidance of all tobacco products     PHYSICAL ACTIVITY: continue healthy activity to improve CV fitness.  In general, targeting >150min/week of moderate-level activity.  Additional details reviewed with patient and/or outlined in care instructions     WEIGHT MANAGEMENT AND NUTRITION: Dietary plan was discussed with patient at this visit including DASH-dietary approaches, substitution of MUFA/PUFA for saturated fats, substituting whole grains for simple carbs, substituting lean meats for fatty meats, increase use of plant-based protein vs animal-based, lowered sodium.  Additional details reviewed with patient and/or outlined in care instructions.    6. STANDARD HTN PHARMACOTHERAPY:  ACEI/ARB: continue telmisartan 80mg daily   Thiazide Type Diuretic: consider as next agent   DHP-CCB: continue amlodipine 5mg QHS     7. ADDITIONAL AGENTS   Aldosterone Receptor Antagonist: not indicated   Loop Diuretic: not indicated   Peripheral Alpha Blocker: not indicated   BB: not indicated, stopped on prior visits as unlikely beneficial for BP at current dosing    Non-DHP-CCB: not indicated   Direct Vasodilator: not indicated   Centrally Acting Alpha Agonist: not indicated   Potassium-sparing diuretic: not indicated   DRI: not indicated     LIPID MANAGEMENT:   Qualifies for Statin Therapy Based on 2018 ACC/AHA  "Guidelines: yes, Secondary Prevention - Very high risk ASCVD - 2 major events or 1 event with other high-risk conditions, Diabetes and HERNANDEZ <0.9  The 10-year ASCVD risk score (Bill CORTEZ Jr., et al., 2013) is: 17.9%, 7.5 - <20% \"intermediate risk\"   Major ASCVD events: Symptomatic PAD (hx of claudication with HERNANDEZ <0.85, previous revascularization/amputation  High-risk conditions: >64yr old , Diabetes , Hypertension  and N/A  Risk-enhancers: N/A  Currently on Statin: Yes  Treatment goals: reduce LDL-C >50%  and LDL-C <70 (consider non-HDL-C <100, apoB <80)  At goal? yes  Plan:   - reinforced ongoing TLC measures as noted   - monitor labs and lp(a)  Meds:   - increase  rosuvastatin to 30 mg daily, consider intensification and/or zetia     GLYCEMIA MANAGEMENT:  Diabetic   Goal A1c < 7.0  Lab Results   Component Value Date    HBA1C 7.6 (H) 07/05/2022      Lab Results   Component Value Date/Time    MALBCRT 16 12/27/2021 10:40 AM    MICROALBUR 6.2 12/27/2021 10:40 AM   Recommend aggressive treatment due to pt's overall risk.  Increase metformin as discussed below, if does not tolerate would trial different drug class.  Good effect for just one month of the increased dose of Metformin need to test in 3 months to assess the A1c  Plan:  - Continue metformin ER 1000mg twice daily    - consider SGLT2i -Started on Ozempic instead by PCP.   - continue to see dietician   - consider referral to T2DM clinic in future if continues to struggle with FSBS monitoring at home   - recommmend for routine care with PCP (or endocrine) to include regular A1c monitoring, annual albumin/creatinine ratio (ACR), annual diabetic retinopathy screening, foot exams, annual flu vaccine, and updates to pneumonia vaccines as appropriate     ANTIPLATELET/ANTICOAGULANT THERAPY: Increase ASA to 325 mg as her Lpa is elevated to 125 and >90 is a risk of clot formation,.     OTHER ISSUES:     # MDD, recurrent - stable, continue current meds, f/u with PCP    # " GERD, restart PPI consistently.  No CP today.  If CP returns, given warning s/s to seek immediate medical attention to rule out cardiac involvement. Pt states understanding.    Studies Ordered At This Visit: none    Blood Work to Be Obtained Prior to Next Visit: as noted below   Disposition: 2 months for A1C in visit   Time: 37-min - chart review/prep, review of other providers' records, imaging/lab review, face-to-face time for history/examination, ordering, prescribing,  review of results/meds/ treatment plan with patient/family/caregiver, documentation in EMR, care coordination (as needed)    1. Coronary artery calcification seen on CT scan     2. Dyslipidemia associated with type 2 diabetes mellitus (HCC)     3. Elevated lipoprotein(a)     4. Essential hypertension     5. PAD (peripheral artery disease) (HCC)         History of Present Illness:   Dunia Her is a female seen for evaluation of resistant HTN and management.   Dunia Her is initially referred by Ref Not In Computer     Subjective    HTN:  No major sx.  Has seen cardiology and pharm HTN clinic to date.    Home BP lo-130's/70-80's  24h ABPM completed: consider in future   Adherence to current HTN meds: compliant all of the time  Hx of clinical ASCVD events: Symptomatic PAD (hx of claudication with HERNANDEZ <0.85, previous revascularization/amputation   Recent increase in dull chest pain which she attributes to GERD since she stopped her PPI recently   Lifestyle factors:  Weight Change: stable   BMI Readings from Last 5 Encounters:   22 33.65 kg/m²   22 34.48 kg/m²   22 35.28 kg/m²   22 34.12 kg/m²   22 34.09 kg/m²     Diet pattern: low red meat, increase veg, has lost weight on Med diet in past, continue with RD   Daily salt intake estimate:  Low   - none   EtOH: No  Exercise habits: minimal exercise  Perceived barriers to care: leg pain  Pertinent HTN hx (reviewed at initial visit):   Age at HTN dx:  several years   (if female, any hx of pregnancy-related HTN): n/a   Past HTN medications: as noted   HTN crises:  No prior hospitalization or crises   Interfering substances/contributing factors:  None    Hyperlipidemia:    Stable, tolerating rosuva   Clinical evidence of ASCVD: Symptomatic PAD (hx of claudication with HERNANDEZ <0.85, previous revascularization/amputation    Antiplatelet/anticoagulation: Yes, Details: ASA, no bleeding noted     T2D:  A1C increasing   Currently taking metformin 1,000mg onceBID daily-tolerating increase   Not able to check finger sticks at home due to difficulty with monitor- may benefit from T2DM clinic in future  No current symptoms reported.   Tolerating meds and no recent med changes.   Last A1c   Lab Results   Component Value Date    HBA1C 7.6 (H) 07/05/2022     Lab Results   Component Value Date/Time    MALBCRT 16 12/27/2021 10:40 AM    MICROALBUR 6.2 12/27/2021 10:40 AM       CKD: No     Sleeping disorder/LARA: No     Hypothyroidism: No      Problem List:     Patient Active Problem List    Diagnosis Date Noted   • COPD (chronic obstructive pulmonary disease) ([facility]) 04/20/2022   • Lung nodule 04/20/2022   • Greater trochanteric bursitis of left hip 01/31/2022   • Elevated lipoprotein(a) 12/29/2021   • Obesity, Class II, BMI 35-39.9 12/06/2021   • Healthcare maintenance 06/17/2021   • Acute appendicitis with appendiceal abscess 02/12/2021   • Depression 02/12/2021   • Essential hypertension 10/06/2020   • Coronary artery calcification seen on CT scan 04/07/2020   • PAD (peripheral artery disease) ([facility]) 02/07/2020   • Other specified personal risk factors, not elsewhere classified 02/07/2020   • Bronchitis 01/22/2020   • Recurrent major depressive disorder, in partial remission ([facility]) 02/10/2019   • Left elbow pain 09/04/2018   • Rash 09/04/2018   • Chronic insomnia 04/18/2016   • Dyslipidemia associated with type 2 diabetes mellitus ([facility]) 10/02/2014   • BMI 36.0-36.9,adult 10/02/2014   •  Hyperlipidemia 10/02/2014       Current Outpatient Medications:   •  doxycycline, 100 mg, Oral, BID, Taking  •  methylPREDNISolone, As directed on the packaging label., Taking  •  PARoxetine, 30 mg, Oral, DAILY, Taking  •  rosuvastatin, TAKE ONE TABLET BY MOUTH EVERY EVENING, Taking  •  traZODone, 50 mg, Oral, QHS PRN, PRN  •  Lansoprazole (PREVACID 24HR PO), Take  by mouth., Taking  •  metFORMIN ER, 1,000 mg, Oral, PM MEAL (Patient taking differently: 1,000 mg, Oral, DAILY), Taking  •  Blood Glucose Test Strips, Use one Accucheck Tana strip to test blood sugar three times daily before meals., Taking  •  amLODIPine, 5 mg, Oral, QHS, Taking  •  telmisartan, 80 mg, Oral, QHS, Taking  •  ibuprofen, 600 mg, Oral, QDAY PRN, PRN  •  vitamin D3, 3,000 Units, Oral, QAM, Taking  •  aspirin EC, 81 mg, Oral, QAM, Taking  •  albuterol, 2.5 mg, Nebulization, Q4HRS PRN, PRN  •  Semaglutide(0.25 or 0.5MG/DOS), Inject 0.25 mg under the skin every 7 days for 28 days, THEN 0.5 mg every 7 days for 28 days, THEN 1 mg every 7 days for 28 days. (Patient not taking: Reported on 2022), Not Taking   Sulfa drugs and Shellfish allergy     Social History:     Social History     Tobacco Use   • Smoking status: Former Smoker     Packs/day: 1.00     Years: 48.00     Pack years: 48.00     Types: Cigarettes     Start date:      Quit date: 2017     Years since quittin.1   • Smokeless tobacco: Never Used   Vaping Use   • Vaping Use: Never used   Substance Use Topics   • Alcohol use: No     Alcohol/week: 0.0 oz   • Drug use: Never       Review of Systems:   Review of Systems   Constitutional: Negative for chills and fever.   Respiratory: Negative for cough.    Cardiovascular: Negative for chest pain, palpitations, orthopnea, claudication and leg swelling.   Gastrointestinal: Negative for blood in stool and nausea.   Genitourinary: Negative for hematuria.   Musculoskeletal: Negative for myalgias.   Neurological: Negative for weakness.  "  Endo/Heme/Allergies: Does not bruise/bleed easily.         Objective     Objective:     Vitals:    07/13/22 1419 07/13/22 1422   BP: (!) 143/81 132/84   BP Location: Left arm Left arm   Patient Position: Sitting Sitting   BP Cuff Size: Large adult Large adult   Pulse: 77 74   Weight: 94.6 kg (208 lb 8 oz)    Height: 1.676 m (5' 6\")      Body mass index is 33.65 kg/m².  BP Readings from Last 5 Encounters:   07/13/22 132/84   05/31/22 130/70   04/20/22 122/66   04/13/22 141/79   04/05/22 118/74      Physical Exam  Vitals reviewed.   Constitutional:       General: She is not in acute distress.     Appearance: Normal appearance.   HENT:      Head: Normocephalic and atraumatic.   Neck:      Thyroid: No thyroid mass.      Vascular: Normal carotid pulses.      Trachea: Trachea normal.   Cardiovascular:      Rate and Rhythm: Normal rate and regular rhythm.      Chest Wall: PMI is not displaced.      Pulses: Normal pulses.           Carotid pulses are 2+ on the right side and 2+ on the left side.       Radial pulses are 2+ on the right side and 2+ on the left side.        Dorsalis pedis pulses are 2+ on the right side and 2+ on the left side.        Posterior tibial pulses are 2+ on the right side and 2+ on the left side.      Heart sounds: Normal heart sounds.   Pulmonary:      Effort: Pulmonary effort is normal.      Breath sounds: Normal breath sounds.   Musculoskeletal:      Cervical back: Full passive range of motion without pain.      Right lower leg: No edema.      Left lower leg: No edema.   Skin:     General: Skin is warm and dry.      Capillary Refill: Capillary refill takes less than 2 seconds.      Coloration: Skin is not cyanotic.      Nails: There is no clubbing.   Neurological:      General: No focal deficit present.      Mental Status: She is alert and oriented to person, place, and time. Mental status is at baseline.      Cranial Nerves: Cranial nerves are intact. No cranial nerve deficit.      " Coordination: Coordination is intact. Coordination normal.      Gait: Gait is intact. Gait normal.   Psychiatric:         Mood and Affect: Mood normal.         Behavior: Behavior normal.        Accessory Clinical Findings:     Lab Results   Component Value Date    CHOLSTRLTOT 180 07/05/2022    LDL 77 07/05/2022    HDL 72 07/05/2022    TRIGLYCERIDE 154 (H) 07/05/2022      Lab Results   Component Value Date/Time    LIPOPROTA 125 (H) 12/27/2021 10:41 AM      No results found for: APOB         Lab Results   Component Value Date    HBA1C 7.6 (H) 07/05/2022      Lab Results   Component Value Date    SODIUM 139 07/05/2022    POTASSIUM 4.4 07/05/2022    CHLORIDE 105 07/05/2022    CO2 21 07/05/2022    GLUCOSE 176 (H) 07/05/2022    BUN 16 07/05/2022    CREATININE 0.76 07/05/2022          Lab Results   Component Value Date    URCREAT 381.64 12/27/2021    MICROALBUR 6.2 12/27/2021    MALBCRT 16 12/27/2021     VASCULAR IMAGING:     Last EKG:  Reviewed     HERNANDEZ 2/2020   Right HERNANDEZ: 1.02  Left HERNANDEZ:  0.79   IMPRESSION:   1.  Post occlusive waveform is noted in the distal left peroneal artery. Short segment occlusion or high-grade stenosis proximal to this location is likely present, although not directly visualized.   2.  No other evidence of occlusion or significant stenosis bilaterally.   3.  Moderate calcified atherosclerotic plaque is identified in each lower extremity.    CAC 2/2020  Coronary calcification:  LMA - 0.0  LCX - 0.0  LAD - 315  RCA - 260.8   Total Calcium Score: 575.8   Percentile: Calcium score is above the 90th percentile for the patient's age and sex.   Other findings:  Heart: Normal size.  Lungs: Tiny granuloma in the left upper lobe.  Mediastinum: Normal.  Upper abdomen: Normal.  Spondylotic changes of the spine.    MPI 4/2020  NUCLEAR IMAGING INTERPRETATION   Normal left ventricular size, ejection fraction, and wall motion.    Small fixed defect in the inferolateral wall could be artifact. Infarct is in    the  differential but consider less likely.     No reversible defect.    Sinus rhythm.   Nondiagnostic ECG portion of a Regadenoson nuclear stress test.   ECG INTERPRETATION   Nondiagnostic ECG portion of a Regadenoson nuclear stress test.    CT abd 2/2021   The abdominal aorta is normal in caliber with aortic atherosclerosis.    CT chest 10/2021   1.  Stable 1.1 cm right upper lobe groundglass density nodule. Continued follow-up is recommended.  2.  Cholelithiasis.  3.  Hepatic steatosis  Cardiac: Heart normal in size without pericardial effusion.   Vascular: Atherosclerotic calcifications of the coronary arteries, aorta and branches...          MARIAN Magana.  [facility]   [facility]   [phone]

## 2022-07-15 ENCOUNTER — TELEPHONE (OUTPATIENT)
Dept: MEDICAL GROUP | Facility: PHYSICIAN GROUP | Age: 67
End: 2022-07-15
Payer: MEDICARE

## 2022-07-15 NOTE — TELEPHONE ENCOUNTER
1. Caller Name:Patient                   Call Back Number: [phone]      How would the patient prefer to be contacted with a response: Phone call do NOT leave a detailed message    Patient call about this medicine is to expensive Semaglutide,0.25 or 0.5MG   Bernie Pitt MA

## 2022-07-19 ENCOUNTER — TELEPHONE (OUTPATIENT)
Dept: VASCULAR LAB | Facility: MEDICAL CENTER | Age: 67
End: 2022-07-19
Payer: MEDICARE

## 2022-07-20 DIAGNOSIS — E78.5 DYSLIPIDEMIA ASSOCIATED WITH TYPE 2 DIABETES MELLITUS (HCC): Chronic | ICD-10-CM

## 2022-07-20 DIAGNOSIS — E78.5 DYSLIPIDEMIA ASSOCIATED WITH TYPE 2 DIABETES MELLITUS (HCC): ICD-10-CM

## 2022-07-20 DIAGNOSIS — E11.69 DYSLIPIDEMIA ASSOCIATED WITH TYPE 2 DIABETES MELLITUS (HCC): Chronic | ICD-10-CM

## 2022-07-20 DIAGNOSIS — E11.69 DYSLIPIDEMIA ASSOCIATED WITH TYPE 2 DIABETES MELLITUS (HCC): ICD-10-CM

## 2022-07-22 ENCOUNTER — TELEPHONE (OUTPATIENT)
Dept: VASCULAR LAB | Facility: MEDICAL CENTER | Age: 67
End: 2022-07-22
Payer: MEDICARE

## 2022-07-22 NOTE — TELEPHONE ENCOUNTER
"[facility] and Vascular Health and Pharmacotherapy Programs    Received pharmacotherapy referral for lipids from WILLY Cortes on 7/20/22    Per referral: \"Pillo Truong please.   Patient's cardiologist would prefer GLP1a for her.  Ozempic was approved but too expensive for patient.  Can you recommend or help with anything else for her please?\"    Scheduled pt for 8/12.    Insurance: Dwight FRANKLIN  [facility]  Locations to be seen: Any    [facility] Anticoagulation/Pharmacotherapy [facility] at [phone], fax [phone]    Jose Layne, PharmD, BCACP    "

## 2022-08-15 ENCOUNTER — TELEPHONE (OUTPATIENT)
Dept: VASCULAR LAB | Facility: MEDICAL CENTER | Age: 67
End: 2022-08-15
Payer: MEDICARE

## 2022-08-15 NOTE — TELEPHONE ENCOUNTER
"[facility] Heart and Vascular Health and Pharmacotherapy Programs     Received pharmacotherapy referral for lipids from WILLY Cortes on 7/20/22     Per referral: \"Pillo Truong please.   Patient's cardiologist would prefer GLP1a for her.  Ozempic was approved but too expensive for patient.  Can you recommend or help with anything else for her please?\"     Pt missed initial appt - LVM requesting c/b to r/s.     Insurance: Dwight FRANKLIN  PCP: [facility]  Locations to be seen: Any     [facility] Anticoagulation/Pharmacotherapy Clinic at [phone], fax [phone]     Jose Layne, PharmD, BCACP  "

## 2022-08-19 DIAGNOSIS — F51.04 CHRONIC INSOMNIA: ICD-10-CM

## 2022-08-19 RX ORDER — TRAZODONE HYDROCHLORIDE 50 MG/1
TABLET ORAL
Qty: 90 TABLET | Refills: 0 | Status: SHIPPED | OUTPATIENT
Start: 2022-08-19 | End: 2022-12-09 | Stop reason: SDUPTHER

## 2022-09-23 ENCOUNTER — NON-PROVIDER VISIT (OUTPATIENT)
Dept: MEDICAL GROUP | Facility: PHYSICIAN GROUP | Age: 67
End: 2022-09-23
Payer: MEDICARE

## 2022-09-23 PROCEDURE — 99211 OFF/OP EST MAY X REQ PHY/QHP: CPT | Performed by: INTERNAL MEDICINE

## 2022-09-23 RX ORDER — METFORMIN HYDROCHLORIDE 500 MG/1
TABLET, EXTENDED RELEASE ORAL
COMMUNITY
Start: 2022-08-19 | End: 2022-11-03

## 2022-09-23 NOTE — PROGRESS NOTES
Patient Consult Note - Initial Visit    TIME IN: 1:35pm  TIME OUT: 2:07pm    Primary care physician: RIOS Davis    Reason for consult: Management of Uncontrolled Type 2 Diabetes    HPI:  Dunia Her is a 67 y.o. old patient who comes in today for evaluation of above stated problem.    Most Recent HbA1c:   Lab Results   Component Value Date/Time    HBA1C 7.6 (H) 07/05/2022 08:19 AM      Lab Results   Component Value Date/Time    CREATININE 0.76 07/05/2022 08:19 AM              Diabetes Medication History and Current Regimen  Metformin ER: 1000mg QD     Previously attempted medications  None    Pt has home glucometer and proper testing technique - Yes, but not currently testing    Hypoglycemia awareness - Yes  Nocturnal hypoglycemia- Yes  Hypoglycemia:  None    Pt's treatment of Hypoglycemia - - 15:15 Rule    Current Exercise - Minimal  Exercise Goal - Will get back to foot pedal on regular basis while at home and walking dogs now that weather is more favorable.  Stressed importance of shooting for 150 min/week moderate intensity exercise.    Dietary - Common adult diet.  Dose have a good amount of carbs throughout the week.  Discussed at length the impact of simpe carbs on diabetes control.  She will attempt to incorporate protein into breakfast routine and use whole grain/vegetable options in place of plain pastas.    Pt reports eating:  Breakfast - none  Snack - fruits throughout the day  Lunch - sandwich on whole grain  Snack - nuts  Dinner - chicken, veggies, whole grain breads  Snack - none  Beverages - water with lemon    Foot Exam:  Monofilament exam - up to date, not conducted today  Monofilament testing with a 10 gram force: sensation intact: decreased bilaterally.    Visual Inspection: Feet without maceration, ulcers, fissures.  Feet dry.  Pedal pulses: intact bilaterally    Preventative Management  BP regimen (ACE/ARB) - none  ASA - 325mg QD  Statin - Rosuvastatin 20mg QD  Last Retinal  Scan - regular f/u optometry  Last Foot Exam - 4/2022  Last A1c -   Lab Results   Component Value Date/Time    HBA1C 7.6 (H) 07/05/2022 08:19 AM      Last Microalbuminuria - 12/2021      Past Medical History:  Patient Active Problem List    Diagnosis Date Noted    COPD (chronic obstructive pulmonary disease) (HCC) 04/20/2022    Lung nodule 04/20/2022    Greater trochanteric bursitis of left hip 01/31/2022    Elevated lipoprotein(a) 12/29/2021    Obesity, Class II, BMI 35-39.9 12/06/2021    Healthcare maintenance 06/17/2021    Acute appendicitis with appendiceal abscess 02/12/2021    Depression 02/12/2021    Essential hypertension 10/06/2020    Coronary artery calcification seen on CT scan 04/07/2020    PAD (peripheral artery disease) ([facility]) 02/07/2020    Other specified personal risk factors, not elsewhere classified 02/07/2020    Bronchitis 01/22/2020    Recurrent major depressive disorder, in partial remission ([facility]) 02/10/2019    Left elbow pain 09/04/2018    Rash 09/04/2018    Chronic insomnia 04/18/2016    Dyslipidemia associated with type 2 diabetes mellitus ([facility]) 10/02/2014    BMI 36.0-36.9,adult 10/02/2014    Hyperlipidemia 10/02/2014       Past Surgical History:  Past Surgical History:   Procedure Laterality Date    CT LAP,APPENDECTOMY N/A 2/12/2021    Procedure: APPENDECTOMY, LAPAROSCOPIC;  Surgeon: Juan Dawson M.D.;  Location: SURGERY [facility];  Service: General    ABDOMINAL HYSTERECTOMY TOTAL  1993    benign cysts, total       Allergies:  Sulfa drugs and Shellfish allergy    Social History:  Social History     Socioeconomic History    Marital status:      Spouse name: Not on file    Number of children: Not on file    Years of education: Not on file    Highest education level: Some college, no degree   Occupational History    Not on file   Tobacco Use    Smoking status: Former     Packs/day: 1.00     Years: 48.00     Pack years: 48.00     Types: Cigarettes     Start date: 1968     Quit date:  2017     Years since quittin.2    Smokeless tobacco: Never   Vaping Use    Vaping Use: Never used   Substance and Sexual Activity    Alcohol use: No     Alcohol/week: 0.0 oz    Drug use: Never    Sexual activity: Yes     Partners: Male     Comment:    Other Topics Concern    Not on file   Social History Narrative    Not on file     Social Determinants of Health     Financial Resource Strain: Low Risk     Difficulty of Paying Living Expenses: Not very hard   Food Insecurity: No Food Insecurity    Worried About Running Out of Food in the Last Year: Never true    Ran Out of Food in the Last Year: Never true   Transportation Needs: No Transportation Needs    Lack of Transportation (Medical): No    Lack of Transportation (Non-Medical): No   Physical Activity: Sufficiently Active    Days of Exercise per Week: 4 days    Minutes of Exercise per Session: 40 min   Stress: Stress Concern Present    Feeling of Stress : To some extent   Social Connections: Moderately Integrated    Frequency of Communication with Friends and Family: More than three times a week    Frequency of Social Gatherings with Friends and Family: Once a week    Attends Orthodoxy Services: 1 to 4 times per year    Active Member of Clubs or Organizations: No    Attends Club or Organization Meetings: Not on file    Marital Status:    Intimate Partner Violence: Not on file   Housing Stability: Low Risk     Unable to Pay for Housing in the Last Year: No    Number of Places Lived in the Last Year: 1    Unstable Housing in the Last Year: No       Family History:  Family History   Problem Relation Age of Onset    Cancer Mother         pancreatic     Heart Disease Father     Heart Attack Father     Diabetes Father         pre-diabetes    Stroke Neg Hx        Medications:    Current Outpatient Medications:     metFORMIN ER (GLUCOPHAGE XR) 500 MG TABLET SR 24 HR, , Disp: , Rfl:     telmisartan (MICARDIS) 80 MG Tab, TAKE ONE TABLET BY MOUTH EVERY  NIGHT AT BEDTIME, Disp: 90 Tablet, Rfl: 2    traZODone (DESYREL) 50 MG Tab, TAKE ONE TABLET BY MOUTH EVERY NIGHT AT BEDTIME AS NEEDED FOR SLEEP, Disp: 90 Tablet, Rfl: 0    Semaglutide,0.25 or 0.5MG/DOS, 2 MG/1.5ML Solution Pen-injector, Inject 0.25 mg under the skin every 7 days for 28 days, THEN 0.5 mg every 7 days for 28 days, THEN 1 mg every 7 days for 28 days., Disp: 3 mL, Rfl: 11    rosuvastatin (CRESTOR) 20 MG Tab, Take 1.5 tabs every evening, Disp: 135 Tablet, Rfl: 1    aspirin EC (ECOTRIN) 325 MG Tablet Delayed Response, Take 1 Tablet by mouth every day., Disp: 100 Tablet, Rfl: 1    methylPREDNISolone (MEDROL DOSEPAK) 4 MG Tablet Therapy Pack, As directed on the packaging label., Disp: 21 Tablet, Rfl: 0    PARoxetine (PAXIL) 30 MG Tab, Take 1 Tablet by mouth every day., Disp: 90 Tablet, Rfl: 3    Lansoprazole (PREVACID 24HR PO), Take  by mouth., Disp: , Rfl:     metFORMIN ER 1000 MG TABLET SR 24 HR, Take 1 Tablet by mouth with dinner. (Patient taking differently: Take 1,000 mg by mouth every day.), Disp: 180 Tablet, Rfl: 3    Blood Glucose Test Strips, Use one Accucheck Tana strip to test blood sugar three times daily before meals., Disp: 100 Strip, Rfl: 0    amLODIPine (NORVASC) 5 MG Tab, Take 1 Tablet by mouth at bedtime for 360 days., Disp: 90 Tablet, Rfl: 3    ibuprofen (MOTRIN) 200 MG Tab, Take 600 mg by mouth 1 time a day as needed (Moderate pain). 3 tablets = 600 mg., Disp: , Rfl:     vitamin D (CHOLECALCIFEROL) 1000 Unit (25 mcg) Tab, Take 3,000 Units by mouth every morning. 3 tablets = 3000 units., Disp: , Rfl:     albuterol (PROVENTIL) 2.5mg/3ml Nebu Soln solution for nebulization, 3 mL by Nebulization route every four hours as needed for Shortness of Breath., Disp: 25 Bullet, Rfl: 0    Labs: Reviewed    Physical Examination:  Vital signs: There were no vitals taken for this visit. There is no height or weight on file to calculate BMI.    Assessment and Plan:    1. DM2  Patient seen today for  initial visit, referred by PCP and cardiology.  Longstanding hx of DMII, also has family hx on paternal side.  Although she is not currently testing home glucose levels discussed FBG of  and 2hrPP <180, with A1c goal of <7.0%.  Main reason for visit is to discuss cost of Ozempic and alternatives that may be available.    Basic physiology of DMII was explained to patient as well as microvascular/macrovascular complications. The importance of increasing physical activity to improve diabetes control was discussed with the patient. Patient was also educated on changing diet and making better choices to help control blood sugar.    Discussed current treatment strategies and rationale for use of SGLT2i and GLP1a.  Agree with PCP and cardiology assessment of GLP1a use.  Will explore PAP for Trulicity.    - Medication changes -   Continue all current medications for now.  Will initiate Trulicity 0.75mg once weekly if approve by Ninoska Ngo PAP.    Gave application today, instructed to bring back next week.     - Lifestyle changes -   Diet:  As above, decrease simple carb intake, add protein to AM meal, continues to stay well hydrated.  Exercise:  As above, increase home exercise to tolerability.    Follow Up:  About six weeks, sooner if PAP denied.    Pillo Truong, PharmDBCACP  09/23/22    CC:   RIOS Davis                                                   [facility] Consent      Name    Dunianolan Her    MRN number: [id number]    the following are guidelines for participation in the [facility].     I, ____Dunia Her_____, understand and voluntarily agree to participate in the [facility] and to have services provided to me by pharmacists working in collaboration with my provider.    I understand the pharmacist within the [facility] may initiate, modify or discontinue my medications, order  appropriate testing and appointments, perform exams, monitor treatment, and make clinical evaluations and decisions pursuant to a collaborative practice agreement with my provider.  I understand the pharmacist within the [facility] is not a physician, osteopathic physician, advanced practice registered nurse or physician assistant and may not diagnose.  I will take all my medications as instructed and not change the way I take it without first talking to my provider or a pharmacist within the [facility].  I understand that if I am late to my appointment I may not be able to be seen by a pharmacist at that time and will have to reschedule my appointment.  During appointment with pharmacist I understand that pharmacist has the right not to answer questions or perform services outside the pharmacist’s scope of practice.  By signing below, I provide informed consent for the pharmacist to provide these services and for my participation in the [facility].      Dunia Her           [id number]          09/23/22  Patient Name                   MRN number  Date     ___X_Obtained verbal consent from pt, No signature due to COVID concerns___   Patient Signature

## 2022-10-04 ENCOUNTER — HOSPITAL ENCOUNTER (OUTPATIENT)
Dept: RADIOLOGY | Facility: MEDICAL CENTER | Age: 67
End: 2022-10-04
Attending: STUDENT IN AN ORGANIZED HEALTH CARE EDUCATION/TRAINING PROGRAM
Payer: MEDICARE

## 2022-10-04 DIAGNOSIS — R91.1 LUNG NODULE: ICD-10-CM

## 2022-10-04 PROCEDURE — 71250 CT THORAX DX C-: CPT

## 2022-10-06 ENCOUNTER — PATIENT MESSAGE (OUTPATIENT)
Dept: SLEEP MEDICINE | Facility: MEDICAL CENTER | Age: 67
End: 2022-10-06
Payer: MEDICARE

## 2022-10-06 DIAGNOSIS — R91.1 LUNG NODULE: ICD-10-CM

## 2022-11-03 ENCOUNTER — PATIENT MESSAGE (OUTPATIENT)
Dept: HEALTH INFORMATION MANAGEMENT | Facility: OTHER | Age: 67
End: 2022-11-03

## 2022-11-03 ENCOUNTER — OFFICE VISIT (OUTPATIENT)
Dept: MEDICAL GROUP | Facility: PHYSICIAN GROUP | Age: 67
End: 2022-11-03
Payer: MEDICARE

## 2022-11-03 VITALS
TEMPERATURE: 97.8 F | HEART RATE: 84 BPM | WEIGHT: 211.13 LBS | RESPIRATION RATE: 20 BRPM | DIASTOLIC BLOOD PRESSURE: 80 MMHG | OXYGEN SATURATION: 96 % | BODY MASS INDEX: 33.93 KG/M2 | HEIGHT: 66 IN | SYSTOLIC BLOOD PRESSURE: 124 MMHG

## 2022-11-03 DIAGNOSIS — E66.09 CLASS 1 OBESITY DUE TO EXCESS CALORIES WITH BODY MASS INDEX (BMI) OF 34.0 TO 34.9 IN ADULT, UNSPECIFIED WHETHER SERIOUS COMORBIDITY PRESENT: ICD-10-CM

## 2022-11-03 DIAGNOSIS — R10.11 RIGHT UPPER QUADRANT ABDOMINAL PAIN: ICD-10-CM

## 2022-11-03 PROCEDURE — 99214 OFFICE O/P EST MOD 30 MIN: CPT | Performed by: NURSE PRACTITIONER

## 2022-11-03 ASSESSMENT — FIBROSIS 4 INDEX: FIB4 SCORE: 1.31

## 2022-11-03 NOTE — PROGRESS NOTES
Subjective  Chief Complaint  Establish care to manage her chronic conditions    History of Present Illness  Dunia Her is a 67 y.o. female. This patient is here today to establish care.  Her prior PCP was WILLY Marrero.    Right upper quadrant abdominal pain  Acute and worsening. She states that this has been going on for a few months but lately it has been hurting more often. She states that it hurts everyday and not associated with eating food. Her recent labs showed that she may have a fatty liver. She has not had any recent imaging to check her liver or gallbladder. She states that she has had some fevers and chills off and on.    Class 1 obesity due to excess calories with body mass index (BMI) of 34.0 to 34.9 in adult  Chronic and ongoing. She states that she knows she needs to work on her diet and start walking more for exercise.    Past Medical History    Allergies: Sulfa drugs and Shellfish allergy  Past Medical History:   Diagnosis Date    Hyperlipidemia LDL goal < 100 10/2/2014    Hypertension     Obesity (BMI 30-39.9) 10/2/2014    Pre-diabetes 10/2/2014    Recurrent sinusitis     Type II or unspecified type diabetes mellitus without mention of complication, not stated as uncontrolled     pre-diabetes     Past Surgical History:   Procedure Laterality Date    NE LAP,APPENDECTOMY N/A 2/12/2021    Procedure: APPENDECTOMY, LAPAROSCOPIC;  Surgeon: Juan Dawson M.D.;  Location: SURGERY [facility];  Service: General    ABDOMINAL HYSTERECTOMY TOTAL  1993    benign cysts, total     Current Outpatient Medications Ordered in Epic   Medication Sig Dispense Refill    telmisartan (MICARDIS) 80 MG Tab TAKE ONE TABLET BY MOUTH EVERY NIGHT AT BEDTIME 90 Tablet 2    traZODone (DESYREL) 50 MG Tab TAKE ONE TABLET BY MOUTH EVERY NIGHT AT BEDTIME AS NEEDED FOR SLEEP 90 Tablet 0    rosuvastatin (CRESTOR) 20 MG Tab Take 1.5 tabs every evening 135 Tablet 1    aspirin EC (ECOTRIN) 325 MG Tablet Delayed Response  Take 1 Tablet by mouth every day. 100 Tablet 1    PARoxetine (PAXIL) 30 MG Tab Take 1 Tablet by mouth every day. 90 Tablet 3    Lansoprazole (PREVACID 24HR PO) Take  by mouth.      metFORMIN ER 1000 MG TABLET SR 24 HR Take 1 Tablet by mouth with dinner. (Patient taking differently: Take 1,000 mg by mouth every day.) 180 Tablet 3    Blood Glucose Test Strips Use one Accucheck Tana strip to test blood sugar three times daily before meals. 100 Strip 0    amLODIPine (NORVASC) 5 MG Tab Take 1 Tablet by mouth at bedtime for 360 days. 90 Tablet 3    ibuprofen (MOTRIN) 200 MG Tab Take 600 mg by mouth 1 time a day as needed (Moderate pain). 3 tablets = 600 mg.      vitamin D (CHOLECALCIFEROL) 1000 Unit (25 mcg) Tab Take 3,000 Units by mouth every morning. 3 tablets = 3000 units.      albuterol (PROVENTIL) 2.5mg/3ml Nebu Soln solution for nebulization 3 mL by Nebulization route every four hours as needed for Shortness of Breath. 25 Bullet 0     No current Epic-ordered facility-administered medications on file.     Family History:    Family History   Problem Relation Age of Onset    Cancer Mother         pancreatic     Heart Disease Father     Heart Attack Father     Diabetes Father         pre-diabetes    Stroke Neg Hx       Personal/Social History:    Social History     Tobacco Use    Smoking status: Former     Packs/day: 1.00     Years: 48.00     Pack years: 48.00     Types: Cigarettes     Start date:      Quit date: 2017     Years since quittin.4    Smokeless tobacco: Never   Vaping Use    Vaping Use: Never used   Substance Use Topics    Alcohol use: No     Alcohol/week: 0.0 oz    Drug use: Never     Social History     Social History Narrative    Not on file      Review of Systems:     General: Negative for fever/chills and unexpected weight change.    Respiratory:  Negative for cough and dyspnea.     Cardiovascular:  Negative for chest pain and palpitations.   Gastrointestinal:  Negative for nausea/vomiting  "and changes in bowel habits. Positive for abdominal pain.   Musculoskeletal:  Negative for myalgias.    Skin:  Negative for rash.     Objective  Physical Exam:   /80 (BP Location: Left arm, Patient Position: Sitting, BP Cuff Size: Adult)   Pulse 84   Temp 36.6 °C (97.8 °F) (Temporal)   Resp 20   Ht 1.664 m (5' 5.5\")   Wt 95.8 kg (211 lb 2 oz)   SpO2 96%  Body mass index is 34.6 kg/m².  General:  Alert and oriented.  Well appearing.  NAD.  Head:  Normocephalic.   Neck: Supple without JVD. No lymphadenopathy.  Pulmonary:  Normal effort.  Clear to ausculation without rales, ronchi, or wheezing.  Cardiovascular:  Regular rate and rhythm without murmur, rubs or gallop.  Radial pulses are intact and equal bilaterally.  Gastrointestinal: Normal bowel sounds. RUQ tender to palpation.  Musculoskeletal:  No extremity cyanosis, clubbing, or edema.  Skin:  Warm and dry.  No obvious lesions.  Psych: Normal mood and affect. Alert and oriented x3. Judgment and insight is normal.      Assessment/Plan   1. Right upper quadrant abdominal pain  Acute and worsening.  Discussed getting an ultrasound of there RUQ, she is agreeable.  Discussed that her labs did show fatty liver and that lifestyle changes can help with this and may also help reduce the pain she is having, she verbalized understanding.  - US-RUQ; Future    2. Class 1 obesity due to excess calories with body mass index (BMI) of 34.0 to 34.9 in adult, unspecified whether serious comorbidity present  Chronic and ongoing.  Educated on a healthy diet and exercise.      Health Maintenance: Completed    Return in about 3 months (around 2/3/2023) for F/U.    Please note that this dictation was created using voice recognition software. I have made every reasonable attempt to correct obvious errors, but I expect that there are errors of grammar and possibly content that I did not discover before finalizing the note.    WILLY Castro  [facility]  "

## 2022-11-03 NOTE — ASSESSMENT & PLAN NOTE
Acute and worsening. She states that this has been going on for a few months but lately it has been hurting more often. She states that it hurts everyday and not associated with eating food. Her recent labs showed that she may have a fatty liver. She has not had any recent imaging to check her liver or gallbladder. She states that she has had some fevers and chills off and on.

## 2022-11-03 NOTE — ASSESSMENT & PLAN NOTE
Chronic and ongoing. She states that she knows she needs to work on her diet and start walking more for exercise.

## 2022-11-04 ENCOUNTER — PATIENT OUTREACH (OUTPATIENT)
Dept: HEALTH INFORMATION MANAGEMENT | Facility: OTHER | Age: 67
End: 2022-11-04
Payer: MEDICARE

## 2022-11-04 ENCOUNTER — HOSPITAL ENCOUNTER (OUTPATIENT)
Dept: RADIOLOGY | Facility: MEDICAL CENTER | Age: 67
End: 2022-11-04
Attending: NURSE PRACTITIONER
Payer: MEDICARE

## 2022-11-04 DIAGNOSIS — R10.11 RIGHT UPPER QUADRANT ABDOMINAL PAIN: ICD-10-CM

## 2022-11-04 DIAGNOSIS — J44.9 CHRONIC OBSTRUCTIVE PULMONARY DISEASE, UNSPECIFIED COPD TYPE (HCC): ICD-10-CM

## 2022-11-04 PROCEDURE — 76705 ECHO EXAM OF ABDOMEN: CPT

## 2022-11-04 NOTE — PROGRESS NOTES
Sarah was in office on 11/3/2022 with Carmelita James to establish care. I attempted to see her following her appointments and patient left. Per PCP she is doing her own things and is managing well at this time. She would not be interested in speaking to [facility] RN.     Attempted to see Sarah at her pharmacy appointment on 11/4/2022 and she was a no show. Will decline program at this time as PCP does to think it is needed.

## 2022-11-07 ENCOUNTER — TELEPHONE (OUTPATIENT)
Dept: VASCULAR LAB | Facility: MEDICAL CENTER | Age: 67
End: 2022-11-07
Payer: MEDICARE

## 2022-11-08 ENCOUNTER — OFFICE VISIT (OUTPATIENT)
Dept: VASCULAR LAB | Facility: MEDICAL CENTER | Age: 67
End: 2022-11-08
Attending: NURSE PRACTITIONER
Payer: MEDICARE

## 2022-11-08 VITALS
DIASTOLIC BLOOD PRESSURE: 82 MMHG | SYSTOLIC BLOOD PRESSURE: 118 MMHG | WEIGHT: 206 LBS | BODY MASS INDEX: 34.32 KG/M2 | HEART RATE: 99 BPM | HEIGHT: 65 IN

## 2022-11-08 DIAGNOSIS — Z79.02 LONG TERM CURRENT USE OF ANTITHROMBOTICS/ANTIPLATELETS: ICD-10-CM

## 2022-11-08 PROCEDURE — 99212 OFFICE O/P EST SF 10 MIN: CPT

## 2022-11-08 PROCEDURE — 99214 OFFICE O/P EST MOD 30 MIN: CPT | Performed by: NURSE PRACTITIONER

## 2022-11-08 ASSESSMENT — ENCOUNTER SYMPTOMS
WEAKNESS: 0
SHORTNESS OF BREATH: 0
FEVER: 0
MYALGIAS: 0
PALPITATIONS: 0
BRUISES/BLEEDS EASILY: 0
DIZZINESS: 0
BLOOD IN STOOL: 0
ABDOMINAL PAIN: 1
NAUSEA: 1
NERVOUS/ANXIOUS: 0
WEIGHT LOSS: 1
COUGH: 0
CHILLS: 0

## 2022-11-08 ASSESSMENT — FIBROSIS 4 INDEX: FIB4 SCORE: 1.31

## 2022-11-08 NOTE — PROGRESS NOTES
[facility] - Follow Up EVALUATION   11/8/2022    ASSESSEMENT AND PLAN:      1. BLOOD PRESSURE CONTROL   Qualifies as Resistant HTN (RH):  No, not on optimized, max-dosed or max-tolerated meds from 3 classes   Office BP Goal ACC/AHA (2017) goal <130/80  Home BP at goal:  yes  Office BP at goal:  Yes  Much improved on office readings   Home readings 110/70s  Good med adherence  Is currently having some continued abd pain d/t a single gallstone; saw PCP recently  No sx plans  No labwork done    2. WORK UP FOR SECONDARY CAUSES OF RH:  Obstructive Sleep Apnea: Not Yet Assessed- no current symptoms  Renal parenchymal disease: Excluded  Renovascular HTN: Not Yet Assessed  Primary Aldosteronism: Not Yet Assessed  Thyroid Function: Excluded  Pheochromocytoma: Not Yet Assessed   Cushing's:   Not Yet Assessed   Coarctation of Aorta: excluded  Other: none identified    Recommendations At This Visit: as noted in orders         3. MEDICATION USE / ADHERENCE:   Assessment: Complete  Recommendations: Instructed to Continue Taking All Medications As Prescribed         4. HTN-MEDIATED END-ORGAN DAMAGE:  Left Ventricular Hypertrophy: Absent on  ECG Date: 2020  Urine Albuminuria: None on Date: 12/2021  Renal Function: Chronic Kidney Disease  CKD G2 at worst   Ophthalmologic: Absent per patient report and/or eye specialist   Established Cardiovascular Disease:     # CAD per CT scan - continue current med mgmt, no further surveillance and no prior ASCVD events     # PAD, LLE inflow - stable, limited sx   - continue exercise, TLC, med mgmt      5. LIFESTYLE INTERVENTIONS:    SMOKING:   reports that she quit smoking about 5 years ago. Her smoking use included cigarettes. She started smoking about 54 years ago. She has a 48.00 pack-year smoking history. She has never used smokeless tobacco.   - continued complete avoidance of all tobacco products     PHYSICAL ACTIVITY: continue healthy activity to improve CV fitness.  In general,  "targeting >150min/week of moderate-level activity.      WEIGHT MANAGEMENT AND NUTRITION: Dietary plan was discussed with patient at this visit including DASH-dietary approaches, substitution of MUFA/PUFA for saturated fats, substituting whole grains for simple carbs, substituting lean meats for fatty meats, increase use of plant-based protein vs animal-based, lowered sodium.  Additional details reviewed with patient and/or outlined in care instructions.    6. STANDARD HTN PHARMACOTHERAPY:  ACEI/ARB: continue telmisartan 80mg daily   Thiazide Type Diuretic: consider as next agent   DHP-CCB: continue amlodipine 5mg QHS     7. ADDITIONAL AGENTS   Aldosterone Receptor Antagonist: not indicated   Loop Diuretic: not indicated   Peripheral Alpha Blocker: not indicated   BB: not indicated, stopped on prior visits as unlikely beneficial for BP at current dosing    Non-DHP-CCB: not indicated   Direct Vasodilator: not indicated   Centrally Acting Alpha Agonist: not indicated   Potassium-sparing diuretic: not indicated   DRI: not indicated     LIPID MANAGEMENT:   Qualifies for Statin Therapy Based on 2018 ACC/AHA Guidelines: yes, Secondary Prevention - Very high risk ASCVD - 2 major events or 1 event with other high-risk conditions, Diabetes and HERNANDEZ <0.9  The 10-year ASCVD risk score (Carl DK, et al., 2019) is: 14.5%, 7.5 - <20% \"intermediate risk\"   Major ASCVD events: Symptomatic PAD (hx of claudication with HERNANDEZ <0.85, previous revascularization/amputation  High-risk conditions: >64yr old , Diabetes , Hypertension  and N/A  Risk-enhancers: N/A  Currently on Statin: Yes  Treatment goals: reduce LDL-C >50%  and LDL-C <70 (consider non-HDL-C <100, apoB <80)  At goal? Due for labs  Plan:   - reinforced ongoing TLC measures as noted   - monitor labs; ordered at last visit  Meds:   - continue rosuvastatin 30 mg daily, for now, although not a usual dose- consider intensification and/or zetia     GLYCEMIA MANAGEMENT:  Diabetic "   Goal A1c < 7.0  Lab Results   Component Value Date    HBA1C 7.6 (H) 2022      Lab Results   Component Value Date/Time    MALBCRT 16 2021 10:40 AM    MICROALBUR 6.2 2021 10:40 AM   Recommend aggressive treatment due to pt's overall risk.    Pt missed f/u with [facility] clinic; now r/s'd for Dec  - Continue current meds  - a1c with labs  - f/u with [facility] clinic  - recommmend for routine care with PCP (or endocrine) to include regular A1c monitoring, annual albumin/creatinine ratio (ACR), annual diabetic retinopathy screening, foot exams, annual flu vaccine, and updates to pneumonia vaccines as appropriate     ANTIPLATELET/ANTICOAGULANT THERAPY: continue asa 325mg daily    OTHER ISSUES:     # MDD, recurrent - stable, continue current meds, f/u with PCP    # GERD, on PPI. No CP today.  If CP returns, given warning s/s to seek immediate medical attention to rule out cardiac involvement. Pt states understanding.  Defer to PCP    # gallstone, abd pain- continue close f/u with PCP; to ER with worsening or fever    Studies Ordered At This Visit: none    Blood Work to Be Obtained Prior to Next Visit: as previously ordered + CBC  Disposition: 3mo      1. Long term current use of antithrombotics/antiplatelets  CBC WITH DIFFERENTIAL          History of Present Illness:   Dunia Her is a female seen for evaluation of resistant HTN and management.   Dunia Her is initially referred by Ref Not In Computer     Subjective    HTN:  Home BP lo/70s  24h ABPM completed: consider in future   Adherence to current HTN meds: compliant all of the time  Hx of clinical ASCVD events: Symptomatic PAD (hx of claudication with HERNANDEZ <0.85, previous revascularization/amputation    Lifestyle factors:  Weight Change: down 5lbs  BMI Readings from Last 5 Encounters:   22 34.28 kg/m²   22 34.60 kg/m²   22 33.65 kg/m²   22 34.48 kg/m²   22 35.28 kg/m²     Diet pattern: low red meat, increase  veg, has lost weight on Med diet in past, continue with RD. Some decrease in appetite at present d/t gallstone pain  Daily salt intake estimate:  Low   - none   EtOH: No  Exercise habits: minimal exercise  Perceived barriers to care: leg pain  Pertinent HTN hx (reviewed at initial visit):   Age at HTN dx: several years   (if female, any hx of pregnancy-related HTN): n/a   Past HTN medications: as noted   HTN crises:  No prior hospitalization or crises   Interfering substances/contributing factors:  None    Hyperlipidemia:    Stable, tolerating rosuva   Clinical evidence of ASCVD: Symptomatic PAD (hx of claudication with HERNANDEZ <0.85, previous revascularization/amputation    Antiplatelet/anticoagulation: Yes, Details: ASA , no bleeding noted     T2D:  No current symptoms reported.   Tolerating meds and no recent med changes.   Has f/u with [facility] pharmacist in 4wks  Last A1c   Lab Results   Component Value Date    HBA1C 7.6 (H) 07/05/2022     Lab Results   Component Value Date/Time    MALBCRT 16 12/27/2021 10:40 AM    MICROALBUR 6.2 12/27/2021 10:40 AM       CKD: No     Sleeping disorder/LARA: No     Hypothyroidism: No      Problem List:     Patient Active Problem List    Diagnosis Date Noted    Right upper quadrant abdominal pain 11/03/2022    COPD (chronic obstructive pulmonary disease) (HCC) 04/20/2022    Lung nodule 04/20/2022    Greater trochanteric bursitis of left hip 01/31/2022    Elevated lipoprotein(a) 12/29/2021    Healthcare maintenance 06/17/2021    Acute appendicitis with appendiceal abscess 02/12/2021    Depression 02/12/2021    Essential hypertension 10/06/2020    Coronary artery calcification seen on CT scan 04/07/2020    PAD (peripheral artery disease) (HCC) 02/07/2020    Other specified personal risk factors, not elsewhere classified 02/07/2020    Bronchitis 01/22/2020    Recurrent major depressive disorder, in partial remission (HCC) 02/10/2019    Left elbow pain 09/04/2018    Rash 09/04/2018    Chronic  insomnia 2016    Dyslipidemia associated with type 2 diabetes mellitus (HCC) 10/02/2014    Class 1 obesity due to excess calories with body mass index (BMI) of 34.0 to 34.9 in adult 10/02/2014    Hyperlipidemia 10/02/2014       Current Outpatient Medications:     Dulaglutide (TRULICITY SC), Inject  under the skin. Pt unsure of dose, Taking    telmisartan, TAKE ONE TABLET BY MOUTH EVERY NIGHT AT BEDTIME, Taking    traZODone, TAKE ONE TABLET BY MOUTH EVERY NIGHT AT BEDTIME AS NEEDED FOR SLEEP, Taking    rosuvastatin, Take 1.5 tabs every evening, Taking    aspirin EC, 325 mg, Oral, DAILY, Taking    PARoxetine, 30 mg, Oral, DAILY, Taking    Lansoprazole (PREVACID 24HR PO), Take  by mouth., Taking    metFORMIN ER, 1,000 mg, Oral, PM MEAL (Patient taking differently: 1,000 mg, Oral, DAILY), Taking    Blood Glucose Test Strips, Use one Accucheck Tana strip to test blood sugar three times daily before meals., Taking    amLODIPine, 5 mg, Oral, QHS, Taking    ibuprofen, 600 mg, Oral, QDAY PRN, Taking    vitamin D3, 3,000 Units, Oral, QAM, Taking    albuterol, 2.5 mg, Nebulization, Q4HRS PRN, Taking   Sulfa drugs and Shellfish allergy     Social History:     Social History     Tobacco Use    Smoking status: Former     Packs/day: 1.00     Years: 48.00     Pack years: 48.00     Types: Cigarettes     Start date:      Quit date: 2017     Years since quittin.4    Smokeless tobacco: Never   Vaping Use    Vaping Use: Never used   Substance Use Topics    Alcohol use: No     Alcohol/week: 0.0 oz    Drug use: Never       Review of Systems:   Review of Systems   Constitutional:  Positive for weight loss. Negative for chills and fever.   HENT:  Negative for nosebleeds.    Respiratory:  Negative for cough and shortness of breath.    Cardiovascular:  Negative for chest pain and palpitations.   Gastrointestinal:  Positive for abdominal pain and nausea. Negative for blood in stool.   Genitourinary:  Negative for hematuria.  "  Musculoskeletal:  Negative for myalgias.   Neurological:  Negative for dizziness and weakness.   Endo/Heme/Allergies:  Does not bruise/bleed easily.   Psychiatric/Behavioral:  The patient is not nervous/anxious.        Objective     Objective:     Vitals:    11/08/22 0955 11/08/22 0958   BP: 124/80 118/82   BP Location: Left arm Left arm   Patient Position: Sitting Sitting   BP Cuff Size: Adult Adult   Pulse: 86 99   Weight: 93.4 kg (206 lb)    Height: 1.651 m (5' 5\")      Body mass index is 34.28 kg/m².  BP Readings from Last 5 Encounters:   11/08/22 118/82   11/03/22 124/80   07/13/22 132/84   05/31/22 130/70   04/20/22 122/66      Physical Exam  Constitutional:       General: She is not in acute distress.  Cardiovascular:      Rate and Rhythm: Normal rate and regular rhythm.      Pulses: Normal pulses.      Heart sounds: Normal heart sounds. No murmur heard.  Pulmonary:      Effort: Pulmonary effort is normal. No respiratory distress.      Breath sounds: Normal breath sounds. No wheezing.   Musculoskeletal:         General: No swelling. Normal range of motion.   Skin:     General: Skin is warm and dry.      Coloration: Skin is not pale.      Findings: No erythema.   Neurological:      Mental Status: She is alert and oriented to person, place, and time.      Motor: No weakness.      Gait: Gait normal.   Psychiatric:         Mood and Affect: Mood normal.         Behavior: Behavior normal.         Thought Content: Thought content normal.      Accessory Clinical Findings:     Lab Results   Component Value Date    CHOLSTRLTOT 180 07/05/2022    LDL 77 07/05/2022    HDL 72 07/05/2022    TRIGLYCERIDE 154 (H) 07/05/2022      Lab Results   Component Value Date/Time    LIPOPROTA 125 (H) 12/27/2021 10:41 AM      No results found for: APOB         Lab Results   Component Value Date    HBA1C 7.6 (H) 07/05/2022      Lab Results   Component Value Date    SODIUM 139 07/05/2022    POTASSIUM 4.4 07/05/2022    CHLORIDE 105 " 07/05/2022    CO2 21 07/05/2022    GLUCOSE 176 (H) 07/05/2022    BUN 16 07/05/2022    CREATININE 0.76 07/05/2022          Lab Results   Component Value Date    URCREAT 381.64 12/27/2021    MICROALBUR 6.2 12/27/2021    MALBCRT 16 12/27/2021     VASCULAR IMAGING:     Last EKG:  Reviewed     HERNANDEZ 2/2020   Right HERNANDEZ: 1.02  Left HERNANDEZ:  0.79   IMPRESSION:   1.  Post occlusive waveform is noted in the distal left peroneal artery. Short segment occlusion or high-grade stenosis proximal to this location is likely present, although not directly visualized.   2.  No other evidence of occlusion or significant stenosis bilaterally.   3.  Moderate calcified atherosclerotic plaque is identified in each lower extremity.    CAC 2/2020  Coronary calcification:  LMA - 0.0  LCX - 0.0  LAD - 315  RCA - 260.8   Total Calcium Score: 575.8   Percentile: Calcium score is above the 90th percentile for the patient's age and sex.   Other findings:  Heart: Normal size.  Lungs: Tiny granuloma in the left upper lobe.  Mediastinum: Normal.  Upper abdomen: Normal.  Spondylotic changes of the spine.    MPI 4/2020  NUCLEAR IMAGING INTERPRETATION   Normal left ventricular size, ejection fraction, and wall motion.    Small fixed defect in the inferolateral wall could be artifact. Infarct is in    the differential but consider less likely.     No reversible defect.    Sinus rhythm.   Nondiagnostic ECG portion of a Regadenoson nuclear stress test.   ECG INTERPRETATION   Nondiagnostic ECG portion of a Regadenoson nuclear stress test.    CT abd 2/2021   The abdominal aorta is normal in caliber with aortic atherosclerosis.    CT chest 10/2021   1.  Stable 1.1 cm right upper lobe groundglass density nodule. Continued follow-up is recommended.  2.  Cholelithiasis.  3.  Hepatic steatosis  Cardiac: Heart normal in size without pericardial effusion.   Vascular: Atherosclerotic calcifications of the coronary arteries, aorta and branches...          Luz Elena HUNTER  Guerra, A.P.N.  [facility]   [facility]   [phone]

## 2022-11-14 ENCOUNTER — HOSPITAL ENCOUNTER (OUTPATIENT)
Dept: LAB | Facility: MEDICAL CENTER | Age: 67
End: 2022-11-14
Attending: NURSE PRACTITIONER
Payer: MEDICARE

## 2022-11-14 DIAGNOSIS — E78.5 DYSLIPIDEMIA ASSOCIATED WITH TYPE 2 DIABETES MELLITUS (HCC): Chronic | ICD-10-CM

## 2022-11-14 DIAGNOSIS — Z79.02 LONG TERM CURRENT USE OF ANTITHROMBOTICS/ANTIPLATELETS: ICD-10-CM

## 2022-11-14 DIAGNOSIS — E11.69 DYSLIPIDEMIA ASSOCIATED WITH TYPE 2 DIABETES MELLITUS (HCC): Chronic | ICD-10-CM

## 2022-11-14 DIAGNOSIS — E78.5 HYPERLIPIDEMIA LDL GOAL <70: ICD-10-CM

## 2022-11-14 DIAGNOSIS — I10 ESSENTIAL HYPERTENSION: ICD-10-CM

## 2022-11-14 LAB
ALBUMIN SERPL BCP-MCNC: 4.6 G/DL (ref 3.2–4.9)
ALBUMIN/GLOB SERPL: 1.8 G/DL
ALP SERPL-CCNC: 74 U/L (ref 30–99)
ALT SERPL-CCNC: 26 U/L (ref 2–50)
ANION GAP SERPL CALC-SCNC: 12 MMOL/L (ref 7–16)
AST SERPL-CCNC: 19 U/L (ref 12–45)
BASOPHILS # BLD AUTO: 0.7 % (ref 0–1.8)
BASOPHILS # BLD: 0.05 K/UL (ref 0–0.12)
BILIRUB SERPL-MCNC: 0.3 MG/DL (ref 0.1–1.5)
BUN SERPL-MCNC: 14 MG/DL (ref 8–22)
CALCIUM SERPL-MCNC: 9.4 MG/DL (ref 8.5–10.5)
CHLORIDE SERPL-SCNC: 107 MMOL/L (ref 96–112)
CHOLEST SERPL-MCNC: 164 MG/DL (ref 100–199)
CO2 SERPL-SCNC: 24 MMOL/L (ref 20–33)
CREAT SERPL-MCNC: 0.75 MG/DL (ref 0.5–1.4)
EOSINOPHIL # BLD AUTO: 0.17 K/UL (ref 0–0.51)
EOSINOPHIL NFR BLD: 2.4 % (ref 0–6.9)
ERYTHROCYTE [DISTWIDTH] IN BLOOD BY AUTOMATED COUNT: 46.2 FL (ref 35.9–50)
EST. AVERAGE GLUCOSE BLD GHB EST-MCNC: 160 MG/DL
FASTING STATUS PATIENT QL REPORTED: NORMAL
GFR SERPLBLD CREATININE-BSD FMLA CKD-EPI: 86 ML/MIN/1.73 M 2
GLOBULIN SER CALC-MCNC: 2.5 G/DL (ref 1.9–3.5)
GLUCOSE SERPL-MCNC: 144 MG/DL (ref 65–99)
HBA1C MFR BLD: 7.2 % (ref 4–5.6)
HCT VFR BLD AUTO: 41.2 % (ref 37–47)
HDLC SERPL-MCNC: 71 MG/DL
HGB BLD-MCNC: 12.9 G/DL (ref 12–16)
IMM GRANULOCYTES # BLD AUTO: 0.01 K/UL (ref 0–0.11)
IMM GRANULOCYTES NFR BLD AUTO: 0.1 % (ref 0–0.9)
LDLC SERPL CALC-MCNC: 66 MG/DL
LYMPHOCYTES # BLD AUTO: 1.57 K/UL (ref 1–4.8)
LYMPHOCYTES NFR BLD: 22.1 % (ref 22–41)
MCH RBC QN AUTO: 30.1 PG (ref 27–33)
MCHC RBC AUTO-ENTMCNC: 31.3 G/DL (ref 33.6–35)
MCV RBC AUTO: 96.3 FL (ref 81.4–97.8)
MONOCYTES # BLD AUTO: 0.52 K/UL (ref 0–0.85)
MONOCYTES NFR BLD AUTO: 7.3 % (ref 0–13.4)
NEUTROPHILS # BLD AUTO: 4.78 K/UL (ref 2–7.15)
NEUTROPHILS NFR BLD: 67.4 % (ref 44–72)
NRBC # BLD AUTO: 0 K/UL
NRBC BLD-RTO: 0 /100 WBC
PLATELET # BLD AUTO: 246 K/UL (ref 164–446)
PMV BLD AUTO: 10.6 FL (ref 9–12.9)
POTASSIUM SERPL-SCNC: 4.5 MMOL/L (ref 3.6–5.5)
PROT SERPL-MCNC: 7.1 G/DL (ref 6–8.2)
RBC # BLD AUTO: 4.28 M/UL (ref 4.2–5.4)
SODIUM SERPL-SCNC: 143 MMOL/L (ref 135–145)
TRIGL SERPL-MCNC: 137 MG/DL (ref 0–149)
WBC # BLD AUTO: 7.1 K/UL (ref 4.8–10.8)

## 2022-11-14 PROCEDURE — 85025 COMPLETE CBC W/AUTO DIFF WBC: CPT

## 2022-11-14 PROCEDURE — 36415 COLL VENOUS BLD VENIPUNCTURE: CPT

## 2022-11-14 PROCEDURE — 80053 COMPREHEN METABOLIC PANEL: CPT

## 2022-11-14 PROCEDURE — 83036 HEMOGLOBIN GLYCOSYLATED A1C: CPT | Mod: GA

## 2022-11-14 PROCEDURE — 80061 LIPID PANEL: CPT

## 2022-11-22 ENCOUNTER — OFFICE VISIT (OUTPATIENT)
Dept: SLEEP MEDICINE | Facility: MEDICAL CENTER | Age: 67
End: 2022-11-22
Payer: MEDICARE

## 2022-11-22 VITALS
HEART RATE: 82 BPM | DIASTOLIC BLOOD PRESSURE: 72 MMHG | WEIGHT: 205 LBS | SYSTOLIC BLOOD PRESSURE: 112 MMHG | HEIGHT: 65 IN | BODY MASS INDEX: 34.16 KG/M2 | OXYGEN SATURATION: 98 %

## 2022-11-22 DIAGNOSIS — R91.1 LUNG NODULE: ICD-10-CM

## 2022-11-22 DIAGNOSIS — Z87.891 FORMER SMOKER: ICD-10-CM

## 2022-11-22 DIAGNOSIS — J44.9 CHRONIC OBSTRUCTIVE PULMONARY DISEASE, UNSPECIFIED COPD TYPE (HCC): ICD-10-CM

## 2022-11-22 PROCEDURE — 99214 OFFICE O/P EST MOD 30 MIN: CPT | Performed by: INTERNAL MEDICINE

## 2022-11-22 ASSESSMENT — ENCOUNTER SYMPTOMS
NECK PAIN: 0
FOCAL WEAKNESS: 0
DIZZINESS: 0
SORE THROAT: 0
DIAPHORESIS: 0
BACK PAIN: 0
PND: 0
HEADACHES: 0
HEARTBURN: 0
CHILLS: 0
SINUS PAIN: 0
EYE REDNESS: 0
NAUSEA: 0
SHORTNESS OF BREATH: 0
ABDOMINAL PAIN: 0
EYE DISCHARGE: 0
ORTHOPNEA: 0
SPEECH CHANGE: 0
COUGH: 0
CONSTIPATION: 0
FEVER: 0
TREMORS: 0
SPUTUM PRODUCTION: 0
DOUBLE VISION: 0
BLURRED VISION: 0
HEMOPTYSIS: 0
DIARRHEA: 0
FALLS: 0
DEPRESSION: 0
PHOTOPHOBIA: 0
VOMITING: 0
PALPITATIONS: 0
WEAKNESS: 0
MYALGIAS: 0
WEIGHT LOSS: 0
EYE PAIN: 0
STRIDOR: 0
WHEEZING: 0
CLAUDICATION: 0

## 2022-11-22 ASSESSMENT — FIBROSIS 4 INDEX: FIB4 SCORE: 1.03

## 2022-11-22 NOTE — PROGRESS NOTES
Chief Complaint   Patient presents with    Follow-Up     Last seen 04/20/22    Results     10/04/22 ct-chest          HPI: This patient is a 67 y.o. female whom is followed in our clinic for pulmonary nodules and mild obstructive lung disease last seen by Dr Mari on 4/20/22.     The patient's past medical history is significant for coronary artery disease as detected by calcium scoring, type 2 diabetes, hypertension, dyslipidemia all currently controlled on medication, obesity.  She is a former tobacco smoker with 50-pack-year history and quit in 2017.  Patient was referred to me for episodes of recurrent bronchitis typically 1-2 times per year prior to 2020.  She had never been hospitalized but was tx prn with MICHEL. She was unable to afford ICS/LABA however over the last 3 years her sxs have decreased in frequency and severity and she has not needed albuterol for 3 months. CT angiogram from April 2020 that showed 1.1 cm groundglass nodule in the medial right upper lobe and mild bibasilar atelectasis with no associated lymphadenopathy or parenchymal lung disease. Surveillance imaging from 10/2021 and most recently 10/4/22 shows stability with no new nodules. The pt has no acute complaints today. PFTs from April show mild air flow obstruction with low normal FEV1, borderline air trapping with normal TLC and DLCO.     Past Medical History:   Diagnosis Date    Hyperlipidemia LDL goal < 100 10/02/2014    Hypertension     Obesity (BMI 30-39.9) 10/02/2014    Pre-diabetes 10/02/2014    Recurrent sinusitis     Type II or unspecified type diabetes mellitus without mention of complication, not stated as uncontrolled     pre-diabetes    Wheezing        Social History     Socioeconomic History    Marital status:      Spouse name: Not on file    Number of children: Not on file    Years of education: Not on file    Highest education level: Some college, no degree   Occupational History    Not on file   Tobacco Use     Smoking status: Former     Packs/day: 1.00     Years: 48.00     Pack years: 48.00     Types: Cigarettes     Start date:      Quit date: 2017     Years since quittin.4    Smokeless tobacco: Never   Vaping Use    Vaping Use: Never used   Substance and Sexual Activity    Alcohol use: No     Alcohol/week: 0.0 oz    Drug use: Never    Sexual activity: Yes     Partners: Male     Comment:    Other Topics Concern    Not on file   Social History Narrative    Not on file     Social Determinants of Health     Financial Resource Strain: Low Risk     Difficulty of Paying Living Expenses: Not very hard   Food Insecurity: No Food Insecurity    Worried About Running Out of Food in the Last Year: Never true    Ran Out of Food in the Last Year: Never true   Transportation Needs: No Transportation Needs    Lack of Transportation (Medical): No    Lack of Transportation (Non-Medical): No   Physical Activity: Sufficiently Active    Days of Exercise per Week: 4 days    Minutes of Exercise per Session: 40 min   Stress: Stress Concern Present    Feeling of Stress : To some extent   Social Connections: Moderately Integrated    Frequency of Communication with Friends and Family: More than three times a week    Frequency of Social Gatherings with Friends and Family: Once a week    Attends [employer] Services: 1 to 4 times per year    Active Member of Clubs or Organizations: No    Attends Club or Organization Meetings: Not on file    Marital Status:    Intimate Partner Violence: Not on file   Housing Stability: Low Risk     Unable to Pay for Housing in the Last Year: No    Number of Places Lived in the Last Year: 1    Unstable Housing in the Last Year: No       Family History   Problem Relation Age of Onset    Cancer Mother         pancreatic     Heart Disease Father     Heart Attack Father     Diabetes Father         pre-diabetes    Stroke Neg Hx        Current Outpatient Medications on File Prior to Visit   Medication  Sig Dispense Refill    Dulaglutide (TRULICITY SC) Inject  under the skin. Pt unsure of dose      telmisartan (MICARDIS) 80 MG Tab TAKE ONE TABLET BY MOUTH EVERY NIGHT AT BEDTIME 90 Tablet 2    traZODone (DESYREL) 50 MG Tab TAKE ONE TABLET BY MOUTH EVERY NIGHT AT BEDTIME AS NEEDED FOR SLEEP 90 Tablet 0    rosuvastatin (CRESTOR) 20 MG Tab Take 1.5 tabs every evening 135 Tablet 1    aspirin EC (ECOTRIN) 325 MG Tablet Delayed Response Take 1 Tablet by mouth every day. 100 Tablet 1    PARoxetine (PAXIL) 30 MG Tab Take 1 Tablet by mouth every day. 90 Tablet 3    Lansoprazole (PREVACID 24HR PO) Take  by mouth.      metFORMIN ER 1000 MG TABLET SR 24 HR Take 1 Tablet by mouth with dinner. (Patient taking differently: Take 1,000 mg by mouth every day.) 180 Tablet 3    Blood Glucose Test Strips Use one Accucheck Tana strip to test blood sugar three times daily before meals. 100 Strip 0    amLODIPine (NORVASC) 5 MG Tab Take 1 Tablet by mouth at bedtime for 360 days. 90 Tablet 3    ibuprofen (MOTRIN) 200 MG Tab Take 3 Tablets by mouth 1 time a day as needed (Moderate pain). 3 tablets = 600 mg.      vitamin D (CHOLECALCIFEROL) 1000 Unit (25 mcg) Tab Take 3 Tablets by mouth every morning. 3 tablets = 3000 units.      albuterol (PROVENTIL) 2.5mg/3ml Nebu Soln solution for nebulization 3 mL by Nebulization route every four hours as needed for Shortness of Breath. 25 Bullet 0     No current facility-administered medications on file prior to visit.       Sulfa drugs and Shellfish allergy      ROS:   Review of Systems   Constitutional:  Negative for chills, diaphoresis, fever, malaise/fatigue and weight loss.   HENT:  Negative for congestion, ear discharge, ear pain, hearing loss, nosebleeds, sinus pain, sore throat and tinnitus.    Eyes:  Negative for blurred vision, double vision, photophobia, pain, discharge and redness.   Respiratory:  Negative for cough, hemoptysis, sputum production, shortness of breath, wheezing and stridor.   "  Cardiovascular:  Negative for chest pain, palpitations, orthopnea, claudication, leg swelling and PND.   Gastrointestinal:  Negative for abdominal pain, constipation, diarrhea, heartburn, nausea and vomiting.   Genitourinary:  Negative for dysuria and urgency.   Musculoskeletal:  Negative for back pain, falls, joint pain, myalgias and neck pain.   Skin:  Negative for itching and rash.   Neurological:  Negative for dizziness, tremors, speech change, focal weakness, weakness and headaches.   Endo/Heme/Allergies:  Negative for environmental allergies.   Psychiatric/Behavioral:  Negative for depression.      /72 (BP Location: Right arm, Patient Position: Sitting, BP Cuff Size: Large adult)   Pulse 82   Ht 1.651 m (5' 5\")   Wt 93 kg (205 lb)   SpO2 98%   Physical Exam  Constitutional:       General: She is not in acute distress.     Appearance: Normal appearance. She is well-developed and normal weight.   HENT:      Head: Normocephalic and atraumatic.      Right Ear: External ear normal.      Left Ear: External ear normal.      Nose: Nose normal. No congestion.      Mouth/Throat:      Mouth: Mucous membranes are moist.      Pharynx: Oropharynx is clear. No oropharyngeal exudate.   Eyes:      General: No scleral icterus.     Extraocular Movements: Extraocular movements intact.      Conjunctiva/sclera: Conjunctivae normal.      Pupils: Pupils are equal, round, and reactive to light.   Neck:      Vascular: No JVD.      Trachea: No tracheal deviation.   Cardiovascular:      Rate and Rhythm: Normal rate and regular rhythm.      Heart sounds: Normal heart sounds. No murmur heard.    No friction rub. No gallop.   Pulmonary:      Effort: Pulmonary effort is normal. No accessory muscle usage or respiratory distress.      Breath sounds: Normal breath sounds. No wheezing or rales.   Abdominal:      General: There is no distension.      Palpations: Abdomen is soft.      Tenderness: There is no abdominal tenderness. "   Musculoskeletal:         General: No tenderness or deformity. Normal range of motion.      Cervical back: Normal range of motion and neck supple.      Right lower leg: No edema.      Left lower leg: No edema.   Lymphadenopathy:      Cervical: No cervical adenopathy.   Skin:     General: Skin is warm and dry.      Findings: No rash.      Nails: There is no clubbing.   Neurological:      Mental Status: She is alert and oriented to person, place, and time.      Cranial Nerves: No cranial nerve deficit.      Gait: Gait normal.   Psychiatric:         Behavior: Behavior normal.       PFTs as reviewed by me personally: as per hPI    Imaging as reviewed by me personally:  as per hPI    Assessment:  1. Lung nodule  PULMONARY FUNCTION TESTS -Test requested: Complete Pulmonary Function Test    CT-CHEST (THORAX) W/O      2. Chronic obstructive pulmonary disease, unspecified COPD type (HCC)  PULMONARY FUNCTION TESTS -Test requested: Complete Pulmonary Function Test    CT-CHEST (THORAX) W/O      3. Former smoker  CT-CHEST (THORAX) W/O          Plan:  Stable now for over 2 years. Given tobacco hx; we will continue annual CT chest  Mild and functionally pt is MMRC 1 on only as needed MICHEL, stable to improved PFTs. Tobacco free and up to date on vaccines. F/U one year with PFTs  Tobacco free for 5 years. Encouraged ongoing abstinence  Return in about 1 year (around 11/22/2023) for PFT and CT at time of F/U .

## 2022-11-30 DIAGNOSIS — I25.10 CORONARY ARTERY CALCIFICATION SEEN ON CT SCAN: ICD-10-CM

## 2022-11-30 DIAGNOSIS — E78.41 ELEVATED LIPOPROTEIN(A): ICD-10-CM

## 2022-12-05 ENCOUNTER — NON-PROVIDER VISIT (OUTPATIENT)
Dept: MEDICAL GROUP | Facility: PHYSICIAN GROUP | Age: 67
End: 2022-12-05
Payer: MEDICARE

## 2022-12-05 PROCEDURE — 99211 OFF/OP EST MAY X REQ PHY/QHP: CPT | Performed by: INTERNAL MEDICINE

## 2022-12-05 RX ORDER — DULAGLUTIDE 1.5 MG/.5ML
1 INJECTION, SOLUTION SUBCUTANEOUS
COMMUNITY
End: 2023-02-13

## 2022-12-05 RX ORDER — METFORMIN HYDROCHLORIDE 500 MG/1
TABLET, EXTENDED RELEASE ORAL
COMMUNITY
Start: 2022-11-07 | End: 2022-12-05

## 2022-12-05 NOTE — PROGRESS NOTES
Patient Consult Note - Follow Up Visit  Primary care physician: DELFINA DowellPShaunRALICIA    Reason for consult: Management of Uncontrolled Type 2 Diabetes    Time IN:  10:51am  Time OUT:  11:10am    HPI:  Dunia Her is a 67 y.o. old patient who comes in today for evaluation of above stated problem.    Most Recent HbA1c:   Lab Results   Component Value Date/Time    HBA1C 7.2 (H) 11/14/2022 09:07 AM        Current Diabetes Regimen:  GLP-1 Agent: Dulaglutide 1.5 mg once weekly     Metformin ER 1000mg QD    Before Breakfast: not testing at home  Before Lunch:  Before Dinner:  Before Bedtime:  Other times:  Hypoglycemia:  None    Diet/Exercise:  Patient endorses early satiety quite frequently since starting Trulicity.    Has been trying to avoid or minimize simple carbs as much as possible.  Exercise largely unchanged.    Foot Exam:  Monofilament exam - up to date, not conducted today    Preventative Management  BP regimen (ACE/ARB) - Telmisartan 80mg QD  ASA - 325mg QD  Statin - Rosuvastatin 20mg QD  Last Retinal Scan - 6/2022  Last Foot Exam - 4/2022  Last A1c -   Lab Results   Component Value Date/Time    HBA1C 7.2 (H) 11/14/2022 09:07 AM      Last Microalbuminuria - 12/2021      ROS:  Constitutional: No weight loss  Cardiac: No palpitations or racing heart  Resp: No shortness of breath  Neuro: No numbness or tinging in feet  Endo: No heat or cold intolerance, no polyuria or polydipsia  All other systems were reviewed and were negative.    Past Medical History:  Patient Active Problem List    Diagnosis Date Noted    Right upper quadrant abdominal pain 11/03/2022    COPD (chronic obstructive pulmonary disease) (HCC) 04/20/2022    Lung nodule 04/20/2022    Greater trochanteric bursitis of left hip 01/31/2022    Elevated lipoprotein(a) 12/29/2021    Healthcare maintenance 06/17/2021    Acute appendicitis with appendiceal abscess 02/12/2021    Depression 02/12/2021    Essential hypertension 10/06/2020    Coronary  artery calcification seen on CT scan 2020    PAD (peripheral artery disease) (HCC) 2020    Other specified personal risk factors, not elsewhere classified 2020    Bronchitis 2020    Recurrent major depressive disorder, in partial remission (HCC) 02/10/2019    Left elbow pain 2018    Rash 2018    Chronic insomnia 2016    Dyslipidemia associated with type 2 diabetes mellitus (HCC) 10/02/2014    Class 1 obesity due to excess calories with body mass index (BMI) of 34.0 to 34.9 in adult 10/02/2014    Hyperlipidemia 10/02/2014       Past Surgical History:  Past Surgical History:   Procedure Laterality Date    TX LAP,APPENDECTOMY N/A 2021    Procedure: APPENDECTOMY, LAPAROSCOPIC;  Surgeon: Juan Dawson M.D.;  Location: SURGERY [facility];  Service: General    ABDOMINAL HYSTERECTOMY TOTAL      benign cysts, total       Allergies:  Sulfa drugs and Shellfish allergy    Social History:  Social History     Socioeconomic History    Marital status:      Spouse name: Not on file    Number of children: Not on file    Years of education: Not on file    Highest education level: Some college, no degree   Occupational History    Not on file   Tobacco Use    Smoking status: Former     Packs/day: 1.00     Years: 48.00     Pack years: 48.00     Types: Cigarettes     Start date:      Quit date: 2017     Years since quittin.4    Smokeless tobacco: Never   Vaping Use    Vaping Use: Never used   Substance and Sexual Activity    Alcohol use: No     Alcohol/week: 0.0 oz    Drug use: Never    Sexual activity: Yes     Partners: Male     Comment:    Other Topics Concern    Not on file   Social History Narrative    Not on file     Social Determinants of Health     Financial Resource Strain: Low Risk     Difficulty of Paying Living Expenses: Not very hard   Food Insecurity: No Food Insecurity    Worried About Running Out of Food in the Last Year: Never true    Ran Out  of Food in the Last Year: Never true   Transportation Needs: No Transportation Needs    Lack of Transportation (Medical): No    Lack of Transportation (Non-Medical): No   Physical Activity: Sufficiently Active    Days of Exercise per Week: 4 days    Minutes of Exercise per Session: 40 min   Stress: Stress Concern Present    Feeling of Stress : To some extent   Social Connections: Moderately Integrated    Frequency of Communication with Friends and Family: More than three times a week    Frequency of Social Gatherings with Friends and Family: Once a week    Attends [employer] Services: 1 to 4 times per year    Active Member of Clubs or Organizations: No    Attends Club or Organization Meetings: Not on file    Marital Status:    Intimate Partner Violence: Not on file   Housing Stability: Low Risk     Unable to Pay for Housing in the Last Year: No    Number of Places Lived in the Last Year: 1    Unstable Housing in the Last Year: No       Family History:  Family History   Problem Relation Age of Onset    Cancer Mother         pancreatic     Heart Disease Father     Heart Attack Father     Diabetes Father         pre-diabetes    Stroke Neg Hx        Medications:    Current Outpatient Medications:     aspirin EC (ECOTRIN) 325 MG Tablet Delayed Response, Take 1 Tablet by mouth every day., Disp: 100 Tablet, Rfl: 1    Dulaglutide (TRULICITY SC), Inject  under the skin. Pt unsure of dose, Disp: , Rfl:     telmisartan (MICARDIS) 80 MG Tab, TAKE ONE TABLET BY MOUTH EVERY NIGHT AT BEDTIME, Disp: 90 Tablet, Rfl: 2    traZODone (DESYREL) 50 MG Tab, TAKE ONE TABLET BY MOUTH EVERY NIGHT AT BEDTIME AS NEEDED FOR SLEEP, Disp: 90 Tablet, Rfl: 0    rosuvastatin (CRESTOR) 20 MG Tab, Take 1.5 tabs every evening, Disp: 135 Tablet, Rfl: 1    PARoxetine (PAXIL) 30 MG Tab, Take 1 Tablet by mouth every day., Disp: 90 Tablet, Rfl: 3    Lansoprazole (PREVACID 24HR PO), Take  by mouth., Disp: , Rfl:     metFORMIN ER 1000 MG TABLET SR 24  HR, Take 1 Tablet by mouth with dinner. (Patient taking differently: Take 1,000 mg by mouth every day.), Disp: 180 Tablet, Rfl: 3    Blood Glucose Test Strips, Use one Accucheck Tana strip to test blood sugar three times daily before meals., Disp: 100 Strip, Rfl: 0    ibuprofen (MOTRIN) 200 MG Tab, Take 3 Tablets by mouth 1 time a day as needed (Moderate pain). 3 tablets = 600 mg., Disp: , Rfl:     vitamin D (CHOLECALCIFEROL) 1000 Unit (25 mcg) Tab, Take 3 Tablets by mouth every morning. 3 tablets = 3000 units., Disp: , Rfl:     albuterol (PROVENTIL) 2.5mg/3ml Nebu Soln solution for nebulization, 3 mL by Nebulization route every four hours as needed for Shortness of Breath., Disp: 25 Bullet, Rfl: 0    Labs: Reviewed    Physical Examination:  Vital signs: There were no vitals taken for this visit. There is no height or weight on file to calculate BMI.  General: No apparent distress, cooperative  Eyes: No scleral icterus or discharge  ENMT: Normal on external inspection of nose, lips, normal thyroid exam  Neck: No abnormal masses on inspection  Resp: Normal effort, clear to auscultation bilaterally   CVS: Regular rate and rhythm, S1 S2 normal, no murmur   Extremities: No edema  Abdomen: abdominal obesity present  Neuro: Alert and oriented  Skin: No rash  Psych: Normal mood and affect, intact memory and able to make informed decisions    Assessment and Plan:    Patient is optimized on metformin.  Tolerating dosing of Trulicity, although she started with 1.5mg dose that was sent from PAP rather than returning to clinic to initiate samples of 0.75mg.  Small amount of nausea on occasion.  Endorses ~7lb weight loss since starting Trulicity, largely generated by decrease in appetite.  Continues to be mindful of dietary needs, minimizing portion size and eating low carb.    Will continue all current medications for now.  Continue to focus on appropriate diabetic dietary habits.  Increase exercise as tolerated.    Follow Up:   About two months for next A1c.    Thank you for allowing me to participate in the care of this patient.    Pillo Truong, PharmD, BCACP  12/05/22    CC:   RIOS Dowell

## 2022-12-09 DIAGNOSIS — F51.04 CHRONIC INSOMNIA: ICD-10-CM

## 2022-12-09 RX ORDER — TRAZODONE HYDROCHLORIDE 50 MG/1
50 TABLET ORAL
Qty: 90 TABLET | Refills: 2 | Status: SHIPPED | OUTPATIENT
Start: 2022-12-09 | End: 2023-09-14 | Stop reason: SDUPTHER

## 2022-12-09 NOTE — TELEPHONE ENCOUNTER
Requested Prescriptions     Pending Prescriptions Disp Refills   • traZODone (DESYREL) 50 MG Tab 90 Tablet 2     Sig: Take 1 Tablet by mouth at bedtime as needed for Sleep.

## 2023-01-26 DIAGNOSIS — E78.5 HYPERLIPIDEMIA LDL GOAL <70: ICD-10-CM

## 2023-01-26 RX ORDER — ROSUVASTATIN CALCIUM 20 MG/1
TABLET, COATED ORAL
Qty: 135 TABLET | Refills: 1 | Status: SHIPPED | OUTPATIENT
Start: 2023-01-26 | End: 2023-07-26 | Stop reason: SDUPTHER

## 2023-02-07 ENCOUNTER — APPOINTMENT (OUTPATIENT)
Dept: VASCULAR LAB | Facility: MEDICAL CENTER | Age: 68
End: 2023-02-07
Attending: NURSE PRACTITIONER

## 2023-02-07 ENCOUNTER — OFFICE VISIT (OUTPATIENT)
Dept: VASCULAR LAB | Facility: MEDICAL CENTER | Age: 68
End: 2023-02-07
Attending: NURSE PRACTITIONER
Payer: MEDICARE

## 2023-02-07 VITALS
DIASTOLIC BLOOD PRESSURE: 89 MMHG | HEART RATE: 61 BPM | SYSTOLIC BLOOD PRESSURE: 153 MMHG | HEIGHT: 65 IN | BODY MASS INDEX: 34.69 KG/M2 | WEIGHT: 208.2 LBS

## 2023-02-07 DIAGNOSIS — E11.69 DYSLIPIDEMIA ASSOCIATED WITH TYPE 2 DIABETES MELLITUS (HCC): ICD-10-CM

## 2023-02-07 DIAGNOSIS — E78.5 DYSLIPIDEMIA ASSOCIATED WITH TYPE 2 DIABETES MELLITUS (HCC): ICD-10-CM

## 2023-02-07 DIAGNOSIS — I10 ESSENTIAL HYPERTENSION: ICD-10-CM

## 2023-02-07 DIAGNOSIS — Z00.6 RESEARCH STUDY PATIENT: ICD-10-CM

## 2023-02-07 DIAGNOSIS — E78.5 HYPERLIPIDEMIA LDL GOAL <70: ICD-10-CM

## 2023-02-07 DIAGNOSIS — E11.51 TYPE 2 DIABETES MELLITUS WITH DIABETIC PERIPHERAL ANGIOPATHY WITHOUT GANGRENE, WITHOUT LONG-TERM CURRENT USE OF INSULIN (HCC): ICD-10-CM

## 2023-02-07 PROCEDURE — 99214 OFFICE O/P EST MOD 30 MIN: CPT | Performed by: NURSE PRACTITIONER

## 2023-02-07 PROCEDURE — 99212 OFFICE O/P EST SF 10 MIN: CPT

## 2023-02-07 RX ORDER — AMLODIPINE BESYLATE 5 MG/1
5 TABLET ORAL
Qty: 90 TABLET | Refills: 3 | Status: SHIPPED | OUTPATIENT
Start: 2023-02-07 | End: 2024-02-02 | Stop reason: SDUPTHER

## 2023-02-07 ASSESSMENT — ENCOUNTER SYMPTOMS
PALPITATIONS: 0
CHILLS: 0
NAUSEA: 0
NERVOUS/ANXIOUS: 0
ABDOMINAL PAIN: 1
WEAKNESS: 0
WEIGHT LOSS: 0
FEVER: 0
COUGH: 0
SHORTNESS OF BREATH: 0
MYALGIAS: 0
BLOOD IN STOOL: 0
DIZZINESS: 0
BRUISES/BLEEDS EASILY: 0

## 2023-02-07 ASSESSMENT — FIBROSIS 4 INDEX: FIB4 SCORE: 1.03

## 2023-02-07 NOTE — PROGRESS NOTES
RESISTANT HTN CLINIC - Follow Up EVALUATION   2/7/23    ASSESSEMENT AND PLAN:      1. BLOOD PRESSURE CONTROL   Qualifies as Resistant HTN (RH):  No, not on optimized, max-dosed or max-tolerated meds from 3 classes   Office BP Goal ACC/AHA (2017) goal <130/80  Home BP at goal:  yes  Office BP at goal:  elevated today; out of amlodipine  Home readings 110/70s  Good med adherence  Overall, feeling well, yet still gets occ abd pain r/t gallstone  Had fasting labwork done    2. WORK UP FOR SECONDARY CAUSES OF RH:  Obstructive Sleep Apnea: Not Yet Assessed- no current symptoms  Renal parenchymal disease: Excluded  Renovascular HTN: Not Yet Assessed  Primary Aldosteronism: Not Yet Assessed  Thyroid Function: Excluded 12/2021  Pheochromocytoma: Not Yet Assessed   Cushing's:   Not Yet Assessed   Coarctation of Aorta: excluded  Other: none identified    Recommendations At This Visit: as noted in orders         3. MEDICATION USE / ADHERENCE:   Assessment: Complete  Recommendations: Instructed to Continue Taking All Medications As Prescribed         4. HTN-MEDIATED END-ORGAN DAMAGE:  Left Ventricular Hypertrophy: Absent on  ECG Date: 2020  Urine Albuminuria: None on Date: 12/2021  Renal Function: Chronic Kidney Disease  CKD G2 at worst   Ophthalmologic: Absent per patient report and/or eye specialist   Established Cardiovascular Disease:     # CAD per CT scan - continue current med mgmt, no further surveillance and no prior ASCVD events     # PAD, LLE inflow - stable, limited sx   - continue exercise, TLC, med mgmt      5. LIFESTYLE INTERVENTIONS:    SMOKING:   reports that she quit smoking about 5 years ago. Her smoking use included cigarettes. She started smoking about 55 years ago. She has a 48.00 pack-year smoking history. She has never used smokeless tobacco.   - continued complete avoidance of all tobacco products     PHYSICAL ACTIVITY: continue healthy activity to improve CV fitness.  In general, targeting >150min/week  "of moderate-level activity.      WEIGHT MANAGEMENT AND NUTRITION: Dietary plan was discussed with patient at this visit including DASH-dietary approaches, substitution of MUFA/PUFA for saturated fats, substituting whole grains for simple carbs, substituting lean meats for fatty meats, increase use of plant-based protein vs animal-based, lowered sodium.  Additional details reviewed with patient and/or outlined in care instructions.    6. STANDARD HTN PHARMACOTHERAPY:  ACEI/ARB: continue telmisartan 80mg daily   Thiazide Type Diuretic: consider as next agent   DHP-CCB: continue amlodipine 5mg qhs- sent in new rx    7. ADDITIONAL AGENTS   Aldosterone Receptor Antagonist: not indicated   Loop Diuretic: not indicated   Peripheral Alpha Blocker: not indicated   BB: not indicated, stopped on prior visits as unlikely beneficial for BP   Non-DHP-CCB: not indicated   Direct Vasodilator: not indicated   Centrally Acting Alpha Agonist: not indicated   Potassium-sparing diuretic: not indicated   DRI: not indicated     LIPID MANAGEMENT:   Qualifies for Statin Therapy Based on 2018 ACC/AHA Guidelines: yes, Secondary Prevention - Very high risk ASCVD - 2 major events or 1 event with other high-risk conditions, Diabetes and HERNANDEZ <0.9  The 10-year ASCVD risk score (Carl WOLFF, et al., 2019) is: 12.8%, 7.5 - <20% \"intermediate risk\"   Major ASCVD events: Symptomatic PAD (hx of claudication with HERNANDEZ <0.85, previous revascularization/amputation  High-risk conditions: >64yr old , Diabetes , Hypertension  and N/A  Risk-enhancers: N/A  Currently on Statin: Yes  Treatment goals: reduce LDL-C >50%  and LDL-C <70 (consider non-HDL-C <100, apoB <80)  At goal? Yes 11/2022  Plan:   - reinforced ongoing TLC measures as noted   - monitor labs in 6mo  Meds:   - continue rosuvastatin 30 mg daily, although not a usual dose    GLYCEMIA MANAGEMENT:  Diabetic   Goal A1c < 7.0  Lab Results   Component Value Date    HBA1C 7.2 (H) 11/14/2022      Lab Results "   Component Value Date/Time    MALBCRT 16 2021 10:40 AM    MICROALBUR 6.2 2021 10:40 AM   Recommend aggressive treatment due to pt's overall risk.    Close f/u with [facility] clinic; try not to miss visits!  - Continue current meds  - a1c and f/u per [facility] clinic  - recommmend for routine care to include regular A1c monitoring, annual albumin/creatinine ratio (ACR), annual diabetic retinopathy screening, foot exams, annual flu vaccine, and updates to pneumonia vaccines as appropriate     ANTIPLATELET/ANTICOAGULANT THERAPY: continue asa 325mg daily    OTHER ISSUES:     # MDD, recurrent - stable, continue current meds, f/u with PCP      Studies Ordered At This Visit: none    Blood Work to Be Obtained Prior to Next Visit: cmp, lipids, urine for MA  Disposition: 6mo      No diagnosis found.      History of Present Illness:   Dunia Her is a female seen for evaluation of resistant HTN and management.   Dunia Her is initially referred by Ref Not In Computer     Subjective    HTN:  Home BP lo/70s  24h ABPM completed: could consider in future if office BP high  Adherence to current HTN meds: compliant all of the time  Hx of clinical ASCVD events: Symptomatic PAD (hx of claudication with HERNANDEZ <0.85, previous revascularization/amputation    Lifestyle factors:  Weight Change: none  BMI Readings from Last 5 Encounters:   22 34.11 kg/m²   22 34.28 kg/m²   22 34.60 kg/m²   22 33.65 kg/m²   22 34.48 kg/m²     Diet pattern: low red meat, increase veg, has lost weight on Med diet in past, continue with RD. Some decrease in appetite at present d/t gallstone pain  Daily salt intake estimate:  Low   - none   EtOH: No  Exercise habits: minimal exercise  Perceived barriers to care: leg pain  Pertinent HTN hx (reviewed at initial visit):   Age at HTN dx: several years   (if female, any hx of pregnancy-related HTN): n/a   Past HTN medications: as noted   HTN crises:  No prior  hospitalization or crises   Interfering substances/contributing factors:  None    Hyperlipidemia:    Stable, tolerating rosuva   Clinical evidence of ASCVD: Symptomatic PAD (hx of claudication with HERNANDEZ <0.85, previous revascularization/amputation    Antiplatelet/anticoagulation: Yes, Details: ASA , no bleeding noted     T2D:  No current symptoms reported.   Tolerating meds  Has f/u with [facility] pharmacist next wk  Last A1c   Lab Results   Component Value Date    HBA1C 7.2 (H) 11/14/2022     Lab Results   Component Value Date/Time    MALBCRT 16 12/27/2021 10:40 AM    MICROALBUR 6.2 12/27/2021 10:40 AM            Problem List:     Patient Active Problem List    Diagnosis Date Noted    Right upper quadrant abdominal pain 11/03/2022    COPD (chronic obstructive pulmonary disease) ([facility]) 04/20/2022    Lung nodule 04/20/2022    Greater trochanteric bursitis of left hip 01/31/2022    Elevated lipoprotein(a) 12/29/2021    Healthcare maintenance 06/17/2021    Acute appendicitis with appendiceal abscess 02/12/2021    Depression 02/12/2021    Essential hypertension 10/06/2020    Coronary artery calcification seen on CT scan 04/07/2020    PAD (peripheral artery disease) ([facility]) 02/07/2020    Other specified personal risk factors, not elsewhere classified 02/07/2020    Bronchitis 01/22/2020    Recurrent major depressive disorder, in partial remission ([facility]) 02/10/2019    Left elbow pain 09/04/2018    Rash 09/04/2018    Chronic insomnia 04/18/2016    Dyslipidemia associated with type 2 diabetes mellitus (HCC) 10/02/2014    Class 1 obesity due to excess calories with body mass index (BMI) of 34.0 to 34.9 in adult 10/02/2014    Hyperlipidemia 10/02/2014       Current Outpatient Medications:     rosuvastatin, TAKE ONE AND ONE-HALF TABLETS EVERY EVENING    traZODone, 50 mg, Oral, QHS PRN    Trulicity, 1 PEN, Subcutaneous, Q7 DAYS    aspirin EC, 325 mg, Oral, DAILY    telmisartan, TAKE ONE TABLET BY MOUTH EVERY NIGHT AT BEDTIME     PARoxetine, 30 mg, Oral, DAILY    Lansoprazole (PREVACID 24HR PO), Take  by mouth.    metFORMIN ER, 1,000 mg, Oral, PM MEAL (Patient taking differently: 1,000 mg, Oral, DAILY)    Blood Glucose Test Strips, Use one Accucheck Tana strip to test blood sugar three times daily before meals.    ibuprofen, 600 mg, Oral, QDAY PRN    vitamin D3, 3,000 Units, Oral, QAM    albuterol, 2.5 mg, Nebulization, Q4HRS PRN   Sulfa drugs and Shellfish allergy     Social History:     Social History     Tobacco Use    Smoking status: Former     Packs/day: 1.00     Years: 48.00     Pack years: 48.00     Types: Cigarettes     Start date:      Quit date: 2017     Years since quittin.6    Smokeless tobacco: Never   Vaping Use    Vaping Use: Never used   Substance Use Topics    Alcohol use: No     Alcohol/week: 0.0 oz    Drug use: Never       Review of Systems:   Review of Systems   Constitutional:  Negative for chills, fever and weight loss.   HENT:  Negative for nosebleeds.    Respiratory:  Negative for cough and shortness of breath.    Cardiovascular:  Negative for chest pain and palpitations.   Gastrointestinal:  Positive for abdominal pain (occasional). Negative for blood in stool and nausea.   Genitourinary:  Negative for hematuria.   Musculoskeletal:  Negative for myalgias.   Neurological:  Negative for dizziness and weakness.   Endo/Heme/Allergies:  Does not bruise/bleed easily.   Psychiatric/Behavioral:  The patient is not nervous/anxious.        Objective     Objective:     There were no vitals filed for this visit.    There is no height or weight on file to calculate BMI.  BP Readings from Last 5 Encounters:   22 112/72   22 118/82   22 124/80   22 132/84   22 130/70      Physical Exam  Constitutional:       General: She is not in acute distress.  Cardiovascular:      Rate and Rhythm: Normal rate and regular rhythm.      Pulses: Normal pulses.      Heart sounds: Normal heart sounds. No  murmur heard.  Pulmonary:      Effort: Pulmonary effort is normal. No respiratory distress.      Breath sounds: Normal breath sounds. No wheezing.   Musculoskeletal:         General: No swelling. Normal range of motion.   Skin:     General: Skin is warm and dry.      Coloration: Skin is not pale.      Findings: No erythema.   Neurological:      Mental Status: She is alert and oriented to person, place, and time.      Motor: No weakness.      Gait: Gait normal.   Psychiatric:         Mood and Affect: Mood normal.         Behavior: Behavior normal.         Thought Content: Thought content normal.      Accessory Clinical Findings:     Lab Results   Component Value Date    CHOLSTRLTOT 164 11/14/2022    LDL 66 11/14/2022    HDL 71 11/14/2022    TRIGLYCERIDE 137 11/14/2022      Lab Results   Component Value Date/Time    LIPOPROTA 125 (H) 12/27/2021 10:41 AM      No results found for: APOB         Lab Results   Component Value Date    HBA1C 7.2 (H) 11/14/2022      Lab Results   Component Value Date    SODIUM 143 11/14/2022    POTASSIUM 4.5 11/14/2022    CHLORIDE 107 11/14/2022    CO2 24 11/14/2022    GLUCOSE 144 (H) 11/14/2022    BUN 14 11/14/2022    CREATININE 0.75 11/14/2022          Lab Results   Component Value Date    URCREAT 381.64 12/27/2021    MICROALBUR 6.2 12/27/2021    MALBCRT 16 12/27/2021     VASCULAR IMAGING:     Last EKG:  Reviewed     HERNANDEZ 2/2020   Right HERNANDEZ: 1.02  Left HERNANDEZ:  0.79   IMPRESSION:   1.  Post occlusive waveform is noted in the distal left peroneal artery. Short segment occlusion or high-grade stenosis proximal to this location is likely present, although not directly visualized.   2.  No other evidence of occlusion or significant stenosis bilaterally.   3.  Moderate calcified atherosclerotic plaque is identified in each lower extremity.    CAC 2/2020  Coronary calcification:  LMA - 0.0  LCX - 0.0  LAD - 315  RCA - 260.8   Total Calcium Score: 575.8   Percentile: Calcium score is above the 90th  percentile for the patient's age and sex.   Other findings:  Heart: Normal size.  Lungs: Tiny granuloma in the left upper lobe.  Mediastinum: Normal.  Upper abdomen: Normal.  Spondylotic changes of the spine.    MPI 4/2020  NUCLEAR IMAGING INTERPRETATION   Normal left ventricular size, ejection fraction, and wall motion.    Small fixed defect in the inferolateral wall could be artifact. Infarct is in    the differential but consider less likely.     No reversible defect.    Sinus rhythm.   Nondiagnostic ECG portion of a Regadenoson nuclear stress test.   ECG INTERPRETATION   Nondiagnostic ECG portion of a Regadenoson nuclear stress test.    CT abd 2/2021   The abdominal aorta is normal in caliber with aortic atherosclerosis.    CT chest 10/2021   1.  Stable 1.1 cm right upper lobe groundglass density nodule. Continued follow-up is recommended.  2.  Cholelithiasis.  3.  Hepatic steatosis  Cardiac: Heart normal in size without pericardial effusion.   Vascular: Atherosclerotic calcifications of the coronary arteries, aorta and branches...      Pt met with HNP representative to discuss HENAO study today    MARIAN Bowers.  [facility]   [facility]   [phone]

## 2023-02-13 ENCOUNTER — NON-PROVIDER VISIT (OUTPATIENT)
Dept: MEDICAL GROUP | Facility: PHYSICIAN GROUP | Age: 68
End: 2023-02-13
Payer: MEDICARE

## 2023-02-13 DIAGNOSIS — E11.9 TYPE 2 DIABETES MELLITUS WITHOUT COMPLICATION, WITHOUT LONG-TERM CURRENT USE OF INSULIN (HCC): ICD-10-CM

## 2023-02-13 LAB
HBA1C MFR BLD: 7 % (ref ?–5.8)
POCT INT CON NEG: NEGATIVE
POCT INT CON POS: POSITIVE

## 2023-02-13 PROCEDURE — 83036 HEMOGLOBIN GLYCOSYLATED A1C: CPT | Performed by: NURSE PRACTITIONER

## 2023-02-13 PROCEDURE — 99401 PREV MED CNSL INDIV APPRX 15: CPT | Performed by: INTERNAL MEDICINE

## 2023-02-13 NOTE — PROGRESS NOTES
Patient Consult Note - Follow Up Visit  Primary care physician: DELFINA DowellPShaunRALICIA    Reason for consult: Management of Uncontrolled Type 2 Diabetes    Time IN:  1100   Time OUT:  1115    HPI:  Dunia Her is a 67 y.o. old patient who comes in today for evaluation of above stated problem.    Most Recent HbA1c:   Lab Results   Component Value Date/Time    HBA1C 7.0 (A) 02/13/2023 11:16 AM        Current Diabetes Regimen:  GLP-1 Agent: Dulaglutide 1.5 mg once weekly - unable to get from [employer] [facility] d/t renewal of application; last dose was 3 weeks ago  Metformin ER 1000mg QD    Before Breakfast: not testing at home  Before Lunch:  Before Dinner:  Before Bedtime:  Other times:  Hypoglycemia:  None    Diet/Exercise:  Patient endorses early satiety quite frequently since starting Trulicity.    Has been trying to avoid or minimize simple carbs as much as possible.  Exercise largely unchanged.    Foot Exam:  Monofilament exam - up to date, not conducted today    Preventative Management  BP regimen (ACE/ARB) - Telmisartan 80mg QD  ASA - 325mg QD  Statin - Rosuvastatin 20mg QD  Last Retinal Scan - 6/2022  Last Foot Exam - 4/2022  Last A1c -   Lab Results   Component Value Date/Time    HBA1C 7.0 (A) 02/13/2023 11:16 AM      Last Microalbuminuria - 12/2021      ROS:  Constitutional: No weight loss  Cardiac: No palpitations or racing heart  Resp: No shortness of breath  Neuro: No numbness or tinging in feet  Endo: No heat or cold intolerance, no polyuria or polydipsia  All other systems were reviewed and were negative.    Past Medical History:  Patient Active Problem List    Diagnosis Date Noted    Right upper quadrant abdominal pain 11/03/2022    COPD (chronic obstructive pulmonary disease) (HCC) 04/20/2022    Lung nodule 04/20/2022    Greater trochanteric bursitis of left hip 01/31/2022    Elevated lipoprotein(a) 12/29/2021    Healthcare maintenance 06/17/2021    Acute appendicitis with appendiceal abscess  2021    Depression 2021    Essential hypertension 10/06/2020    Coronary artery calcification seen on CT scan 2020    PAD (peripheral artery disease) (HCC) 2020    Other specified personal risk factors, not elsewhere classified 2020    Bronchitis 2020    Recurrent major depressive disorder, in partial remission (HCC) 02/10/2019    Left elbow pain 2018    Rash 2018    Chronic insomnia 2016    Dyslipidemia associated with type 2 diabetes mellitus (HCC) 10/02/2014    Class 1 obesity due to excess calories with body mass index (BMI) of 34.0 to 34.9 in adult 10/02/2014    Hyperlipidemia 10/02/2014       Past Surgical History:  Past Surgical History:   Procedure Laterality Date    [place] LAP,APPENDECTOMY N/A 2021    Procedure: APPENDECTOMY, LAPAROSCOPIC;  Surgeon: Juan Dawson M.D.;  Location: SURGERY [facility];  Service: General    ABDOMINAL HYSTERECTOMY TOTAL      benign cysts, total       Allergies:  Sulfa drugs and Shellfish allergy    Social History:  Social History     Socioeconomic History    Marital status:      Spouse name: Not on file    Number of children: Not on file    Years of education: Not on file    Highest education level: Some college, no degree   Occupational History    Not on file   Tobacco Use    Smoking status: Former     Packs/day: 1.00     Years: 48.00     Pack years: 48.00     Types: Cigarettes     Start date:      Quit date: 2017     Years since quittin.6    Smokeless tobacco: Never   Vaping Use    Vaping Use: Never used   Substance and Sexual Activity    Alcohol use: No     Alcohol/week: 0.0 oz    Drug use: Never    Sexual activity: Yes     Partners: Male     Comment:    Other Topics Concern    Not on file   Social History Narrative    Not on file     Social Determinants of Health     Financial Resource Strain: Low Risk     Difficulty of Paying Living Expenses: Not very hard   Food Insecurity: No Food  Insecurity    Worried About Running Out of Food in the Last Year: Never true    Ran Out of Food in the Last Year: Never true   Transportation Needs: No Transportation Needs    Lack of Transportation (Medical): No    Lack of Transportation (Non-Medical): No   Physical Activity: Sufficiently Active    Days of Exercise per Week: 4 days    Minutes of Exercise per Session: 40 min   Stress: Stress Concern Present    Feeling of Stress : To some extent   Social Connections: Moderately Integrated    Frequency of Communication with Friends and Family: More than three times a week    Frequency of Social Gatherings with Friends and Family: Once a week    Attends Nondenominational Services: 1 to 4 times per year    Active Member of Clubs or Organizations: No    Attends Club or Organization Meetings: Not on file    Marital Status:    Intimate Partner Violence: Not on file   Housing Stability: Low Risk     Unable to Pay for Housing in the Last Year: No    Number of Places Lived in the Last Year: 1    Unstable Housing in the Last Year: No       Family History:  Family History   Problem Relation Age of Onset    Cancer Mother         pancreatic     Heart Disease Father     Heart Attack Father     Diabetes Father         pre-diabetes    Stroke Neg Hx        Medications:    Current Outpatient Medications:     amLODIPine (NORVASC) 5 MG Tab, Take 1 Tablet by mouth at bedtime., Disp: 90 Tablet, Rfl: 3    rosuvastatin (CRESTOR) 20 MG Tab, TAKE ONE AND ONE-HALF TABLETS EVERY EVENING, Disp: 135 Tablet, Rfl: 1    traZODone (DESYREL) 50 MG Tab, Take 1 Tablet by mouth at bedtime as needed for Sleep., Disp: 90 Tablet, Rfl: 2    aspirin EC (ECOTRIN) 325 MG Tablet Delayed Response, Take 1 Tablet by mouth every day., Disp: 100 Tablet, Rfl: 1    telmisartan (MICARDIS) 80 MG Tab, TAKE ONE TABLET BY MOUTH EVERY NIGHT AT BEDTIME, Disp: 90 Tablet, Rfl: 2    PARoxetine (PAXIL) 30 MG Tab, Take 1 Tablet by mouth every day., Disp: 90 Tablet, Rfl: 3     Lansoprazole (PREVACID 24HR PO), Take  by mouth., Disp: , Rfl:     metFORMIN ER 1000 MG TABLET SR 24 HR, Take 1 Tablet by mouth with dinner. (Patient taking differently: Take 1,000 mg by mouth every day.), Disp: 180 Tablet, Rfl: 3    ibuprofen (MOTRIN) 200 MG Tab, Take 3 Tablets by mouth 1 time a day as needed (Moderate pain). 3 tablets = 600 mg., Disp: , Rfl:     vitamin D (CHOLECALCIFEROL) 1000 Unit (25 mcg) Tab, Take 3 Tablets by mouth every morning. 3 tablets = 3000 units., Disp: , Rfl:     albuterol (PROVENTIL) 2.5mg/3ml Nebu Soln solution for nebulization, 3 mL by Nebulization route every four hours as needed for Shortness of Breath., Disp: 25 Bullet, Rfl: 0    Labs: Reviewed    Physical Examination:  Vital signs: There were no vitals taken for this visit. There is no height or weight on file to calculate BMI.  General: No apparent distress, cooperative  Eyes: No scleral icterus or discharge  ENMT: Normal on external inspection of nose, lips, normal thyroid exam  Neck: No abnormal masses on inspection  Resp: Normal effort, clear to auscultation bilaterally   CVS: Regular rate and rhythm, S1 S2 normal, no murmur   Extremities: No edema  Abdomen: abdominal obesity present  Neuro: Alert and oriented  Skin: No rash  Psych: Normal mood and affect, intact memory and able to make informed decisions    Assessment and Plan:    Patient is optimized on metformin.  Pt has been unable to get refills of Trulicity from [employer] [facility] - likely d/t needing to re-enroll. Currently, [employer] is still accepting applications for re-enrollment of current enrollees.    Pt's a1c has improved from 7.2% to 7.0% - it is likely a1c will increase if off GLP1RA    Will have pt fill out forms for [employer] and have them return to our clinic.    Follow Up:  About 3 months for next A1c.    Thank you for allowing me to participate in the care of this patient.    Sun Hernandez, PharmD, BCACP  02/13/2023    CC:   Carmelita James,  RIOS

## 2023-02-15 ENCOUNTER — HOSPITAL ENCOUNTER (OUTPATIENT)
Facility: MEDICAL CENTER | Age: 68
End: 2023-02-15
Attending: PATHOLOGY
Payer: COMMERCIAL

## 2023-02-15 DIAGNOSIS — E11.51 TYPE 2 DIABETES MELLITUS WITH DIABETIC PERIPHERAL ANGIOPATHY WITHOUT GANGRENE, WITHOUT LONG-TERM CURRENT USE OF INSULIN (HCC): ICD-10-CM

## 2023-02-15 DIAGNOSIS — Z00.6 RESEARCH STUDY PATIENT: ICD-10-CM

## 2023-02-15 RX ORDER — METFORMIN HYDROCHLORIDE 500 MG/1
TABLET, EXTENDED RELEASE ORAL
Qty: 180 TABLET | Refills: 3 | Status: SHIPPED | OUTPATIENT
Start: 2023-02-15 | End: 2024-02-20 | Stop reason: SDUPTHER

## 2023-02-23 LAB
ELF SCORE: 9.1
RELATIVE RISK: NORMAL
RISK GROUP: NORMAL
RISK: 3.3 %

## 2023-05-15 ENCOUNTER — NON-PROVIDER VISIT (OUTPATIENT)
Dept: MEDICAL GROUP | Facility: PHYSICIAN GROUP | Age: 68
End: 2023-05-15
Payer: MEDICARE

## 2023-05-15 VITALS — DIASTOLIC BLOOD PRESSURE: 92 MMHG | HEART RATE: 86 BPM | SYSTOLIC BLOOD PRESSURE: 145 MMHG

## 2023-05-15 DIAGNOSIS — E11.9 TYPE 2 DIABETES MELLITUS WITHOUT COMPLICATION, WITHOUT LONG-TERM CURRENT USE OF INSULIN (HCC): ICD-10-CM

## 2023-05-15 LAB
HBA1C MFR BLD: 7 % (ref ?–5.8)
POCT INT CON NEG: NEGATIVE
POCT INT CON POS: POSITIVE

## 2023-05-15 PROCEDURE — 99401 PREV MED CNSL INDIV APPRX 15: CPT | Performed by: INTERNAL MEDICINE

## 2023-05-15 PROCEDURE — 3080F DIAST BP >= 90 MM HG: CPT | Performed by: INTERNAL MEDICINE

## 2023-05-15 PROCEDURE — 83036 HEMOGLOBIN GLYCOSYLATED A1C: CPT | Performed by: NURSE PRACTITIONER

## 2023-05-15 PROCEDURE — 3077F SYST BP >= 140 MM HG: CPT | Performed by: INTERNAL MEDICINE

## 2023-05-15 NOTE — PROGRESS NOTES
Patient Consult Note - Follow Up Visit  Primary care physician: DELFINA DowellPShaunRALICIA    Reason for consult: Management of Uncontrolled Type 2 Diabetes    Time IN:  10:58am  Time OUT:  11:17am    HPI:  Dunia Her is a 67 y.o. old patient who comes in today for evaluation of above stated problem.    Most Recent HbA1c:   Lab Results   Component Value Date/Time    HBA1C 7.0 (A) 05/15/2023 11:02 AM        Current Diabetes Regimen:  GLP-1 Agent: Dulaglutide 1.5 mg once weekly   Metformin ER 1000mg BID    Before Breakfast: not currently testing  Before Lunch:  Before Dinner:  Before Bedtime:  Other times:  Hypoglycemia:   Unknown, but highly unlikely    Diet/Exercise:  Largely unchanged from last visit.    Foot Exam:  Monofilament exam - conducted today  Monofilament testing with a 10 gram force: sensation intact    Visual Inspection: Feet without maceration, ulcers, fissures.  Feet dry.  Pedal pulses: intact bilaterally    Preventative Management  BP regimen (ACE/ARB) - Telmisartan 80mg QD  ASA - 81mg QD  Statin - Rosuvastatin 20mg QD  Last Retinal Scan - 6/2022  Last Foot Exam - 5/2023  Last A1c -   Lab Results   Component Value Date/Time    HBA1C 7.0 (A) 05/15/2023 11:02 AM      Last Microalbuminuria - needs updating, has been ordered for next [facility] medicine appt.      ROS:  Constitutional: No weight loss  Cardiac: No palpitations or racing heart  Resp: No shortness of breath  Neuro: No numbness or tinging in feet  Endo: No heat or cold intolerance, no polyuria or polydipsia  All other systems were reviewed and were negative.    Past Medical History:  Patient Active Problem List    Diagnosis Date Noted    Right upper quadrant abdominal pain 11/03/2022    COPD (chronic obstructive pulmonary disease) (HCC) 04/20/2022    Lung nodule 04/20/2022    Greater trochanteric bursitis of left hip 01/31/2022    Elevated lipoprotein(a) 12/29/2021    Healthcare maintenance 06/17/2021    Acute appendicitis with appendiceal  abscess 2021    Depression 2021    Essential hypertension 10/06/2020    Coronary artery calcification seen on CT scan 2020    PAD (peripheral artery disease) (HCC) 2020    Other specified personal risk factors, not elsewhere classified 2020    Bronchitis 2020    Recurrent major depressive disorder, in partial remission (HCC) 02/10/2019    Left elbow pain 2018    Rash 2018    Chronic insomnia 2016    Dyslipidemia associated with type 2 diabetes mellitus (HCC) 10/02/2014    Class 1 obesity due to excess calories with body mass index (BMI) of 34.0 to 34.9 in adult 10/02/2014    Hyperlipidemia 10/02/2014       Past Surgical History:  Past Surgical History:   Procedure Laterality Date    [place] LAP,APPENDECTOMY N/A 2021    Procedure: APPENDECTOMY, LAPAROSCOPIC;  Surgeon: Juan Dawson M.D.;  Location: SURGERY [facility];  Service: General    ABDOMINAL HYSTERECTOMY TOTAL      benign cysts, total       Allergies:  Sulfa drugs and Shellfish allergy    Social History:  Social History     Socioeconomic History    Marital status:      Spouse name: Not on file    Number of children: Not on file    Years of education: Not on file    Highest education level: Some college, no degree   Occupational History    Not on file   Tobacco Use    Smoking status: Former     Packs/day: 1.00     Years: 48.00     Pack years: 48.00     Types: Cigarettes     Start date:      Quit date: 2017     Years since quittin.9    Smokeless tobacco: Never   Vaping Use    Vaping Use: Never used   Substance and Sexual Activity    Alcohol use: No     Alcohol/week: 0.0 oz    Drug use: Never    Sexual activity: Yes     Partners: Male     Comment:    Other Topics Concern    Not on file   Social History Narrative    Not on file     Social Determinants of Health     Financial Resource Strain: Low Risk  (7/3/2022)    Overall Financial Resource Strain (CARDIA)     Difficulty of  Paying Living Expenses: Not very hard   Food Insecurity: No Food Insecurity (7/3/2022)    Hunger Vital Sign     Worried About Running Out of Food in the Last Year: Never true     Ran Out of Food in the Last Year: Never true   Transportation Needs: No Transportation Needs (7/3/2022)    PRAPARE - Transportation     Lack of Transportation (Medical): No     Lack of Transportation (Non-Medical): No   Physical Activity: Sufficiently Active (7/3/2022)    Exercise Vital Sign     Days of Exercise per Week: 4 days     Minutes of Exercise per Session: 40 min   Stress: Stress Concern Present (7/3/2022)    [employer] [employer] - Occupational Stress Questionnaire     Feeling of Stress : To some extent   Social Connections: Moderately Integrated (7/3/2022)    Social Connection and Isolation Panel [NHANES]     Frequency of Communication with Friends and Family: More than three times a week     Frequency of Social Gatherings with Friends and Family: Once a week     Attends [employer] Services: 1 to 4 times per year     Active Member of Clubs or Organizations: No     Attends Club or Organization Meetings: Not on file     Marital Status:    Intimate Partner Violence: Not on file   Housing Stability: Low Risk  (7/3/2022)    Housing Stability Vital Sign     Unable to Pay for Housing in the Last Year: No     Number of Places Lived in the Last Year: 1     Unstable Housing in the Last Year: No       Family History:  Family History   Problem Relation Age of Onset    Cancer Mother         pancreatic     Heart Disease Father     Heart Attack Father     Diabetes Father         pre-diabetes    Stroke Neg Hx        Medications:    Current Outpatient Medications:     metFORMIN ER (GLUCOPHAGE XR) 500 MG TABLET SR 24 HR, TAKE TWO TABLETS BY MOUTH DAILY WITH DINNER, Disp: 180 Tablet, Rfl: 3    amLODIPine (NORVASC) 5 MG Tab, Take 1 Tablet by mouth at bedtime., Disp: 90 Tablet, Rfl: 3    rosuvastatin (CRESTOR) 20 MG Tab, TAKE  ONE AND ONE-HALF TABLETS EVERY EVENING, Disp: 135 Tablet, Rfl: 1    traZODone (DESYREL) 50 MG Tab, Take 1 Tablet by mouth at bedtime as needed for Sleep., Disp: 90 Tablet, Rfl: 2    aspirin EC (ECOTRIN) 325 MG Tablet Delayed Response, Take 1 Tablet by mouth every day., Disp: 100 Tablet, Rfl: 1    telmisartan (MICARDIS) 80 MG Tab, TAKE ONE TABLET BY MOUTH EVERY NIGHT AT BEDTIME, Disp: 90 Tablet, Rfl: 2    PARoxetine (PAXIL) 30 MG Tab, Take 1 Tablet by mouth every day., Disp: 90 Tablet, Rfl: 3    Lansoprazole (PREVACID 24HR PO), Take  by mouth., Disp: , Rfl:     ibuprofen (MOTRIN) 200 MG Tab, Take 3 Tablets by mouth 1 time a day as needed (Moderate pain). 3 tablets = 600 mg., Disp: , Rfl:     vitamin D (CHOLECALCIFEROL) 1000 Unit (25 mcg) Tab, Take 3 Tablets by mouth every morning. 3 tablets = 3000 units., Disp: , Rfl:     albuterol (PROVENTIL) 2.5mg/3ml Nebu Soln solution for nebulization, 3 mL by Nebulization route every four hours as needed for Shortness of Breath., Disp: 25 Bullet, Rfl: 0    Labs: Reviewed    Physical Examination:  Vital signs: BP (!) 145/92 (BP Location: Left arm, Patient Position: Sitting, BP Cuff Size: Large adult)   Pulse 86  There is no height or weight on file to calculate BMI.  General: No apparent distress, cooperative  Eyes: No scleral icterus or discharge  ENMT: Normal on external inspection of nose, lips, normal thyroid exam  Neck: No abnormal masses on inspection  Resp: Normal effort, clear to auscultation bilaterally   CVS: Regular rate and rhythm, S1 S2 normal, no murmur   Extremities: No edema  Abdomen: abdominal obesity present  Neuro: Alert and oriented  Skin: No rash  Psych: Normal mood and affect, intact memory and able to make informed decisions    Assessment and Plan:    Patient is optimized on metformin.  Tolerating current dosing of Trulicity.  A1c remains at goal at 7.0.  No home FBG reading to analyze today.  Diet and exercise largely unchanged from last  visit.  Monofilament conducted today.    INCREASE Trulicity to 3mg SQ once weekly.  Addendum sent to [facility].  Continue all other medications for now.  Continue to be mindful on dietary habits, minimize simple carbs and sweets.  Increase exercise as tolerated.    Follow Up:  Three months for A1c    Thank you for allowing me to participate in the care of this patient.    Pillo Truong, PharmD, [facility]  05/15/23    CC:   Carmelita James, PEPE.

## 2023-05-17 ENCOUNTER — HOSPITAL ENCOUNTER (OUTPATIENT)
Dept: RADIOLOGY | Facility: MEDICAL CENTER | Age: 68
End: 2023-05-17
Attending: INTERNAL MEDICINE
Payer: MEDICARE

## 2023-05-17 DIAGNOSIS — I73.9 PAD (PERIPHERAL ARTERY DISEASE) (HCC): ICD-10-CM

## 2023-05-17 PROCEDURE — 93922 UPR/L XTREMITY ART 2 LEVELS: CPT

## 2023-05-26 NOTE — ASSESSMENT & PLAN NOTE
Chronic condition, stable.  Patient takes paroxetine 30 mg daily.  She feels this controls her mood and does not wish to make changes today.  She will continue her current program.   VERBALIZING SUICIDAL IDEATIONS/VERBALIZING HOMICIDAL IDEATIONS

## 2023-06-02 ENCOUNTER — OFFICE VISIT (OUTPATIENT)
Dept: MEDICAL GROUP | Facility: PHYSICIAN GROUP | Age: 68
End: 2023-06-02
Payer: MEDICARE

## 2023-06-02 VITALS
HEART RATE: 78 BPM | WEIGHT: 205 LBS | SYSTOLIC BLOOD PRESSURE: 124 MMHG | DIASTOLIC BLOOD PRESSURE: 78 MMHG | TEMPERATURE: 97.8 F | BODY MASS INDEX: 34.16 KG/M2 | OXYGEN SATURATION: 98 % | HEIGHT: 65 IN | RESPIRATION RATE: 20 BRPM

## 2023-06-02 DIAGNOSIS — F51.04 CHRONIC INSOMNIA: ICD-10-CM

## 2023-06-02 DIAGNOSIS — L60.0 INGROWN NAIL OF GREAT TOE: ICD-10-CM

## 2023-06-02 DIAGNOSIS — N39.46 MIXED STRESS AND URGE URINARY INCONTINENCE: ICD-10-CM

## 2023-06-02 DIAGNOSIS — I25.10 CORONARY ARTERY CALCIFICATION SEEN ON CT SCAN: ICD-10-CM

## 2023-06-02 DIAGNOSIS — Z78.0 POSTMENOPAUSAL STATUS (AGE-RELATED) (NATURAL): ICD-10-CM

## 2023-06-02 DIAGNOSIS — M70.62 GREATER TROCHANTERIC BURSITIS OF LEFT HIP: ICD-10-CM

## 2023-06-02 DIAGNOSIS — R91.1 LUNG NODULE: ICD-10-CM

## 2023-06-02 DIAGNOSIS — E11.69 DYSLIPIDEMIA ASSOCIATED WITH TYPE 2 DIABETES MELLITUS (HCC): ICD-10-CM

## 2023-06-02 DIAGNOSIS — N95.1 MENOPAUSAL STATE: ICD-10-CM

## 2023-06-02 DIAGNOSIS — Z00.00 HEALTHCARE MAINTENANCE: ICD-10-CM

## 2023-06-02 DIAGNOSIS — E66.09 CLASS 1 OBESITY DUE TO EXCESS CALORIES WITH BODY MASS INDEX (BMI) OF 34.0 TO 34.9 IN ADULT, UNSPECIFIED WHETHER SERIOUS COMORBIDITY PRESENT: ICD-10-CM

## 2023-06-02 DIAGNOSIS — E11.8 CONTROLLED TYPE 2 DIABETES MELLITUS WITH COMPLICATION, WITHOUT LONG-TERM CURRENT USE OF INSULIN (HCC): ICD-10-CM

## 2023-06-02 DIAGNOSIS — J44.9 CHRONIC OBSTRUCTIVE PULMONARY DISEASE, UNSPECIFIED COPD TYPE (HCC): Chronic | ICD-10-CM

## 2023-06-02 DIAGNOSIS — Z12.31 ENCOUNTER FOR SCREENING MAMMOGRAM FOR BREAST CANCER: ICD-10-CM

## 2023-06-02 DIAGNOSIS — I73.9 PAD (PERIPHERAL ARTERY DISEASE) (HCC): Chronic | ICD-10-CM

## 2023-06-02 DIAGNOSIS — I10 ESSENTIAL HYPERTENSION: ICD-10-CM

## 2023-06-02 DIAGNOSIS — F33.41 RECURRENT MAJOR DEPRESSIVE DISORDER, IN PARTIAL REMISSION (HCC): Chronic | ICD-10-CM

## 2023-06-02 DIAGNOSIS — E78.5 DYSLIPIDEMIA ASSOCIATED WITH TYPE 2 DIABETES MELLITUS (HCC): ICD-10-CM

## 2023-06-02 PROBLEM — R21 RASH: Status: RESOLVED | Noted: 2018-09-04 | Resolved: 2023-06-02

## 2023-06-02 PROBLEM — M25.522 LEFT ELBOW PAIN: Status: RESOLVED | Noted: 2018-09-04 | Resolved: 2023-06-02

## 2023-06-02 PROBLEM — F32.A DEPRESSION: Status: RESOLVED | Noted: 2021-02-12 | Resolved: 2023-06-02

## 2023-06-02 PROBLEM — Z91.89 OTHER SPECIFIED PERSONAL RISK FACTORS, NOT ELSEWHERE CLASSIFIED: Status: RESOLVED | Noted: 2020-02-07 | Resolved: 2023-06-02

## 2023-06-02 PROBLEM — J40 BRONCHITIS: Chronic | Status: RESOLVED | Noted: 2020-01-22 | Resolved: 2023-06-02

## 2023-06-02 PROBLEM — K35.33 ACUTE APPENDICITIS WITH APPENDICEAL ABSCESS: Status: RESOLVED | Noted: 2021-02-12 | Resolved: 2023-06-02

## 2023-06-02 PROBLEM — R10.11 RIGHT UPPER QUADRANT ABDOMINAL PAIN: Status: RESOLVED | Noted: 2022-11-03 | Resolved: 2023-06-02

## 2023-06-02 PROCEDURE — 3078F DIAST BP <80 MM HG: CPT | Performed by: PHYSICIAN ASSISTANT

## 2023-06-02 PROCEDURE — 3074F SYST BP LT 130 MM HG: CPT | Performed by: PHYSICIAN ASSISTANT

## 2023-06-02 PROCEDURE — 99214 OFFICE O/P EST MOD 30 MIN: CPT | Performed by: PHYSICIAN ASSISTANT

## 2023-06-02 ASSESSMENT — ENCOUNTER SYMPTOMS
SHORTNESS OF BREATH: 0
FEVER: 0
CHILLS: 0

## 2023-06-02 ASSESSMENT — PATIENT HEALTH QUESTIONNAIRE - PHQ9: CLINICAL INTERPRETATION OF PHQ2 SCORE: 0

## 2023-06-02 ASSESSMENT — FIBROSIS 4 INDEX: FIB4 SCORE: 1.03

## 2023-06-02 NOTE — PROGRESS NOTES
Subjective:     CC: establish care     HPI:   Dunia presents today with        Problem   Controlled Type 2 Diabetes Mellitus With Complication, Without Long-Term Current Use of Insulin (Hcc)    Chronic, uncontrolled.   Current medications:  Trulicity 1.5mg every Saturday  Metformin XR 1000mg once daily    A1c: 7.0 (5/2023)  Microalb/Cr ratio: due  Diabetic foot exam: 5/2023  Retinal eye exam: due  ACEi/ARB: telmisartan 80mg daily  Statin: rosuvastatin 20mg daily  Aspirin: 325mg daily  Concomitant HTN: controlled  Nightly foot checks: yes       Mixed Stress and Urge Urinary Incontinence    Chronic, uncontrolled.  Started years ago.  Uses a pad daily.  Denies any issues with rashes.  Discussed medication options, as well as the side effects.  Patient declines medication at this time.       Ingrown Nail of Great Toe    Chronic, uncontrolled.  Patient has been working on her right great toenail for a while.  It is ingrowing, causing some pain.  Referring to podiatry.       Copd (Chronic Obstructive Pulmonary Disease) ([facility])    Chronic, controlled.  Followed by pulmonology.  Only uses albuterol occasionally.     Lung Nodule    Chronic, controlled.  Followed by pulmonology.  Has repeat imaging in October 2023.       Greater Trochanteric Bursitis of Left Hip    Chronic, intermittent.  Had an injection from Dr. Morales but didn't notice any change.  Will follow up for further evaluation.       Healthcare Maintenance    Mammogram: due  DEXA: due       Essential Hypertension    Chronic, controlled.  Tolerating amlodipine 5mg daily and telmisartan 80mg daily.  Managed by cardiology.       Coronary Artery Calcification Seen On CT Scan    Managed by cardiology.       Pad (Peripheral Artery Disease) ([facility])    Chronic, controlled.  Tolerates crestor 20mg and ASA 325mg daily.  Still sees vascular.       Recurrent Major Depressive Disorder, in Partial Remission ([facility])    Chronic, controlled.  Tolerating paxil 30mg for years and it  "seems to help overall.  Still has episodes where she feels down but overall feels good.       Chronic Insomnia    Chronic, controlled.  Tolerates trazodone 50mg at night and it helps.       Dyslipidemia Associated With Type 2 Diabetes Mellitus (Hcc)    Chronic, controlled.   Latest Labs:   Lab Results   Component Value Date/Time    CHOLSTRLTOT 164 11/14/2022 09:05 AM    LDL 66 11/14/2022 09:05 AM    HDL 71 11/14/2022 09:05 AM    TRIGLYCERIDE 137 11/14/2022 09:05 AM      Medications: crestor 20mg   Medication side effects: none  Risk calculator: The 10-year ASCVD risk score (Carl WOLFF, et al., 2019) is: 15.5%        Class 1 Obesity Due to Excess Calories With Body Mass Index (Bmi) of 34.0 to 34.9 in Adult    Chronic, uncontrolled.  Discussed increasing plant-based foods, decreasing animal-based foods.  Increase daily activity, aiming for 30-45 minutes brisk exercise daily.         Right Upper Quadrant Abdominal Pain (Resolved)   Acute Appendicitis With Appendiceal Abscess (Resolved)   Depression (Resolved)   Other Specified Personal Risk Factors, Not Elsewhere Classified (Resolved)   Bronchitis (Resolved)   Left Elbow Pain (Resolved)   Rash (Resolved)   Hyperlipidemia (Resolved)       Health Maintenance: Completed    ROS:  Review of Systems   Constitutional:  Negative for chills and fever.   Respiratory:  Negative for shortness of breath.    Cardiovascular:  Negative for chest pain.   Musculoskeletal:         Ingrowing right great toenail       Objective:     Exam:  /78 (BP Location: Left arm, Patient Position: Sitting, BP Cuff Size: Adult)   Pulse 78   Temp 36.6 °C (97.8 °F) (Temporal)   Resp 20   Ht 1.651 m (5' 5\")   Wt 93 kg (205 lb)   SpO2 98%   Breastfeeding No   BMI 34.11 kg/m²  Body mass index is 34.11 kg/m².    Physical Exam  Vitals reviewed.   Constitutional:       General: She is not in acute distress.     Appearance: Normal appearance.   Pulmonary:      Effort: Pulmonary effort is normal. "   Musculoskeletal:      Comments: Ingrowing right great toenail, medial aspect. No signs of infection.   Neurological:      General: No focal deficit present.      Mental Status: She is alert.   Psychiatric:         Mood and Affect: Mood normal.         Behavior: Behavior normal.         Judgment: Judgment normal.             Assessment & Plan:     67 y.o. female with the following -         HCC Gap Form    Diagnosis to address: J44.9 - Chronic obstructive pulmonary disease, unspecified COPD type ([facility])  Assessment and plan: Chronic, controlled.  Followed by pulmonology.  Only uses albuterol occasionally.  Last edited 06/02/23 12:03 PDT by Becca Marley P.A.-C.         1. Controlled type 2 diabetes mellitus with complication, without long-term current use of insulin (HCC)  Chronic, stable.  A1c has improved to 7.  Currently tolerating Trulicity and metformin.  Continue both medications as directed.  Currently managed by pharmacotherapy.    2. Ingrown nail of great toe  Chronic, uncontrolled.  Referring to podiatry.    - Referral to Podiatry    3. Chronic insomnia  Chronic, controlled.  Continue trazodone 50 mg daily.    4. Class 1 obesity due to excess calories with body mass index (BMI) of 34.0 to 34.9 in adult, unspecified whether serious comorbidity present  Chronic, uncontrolled.  Discussed increasing plant-based foods, decreasing animal-based foods.  Increase daily activity, aiming for 30-45 minutes brisk exercise daily.      5. Recurrent major depressive disorder, in partial remission (HCC)  Chronic, controlled.  Continue paroxetine 30 mg daily.    6. Mixed stress and urge urinary incontinence  Chronic, uncontrolled but stable.  Continue using pads as needed.  Declines medication at this time.    7. Greater trochanteric bursitis of left hip  Chronic, uncontrolled.  Patient to make a follow-up appointment with sports medicine.    8. Essential hypertension  9. Dyslipidemia associated with type 2 diabetes  mellitus (HCC)  10. Coronary artery calcification seen on CT scan  Chronic, stable.  Managed by cardiology.      11. PAD (peripheral artery disease) (HCC)  Chronic, stable.  Managed by vascular.    12. Chronic obstructive pulmonary disease, unspecified COPD type (HCC)  13. Lung nodule  Chronic, stable.  Managed by pulmonology.    14. Postmenopausal status (age-related) (natural)    - DS-BONE DENSITY STUDY (DEXA); Future    15. Menopausal state    - DS-BONE DENSITY STUDY (DEXA); Future    16. Encounter for screening mammogram for breast cancer    - MA-SCREENING MAMMO BILAT W/TOMOSYNTHESIS W/CAD; Future    17. Healthcare maintenance  Patient to schedule an eye exam.        Healthcare maintenance:  Patient is due for an eye exam.  Requested that she have her eye doctor fax us her retinal results.    Return in about 6 months (around 12/2/2023) for med check.    Please note that this dictation was created using voice recognition software. I have made every reasonable attempt to correct obvious errors, but I expect that there are errors of grammar and possibly content that I did not discover before finalizing the note.

## 2023-06-28 DIAGNOSIS — E78.41 ELEVATED LIPOPROTEIN(A): ICD-10-CM

## 2023-06-28 DIAGNOSIS — I25.10 CORONARY ARTERY CALCIFICATION SEEN ON CT SCAN: ICD-10-CM

## 2023-06-29 RX ORDER — ASPIRIN 325 MG
TABLET, DELAYED RELEASE (ENTERIC COATED) ORAL
Qty: 100 TABLET | Refills: 1 | Status: SHIPPED | OUTPATIENT
Start: 2023-06-29

## 2023-07-03 ENCOUNTER — HOSPITAL ENCOUNTER (OUTPATIENT)
Dept: LAB | Facility: MEDICAL CENTER | Age: 68
End: 2023-07-03
Attending: NURSE PRACTITIONER
Payer: MEDICARE

## 2023-07-03 DIAGNOSIS — I10 ESSENTIAL HYPERTENSION: ICD-10-CM

## 2023-07-03 DIAGNOSIS — E78.5 HYPERLIPIDEMIA LDL GOAL <70: ICD-10-CM

## 2023-07-03 DIAGNOSIS — E11.69 DYSLIPIDEMIA ASSOCIATED WITH TYPE 2 DIABETES MELLITUS (HCC): ICD-10-CM

## 2023-07-03 DIAGNOSIS — E78.5 DYSLIPIDEMIA ASSOCIATED WITH TYPE 2 DIABETES MELLITUS (HCC): ICD-10-CM

## 2023-07-03 DIAGNOSIS — E11.51 TYPE 2 DIABETES MELLITUS WITH DIABETIC PERIPHERAL ANGIOPATHY WITHOUT GANGRENE, WITHOUT LONG-TERM CURRENT USE OF INSULIN (HCC): ICD-10-CM

## 2023-07-03 LAB
ALBUMIN SERPL BCP-MCNC: 4.6 G/DL (ref 3.2–4.9)
ALBUMIN/GLOB SERPL: 1.8 G/DL
ALP SERPL-CCNC: 70 U/L (ref 30–99)
ALT SERPL-CCNC: 26 U/L (ref 2–50)
ANION GAP SERPL CALC-SCNC: 18 MMOL/L (ref 7–16)
AST SERPL-CCNC: 20 U/L (ref 12–45)
BILIRUB SERPL-MCNC: 0.4 MG/DL (ref 0.1–1.5)
BUN SERPL-MCNC: 21 MG/DL (ref 8–22)
CALCIUM ALBUM COR SERPL-MCNC: 10.2 MG/DL (ref 8.5–10.5)
CALCIUM SERPL-MCNC: 10.7 MG/DL (ref 8.5–10.5)
CHLORIDE SERPL-SCNC: 102 MMOL/L (ref 96–112)
CHOLEST SERPL-MCNC: 161 MG/DL (ref 100–199)
CO2 SERPL-SCNC: 20 MMOL/L (ref 20–33)
CREAT SERPL-MCNC: 0.92 MG/DL (ref 0.5–1.4)
CREAT UR-MCNC: 473.61 MG/DL
EST. AVERAGE GLUCOSE BLD GHB EST-MCNC: 137 MG/DL
FASTING STATUS PATIENT QL REPORTED: NORMAL
GFR SERPLBLD CREATININE-BSD FMLA CKD-EPI: 67 ML/MIN/1.73 M 2
GLOBULIN SER CALC-MCNC: 2.5 G/DL (ref 1.9–3.5)
GLUCOSE SERPL-MCNC: 134 MG/DL (ref 65–99)
HBA1C MFR BLD: 6.4 % (ref 4–5.6)
HDLC SERPL-MCNC: 69 MG/DL
LDLC SERPL CALC-MCNC: 65 MG/DL
MICROALBUMIN UR-MCNC: 5.6 MG/DL
MICROALBUMIN/CREAT UR: 12 MG/G (ref 0–30)
POTASSIUM SERPL-SCNC: 4.8 MMOL/L (ref 3.6–5.5)
PROT SERPL-MCNC: 7.1 G/DL (ref 6–8.2)
SODIUM SERPL-SCNC: 140 MMOL/L (ref 135–145)
TRIGL SERPL-MCNC: 134 MG/DL (ref 0–149)

## 2023-07-03 PROCEDURE — 80053 COMPREHEN METABOLIC PANEL: CPT

## 2023-07-03 PROCEDURE — 80061 LIPID PANEL: CPT

## 2023-07-03 PROCEDURE — 82043 UR ALBUMIN QUANTITATIVE: CPT

## 2023-07-03 PROCEDURE — 36415 COLL VENOUS BLD VENIPUNCTURE: CPT | Mod: GA

## 2023-07-03 PROCEDURE — 82570 ASSAY OF URINE CREATININE: CPT

## 2023-07-03 PROCEDURE — 83036 HEMOGLOBIN GLYCOSYLATED A1C: CPT | Mod: GA

## 2023-07-12 ENCOUNTER — APPOINTMENT (OUTPATIENT)
Dept: RADIOLOGY | Facility: MEDICAL CENTER | Age: 68
End: 2023-07-12
Attending: PHYSICIAN ASSISTANT
Payer: MEDICARE

## 2023-07-26 DIAGNOSIS — E78.5 HYPERLIPIDEMIA LDL GOAL <70: ICD-10-CM

## 2023-07-26 RX ORDER — ROSUVASTATIN CALCIUM 20 MG/1
TABLET, COATED ORAL
Qty: 135 TABLET | Refills: 1 | Status: SHIPPED | OUTPATIENT
Start: 2023-07-26 | End: 2024-02-02 | Stop reason: SDUPTHER

## 2023-07-27 RX ORDER — PAROXETINE 30 MG/1
30 TABLET, FILM COATED ORAL DAILY
Qty: 90 TABLET | Refills: 3 | Status: SHIPPED | OUTPATIENT
Start: 2023-07-27

## 2023-08-01 ENCOUNTER — HOSPITAL ENCOUNTER (OUTPATIENT)
Dept: RADIOLOGY | Facility: MEDICAL CENTER | Age: 68
End: 2023-08-01
Attending: PHYSICIAN ASSISTANT
Payer: MEDICARE

## 2023-08-01 DIAGNOSIS — N95.1 MENOPAUSAL STATE: ICD-10-CM

## 2023-08-01 DIAGNOSIS — Z12.31 ENCOUNTER FOR SCREENING MAMMOGRAM FOR BREAST CANCER: ICD-10-CM

## 2023-08-01 DIAGNOSIS — Z78.0 POSTMENOPAUSAL STATUS (AGE-RELATED) (NATURAL): ICD-10-CM

## 2023-08-01 PROCEDURE — 77080 DXA BONE DENSITY AXIAL: CPT

## 2023-08-01 PROCEDURE — 77063 BREAST TOMOSYNTHESIS BI: CPT

## 2023-08-02 PROBLEM — M85.852 OSTEOPENIA OF NECK OF LEFT FEMUR: Status: ACTIVE | Noted: 2023-08-02

## 2023-08-09 DIAGNOSIS — I10 PRIMARY HYPERTENSION: ICD-10-CM

## 2023-08-09 RX ORDER — TELMISARTAN 80 MG/1
80 TABLET ORAL
Qty: 90 TABLET | Refills: 2 | Status: SHIPPED | OUTPATIENT
Start: 2023-08-09

## 2023-08-18 ENCOUNTER — OFFICE VISIT (OUTPATIENT)
Dept: VASCULAR LAB | Facility: MEDICAL CENTER | Age: 68
End: 2023-08-18
Payer: MEDICARE

## 2023-08-18 VITALS
SYSTOLIC BLOOD PRESSURE: 125 MMHG | HEART RATE: 81 BPM | WEIGHT: 206 LBS | HEIGHT: 65 IN | BODY MASS INDEX: 34.32 KG/M2 | DIASTOLIC BLOOD PRESSURE: 78 MMHG

## 2023-08-18 DIAGNOSIS — E11.51 TYPE 2 DIABETES MELLITUS WITH DIABETIC PERIPHERAL ANGIOPATHY WITHOUT GANGRENE, WITHOUT LONG-TERM CURRENT USE OF INSULIN (HCC): ICD-10-CM

## 2023-08-18 DIAGNOSIS — I73.9 PAD (PERIPHERAL ARTERY DISEASE) (HCC): Chronic | ICD-10-CM

## 2023-08-18 DIAGNOSIS — E78.5 DYSLIPIDEMIA ASSOCIATED WITH TYPE 2 DIABETES MELLITUS (HCC): ICD-10-CM

## 2023-08-18 DIAGNOSIS — E78.41 ELEVATED LIPOPROTEIN(A): ICD-10-CM

## 2023-08-18 DIAGNOSIS — I10 PRIMARY HYPERTENSION: ICD-10-CM

## 2023-08-18 DIAGNOSIS — E78.5 HYPERLIPIDEMIA LDL GOAL <70: ICD-10-CM

## 2023-08-18 DIAGNOSIS — E11.69 DYSLIPIDEMIA ASSOCIATED WITH TYPE 2 DIABETES MELLITUS (HCC): ICD-10-CM

## 2023-08-18 DIAGNOSIS — Z79.02 LONG TERM CURRENT USE OF ANTITHROMBOTICS/ANTIPLATELETS: ICD-10-CM

## 2023-08-18 PROCEDURE — 99214 OFFICE O/P EST MOD 30 MIN: CPT

## 2023-08-18 PROCEDURE — 3074F SYST BP LT 130 MM HG: CPT

## 2023-08-18 PROCEDURE — 99212 OFFICE O/P EST SF 10 MIN: CPT

## 2023-08-18 PROCEDURE — 3078F DIAST BP <80 MM HG: CPT

## 2023-08-18 ASSESSMENT — ENCOUNTER SYMPTOMS
WEAKNESS: 0
FOCAL WEAKNESS: 0
NAUSEA: 0
SPEECH CHANGE: 0
NERVOUS/ANXIOUS: 0
PALPITATIONS: 0
BLOOD IN STOOL: 0
FEVER: 0
WEIGHT LOSS: 0
CHILLS: 0
DIZZINESS: 0
CLAUDICATION: 0
MYALGIAS: 0
BRUISES/BLEEDS EASILY: 0
COUGH: 0
SHORTNESS OF BREATH: 0

## 2023-08-18 ASSESSMENT — FIBROSIS 4 INDEX: FIB4 SCORE: 1.1

## 2023-08-18 NOTE — PROGRESS NOTES
[facility] - Follow Up EVALUATION   08/18/2023    ASSESSEMENT AND PLAN:      1. BLOOD PRESSURE CONTROL   Qualifies as Resistant HTN (RH):  No, not on optimized, max-dosed or max-tolerated meds from 3 classes   Office BP Goal ACC/AHA (2017) goal <130/80  Home BP at goal:  yes  Office BP at goal: yes  Home readings 120/70-80s  Good med adherence    2. WORK UP FOR SECONDARY CAUSES OF RH:  Obstructive Sleep Apnea: Not Yet Assessed- no current symptoms  Renal parenchymal disease: Excluded  Renovascular HTN: Not Yet Assessed  Primary Aldosteronism: Not Yet Assessed  Thyroid Function: Excluded 12/2021  Pheochromocytoma: Not Yet Assessed   Cushing's:   Not Yet Assessed   Coarctation of Aorta: excluded  Other: none identified    Recommendations At This Visit: as noted in orders         3. MEDICATION USE / ADHERENCE:   Assessment: Complete  Recommendations: Instructed to Continue Taking All Medications As Prescribed         4. HTN-MEDIATED END-ORGAN DAMAGE:  Left Ventricular Hypertrophy: Absent on  ECG Date: 2020  Urine Albuminuria: None on Date: 12/2021  Renal Function: Chronic Kidney Disease  CKD G2 at worst   Ophthalmologic: Absent per patient report and/or eye specialist   Established Cardiovascular Disease:     # CAD per CT scan - continue current med mgmt, no further surveillance and no prior ASCVD events     # PAD, LLE inflow - stable, limited sx with walking >10 min, resolves in under a minute,  no color/temp changes, no wounds  mild disease noted on most recent imaging 5/2023 as ordered by cardiology.  - continue exercise, TLC, med mgmt      5. LIFESTYLE INTERVENTIONS:    SMOKING:   reports that she quit smoking about 6 years ago. Her smoking use included cigarettes. She started smoking about 55 years ago. She has a 48.00 pack-year smoking history. She has never used smokeless tobacco.   - continued complete avoidance of all tobacco products     PHYSICAL ACTIVITY: continue healthy activity to improve CV  "fitness.  In general, targeting >150min/week of moderate-level activity.      WEIGHT MANAGEMENT AND NUTRITION: Dietary plan was discussed with patient at this visit including DASH-dietary approaches, substitution of MUFA/PUFA for saturated fats, substituting whole grains for simple carbs, substituting lean meats for fatty meats, increase use of plant-based protein vs animal-based, lowered sodium.  Additional details reviewed with patient and/or outlined in care instructions.    6. STANDARD HTN PHARMACOTHERAPY:  ACEI/ARB: continue telmisartan 80mg daily   Thiazide Type Diuretic: consider as next agent   DHP-CCB: continue amlodipine 5mg qh    7. ADDITIONAL AGENTS   Aldosterone Receptor Antagonist: not indicated   Loop Diuretic: not indicated   Peripheral Alpha Blocker: not indicated   BB: not indicated, stopped on prior visits as unlikely beneficial for BP   Non-DHP-CCB: not indicated   Direct Vasodilator: not indicated   Centrally Acting Alpha Agonist: not indicated   Potassium-sparing diuretic: not indicated   DRI: not indicated     LIPID MANAGEMENT:   Qualifies for Statin Therapy Based on 2018 ACC/AHA Guidelines: yes, Secondary Prevention - Very high risk ASCVD - 2 major events or 1 event with other high-risk conditions, Diabetes and HERNANDEZ <0.9  The 10-year ASCVD risk score (Carl WOLFF, et al., 2019) is: 17.7%, 7.5 - <20% \"intermediate risk\"   Major ASCVD events: Symptomatic PAD (hx of claudication with HERNANDEZ <0.85, previous revascularization/amputation  High-risk conditions: >64yr old , Diabetes , Hypertension  and N/A  Risk-enhancers: N/A  Currently on Statin: Yes  Treatment goals: reduce LDL-C >50%  and LDL-C <70 (consider non-HDL-C <100, apoB <80)  At goal? Yes 7/2023  Plan:   - reinforced ongoing TLC measures as noted   - monitor labs in 6mo  Meds:   - continue rosuvastatin 30 mg daily, although not a usual dose    GLYCEMIA MANAGEMENT:  Diabetic   Goal A1c < 7.0  Lab Results   Component Value Date    HBA1C 6.4 (H) " 2023      Lab Results   Component Value Date/Time    MALBCRT 12 2023 10:27 AM    MICROALBUR 5.6 2023 10:27 AM   Recommend aggressive treatment due to pt's overall risk.    Close f/u with [facility]; try not to miss visits!  - Continue current meds  - a1c and f/u per [facility] clinic  - recommmend for routine care to include regular A1c monitoring, annual albumin/creatinine ratio (ACR), annual diabetic retinopathy screening, foot exams, annual flu vaccine, and updates to pneumonia vaccines as appropriate     ANTIPLATELET/ANTICOAGULANT THERAPY: continue asa 325mg daily    OTHER ISSUES:     # MDD, recurrent - stable, continue current meds, f/u with PCP      Studies Ordered At This Visit: none    Blood Work to Be Obtained Prior to Next Visit: bmp, lipids, a1c, cbc  Disposition: 6mo      1. Primary hypertension  Basic Metabolic Panel      2. Hyperlipidemia LDL goal <70  Lipid Profile      3. Type 2 diabetes mellitus with diabetic peripheral angiopathy without gangrene, without long-term current use of insulin (HCC)  HEMOGLOBIN A1C (Glycohemoglobin GHB Total/A1C with MBG Estimate)      4. Dyslipidemia associated with type 2 diabetes mellitus (HCC)  Lipid Profile    HEMOGLOBIN A1C (Glycohemoglobin GHB Total/A1C with MBG Estimate)      5. Long term current use of antithrombotics/antiplatelets  CBC WITHOUT DIFFERENTIAL      6. PAD (peripheral artery disease) (HCC)        7. Elevated lipoprotein(a)            History of Present Illness:   Dunia Her is a 67 yo female seen for evaluation of resistant HTN and management.   Dunia Her is initially referred by Ref Not In Computer     Subjective    HTN:  Home BP lo/70-80s  Good med adherence  Overall, feeling well,  Had fasting labwork done  24h ABPM completed: could consider in future if office BP high  Adherence to current HTN meds: compliant all of the time  Hx of clinical ASCVD events: Symptomatic PAD (hx of claudication with HERNANDEZ <0.85,  previous revascularization/amputation    Lifestyle factors:  Weight Change: none  BMI Readings from Last 5 Encounters:   08/18/23 34.28 kg/m²   06/02/23 34.11 kg/m²   02/07/23 34.65 kg/m²   11/22/22 34.11 kg/m²   11/08/22 34.28 kg/m²     Diet pattern: low red meat, increase veg, has lost weight on Med diet in past, continue with RD.   Daily salt intake estimate:  Low   - none   EtOH: No  Exercise habits: minimal exercise  Perceived barriers to care: leg pain  Pertinent HTN hx (reviewed at initial visit):   Age at HTN dx: several years   (if female, any hx of pregnancy-related HTN): n/a   Past HTN medications: as noted   HTN crises:  No prior hospitalization or crises   Interfering substances/contributing factors:  None    Hyperlipidemia:    Stable, tolerating rosuva   Occasional tolerable myalgias  Clinical evidence of ASCVD: Symptomatic PAD (hx of claudication with HERNANDEZ <0.85, previous revascularization/amputation    Antiplatelet/anticoagulation: Yes, Details: ASA , no bleeding noted     T2D:  No current symptoms reported.   Tolerating meds  Has f/u with [facility] pharmacist   Last A1c   Lab Results   Component Value Date    HBA1C 6.4 (H) 07/03/2023     Lab Results   Component Value Date/Time    MALBCRT 12 07/03/2023 10:27 AM    MICROALBUR 5.6 07/03/2023 10:27 AM            Problem List:     Patient Active Problem List    Diagnosis Date Noted    Osteopenia of neck of left femur 08/02/2023    Controlled type 2 diabetes mellitus with complication, without long-term current use of insulin (HCC) 06/02/2023    Mixed stress and urge urinary incontinence 06/02/2023    Ingrown nail of great toe 06/02/2023    COPD (chronic obstructive pulmonary disease) (HCC) 04/20/2022    Lung nodule 04/20/2022    Greater trochanteric bursitis of left hip 01/31/2022    Elevated lipoprotein(a) 12/29/2021    Healthcare maintenance 06/17/2021    Essential hypertension 10/06/2020    Coronary artery calcification seen on CT scan 04/07/2020    PAD  (peripheral artery disease) ([facility]) 2020    Recurrent major depressive disorder, in partial remission ([facility]) 02/10/2019    Chronic insomnia 2016    Dyslipidemia associated with type 2 diabetes mellitus ([facility]) 10/02/2014    Class 1 obesity due to excess calories with body mass index (BMI) of 34.0 to 34.9 in adult 10/02/2014       Current Outpatient Medications:     telmisartan, 80 mg, Oral, QHS, Taking    PARoxetine, 30 mg, Oral, DAILY, Taking    rosuvastatin, TAKE ONE AND ONE-HALF TABLETS EVERY EVENING, Taking    aspirin EC, TAKE ONE TABLET BY MOUTH DAILY, Taking    Dulaglutide, Inject 3 mL under the skin., Taking Differently    metFORMIN ER, TAKE TWO TABLETS BY MOUTH DAILY WITH DINNER, Taking    amLODIPine, 5 mg, Oral, QHS, Taking    traZODone, 50 mg, Oral, QHS PRN, Taking    Lansoprazole (PREVACID 24HR PO), Take  by mouth., Taking    ibuprofen, 600 mg, Oral, QDAY PRN, Taking    vitamin D3, 3,000 Units, Oral, QAM, Taking    albuterol, 2.5 mg, Nebulization, Q4HRS PRN, PRN   Sulfa drugs and Shellfish allergy     Social History:     Social History     Tobacco Use    Smoking status: Former     Packs/day: 1.00     Years: 48.00     Additional pack years: 0.00     Total pack years: 48.00     Types: Cigarettes     Start date:      Quit date: 2017     Years since quittin.2    Smokeless tobacco: Never   Vaping Use    Vaping Use: Never used   Substance Use Topics    Alcohol use: No     Alcohol/week: 0.0 oz    Drug use: Never       Review of Systems:   Review of Systems   Constitutional:  Negative for chills, fever and weight loss.   HENT:  Negative for nosebleeds.    Respiratory:  Negative for cough and shortness of breath.    Cardiovascular:  Positive for leg swelling. Negative for chest pain, palpitations and claudication.   Gastrointestinal:  Negative for blood in stool, melena and nausea. Abdominal pain: occasional.  Genitourinary:  Negative for hematuria.   Musculoskeletal:  Positive for joint pain.  "Negative for myalgias.   Neurological:  Negative for dizziness, speech change, focal weakness and weakness.   Endo/Heme/Allergies:  Does not bruise/bleed easily.   Psychiatric/Behavioral:  The patient is not nervous/anxious.          Objective     Objective:     Vitals:    08/18/23 0804   BP: 125/78   BP Location: Left arm   Patient Position: Sitting   BP Cuff Size: Large adult   Pulse: 81   Weight: 93.4 kg (206 lb)   Height: 1.651 m (5' 5\")       Body mass index is 34.28 kg/m².  BP Readings from Last 5 Encounters:   08/18/23 125/78   06/02/23 124/78   05/15/23 (!) 145/92   02/07/23 (!) 153/89   11/22/22 112/72      Physical Exam  Vitals reviewed.   Constitutional:       General: She is not in acute distress.  HENT:      Head: Normocephalic and atraumatic.   Eyes:      General: No scleral icterus.  Cardiovascular:      Rate and Rhythm: Normal rate and regular rhythm.      Pulses: Normal pulses.      Heart sounds: Normal heart sounds. No murmur heard.  Pulmonary:      Effort: Pulmonary effort is normal. No respiratory distress.      Breath sounds: Normal breath sounds. No wheezing.   Musculoskeletal:         General: No swelling. Normal range of motion.      Right lower leg: No edema.      Left lower leg: No edema.   Skin:     General: Skin is warm and dry.      Coloration: Skin is not pale.      Findings: No erythema.   Neurological:      General: No focal deficit present.      Mental Status: She is alert and oriented to person, place, and time.      Motor: No weakness.      Gait: Gait normal.   Psychiatric:         Mood and Affect: Mood normal.         Behavior: Behavior normal.         Thought Content: Thought content normal.        Accessory Clinical Findings:     Lab Results   Component Value Date    CHOLSTRLTOT 161 07/03/2023    LDL 65 07/03/2023    HDL 69 07/03/2023    TRIGLYCERIDE 134 07/03/2023      Lab Results   Component Value Date/Time    LIPOPROTA 125 (H) 12/27/2021 10:41 AM      No results found for: " "\"APOB\"         Lab Results   Component Value Date    HBA1C 6.4 (H) 07/03/2023      Lab Results   Component Value Date    SODIUM 140 07/03/2023    POTASSIUM 4.8 07/03/2023    CHLORIDE 102 07/03/2023    CO2 20 07/03/2023    GLUCOSE 134 (H) 07/03/2023    BUN 21 07/03/2023    CREATININE 0.92 07/03/2023          Lab Results   Component Value Date    URCREAT 473.61 07/03/2023    MICROALBUR 5.6 07/03/2023    MALBCRT 12 07/03/2023     VASCULAR IMAGING:     Last EKG:  Reviewed     HERNANDEZ 2/2020   Right HERNANDEZ: 1.02  Left HERNANDEZ:  0.79   IMPRESSION:   1.  Post occlusive waveform is noted in the distal left peroneal artery. Short segment occlusion or high-grade stenosis proximal to this location is likely present, although not directly visualized.   2.  No other evidence of occlusion or significant stenosis bilaterally.   3.  Moderate calcified atherosclerotic plaque is identified in each lower extremity.    CAC 2/2020  Coronary calcification:  LMA - 0.0  LCX - 0.0  LAD - 315  RCA - 260.8   Total Calcium Score: 575.8   Percentile: Calcium score is above the 90th percentile for the patient's age and sex.   Other findings:  Heart: Normal size.  Lungs: Tiny granuloma in the left upper lobe.  Mediastinum: Normal.  Upper abdomen: Normal.  Spondylotic changes of the spine.    MPI 4/2020  NUCLEAR IMAGING INTERPRETATION   Normal left ventricular size, ejection fraction, and wall motion.    Small fixed defect in the inferolateral wall could be artifact. Infarct is in    the differential but consider less likely.     No reversible defect.    Sinus rhythm.   Nondiagnostic ECG portion of a Regadenoson nuclear stress test.   ECG INTERPRETATION   Nondiagnostic ECG portion of a Regadenoson nuclear stress test.    CT abd 2/2021   The abdominal aorta is normal in caliber with aortic atherosclerosis.    CT chest 10/2021   1.  Stable 1.1 cm right upper lobe groundglass density nodule. Continued follow-up is recommended.  2.  Cholelithiasis.  3.  Hepatic " steatosis  Cardiac: Heart normal in size without pericardial effusion.   Vascular: Atherosclerotic calcifications of the coronary arteries, aorta and branches...    LE art with HERNANDEZ 5/2023  1.  Diffuse atherosclerotic changes without focal high-grade stenosis.  2.  RIGHT peroneal artery is not visualized.  3.  Ankle brachial indices indicate mild atherosclerotic disease on the LEFT.        PEPE De La Fuente.  [facility]   [facility]   [phone]

## 2023-08-21 ENCOUNTER — NON-PROVIDER VISIT (OUTPATIENT)
Dept: MEDICAL GROUP | Facility: PHYSICIAN GROUP | Age: 68
End: 2023-08-21
Payer: MEDICARE

## 2023-08-21 PROCEDURE — 99211 OFF/OP EST MAY X REQ PHY/QHP: CPT | Performed by: PHYSICIAN ASSISTANT

## 2023-08-21 NOTE — PROGRESS NOTES
Patient Consult Note - Follow Up Visit  Primary care physician: DELFINA DowellPCORNEL    Reason for consult: Management of Uncontrolled Type 2 Diabetes    Time IN:  11:00am  Time OUT:  11:10am    HPI:  Dunia Her is a 67 y.o. old patient who comes in today for evaluation of above stated problem.    Most Recent HbA1c:   Lab Results   Component Value Date/Time    HBA1C 6.4 (H) 07/03/2023 10:27 AM        Current Diabetes Regimen:  GLP-1 Agent: Dulaglutide 3 mg once weekly   Metformin ER 1000mg qhs - NOT taking BID    Before Breakfast: not currently testing  Before Lunch:  Before Dinner:  Before Bedtime:  Other times:  Hypoglycemia:   Unknown, but highly unlikely    Diet/Exercise:  Diet: reports trying Mediterranean diet. Increased lean protein and vegetables like zucchini. Sugar free carbs. Water & coffee  Exercise: uses indoor bike at night every day x 40 mins    Foot Exam:  Monofilament exam - conducted today  Monofilament testing with a 10 gram force: sensation intact    Visual Inspection: Feet without maceration, ulcers, fissures.  Feet dry.  Pedal pulses: intact bilaterally    Preventative Management  BP regimen (ACE/ARB) - Telmisartan 80mg QD  ASA - 81mg QD  Statin - Rosuvastatin 20mg QD  Last Retinal Scan - 6/2022  Last Foot Exam - 5/2023  Last A1c -   Lab Results   Component Value Date/Time    HBA1C 6.4 (H) 07/03/2023 10:27 AM      Last Microalbuminuria - needs updating, has been ordered for next [facility] medicine appt.      ROS:  Constitutional: No weight loss  Cardiac: No palpitations or racing heart  Resp: No shortness of breath  Neuro: No numbness or tinging in feet  Endo: No heat or cold intolerance, no polyuria or polydipsia  All other systems were reviewed and were negative.    Past Medical History:  Patient Active Problem List    Diagnosis Date Noted    Osteopenia of neck of left femur 08/02/2023    Controlled type 2 diabetes mellitus with complication, without long-term current use of insulin  ([facility]) 2023    Mixed stress and urge urinary incontinence 2023    Ingrown nail of great toe 2023    COPD (chronic obstructive pulmonary disease) ([facility]) 2022    Lung nodule 2022    Greater trochanteric bursitis of left hip 2022    Elevated lipoprotein(a) 2021    Healthcare maintenance 2021    Essential hypertension 10/06/2020    Coronary artery calcification seen on CT scan 2020    PAD (peripheral artery disease) ([facility]) 2020    Recurrent major depressive disorder, in partial remission ([facility]) 02/10/2019    Chronic insomnia 2016    Dyslipidemia associated with type 2 diabetes mellitus ([facility]) 10/02/2014    Class 1 obesity due to excess calories with body mass index (BMI) of 34.0 to 34.9 in adult 10/02/2014       Past Surgical History:  Past Surgical History:   Procedure Laterality Date    [place] LAP,APPENDECTOMY N/A 2021    Procedure: APPENDECTOMY, LAPAROSCOPIC;  Surgeon: Juan Dawson M.D.;  Location: SURGERY [facility];  Service: General    ABDOMINAL HYSTERECTOMY TOTAL      benign cysts, total       Allergies:  Sulfa drugs and Shellfish allergy    Social History:  Social History     Socioeconomic History    Marital status:      Spouse name: Not on file    Number of children: Not on file    Years of education: Not on file    Highest education level: Some college, no degree   Occupational History    Not on file   Tobacco Use    Smoking status: Former     Packs/day: 1.00     Years: 48.00     Additional pack years: 0.00     Total pack years: 48.00     Types: Cigarettes     Start date:      Quit date: 2017     Years since quittin.2    Smokeless tobacco: Never   Vaping Use    Vaping Use: Never used   Substance and Sexual Activity    Alcohol use: No     Alcohol/week: 0.0 oz    Drug use: Never    Sexual activity: Not Currently     Comment:    Other Topics Concern    Not on file   Social History Narrative    Not on file     Social  Determinants of Health     Financial Resource Strain: Low Risk  (7/3/2022)    Overall Financial Resource Strain (CARDIA)     Difficulty of Paying Living Expenses: Not very hard   Food Insecurity: No Food Insecurity (7/3/2022)    Hunger Vital Sign     Worried About Running Out of Food in the Last Year: Never true     Ran Out of Food in the Last Year: Never true   Transportation Needs: No Transportation Needs (7/3/2022)    PRAPARE - Transportation     Lack of Transportation (Medical): No     Lack of Transportation (Non-Medical): No   Physical Activity: Sufficiently Active (7/3/2022)    Exercise Vital Sign     Days of Exercise per Week: 4 days     Minutes of Exercise per Session: 40 min   Stress: Stress Concern Present (7/3/2022)    [employer] - Occupational Stress Questionnaire     Feeling of Stress : To some extent   Social Connections: Moderately Integrated (7/3/2022)    Social Connection and Isolation Panel [NHANES]     Frequency of Communication with Friends and Family: More than three times a week     Frequency of Social Gatherings with Friends and Family: Once a week     Attends Tenriism Services: 1 to 4 times per year     Active Member of Clubs or Organizations: No     Attends Club or Organization Meetings: Not on file     Marital Status:    Intimate Partner Violence: Not on file   Housing Stability: Low Risk  (7/3/2022)    Housing Stability Vital Sign     Unable to Pay for Housing in the Last Year: No     Number of Places Lived in the Last Year: 1     Unstable Housing in the Last Year: No       Family History:  Family History   Problem Relation Age of Onset    Cancer Mother         pancreatic     Heart Disease Father     Heart Attack Father     Diabetes Father         pre-diabetes    Stroke Neg Hx        Medications:    Current Outpatient Medications:     telmisartan (MICARDIS) 80 MG Tab, Take 1 Tablet by mouth at bedtime., Disp: 90 Tablet, Rfl: 2    PARoxetine (PAXIL) 30  MG Tab, Take 1 Tablet by mouth every day., Disp: 90 Tablet, Rfl: 3    rosuvastatin (CRESTOR) 20 MG Tab, TAKE ONE AND ONE-HALF TABLETS EVERY EVENING, Disp: 135 Tablet, Rfl: 1    aspirin EC (ECOTRIN) 325 MG Tablet Delayed Response, TAKE ONE TABLET BY MOUTH DAILY, Disp: 100 Tablet, Rfl: 1    Dulaglutide 4.5 MG/0.5ML Solution Pen-injector, Inject 0.5 mL under the skin., Disp: , Rfl:     metFORMIN ER (GLUCOPHAGE XR) 500 MG TABLET SR 24 HR, TAKE TWO TABLETS BY MOUTH DAILY WITH DINNER, Disp: 180 Tablet, Rfl: 3    amLODIPine (NORVASC) 5 MG Tab, Take 1 Tablet by mouth at bedtime., Disp: 90 Tablet, Rfl: 3    traZODone (DESYREL) 50 MG Tab, Take 1 Tablet by mouth at bedtime as needed for Sleep., Disp: 90 Tablet, Rfl: 2    Lansoprazole (PREVACID 24HR PO), Take  by mouth., Disp: , Rfl:     ibuprofen (MOTRIN) 200 MG Tab, Take 3 Tablets by mouth 1 time a day as needed (Moderate pain). 3 tablets = 600 mg., Disp: , Rfl:     vitamin D (CHOLECALCIFEROL) 1000 Unit (25 mcg) Tab, Take 3 Tablets by mouth every morning. 3 tablets = 3000 units., Disp: , Rfl:     albuterol (PROVENTIL) 2.5mg/3ml Nebu Soln solution for nebulization, 3 mL by Nebulization route every four hours as needed for Shortness of Breath., Disp: 25 Bullet, Rfl: 0    Labs: Reviewed    Physical Examination:  Vital signs: There were no vitals taken for this visit. There is no height or weight on file to calculate BMI.  General: No apparent distress, cooperative  Eyes: No scleral icterus or discharge  ENMT: Normal on external inspection of nose, lips, normal thyroid exam  Neck: No abnormal masses on inspection  Resp: Normal effort, clear to auscultation bilaterally   CVS: Regular rate and rhythm, S1 S2 normal, no murmur   Extremities: No edema  Abdomen: abdominal obesity present  Neuro: Alert and oriented  Skin: No rash  Psych: Normal mood and affect, intact memory and able to make informed decisions    Assessment and Plan:    Patient is NOT optimized on metformin.  Tolerating  current dosing of Trulicity.  A1c drawn 7/3/23 is now 6.4% which is at goal   No home FBG reading to analyze today.  Pt states her weight has remained unchanged since increasing Trulicity  Diet and exercise largely unchanged from last visit.    INCREASE Trulicity to 4.5mg SQ once weekly.  Addendum sent to [employer] PAP.  - if pt's weight still unresponsive to max dose Trulicity, can consider applying for Ozempic PAP  Continue all other medications for now.  Continue to be mindful on dietary habits, minimize simple carbs and sweets.  Increase biking to 50min daily.    Follow Up:  Three months for A1c    Thank you for allowing me to participate in the care of this patient.    Sun Hernandez, RommelD

## 2023-09-14 DIAGNOSIS — F51.04 CHRONIC INSOMNIA: ICD-10-CM

## 2023-09-14 RX ORDER — TRAZODONE HYDROCHLORIDE 50 MG/1
50 TABLET ORAL
Qty: 90 TABLET | Refills: 2 | Status: SHIPPED | OUTPATIENT
Start: 2023-09-14

## 2023-10-31 ENCOUNTER — HOSPITAL ENCOUNTER (OUTPATIENT)
Dept: RADIOLOGY | Facility: MEDICAL CENTER | Age: 68
End: 2023-10-31
Attending: INTERNAL MEDICINE
Payer: MEDICARE

## 2023-10-31 DIAGNOSIS — J44.9 CHRONIC OBSTRUCTIVE PULMONARY DISEASE, UNSPECIFIED COPD TYPE (HCC): ICD-10-CM

## 2023-10-31 DIAGNOSIS — R91.1 LUNG NODULE: ICD-10-CM

## 2023-10-31 DIAGNOSIS — Z87.891 FORMER SMOKER: ICD-10-CM

## 2023-10-31 PROCEDURE — 71250 CT THORAX DX C-: CPT

## 2023-11-20 ENCOUNTER — NON-PROVIDER VISIT (OUTPATIENT)
Dept: MEDICAL GROUP | Facility: PHYSICIAN GROUP | Age: 68
End: 2023-11-20
Payer: MEDICARE

## 2023-11-20 DIAGNOSIS — E11.8 CONTROLLED TYPE 2 DIABETES MELLITUS WITH COMPLICATION, WITHOUT LONG-TERM CURRENT USE OF INSULIN (HCC): ICD-10-CM

## 2023-11-20 LAB
HBA1C MFR BLD: 6.5 % (ref ?–5.8)
POCT INT CON NEG: NEGATIVE
POCT INT CON POS: POSITIVE

## 2023-11-20 PROCEDURE — 83036 HEMOGLOBIN GLYCOSYLATED A1C: CPT | Performed by: INTERNAL MEDICINE

## 2023-11-20 PROCEDURE — 99211 OFF/OP EST MAY X REQ PHY/QHP: CPT | Performed by: INTERNAL MEDICINE

## 2023-11-20 NOTE — PROGRESS NOTES
Patient Consult Note - Follow Up Visit  Primary care physician: Becca Marley P.A.-C.    Reason for consult: Management of Uncontrolled Type 2 Diabetes    Time IN:  10:11am  Time OUT:  10:28am    HPI:  Dunia Her is a 68 y.o. old patient who comes in today for evaluation of above stated problem.    Most Recent HbA1c:   Lab Results   Component Value Date/Time    HBA1C 6.4 (H) 07/03/2023 10:27 AM        Current Diabetes Regimen:  GLP-1 Agent: Dulaglutide 4.5 mg once weekly   Metformin ER 1000mg qhs - NOT taking BID     Before Breakfast: not currently testing  Before Lunch:  Before Dinner:  Before Bedtime:  Other times:  Hypoglycemia:   Unknown, but highly unlikely     Diet/Exercise:  Largely unchanged from previous visit as shown below:  Diet: reports trying Mediterranean diet. Increased lean protein and vegetables like zucchini. Sugar free carbs. Water & coffee  Exercise: uses indoor bike at night every day x 40 mins    Preventative Management  BP regimen (ACEi/ARB): Telmisartan 80mg QD  BP <140/90: Yes  Statin: rosuvastatin (Crestor) 20 mg daily  LDL <100: Yes  Lab Results   Component Value Date/Time    CHOLSTRLTOT 161 07/03/2023 10:27 AM    LDL 65 07/03/2023 10:27 AM    HDL 69 07/03/2023 10:27 AM    TRIGLYCERIDE 134 07/03/2023 10:27 AM       Last Microalbumin/Cr:  Lab Results   Component Value Date/Time    MALBCRT 12 07/03/2023 10:27 AM    MICROALBUR 5.6 07/03/2023 10:27 AM     Last A1c:  Lab Results   Component Value Date/Time    HBA1C 6.4 (H) 07/03/2023 10:27 AM      Last Retinal Scan: 7/2023    Monofilament exam: up to date, 5/2023     ROS:  Constitutional: No weight loss  Cardiac: No palpitations or racing heart  Resp: No shortness of breath  Neuro: No numbness or tinging in feet  Endo: No heat or cold intolerance, no polyuria or polydipsia  All other systems were reviewed and were negative.    Past Medical History:  Patient Active Problem List    Diagnosis Date Noted    Osteopenia of neck of left  femur 2023    Controlled type 2 diabetes mellitus with complication, without long-term current use of insulin ([facility]) 2023    Mixed stress and urge urinary incontinence 2023    Ingrown nail of great toe 2023    COPD (chronic obstructive pulmonary disease) ([facility]) 2022    Lung nodule 2022    Greater trochanteric bursitis of left hip 2022    Elevated lipoprotein(a) 2021    Healthcare maintenance 2021    Essential hypertension 10/06/2020    Coronary artery calcification seen on CT scan 2020    PAD (peripheral artery disease) ([facility]) 2020    Recurrent major depressive disorder, in partial remission ([facility]) 02/10/2019    Chronic insomnia 2016    Dyslipidemia associated with type 2 diabetes mellitus ([facility]) 10/02/2014    Class 1 obesity due to excess calories with body mass index (BMI) of 34.0 to 34.9 in adult 10/02/2014       Past Surgical History:  Past Surgical History:   Procedure Laterality Date    ME LAP,APPENDECTOMY N/A 2021    Procedure: APPENDECTOMY, LAPAROSCOPIC;  Surgeon: Juan Dawson M.D.;  Location: SURGERY [facility];  Service: General    ABDOMINAL HYSTERECTOMY TOTAL  1993    benign cysts, total       Allergies:  Sulfa drugs and Shellfish allergy    Social History:  Social History     Socioeconomic History    Marital status:      Spouse name: Not on file    Number of children: Not on file    Years of education: Not on file    Highest education level: Some college, no degree   Occupational History    Not on file   Tobacco Use    Smoking status: Former     Current packs/day: 0.00     Average packs/day: 1 pack/day for 49.4 years (49.4 ttl pk-yrs)     Types: Cigarettes     Start date:      Quit date: 2017     Years since quittin.4    Smokeless tobacco: Never   Vaping Use    Vaping Use: Never used   Substance and Sexual Activity    Alcohol use: No     Alcohol/week: 0.0 oz    Drug use: Never    Sexual activity: Not  Currently     Comment:    Other Topics Concern    Not on file   Social History Narrative    Not on file     Social Determinants of Health     Financial Resource Strain: Low Risk  (7/3/2022)    Overall Financial Resource Strain (CARDIA)     Difficulty of Paying Living Expenses: Not very hard   Food Insecurity: No Food Insecurity (7/3/2022)    Hunger Vital Sign     Worried About Running Out of Food in the Last Year: Never true     Ran Out of Food in the Last Year: Never true   Transportation Needs: No Transportation Needs (7/3/2022)    PRAPARE - Transportation     Lack of Transportation (Medical): No     Lack of Transportation (Non-Medical): No   Physical Activity: Sufficiently Active (7/3/2022)    Exercise Vital Sign     Days of Exercise per Week: 4 days     Minutes of Exercise per Session: 40 min   Stress: Stress Concern Present (7/3/2022)    [employer] - Occupational Stress Questionnaire     Feeling of Stress : To some extent   Social Connections: Moderately Integrated (7/3/2022)    Social Connection and Isolation Panel [NHANES]     Frequency of Communication with Friends and Family: More than three times a week     Frequency of Social Gatherings with Friends and Family: Once a week     Attends Alevism Services: 1 to 4 times per year     Active Member of Clubs or Organizations: No     Attends Club or Organization Meetings: Not on file     Marital Status:    Intimate Partner Violence: Not on file   Housing Stability: Low Risk  (7/3/2022)    Housing Stability Vital Sign     Unable to Pay for Housing in the Last Year: No     Number of Places Lived in the Last Year: 1     Unstable Housing in the Last Year: No       Family History:  Family History   Problem Relation Age of Onset    Cancer Mother         pancreatic     Heart Disease Father     Heart Attack Father     Diabetes Father         pre-diabetes    Stroke Neg Hx        Medications:    Current Outpatient Medications:      traZODone (DESYREL) 50 MG Tab, Take 1 Tablet by mouth at bedtime as needed for Sleep., Disp: 90 Tablet, Rfl: 2    telmisartan (MICARDIS) 80 MG Tab, Take 1 Tablet by mouth at bedtime., Disp: 90 Tablet, Rfl: 2    PARoxetine (PAXIL) 30 MG Tab, Take 1 Tablet by mouth every day., Disp: 90 Tablet, Rfl: 3    rosuvastatin (CRESTOR) 20 MG Tab, TAKE ONE AND ONE-HALF TABLETS EVERY EVENING, Disp: 135 Tablet, Rfl: 1    aspirin EC (ECOTRIN) 325 MG Tablet Delayed Response, TAKE ONE TABLET BY MOUTH DAILY, Disp: 100 Tablet, Rfl: 1    Dulaglutide 4.5 MG/0.5ML Solution Pen-injector, Inject 0.5 mL under the skin., Disp: , Rfl:     metFORMIN ER (GLUCOPHAGE XR) 500 MG TABLET SR 24 HR, TAKE TWO TABLETS BY MOUTH DAILY WITH DINNER, Disp: 180 Tablet, Rfl: 3    amLODIPine (NORVASC) 5 MG Tab, Take 1 Tablet by mouth at bedtime., Disp: 90 Tablet, Rfl: 3    Lansoprazole (PREVACID 24HR PO), Take  by mouth., Disp: , Rfl:     ibuprofen (MOTRIN) 200 MG Tab, Take 3 Tablets by mouth 1 time a day as needed (Moderate pain). 3 tablets = 600 mg., Disp: , Rfl:     vitamin D (CHOLECALCIFEROL) 1000 Unit (25 mcg) Tab, Take 3 Tablets by mouth every morning. 3 tablets = 3000 units., Disp: , Rfl:     albuterol (PROVENTIL) 2.5mg/3ml Nebu Soln solution for nebulization, 3 mL by Nebulization route every four hours as needed for Shortness of Breath., Disp: 25 Bullet, Rfl: 0    Labs: Reviewed    Physical Examination:  Vital signs: There were no vitals taken for this visit. There is no height or weight on file to calculate BMI.  General: No apparent distress, cooperative  Eyes: No scleral icterus or discharge  ENMT: Normal on external inspection of nose, lips, normal thyroid exam  Neck: No abnormal masses on inspection  Resp: Normal effort, clear to auscultation bilaterally   CVS: Regular rate and rhythm, S1 S2 normal, no murmur   Extremities: No edema  Abdomen: abdominal obesity present  Neuro: Alert and oriented  Skin: No rash  Psych: Normal mood and affect,  intact memory and able to make informed decisions    Assessment and Plan:    Patient is optimized on Trulicity.  Taking max tolerated dose of metformin.  No home FBG at this time.  A1c remains at goal at 6.5% today.  Diet and exercise habits largely unchanged from last visit.    Sent patient with renewal form for [employer].  She will return with financials within the next week.    Will continue all current medications for now.  Continue to be mindful of dietary habits, minimize simple carbs.  Increase exercise as tolerated.    Follow Up:  One year for PAP renewal    Thank you for allowing me to participate in the care of this patient.    Pillo Truong, PharmD, BCACP  11/20/23    CC:   Becca Marley P.A.-C.

## 2023-11-27 ENCOUNTER — HOSPITAL ENCOUNTER (OUTPATIENT)
Dept: LAB | Facility: MEDICAL CENTER | Age: 68
End: 2023-11-27
Payer: MEDICARE

## 2023-11-27 DIAGNOSIS — E78.5 HYPERLIPIDEMIA LDL GOAL <70: ICD-10-CM

## 2023-11-27 DIAGNOSIS — I10 PRIMARY HYPERTENSION: ICD-10-CM

## 2023-11-27 DIAGNOSIS — E78.5 DYSLIPIDEMIA ASSOCIATED WITH TYPE 2 DIABETES MELLITUS (HCC): ICD-10-CM

## 2023-11-27 DIAGNOSIS — E11.69 DYSLIPIDEMIA ASSOCIATED WITH TYPE 2 DIABETES MELLITUS (HCC): ICD-10-CM

## 2023-11-27 DIAGNOSIS — E11.51 TYPE 2 DIABETES MELLITUS WITH DIABETIC PERIPHERAL ANGIOPATHY WITHOUT GANGRENE, WITHOUT LONG-TERM CURRENT USE OF INSULIN (HCC): ICD-10-CM

## 2023-11-27 DIAGNOSIS — Z79.02 LONG TERM CURRENT USE OF ANTITHROMBOTICS/ANTIPLATELETS: ICD-10-CM

## 2023-11-27 LAB
ANION GAP SERPL CALC-SCNC: 12 MMOL/L (ref 7–16)
BUN SERPL-MCNC: 22 MG/DL (ref 8–22)
CALCIUM SERPL-MCNC: 9.4 MG/DL (ref 8.5–10.5)
CHLORIDE SERPL-SCNC: 109 MMOL/L (ref 96–112)
CHOLEST SERPL-MCNC: 170 MG/DL (ref 100–199)
CO2 SERPL-SCNC: 21 MMOL/L (ref 20–33)
CREAT SERPL-MCNC: 0.77 MG/DL (ref 0.5–1.4)
ERYTHROCYTE [DISTWIDTH] IN BLOOD BY AUTOMATED COUNT: 45.7 FL (ref 35.9–50)
EST. AVERAGE GLUCOSE BLD GHB EST-MCNC: 148 MG/DL
FASTING STATUS PATIENT QL REPORTED: NORMAL
GFR SERPLBLD CREATININE-BSD FMLA CKD-EPI: 83 ML/MIN/1.73 M 2
GLUCOSE SERPL-MCNC: 139 MG/DL (ref 65–99)
HBA1C MFR BLD: 6.8 % (ref 4–5.6)
HCT VFR BLD AUTO: 39.5 % (ref 37–47)
HDLC SERPL-MCNC: 72 MG/DL
HGB BLD-MCNC: 12.5 G/DL (ref 12–16)
LDLC SERPL CALC-MCNC: 77 MG/DL
MCH RBC QN AUTO: 30 PG (ref 27–33)
MCHC RBC AUTO-ENTMCNC: 31.6 G/DL (ref 32.2–35.5)
MCV RBC AUTO: 94.7 FL (ref 81.4–97.8)
PLATELET # BLD AUTO: 246 K/UL (ref 164–446)
PMV BLD AUTO: 10.4 FL (ref 9–12.9)
POTASSIUM SERPL-SCNC: 4.8 MMOL/L (ref 3.6–5.5)
RBC # BLD AUTO: 4.17 M/UL (ref 4.2–5.4)
SODIUM SERPL-SCNC: 142 MMOL/L (ref 135–145)
TRIGL SERPL-MCNC: 106 MG/DL (ref 0–149)
WBC # BLD AUTO: 6.1 K/UL (ref 4.8–10.8)

## 2023-11-27 PROCEDURE — 36415 COLL VENOUS BLD VENIPUNCTURE: CPT

## 2023-11-27 PROCEDURE — 83036 HEMOGLOBIN GLYCOSYLATED A1C: CPT | Mod: GA

## 2023-11-27 PROCEDURE — 80048 BASIC METABOLIC PNL TOTAL CA: CPT

## 2023-11-27 PROCEDURE — 85027 COMPLETE CBC AUTOMATED: CPT

## 2023-11-27 PROCEDURE — 80061 LIPID PANEL: CPT

## 2023-11-30 ENCOUNTER — OFFICE VISIT (OUTPATIENT)
Dept: MEDICAL GROUP | Facility: PHYSICIAN GROUP | Age: 68
End: 2023-11-30
Payer: MEDICARE

## 2023-11-30 VITALS
OXYGEN SATURATION: 97 % | TEMPERATURE: 97.9 F | SYSTOLIC BLOOD PRESSURE: 120 MMHG | HEART RATE: 88 BPM | BODY MASS INDEX: 32.79 KG/M2 | HEIGHT: 66 IN | DIASTOLIC BLOOD PRESSURE: 68 MMHG | RESPIRATION RATE: 16 BRPM | WEIGHT: 204.06 LBS

## 2023-11-30 DIAGNOSIS — I10 ESSENTIAL HYPERTENSION: ICD-10-CM

## 2023-11-30 DIAGNOSIS — K21.9 GASTROESOPHAGEAL REFLUX DISEASE WITHOUT ESOPHAGITIS: ICD-10-CM

## 2023-11-30 DIAGNOSIS — E11.8 CONTROLLED TYPE 2 DIABETES MELLITUS WITH COMPLICATION, WITHOUT LONG-TERM CURRENT USE OF INSULIN (HCC): ICD-10-CM

## 2023-11-30 PROBLEM — L60.0 INGROWN NAIL OF GREAT TOE: Status: RESOLVED | Noted: 2023-06-02 | Resolved: 2023-11-30

## 2023-11-30 PROCEDURE — 3078F DIAST BP <80 MM HG: CPT | Performed by: PHYSICIAN ASSISTANT

## 2023-11-30 PROCEDURE — 3074F SYST BP LT 130 MM HG: CPT | Performed by: PHYSICIAN ASSISTANT

## 2023-11-30 PROCEDURE — 99214 OFFICE O/P EST MOD 30 MIN: CPT | Performed by: PHYSICIAN ASSISTANT

## 2023-11-30 RX ORDER — OMEPRAZOLE 20 MG/1
20 CAPSULE, DELAYED RELEASE ORAL DAILY
Qty: 90 CAPSULE | Refills: 0 | Status: SHIPPED | OUTPATIENT
Start: 2023-11-30 | End: 2024-02-13

## 2023-11-30 ASSESSMENT — FIBROSIS 4 INDEX: FIB4 SCORE: 1.1

## 2023-11-30 ASSESSMENT — ENCOUNTER SYMPTOMS
SHORTNESS OF BREATH: 0
CHILLS: 0
FEVER: 0

## 2023-11-30 NOTE — PROGRESS NOTES
Subjective:     CC: follow up chronic issues      HPI:   Dunia presents today with the following:    Problem   Gastroesophageal Reflux Disease Without Esophagitis    Chronic, uncontrolled.  Has been on prevacid for a couple years.  Stopped working a couple months.  Denies CP, SOB, chest heaviness/pressure.  Worse at night when she lies down.  Has bile/acid taste in mouth when she wakes up.  NOT worse with exertion.     Osteopenia of Neck of Left Femur    Chronic, mildly decreased bone density.  Recommend adding vitamin D and calcium.    DEXA 8/2023:  L-spine T score -0.4  Left femur T score -1.2       Controlled Type 2 Diabetes Mellitus With Complication, Without Long-Term Current Use of Insulin (Hcc)    Chronic, uncontrolled.     Current medications:  Trulicity 1.5mg every Saturday  Metformin XR 1000mg once daily    A1c: 6.8 (11/2023); 7.0 (5/2023)  Microalb/Cr ratio: 12 (7/2023)  Diabetic foot exam: 5/2023  Retinal eye exam: 7/2023  ACEi/ARB: telmisartan 80mg daily  Statin: rosuvastatin 20mg daily  Aspirin: 325mg daily  Concomitant HTN: controlled  Nightly foot checks: yes       Mixed Stress and Urge Urinary Incontinence    Chronic, uncontrolled.  Started years ago.  Uses a pad daily.  Denies any issues with rashes.  Discussed medication options, as well as the side effects.  Patient declines medication at this time.      Greater Trochanteric Bursitis of Left Hip    Chronic, intermittent.  Had an injection from Dr. Morales but didn't notice any change.  Hasn't follow up with him yet but will.     Essential Hypertension    Chronic, controlled.  Tolerating amlodipine 5mg daily and telmisartan 80mg daily.  Managed by cardiology.      Recurrent Major Depressive Disorder, in Partial Remission (Hcc)    Chronic, controlled.  Tolerating paxil 30mg for years and it seems to help overall.  Still has episodes where she feels down but overall feels good.      Chronic Insomnia    Chronic, controlled.  Tolerates trazodone  "50mg at night and it helps.      Class 1 Obesity Due to Excess Calories With Body Mass Index (Bmi) of 34.0 to 34.9 in Adult    Chronic, uncontrolled.   Discussed increasing plant-based foods, decreasing animal-based foods.  Increase daily activity, aiming for 30-45 minutes brisk exercise daily.       Ingrown Nail of Great Toe (Resolved)    Chronic, uncontrolled.  Patient has been working on her right great toenail for a while.  It is ingrowing, causing some pain.   Saw podiatry --> removed nail.           ROS:  Review of Systems   Constitutional:  Negative for chills and fever.   Respiratory:  Negative for shortness of breath.    Cardiovascular:  Negative for chest pain.       Objective:     Exam:  /68 (BP Location: Left arm, Patient Position: Sitting, BP Cuff Size: Large adult)   Pulse 88   Temp 36.6 °C (97.9 °F) (Temporal)   Resp 16   Ht 1.664 m (5' 5.5\")   Wt 92.6 kg (204 lb 1 oz)   SpO2 97%   Breastfeeding No   BMI 33.44 kg/m²  Body mass index is 33.44 kg/m².    Physical Exam  Vitals reviewed.   Constitutional:       General: She is not in acute distress.     Appearance: Normal appearance.   Pulmonary:      Effort: Pulmonary effort is normal.   Neurological:      General: No focal deficit present.      Mental Status: She is alert.   Psychiatric:         Mood and Affect: Mood normal.         Behavior: Behavior normal.         Judgment: Judgment normal.           Assessment & Plan:     68 y.o. female with the following -     1. Gastroesophageal reflux disease without esophagitis  Chronic, uncontrolled.  EKG, per my interpretation: Normal sinus rhythm.  Normal axis.  No ST elevation/depression.  Change the patient from lansoprazole to omeprazole.  Patient's description sounds consistent with GERD.  Follow-up in 3 months, sooner if symptoms are not better with omeprazole.    - omeprazole (PRILOSEC) 20 MG delayed-release capsule; Take 1 Capsule by mouth every day.  Dispense: 90 Capsule; Refill: 0  - EKG " - Clinic Performed    2. Controlled type 2 diabetes mellitus with complication, without long-term current use of insulin (HCC)  Chronic, stable.  Continue medication as directed.  Follow-up in 3 months for reevaluation.    - HEMOGLOBIN A1C; Future    3. Essential hypertension  Chronic, stable.  Continue amlodipine 5 mg and telmisartan 80 mg daily.      All other diagnoses are per HPI.  Follow up in March, sooner as needed.  Continue follow up with pharmacotherapy, vascular, cardiology, sports medicine.         Return in about 3 months (around 2/29/2024) for lab discussion.    Please note that this dictation was created using voice recognition software. I have made every reasonable attempt to correct obvious errors, but I expect that there are errors of grammar and possibly content that I did not discover before finalizing the note.

## 2024-01-17 ENCOUNTER — OFFICE VISIT (OUTPATIENT)
Dept: SLEEP MEDICINE | Facility: MEDICAL CENTER | Age: 69
End: 2024-01-17
Attending: INTERNAL MEDICINE
Payer: MEDICARE

## 2024-01-17 VITALS
WEIGHT: 204 LBS | DIASTOLIC BLOOD PRESSURE: 58 MMHG | HEIGHT: 66 IN | OXYGEN SATURATION: 93 % | HEART RATE: 93 BPM | SYSTOLIC BLOOD PRESSURE: 110 MMHG | BODY MASS INDEX: 32.78 KG/M2

## 2024-01-17 DIAGNOSIS — J44.9 CHRONIC OBSTRUCTIVE PULMONARY DISEASE, UNSPECIFIED COPD TYPE (HCC): ICD-10-CM

## 2024-01-17 DIAGNOSIS — Z87.891 FORMER SMOKER: ICD-10-CM

## 2024-01-17 DIAGNOSIS — R91.1 LUNG NODULE: ICD-10-CM

## 2024-01-17 DIAGNOSIS — J06.9 UPPER RESPIRATORY TRACT INFECTION, UNSPECIFIED TYPE: ICD-10-CM

## 2024-01-17 PROCEDURE — 99214 OFFICE O/P EST MOD 30 MIN: CPT | Performed by: INTERNAL MEDICINE

## 2024-01-17 PROCEDURE — 3078F DIAST BP <80 MM HG: CPT | Performed by: INTERNAL MEDICINE

## 2024-01-17 PROCEDURE — 99212 OFFICE O/P EST SF 10 MIN: CPT | Performed by: INTERNAL MEDICINE

## 2024-01-17 PROCEDURE — 3074F SYST BP LT 130 MM HG: CPT | Performed by: INTERNAL MEDICINE

## 2024-01-17 RX ORDER — ALBUTEROL SULFATE 2.5 MG/3ML
2.5 SOLUTION RESPIRATORY (INHALATION) EVERY 4 HOURS PRN
Qty: 120 EACH | Refills: 3 | Status: SHIPPED | OUTPATIENT
Start: 2024-01-17

## 2024-01-17 RX ORDER — ALBUTEROL SULFATE 90 UG/1
1-2 AEROSOL, METERED RESPIRATORY (INHALATION) EVERY 4 HOURS PRN
Qty: 1 EACH | Refills: 6 | Status: SHIPPED | OUTPATIENT
Start: 2024-01-17

## 2024-01-17 ASSESSMENT — ENCOUNTER SYMPTOMS
FALLS: 0
HEADACHES: 0
DIARRHEA: 0
EYE DISCHARGE: 0
WEAKNESS: 0
BACK PAIN: 0
FEVER: 0
CLAUDICATION: 0
CHILLS: 0
CONSTIPATION: 0
NECK PAIN: 0
BLURRED VISION: 0
SPEECH CHANGE: 0
ABDOMINAL PAIN: 0
SINUS PAIN: 1
HEARTBURN: 0
WHEEZING: 0
MYALGIAS: 0
PND: 0
DIZZINESS: 0
STRIDOR: 0
NAUSEA: 0
SPUTUM PRODUCTION: 0
HEMOPTYSIS: 0
PALPITATIONS: 0
COUGH: 0
DEPRESSION: 0
DOUBLE VISION: 0
TREMORS: 0
VOMITING: 0
SHORTNESS OF BREATH: 0
PHOTOPHOBIA: 0
EYE PAIN: 0
ORTHOPNEA: 0
EYE REDNESS: 0
FOCAL WEAKNESS: 0
DIAPHORESIS: 0
WEIGHT LOSS: 0
SORE THROAT: 0

## 2024-01-17 ASSESSMENT — FIBROSIS 4 INDEX: FIB4 SCORE: 1.1

## 2024-01-17 NOTE — PROGRESS NOTES
Chief Complaint   Patient presents with    Follow-Up     LAST SEEN 11/22/22    Results     CT CHEST 10/31/23           HPI: This patient is a 68 y.o. female whom is followed in our clinic for obstructive lung disease and pulmonary nodules last seen by me on 11/22/22.  PMHx is significant for coronary artery disease as detected by calcium scoring, type 2 diabetes, hypertension, dyslipidemia all currently controlled on medication, obesity.  She is a former tobacco smoker with 50-pack-year history and quit in 2017.  Patient was referred to me for episodes of recurrent bronchitis typically 1-2 times per year prior to 2020.  She had never been hospitalized but was tx prn with MICHEL. She was unable to afford ICS/LABA however over the last 3 years her sxs have decreased in frequency and severity and she has not needed tx for acute sxs with infrequent use of MICHEL. CT angiogram from April 2020 showed 1.1 cm groundglass nodule in the medial right upper lobe and mild bibasilar atelectasis with no associated lymphadenopathy or parenchymal lung disease. Surveillance imaging from 10/2021, 10/4/22 and most recently 10/2023 shows stability with no new nodules. PFTs from April 2022 show mild air flow obstruction with low normal FEV1, borderline air trapping with normal TLC and DLCO. functionally she is MMRC class 1. No exacerbations since our last visit however today she woke with sinus pain, congestion and subjective low grade fever.        Past Medical History:   Diagnosis Date    Hyperlipidemia LDL goal < 100 10/02/2014    Hypertension     Obesity (BMI 30-39.9) 10/02/2014    Pre-diabetes 10/02/2014    Recurrent sinusitis     Type II or unspecified type diabetes mellitus without mention of complication, not stated as uncontrolled     pre-diabetes    Wheezing        Social History     Socioeconomic History    Marital status:      Spouse name: Not on file    Number of children: Not on file    Years of education: Not on file     Highest education level: Some college, no degree   Occupational History    Not on file   Tobacco Use    Smoking status: Former     Current packs/day: 0.00     Average packs/day: 1 pack/day for 49.4 years (49.4 ttl pk-yrs)     Types: Cigarettes     Start date:      Quit date: 2017     Years since quittin.6    Smokeless tobacco: Never   Vaping Use    Vaping Use: Never used   Substance and Sexual Activity    Alcohol use: No     Alcohol/week: 0.0 oz    Drug use: Never    Sexual activity: Not Currently     Comment:    Other Topics Concern    Not on file   Social History Narrative    Not on file     Social Determinants of Health     Financial Resource Strain: Low Risk  (7/3/2022)    Overall Financial Resource Strain (CARDIA)     Difficulty of Paying Living Expenses: Not very hard   Food Insecurity: No Food Insecurity (7/3/2022)    Hunger Vital Sign     Worried About Running Out of Food in the Last Year: Never true     Ran Out of Food in the Last Year: Never true   Transportation Needs: No Transportation Needs (7/3/2022)    PRAPARE - Transportation     Lack of Transportation (Medical): No     Lack of Transportation (Non-Medical): No   Physical Activity: Sufficiently Active (7/3/2022)    Exercise Vital Sign     Days of Exercise per Week: 4 days     Minutes of Exercise per Session: 40 min   Stress: Stress Concern Present (7/3/2022)    [employer] - Occupational Stress Questionnaire     Feeling of Stress : To some extent   Social Connections: Moderately Integrated (7/3/2022)    Social Connection and Isolation Panel [NHANES]     Frequency of Communication with Friends and Family: More than three times a week     Frequency of Social Gatherings with Friends and Family: Once a week     Attends Alevism Services: 1 to 4 times per year     Active Member of Clubs or Organizations: No     Attends Club or Organization Meetings: Not on file     Marital Status:    Intimate Partner  Violence: Not on file   Housing Stability: Low Risk  (7/3/2022)    Housing Stability Vital Sign     Unable to Pay for Housing in the Last Year: No     Number of Places Lived in the Last Year: 1     Unstable Housing in the Last Year: No       Family History   Problem Relation Age of Onset    Cancer Mother         pancreatic     Heart Disease Father     Heart Attack Father     Diabetes Father         pre-diabetes    Stroke Neg Hx        Current Outpatient Medications on File Prior to Visit   Medication Sig Dispense Refill    omeprazole (PRILOSEC) 20 MG delayed-release capsule Take 1 Capsule by mouth every day. 90 Capsule 0    traZODone (DESYREL) 50 MG Tab Take 1 Tablet by mouth at bedtime as needed for Sleep. 90 Tablet 2    telmisartan (MICARDIS) 80 MG Tab Take 1 Tablet by mouth at bedtime. 90 Tablet 2    PARoxetine (PAXIL) 30 MG Tab Take 1 Tablet by mouth every day. 90 Tablet 3    rosuvastatin (CRESTOR) 20 MG Tab TAKE ONE AND ONE-HALF TABLETS EVERY EVENING 135 Tablet 1    aspirin EC (ECOTRIN) 325 MG Tablet Delayed Response TAKE ONE TABLET BY MOUTH DAILY 100 Tablet 1    Dulaglutide 4.5 MG/0.5ML Solution Pen-injector Inject 0.5 mL under the skin.      metFORMIN ER (GLUCOPHAGE XR) 500 MG TABLET SR 24 HR TAKE TWO TABLETS BY MOUTH DAILY WITH DINNER 180 Tablet 3    amLODIPine (NORVASC) 5 MG Tab Take 1 Tablet by mouth at bedtime. 90 Tablet 3    ibuprofen (MOTRIN) 200 MG Tab Take 3 Tablets by mouth 1 time a day as needed (Moderate pain). 3 tablets = 600 mg.      vitamin D (CHOLECALCIFEROL) 1000 Unit (25 mcg) Tab Take 3 Tablets by mouth every morning. 3 tablets = 3000 units.       No current facility-administered medications on file prior to visit.       Sulfa drugs      ROS:   Review of Systems   Constitutional:  Negative for chills, diaphoresis, fever, malaise/fatigue and weight loss.   HENT:  Positive for congestion and sinus pain. Negative for ear discharge, ear pain, hearing loss, nosebleeds, sore throat and tinnitus.   "  Eyes:  Negative for blurred vision, double vision, photophobia, pain, discharge and redness.   Respiratory:  Negative for cough, hemoptysis, sputum production, shortness of breath, wheezing and stridor.    Cardiovascular:  Negative for chest pain, palpitations, orthopnea, claudication, leg swelling and PND.   Gastrointestinal:  Negative for abdominal pain, constipation, diarrhea, heartburn, nausea and vomiting.   Genitourinary:  Negative for dysuria and urgency.   Musculoskeletal:  Negative for back pain, falls, joint pain, myalgias and neck pain.   Skin:  Negative for itching and rash.   Neurological:  Negative for dizziness, tremors, speech change, focal weakness, weakness and headaches.   Endo/Heme/Allergies:  Negative for environmental allergies.   Psychiatric/Behavioral:  Negative for depression.        /58 (BP Location: Right arm, Patient Position: Sitting, BP Cuff Size: Adult)   Pulse 93   Ht 1.664 m (5' 5.5\")   Wt 92.5 kg (204 lb)   SpO2 93%   Physical Exam  Constitutional:       General: She is not in acute distress.     Appearance: Normal appearance. She is well-developed and normal weight.   HENT:      Head: Normocephalic and atraumatic.      Right Ear: External ear normal.      Left Ear: External ear normal.      Nose: Nose normal. No congestion.      Mouth/Throat:      Mouth: Mucous membranes are moist.      Pharynx: Oropharynx is clear. No oropharyngeal exudate.   Eyes:      General: No scleral icterus.     Extraocular Movements: Extraocular movements intact.      Conjunctiva/sclera: Conjunctivae normal.      Pupils: Pupils are equal, round, and reactive to light.   Neck:      Vascular: No JVD.      Trachea: No tracheal deviation.   Cardiovascular:      Rate and Rhythm: Normal rate and regular rhythm.      Heart sounds: Normal heart sounds. No murmur heard.     No friction rub. No gallop.   Pulmonary:      Effort: Pulmonary effort is normal. No accessory muscle usage or respiratory " distress.      Breath sounds: Normal breath sounds. No wheezing or rales.   Abdominal:      General: There is no distension.      Palpations: Abdomen is soft.      Tenderness: There is no abdominal tenderness.   Musculoskeletal:         General: No tenderness or deformity. Normal range of motion.      Cervical back: Normal range of motion and neck supple.      Right lower leg: No edema.      Left lower leg: No edema.   Lymphadenopathy:      Cervical: No cervical adenopathy.   Skin:     General: Skin is warm and dry.      Findings: No rash.      Nails: There is no clubbing.   Neurological:      Mental Status: She is alert and oriented to person, place, and time.      Cranial Nerves: No cranial nerve deficit.      Gait: Gait normal.   Psychiatric:         Behavior: Behavior normal.         PFTs as reviewed by me personally: as per hPI    Imaging as reviewed by me personally:  as per HPI    Assessment:  1. Lung nodule  CT-CHEST (THORAX) W/O      2. Former smoker  CT-CHEST (THORAX) W/O      3. Chronic obstructive pulmonary disease, unspecified COPD type (HCC)  PULMONARY FUNCTION TESTS -Test requested: Complete Pulmonary Function Test    albuterol 108 (90 Base) MCG/ACT Aero Soln inhalation aerosol    albuterol (PROVENTIL) 2.5mg/3ml Nebu Soln solution for nebulization      4. Upper respiratory tract infection, unspecified type            Plan:  Stable. Given tobacco hx, continue annual surveillance imaging. Next CT due 10/2025.  Tobacco free since 2017.  Chronic, mild, stable on prn [place] only. Tobacco free and up to date on vaccines.  Acute with only one day of sxs. We discussed symptomatic therapy with decongestant, hydration and rest and pt will f/u with me if sxs worsen or not improving. No e/o exacerbation of chronic respiratory issues at this visit.   Return in about 6 months (around 7/17/2024) for PFT .

## 2024-01-25 ENCOUNTER — TELEPHONE (OUTPATIENT)
Dept: SLEEP MEDICINE | Facility: MEDICAL CENTER | Age: 69
End: 2024-01-25
Payer: MEDICARE

## 2024-01-25 DIAGNOSIS — J06.9 UPPER RESPIRATORY TRACT INFECTION, UNSPECIFIED TYPE: ICD-10-CM

## 2024-01-25 RX ORDER — AZITHROMYCIN 250 MG/1
TABLET, FILM COATED ORAL
Qty: 6 TABLET | Refills: 0 | Status: SHIPPED | OUTPATIENT
Start: 2024-01-25 | End: 2024-03-26

## 2024-01-26 NOTE — TELEPHONE ENCOUNTER
Patient called to request for an Rx for the antibiotics Dr. Hu recommended her at her office visit on 1/17/24.   She states her symptoms are the same but worse, mostly cough and wheezing.

## 2024-02-02 DIAGNOSIS — I10 ESSENTIAL HYPERTENSION: ICD-10-CM

## 2024-02-02 DIAGNOSIS — E78.5 HYPERLIPIDEMIA LDL GOAL <70: ICD-10-CM

## 2024-02-02 RX ORDER — ROSUVASTATIN CALCIUM 20 MG/1
TABLET, COATED ORAL
Qty: 135 TABLET | Refills: 1 | Status: SHIPPED | OUTPATIENT
Start: 2024-02-02

## 2024-02-02 RX ORDER — AMLODIPINE BESYLATE 5 MG/1
5 TABLET ORAL
Qty: 90 TABLET | Refills: 3 | Status: SHIPPED | OUTPATIENT
Start: 2024-02-02

## 2024-02-09 DIAGNOSIS — K21.9 GASTROESOPHAGEAL REFLUX DISEASE WITHOUT ESOPHAGITIS: ICD-10-CM

## 2024-02-10 NOTE — TELEPHONE ENCOUNTER
Received request via: Pharmacy    Was the patient seen in the last year in this department? Yes    Does the patient have an active prescription (recently filled or refills available) for medication(s) requested? No    Pharmacy Name: [facility]    Does the patient have prison Plus and need 100 day supply (blood pressure, diabetes and cholesterol meds only)? Patient does not have SCP

## 2024-02-13 RX ORDER — OMEPRAZOLE 20 MG/1
20 CAPSULE, DELAYED RELEASE ORAL DAILY
Qty: 90 CAPSULE | Refills: 3 | Status: SHIPPED | OUTPATIENT
Start: 2024-02-13

## 2024-02-15 DIAGNOSIS — I10 PRIMARY HYPERTENSION: ICD-10-CM

## 2024-02-15 NOTE — PROGRESS NOTES
Established patient  Chart prep for upcoming appointment with Vascular APRN    Any pending/incomplete orders from last visit? No, all orders completed.  Were any records requested?  No  Correct appointment type (New/Established/virtual)? Yes  Referral up to date? Yes renewal ordered and sent to provider to co-sign   Referral attached to appointment (renewals and New patients only)? Yes          Taz Haddad, Medical Assistant   [facility]   Ph: [phone]  Fx: [phone]

## 2024-02-20 ENCOUNTER — OFFICE VISIT (OUTPATIENT)
Dept: VASCULAR LAB | Facility: MEDICAL CENTER | Age: 69
End: 2024-02-20
Payer: MEDICARE

## 2024-02-20 VITALS
HEART RATE: 84 BPM | SYSTOLIC BLOOD PRESSURE: 121 MMHG | HEIGHT: 65 IN | WEIGHT: 202 LBS | DIASTOLIC BLOOD PRESSURE: 76 MMHG | BODY MASS INDEX: 33.66 KG/M2

## 2024-02-20 DIAGNOSIS — I10 PRIMARY HYPERTENSION: ICD-10-CM

## 2024-02-20 DIAGNOSIS — E11.8 CONTROLLED TYPE 2 DIABETES MELLITUS WITH COMPLICATION, WITHOUT LONG-TERM CURRENT USE OF INSULIN (HCC): ICD-10-CM

## 2024-02-20 DIAGNOSIS — I73.9 PAD (PERIPHERAL ARTERY DISEASE) (HCC): ICD-10-CM

## 2024-02-20 DIAGNOSIS — E11.69 DYSLIPIDEMIA ASSOCIATED WITH TYPE 2 DIABETES MELLITUS (HCC): ICD-10-CM

## 2024-02-20 DIAGNOSIS — E78.5 HYPERLIPIDEMIA LDL GOAL <70: ICD-10-CM

## 2024-02-20 DIAGNOSIS — E11.51 TYPE 2 DIABETES MELLITUS WITH DIABETIC PERIPHERAL ANGIOPATHY WITHOUT GANGRENE, WITHOUT LONG-TERM CURRENT USE OF INSULIN (HCC): ICD-10-CM

## 2024-02-20 DIAGNOSIS — E78.5 DYSLIPIDEMIA ASSOCIATED WITH TYPE 2 DIABETES MELLITUS (HCC): ICD-10-CM

## 2024-02-20 PROCEDURE — 99212 OFFICE O/P EST SF 10 MIN: CPT

## 2024-02-20 PROCEDURE — 3074F SYST BP LT 130 MM HG: CPT

## 2024-02-20 PROCEDURE — 3078F DIAST BP <80 MM HG: CPT

## 2024-02-20 PROCEDURE — 99214 OFFICE O/P EST MOD 30 MIN: CPT

## 2024-02-20 RX ORDER — METFORMIN HYDROCHLORIDE 500 MG/1
TABLET, EXTENDED RELEASE ORAL
Qty: 180 TABLET | Refills: 3 | Status: SHIPPED | OUTPATIENT
Start: 2024-02-20

## 2024-02-20 ASSESSMENT — ENCOUNTER SYMPTOMS
COUGH: 0
FEVER: 0
BLOOD IN STOOL: 0
MYALGIAS: 0
CHILLS: 0
SHORTNESS OF BREATH: 0
NAUSEA: 0
CLAUDICATION: 0
NERVOUS/ANXIOUS: 0
SPEECH CHANGE: 0
FOCAL WEAKNESS: 0
DIZZINESS: 0
PALPITATIONS: 0
WEAKNESS: 0
BRUISES/BLEEDS EASILY: 0
WEIGHT LOSS: 0

## 2024-02-20 ASSESSMENT — FIBROSIS 4 INDEX: FIB4 SCORE: 1.1

## 2024-02-20 NOTE — PROGRESS NOTES
RESISTANT HTN CLINIC - Follow Up EVALUATION   02/20/2024    ASSESSEMENT AND PLAN:      1. BLOOD PRESSURE CONTROL   Qualifies as Resistant HTN (RH):  No, not on optimized, max-dosed or max-tolerated meds from 3 classes   Office BP Goal ACC/AHA (2017) goal <130/80  Home BP at goal:  not checking  Office BP at goal: yes  Home readings 120/70-80s previously  Good med adherence    2. WORK UP FOR SECONDARY CAUSES OF RH:  Obstructive Sleep Apnea: Not Yet Assessed- no current symptoms  Renal parenchymal disease: Excluded  Renovascular HTN: Not Yet Assessed  Primary Aldosteronism: Not Yet Assessed  Thyroid Function: Excluded 12/2021  Pheochromocytoma: Not Yet Assessed   Cushing's:   Not Yet Assessed   Coarctation of Aorta: excluded  Other: none identified    Recommendations At This Visit: as noted in orders         3. MEDICATION USE / ADHERENCE:   Assessment: Complete  Recommendations: Instructed to Continue Taking All Medications As Prescribed         4. HTN-MEDIATED END-ORGAN DAMAGE:  Left Ventricular Hypertrophy: Absent on  ECG Date: 2020  Urine Albuminuria: None on Date: 12/2021  Renal Function: Chronic Kidney Disease  CKD G2 at worst   Ophthalmologic: Absent per patient report and/or eye specialist   Established Cardiovascular Disease:     # CAD per CT scan - continue current med mgmt, no further surveillance and no prior ASCVD events     # PAD, LLE inflow - stable, limited sx with walking ~10 min, resolves in a minute or 2,  no color/temp changes, no wounds.  mild disease noted on most recent imaging 5/2023 as ordered by cardiology.  Consider repeat BLE art duplex/HERNANDEZ in future if symptoms worsening.  Reviewed red flags.  - continue exercise, TLC, med mgmt      5. LIFESTYLE INTERVENTIONS:    SMOKING:   reports that she quit smoking about 6 years ago. Her smoking use included cigarettes. She started smoking about 56 years ago. She has a 49.4 pack-year smoking history. She has never used smokeless tobacco.   - continued  "complete avoidance of all tobacco products     PHYSICAL ACTIVITY: continue healthy activity to improve CV fitness.  In general, targeting >150min/week of moderate-level activity.      WEIGHT MANAGEMENT AND NUTRITION: Dietary plan was discussed with patient at this visit including DASH-dietary approaches, substitution of MUFA/PUFA for saturated fats, substituting whole grains for simple carbs, substituting lean meats for fatty meats, increase use of plant-based protein vs animal-based, lowered sodium.  Additional details reviewed with patient and/or outlined in care instructions.    6. STANDARD HTN PHARMACOTHERAPY:  ACEI/ARB: continue telmisartan 80mg daily   Thiazide Type Diuretic: consider as next agent   DHP-CCB: continue amlodipine 5mg qh    7. ADDITIONAL AGENTS   Aldosterone Receptor Antagonist: not indicated   Loop Diuretic: not indicated   Peripheral Alpha Blocker: not indicated   BB: not indicated, stopped on prior visits as unlikely beneficial for BP   Non-DHP-CCB: not indicated   Direct Vasodilator: not indicated   Centrally Acting Alpha Agonist: not indicated   Potassium-sparing diuretic: not indicated   DRI: not indicated     LIPID MANAGEMENT:   Qualifies for Statin Therapy Based on 2018 ACC/AHA Guidelines: yes, Secondary Prevention - Very high risk ASCVD - 2 major events or 1 event with other high-risk conditions, Diabetes and HERNANDEZ <0.9  The 10-year ASCVD risk score ([place] DK, et al., 2019) is: 16.8%, 7.5 - <20% \"intermediate risk\"   Major ASCVD events: Symptomatic PAD (hx of claudication with HERNANDEZ <0.85, previous revascularization/amputation  High-risk conditions: >64yr old , Diabetes , Hypertension  and N/A  Risk-enhancers: N/A  Currently on Statin: Yes  Treatment goals: reduce LDL-C >50%  and LDL-C <70 (consider non-HDL-C <100, apoB <80)  At goal? No, 11/2023, LDL 77  Plan:   - reinforced ongoing TLC measures as noted   - monitor labs in 4 mo, intensify therapy if remains above goal on next labs  Meds: "   - continue rosuvastatin 30 mg daily, although not a usual dose    GLYCEMIA MANAGEMENT:  Diabetic   Goal A1c < 7.0  Lab Results   Component Value Date    HBA1C 6.8 (H) 11/27/2023      Lab Results   Component Value Date/Time    MALBCRT 12 07/03/2023 10:27 AM    MICROALBUR 5.6 07/03/2023 10:27 AM   Recommend aggressive treatment due to pt's overall risk.    Close f/u with [facility] clinic; try not to miss visits! - follow up yearly for now per their recommendations, consider sooner if increases above goal  - Continue current meds  - a1c and f/u per [facility] clinic  - recommmend for routine care to include regular A1c monitoring, annual albumin/creatinine ratio (ACR), annual diabetic retinopathy screening, foot exams, annual flu vaccine, and updates to pneumonia vaccines as appropriate     ANTIPLATELET/ANTICOAGULANT THERAPY: continue asa 325mg daily    OTHER ISSUES:     # MDD, recurrent - stable, continue current meds, f/u with PCP      Studies Ordered At This Visit: none    Blood Work to Be Obtained Prior to Next Visit: cmp, lipids, a1c, ma  Disposition:  4 mo, to repeat labs, intensify therapy if remain elevated, if in good control consider 6 mo recall again      1. Type 2 diabetes mellitus with diabetic peripheral angiopathy without gangrene, without long-term current use of insulin (HCC)  metFORMIN ER (GLUCOPHAGE XR) 500 MG TABLET SR 24 HR    HEMOGLOBIN A1C (Glycohemoglobin GHB Total/A1C with MBG Estimate)    MICROALBUMIN CREAT RATIO URINE      2. Primary hypertension  Comp Metabolic Panel    MICROALBUMIN CREAT RATIO URINE      3. Hyperlipidemia LDL goal <70  Lipid Profile      4. PAD (peripheral artery disease) (HCC)        5. Controlled type 2 diabetes mellitus with complication, without long-term current use of insulin (HCC)        6. Dyslipidemia associated with type 2 diabetes mellitus (HCC)              History of Present Illness:   Dunia Her is a 67 yo female seen for evaluation of resistant HTN and  management.   Dunia Her is initially referred by Ref Not In Computer     Subjective    HTN:  Home BP log:  not checking, good in other offices  Good med adherence  Overall, feeling well,  Recently  and has been doing less activity and eating more comfort food lately.    Had fasting labwork done 11/2023  24h ABPM completed: could consider in future if office BP high  Adherence to current HTN meds: compliant all of the time  Hx of clinical ASCVD events: Symptomatic PAD (hx of claudication with HERNANDEZ <0.85, previous revascularization/amputation    Lifestyle factors:  Weight Change: none  BMI Readings from Last 5 Encounters:   02/20/24 33.61 kg/m²   01/17/24 33.43 kg/m²   11/30/23 33.44 kg/m²   08/18/23 34.28 kg/m²   06/02/23 34.11 kg/m²     Diet pattern: low red meat, increase veg, has lost weight on Med diet in past, more comfort foods recently, more carbs and sugars  Daily salt intake estimate:  Low   - none   EtOH: No  Exercise habits: minimal exercise  Perceived barriers to care: leg pain  Pertinent HTN hx (reviewed at initial visit):   Age at HTN dx: several years   (if female, any hx of pregnancy-related HTN): n/a   Past HTN medications: as noted   HTN crises:  No prior hospitalization or crises   Interfering substances/contributing factors:  None    Hyperlipidemia:    Stable, tolerating rosuva   Occasional tolerable myalgias  Clinical evidence of ASCVD: Symptomatic PAD (hx of claudication with HERNANDEZ <0.85, previous revascularization/amputation    Antiplatelet/anticoagulation: Yes, Details: ASA , no bleeding noted     T2D:  No current symptoms reported.   Tolerating meds  Has f/u with [facility] pharmacist - now yearly appts  Last A1c   Lab Results   Component Value Date    HBA1C 6.8 (H) 11/27/2023     Lab Results   Component Value Date/Time    MALBCRT 12 07/03/2023 10:27 AM    MICROALBUR 5.6 07/03/2023 10:27 AM            Problem List:     Patient Active Problem List    Diagnosis Date Noted     Gastroesophageal reflux disease without esophagitis 11/30/2023    Osteopenia of neck of left femur 08/02/2023    Controlled type 2 diabetes mellitus with complication, without long-term current use of insulin ([facility]) 06/02/2023    Mixed stress and urge urinary incontinence 06/02/2023    COPD (chronic obstructive pulmonary disease) ([facility]) 04/20/2022    Lung nodule 04/20/2022    Greater trochanteric bursitis of left hip 01/31/2022    Elevated lipoprotein(a) 12/29/2021    Healthcare maintenance 06/17/2021    Essential hypertension 10/06/2020    Coronary artery calcification seen on CT scan 04/07/2020    PAD (peripheral artery disease) ([facility]) 02/07/2020    Recurrent major depressive disorder, in partial remission ([facility]) 02/10/2019    Chronic insomnia 04/18/2016    Dyslipidemia associated with type 2 diabetes mellitus ([facility]) 10/02/2014    Class 1 obesity due to excess calories with body mass index (BMI) of 34.0 to 34.9 in adult 10/02/2014       Current Outpatient Medications:     metFORMIN ER, TAKE TWO TABLETS BY MOUTH DAILY WITH DINNER    omeprazole, 20 mg, Oral, DAILY, Taking    rosuvastatin, TAKE ONE AND ONE-HALF TABLETS EVERY EVENING, Taking    amLODIPine, 5 mg, Oral, QHS, Taking    azithromycin, Take 2 tablets on day 1, then take 1 tablet a day for 4 days., Taking    albuterol, 1-2 Puff, Inhalation, Q4HRS PRN, Taking    albuterol, 2.5 mg, Nebulization, Q4HRS PRN, Taking    traZODone, 50 mg, Oral, QHS PRN, Taking    telmisartan, 80 mg, Oral, QHS, Taking    PARoxetine, 30 mg, Oral, DAILY, Taking    aspirin EC, TAKE ONE TABLET BY MOUTH DAILY, Taking    Dulaglutide, Inject 0.5 mL under the skin., Taking    ibuprofen, 600 mg, Oral, QDAY PRN, Taking    vitamin D3, 3,000 Units, Oral, QAM, Taking   Sulfa drugs     Social History:     Social History     Tobacco Use    Smoking status: Former     Current packs/day: 0.00     Average packs/day: 1 pack/day for 49.4 years (49.4 ttl pk-yrs)     Types: Cigarettes     Start date: 1968      "Quit date: 2017     Years since quittin.7    Smokeless tobacco: Never   Vaping Use    Vaping Use: Never used   Substance Use Topics    Alcohol use: No     Alcohol/week: 0.0 oz    Drug use: Never       Review of Systems:   Review of Systems   Constitutional:  Negative for chills, fever and weight loss.   HENT:  Negative for nosebleeds.    Respiratory:  Negative for cough and shortness of breath.    Cardiovascular:  Positive for leg swelling. Negative for chest pain, palpitations and claudication.   Gastrointestinal:  Negative for blood in stool, melena and nausea. Abdominal pain: occasional.  Genitourinary:  Negative for hematuria.   Musculoskeletal:  Positive for joint pain. Negative for myalgias.   Neurological:  Negative for dizziness, speech change, focal weakness and weakness.   Endo/Heme/Allergies:  Does not bruise/bleed easily.   Psychiatric/Behavioral:  The patient is not nervous/anxious.          Objective     Objective:     Vitals:    24 1001   BP: 121/76   BP Location: Left arm   Patient Position: Sitting   BP Cuff Size: Adult   Pulse: 84   Weight: 91.6 kg (202 lb)   Height: 1.651 m (5' 5\")       Body mass index is 33.61 kg/m².  BP Readings from Last 5 Encounters:   24 121/76   24 110/58   23 120/68   23 125/78   23 124/78      Physical Exam  Vitals reviewed.   Constitutional:       General: She is not in acute distress.  HENT:      Head: Normocephalic and atraumatic.   Eyes:      General: No scleral icterus.  Cardiovascular:      Rate and Rhythm: Normal rate and regular rhythm.      Pulses: Normal pulses.      Heart sounds: Normal heart sounds. No murmur heard.  Pulmonary:      Effort: Pulmonary effort is normal. No respiratory distress.      Breath sounds: Normal breath sounds. No wheezing.   Musculoskeletal:         General: No swelling. Normal range of motion.      Right lower leg: No edema.      Left lower leg: No edema.   Skin:     General: Skin is warm and " "dry.      Coloration: Skin is not pale.      Findings: No erythema.   Neurological:      General: No focal deficit present.      Mental Status: She is alert and oriented to person, place, and time.      Motor: No weakness.      Gait: Gait normal.   Psychiatric:         Mood and Affect: Mood normal.         Behavior: Behavior normal.         Thought Content: Thought content normal.        Accessory Clinical Findings:     Lab Results   Component Value Date    CHOLSTRLTOT 170 11/27/2023    LDL 77 11/27/2023    HDL 72 11/27/2023    TRIGLYCERIDE 106 11/27/2023      Lab Results   Component Value Date/Time    LIPOPROTA 125 (H) 12/27/2021 10:41 AM      No results found for: \"APOB\"         Lab Results   Component Value Date    HBA1C 6.8 (H) 11/27/2023      Lab Results   Component Value Date    SODIUM 142 11/27/2023    POTASSIUM 4.8 11/27/2023    CHLORIDE 109 11/27/2023    CO2 21 11/27/2023    GLUCOSE 139 (H) 11/27/2023    BUN 22 11/27/2023    CREATININE 0.77 11/27/2023          Lab Results   Component Value Date    URCREAT 473.61 07/03/2023    MICROALBUR 5.6 07/03/2023    MALBCRT 12 07/03/2023     VASCULAR IMAGING:     Last EKG:  Reviewed     HERNANDEZ 2/2020   Right HERNANDEZ: 1.02  Left HERNANDEZ:  0.79   IMPRESSION:   1.  Post occlusive waveform is noted in the distal left peroneal artery. Short segment occlusion or high-grade stenosis proximal to this location is likely present, although not directly visualized.   2.  No other evidence of occlusion or significant stenosis bilaterally.   3.  Moderate calcified atherosclerotic plaque is identified in each lower extremity.    CAC 2/2020  Coronary calcification:  LMA - 0.0  LCX - 0.0  LAD - 315  RCA - 260.8   Total Calcium Score: 575.8   Percentile: Calcium score is above the 90th percentile for the patient's age and sex.   Other findings:  Heart: Normal size.  Lungs: Tiny granuloma in the left upper lobe.  Mediastinum: Normal.  Upper abdomen: Normal.  Spondylotic changes of the spine.    MPI " 4/2020  NUCLEAR IMAGING INTERPRETATION   Normal left ventricular size, ejection fraction, and wall motion.    Small fixed defect in the inferolateral wall could be artifact. Infarct is in    the differential but consider less likely.     No reversible defect.    Sinus rhythm.   Nondiagnostic ECG portion of a Regadenoson nuclear stress test.   ECG INTERPRETATION   Nondiagnostic ECG portion of a Regadenoson nuclear stress test.    CT abd 2/2021   The abdominal aorta is normal in caliber with aortic atherosclerosis.    CT chest 10/2021   1.  Stable 1.1 cm right upper lobe groundglass density nodule. Continued follow-up is recommended.  2.  Cholelithiasis.  3.  Hepatic steatosis  Cardiac: Heart normal in size without pericardial effusion.   Vascular: Atherosclerotic calcifications of the coronary arteries, aorta and branches...    LE art with HERNANDEZ 5/2023  1.  Diffuse atherosclerotic changes without focal high-grade stenosis.  2.  RIGHT peroneal artery is not visualized.  3.  Ankle brachial indices indicate mild atherosclerotic disease on the LEFT.        PEPE De La Fuente.  [facility]   [facility]   [phone]      [facility] Gap Form    Diagnosis to address: I73.9 - PAD (peripheral artery disease) ([facility])  Assessment and plan: Chronic, stable. Continue with current defined treatment plan: symptoms stable, lipid and antiplatelet as ordered, update imaging in future if worsening. Follow-up at least annually.  Diagnosis: E11.8 - Controlled type 2 diabetes mellitus with complication, without long-term current use of insulin ([facility])  Assessment and plan: Chronic, stable. Continue with current defined treatment plan: well controlled on current regimen, following up with DM clinic. Follow-up at least annually.  Diagnosis: E11.69, E78.5 - Dyslipidemia associated with type 2 diabetes mellitus (HCC)  Assessment and plan: Chronic, stable. Continue with current defined treatment plan: lipid  and DM management as described, within goal ranges, intensify lifestyle modifications . Follow-up at least annually.  Last edited 02/20/24 10:59 PST by RIOS De La Fuente

## 2024-03-26 ENCOUNTER — OFFICE VISIT (OUTPATIENT)
Dept: MEDICAL GROUP | Facility: PHYSICIAN GROUP | Age: 69
End: 2024-03-26
Payer: MEDICARE

## 2024-03-26 VITALS
RESPIRATION RATE: 16 BRPM | BODY MASS INDEX: 33.85 KG/M2 | DIASTOLIC BLOOD PRESSURE: 74 MMHG | HEART RATE: 85 BPM | SYSTOLIC BLOOD PRESSURE: 126 MMHG | HEIGHT: 65 IN | WEIGHT: 203.2 LBS | OXYGEN SATURATION: 97 % | TEMPERATURE: 97.2 F

## 2024-03-26 DIAGNOSIS — E11.69 DYSLIPIDEMIA ASSOCIATED WITH TYPE 2 DIABETES MELLITUS (HCC): Chronic | ICD-10-CM

## 2024-03-26 DIAGNOSIS — I10 ESSENTIAL HYPERTENSION: ICD-10-CM

## 2024-03-26 DIAGNOSIS — F33.41 RECURRENT MAJOR DEPRESSIVE DISORDER, IN PARTIAL REMISSION (HCC): Chronic | ICD-10-CM

## 2024-03-26 DIAGNOSIS — E78.5 DYSLIPIDEMIA ASSOCIATED WITH TYPE 2 DIABETES MELLITUS (HCC): Chronic | ICD-10-CM

## 2024-03-26 DIAGNOSIS — K21.9 GASTROESOPHAGEAL REFLUX DISEASE WITHOUT ESOPHAGITIS: ICD-10-CM

## 2024-03-26 DIAGNOSIS — E11.8 CONTROLLED TYPE 2 DIABETES MELLITUS WITH COMPLICATION, WITHOUT LONG-TERM CURRENT USE OF INSULIN (HCC): ICD-10-CM

## 2024-03-26 DIAGNOSIS — R42 VERTIGO: ICD-10-CM

## 2024-03-26 LAB
HBA1C MFR BLD: 6.9 % (ref ?–5.8)
POCT INT CON NEG: NEGATIVE
POCT INT CON POS: POSITIVE

## 2024-03-26 PROCEDURE — 99214 OFFICE O/P EST MOD 30 MIN: CPT | Performed by: PHYSICIAN ASSISTANT

## 2024-03-26 PROCEDURE — 3074F SYST BP LT 130 MM HG: CPT | Performed by: PHYSICIAN ASSISTANT

## 2024-03-26 PROCEDURE — 83036 HEMOGLOBIN GLYCOSYLATED A1C: CPT | Performed by: PHYSICIAN ASSISTANT

## 2024-03-26 PROCEDURE — 3078F DIAST BP <80 MM HG: CPT | Performed by: PHYSICIAN ASSISTANT

## 2024-03-26 RX ORDER — HYDROCODONE BITARTRATE AND ACETAMINOPHEN 5; 325 MG/1; MG/1
TABLET ORAL
COMMUNITY

## 2024-03-26 ASSESSMENT — ENCOUNTER SYMPTOMS
SHORTNESS OF BREATH: 0
CHILLS: 0
FEVER: 0

## 2024-03-26 ASSESSMENT — FIBROSIS 4 INDEX: FIB4 SCORE: 1.1

## 2024-03-26 NOTE — PROGRESS NOTES
SUBJECTIVE:     CC: Follow-up on chronic issues    HPI:   Dunia presents today with the following        ASSESSMENT & PLAN by Problem:       Problem List Items Addressed This Visit       Controlled type 2 diabetes mellitus with complication, without long-term current use of insulin (HCC)     Chronic, controlled.  Tolerating/continue current medications:  Trulicity 1.5mg every Saturday  Metformin XR 1000mg once daily         Relevant Orders    POCT  A1C (Completed)    Dyslipidemia associated with type 2 diabetes mellitus (HCC) (Chronic)     Chronic, controlled.  Tolerating/continue crestor 20mg.         Essential hypertension     Chronic, controlled.  Tolerating/continue amlodipine 5mg daily and telmisartan 80mg daily.         Gastroesophageal reflux disease without esophagitis     Chronic, uncontrolled.  Has been on prevacid for a couple years.  Stopped working a couple months.  Denies CP, SOB, chest heaviness/pressure.  Worse at night when she lies down.  Has bile/acid taste in mouth when she wakes up.  NOT worse with exertion.      Somewhat better with omeprazole 20mg but sometimes has to take 40mg.         Relevant Orders    Referral to Gastroenterology    Recurrent major depressive disorder, in partial remission (HCC) (Chronic)     Chronic, controlled.  Tolerating paxil 30mg for years and it seems to help overall.  Still has episodes where she feels down but overall feels good.           Other Visit Diagnoses       Vertigo        Acute, uncontrolled.  Likely secondary to eustachian tube dysfunction.  Recommend fluticasone nasal spray and chlorpheniramine tablets.        Follow-up for worsening vertigo.    POC A1C: 6.9%    Due to repeat labs, June 2024:      Return in about 6 months (around 9/26/2024) for med check.    HCC Gap Form    Last edited 03/26/24 10:50 PDT by Becca Marley P.A.-C.         Healthcare Maintenance:      HPI:     Problem   Gastroesophageal Reflux Disease Without Esophagitis     Chronic, uncontrolled.  Has been on prevacid for a couple years.  Stopped working a couple months.  Denies CP, SOB, chest heaviness/pressure.  Worse at night when she lies down.  Has bile/acid taste in mouth when she wakes up.  NOT worse with exertion.       Controlled Type 2 Diabetes Mellitus With Complication, Without Long-Term Current Use of Insulin (Hcc)    Chronic, uncontrolled.     Tolerating/continue current medications:  Trulicity 4.5mg every Saturday  Metformin XR 1000mg once daily    A1c: 6.9 (3/2024); 6.8 (11/2023); 7.0 (5/2023)  Microalb/Cr ratio: 12 (7/2023)  Diabetic foot exam: 5/2023  Retinal eye exam: 7/2023  ACEi/ARB: telmisartan 80mg daily  Statin: rosuvastatin 20mg daily  Aspirin: 325mg daily  Concomitant HTN: controlled  Nightly foot checks: yes       Essential Hypertension    Chronic, controlled.  Tolerating/continue amlodipine 5mg daily and telmisartan 80mg daily.  Managed by cardiology.      Recurrent Major Depressive Disorder, in Partial Remission (Hcc)    Chronic, controlled.  Tolerating paxil 30mg for years and it seems to help overall.  Still has episodes where she feels down but overall feels good.      Dyslipidemia Associated With Type 2 Diabetes Mellitus (Hcc)    Chronic, controlled.     Latest Labs:   Lab Results   Component Value Date/Time    CHOLSTRLTOT 170 11/27/2023 09:53 AM    LDL 77 11/27/2023 09:53 AM    HDL 72 11/27/2023 09:53 AM    TRIGLYCERIDE 106 11/27/2023 09:53 AM      Medications: Tolerating/continue crestor 20mg   Medication side effects: none  Risk calculator: The 10-year ASCVD risk score ([place] DK, et al., 2019) is: 16.8%                   ROS:  Review of Systems   Constitutional:  Negative for chills and fever.   Respiratory:  Negative for shortness of breath.    Cardiovascular:  Negative for chest pain.       OBJECTIVE:     Exam:  /74 (BP Location: Left arm, Patient Position: Sitting, BP Cuff Size: Adult)   Pulse 85   Temp 36.2 °C (97.2 °F) (Temporal)    "Resp 16   Ht 1.651 m (5' 5\")   Wt 92.2 kg (203 lb 3.2 oz)   SpO2 97%   BMI 33.81 kg/m²  Body mass index is 33.81 kg/m².    Physical Exam  Vitals reviewed.   Constitutional:       General: She is not in acute distress.     Appearance: Normal appearance.   HENT:      Ears:      Comments: TMs are bulging bilaterally with serous effusions.  No signs of infection noted  Pulmonary:      Effort: Pulmonary effort is normal.   Neurological:      General: No focal deficit present.      Mental Status: She is alert.   Psychiatric:         Mood and Affect: Mood normal.         Behavior: Behavior normal.         Judgment: Judgment normal.           CHART REVIEW:     Labs:                Please note that this dictation was created using voice recognition software. I have made every reasonable attempt to correct obvious errors, but I expect that there are errors of grammar and possibly content that I did not discover before finalizing the note.    "

## 2024-03-26 NOTE — ASSESSMENT & PLAN NOTE
Chronic, controlled.  Tolerating/continue current medications:  Trulicity 1.5mg every Saturday  Metformin XR 1000mg once daily

## 2024-03-26 NOTE — ASSESSMENT & PLAN NOTE
Chronic, controlled.  Tolerating paxil 30mg for years and it seems to help overall.  Still has episodes where she feels down but overall feels good.

## 2024-03-26 NOTE — ASSESSMENT & PLAN NOTE
Chronic, uncontrolled.  Has been on prevacid for a couple years.  Stopped working a couple months.  Denies CP, SOB, chest heaviness/pressure.  Worse at night when she lies down.  Has bile/acid taste in mouth when she wakes up.  NOT worse with exertion.      Somewhat better with omeprazole 20mg but sometimes has to take 40mg.

## 2024-05-09 DIAGNOSIS — I10 PRIMARY HYPERTENSION: ICD-10-CM

## 2024-05-09 RX ORDER — TELMISARTAN 80 MG/1
80 TABLET ORAL
Qty: 100 TABLET | Refills: 3 | Status: SHIPPED | OUTPATIENT
Start: 2024-05-09

## 2024-05-09 NOTE — TELEPHONE ENCOUNTER
Received request via: Pharmacy    Was the patient seen in the last year in this department? Yes    Does the patient have an active prescription (recently filled or refills available) for medication(s) requested? No    Pharmacy Name: [facility] on [address]    Does the patient have [facility] and need 100 day supply (blood pressure, diabetes and cholesterol meds only)? Patient does not have SCP

## 2024-05-20 DIAGNOSIS — I25.10 CORONARY ARTERY CALCIFICATION SEEN ON CT SCAN: ICD-10-CM

## 2024-05-20 DIAGNOSIS — E78.41 ELEVATED LIPOPROTEIN(A): ICD-10-CM

## 2024-05-20 RX ORDER — ASPIRIN 325 MG
325 TABLET, DELAYED RELEASE (ENTERIC COATED) ORAL DAILY
Qty: 100 TABLET | Refills: 3 | Status: SHIPPED | OUTPATIENT
Start: 2024-05-20

## 2024-06-06 ENCOUNTER — HOSPITAL ENCOUNTER (OUTPATIENT)
Dept: LAB | Facility: MEDICAL CENTER | Age: 69
End: 2024-06-06
Payer: MEDICARE

## 2024-06-06 DIAGNOSIS — E11.51 TYPE 2 DIABETES MELLITUS WITH DIABETIC PERIPHERAL ANGIOPATHY WITHOUT GANGRENE, WITHOUT LONG-TERM CURRENT USE OF INSULIN (HCC): ICD-10-CM

## 2024-06-06 DIAGNOSIS — E78.5 HYPERLIPIDEMIA LDL GOAL <70: ICD-10-CM

## 2024-06-06 DIAGNOSIS — I10 PRIMARY HYPERTENSION: ICD-10-CM

## 2024-06-06 PROCEDURE — 80053 COMPREHEN METABOLIC PANEL: CPT

## 2024-06-06 PROCEDURE — 80061 LIPID PANEL: CPT

## 2024-06-06 PROCEDURE — 36415 COLL VENOUS BLD VENIPUNCTURE: CPT

## 2024-06-06 PROCEDURE — 82043 UR ALBUMIN QUANTITATIVE: CPT

## 2024-06-06 PROCEDURE — 82570 ASSAY OF URINE CREATININE: CPT

## 2024-06-07 DIAGNOSIS — F51.04 CHRONIC INSOMNIA: ICD-10-CM

## 2024-06-07 LAB
ALBUMIN SERPL BCP-MCNC: 4.3 G/DL (ref 3.2–4.9)
ALBUMIN/GLOB SERPL: 1.7 G/DL
ALP SERPL-CCNC: 64 U/L (ref 30–99)
ALT SERPL-CCNC: 22 U/L (ref 2–50)
ANION GAP SERPL CALC-SCNC: 15 MMOL/L (ref 7–16)
AST SERPL-CCNC: 20 U/L (ref 12–45)
BILIRUB SERPL-MCNC: 0.3 MG/DL (ref 0.1–1.5)
BUN SERPL-MCNC: 21 MG/DL (ref 8–22)
CALCIUM ALBUM COR SERPL-MCNC: 9 MG/DL (ref 8.5–10.5)
CALCIUM SERPL-MCNC: 9.2 MG/DL (ref 8.5–10.5)
CHLORIDE SERPL-SCNC: 109 MMOL/L (ref 96–112)
CHOLEST SERPL-MCNC: 157 MG/DL (ref 100–199)
CO2 SERPL-SCNC: 20 MMOL/L (ref 20–33)
CREAT SERPL-MCNC: 0.86 MG/DL (ref 0.5–1.4)
CREAT UR-MCNC: 287.27 MG/DL
FASTING STATUS PATIENT QL REPORTED: NORMAL
GFR SERPLBLD CREATININE-BSD FMLA CKD-EPI: 73 ML/MIN/1.73 M 2
GLOBULIN SER CALC-MCNC: 2.5 G/DL (ref 1.9–3.5)
GLUCOSE SERPL-MCNC: 147 MG/DL (ref 65–99)
HDLC SERPL-MCNC: 73 MG/DL
LDLC SERPL CALC-MCNC: 59 MG/DL
MICROALBUMIN UR-MCNC: 8.5 MG/DL
MICROALBUMIN/CREAT UR: 30 MG/G (ref 0–30)
POTASSIUM SERPL-SCNC: 4.7 MMOL/L (ref 3.6–5.5)
PROT SERPL-MCNC: 6.8 G/DL (ref 6–8.2)
SODIUM SERPL-SCNC: 144 MMOL/L (ref 135–145)
TRIGL SERPL-MCNC: 125 MG/DL (ref 0–149)

## 2024-06-10 RX ORDER — TRAZODONE HYDROCHLORIDE 50 MG/1
50 TABLET ORAL
Qty: 90 TABLET | Refills: 2 | Status: SHIPPED | OUTPATIENT
Start: 2024-06-10

## 2024-06-11 ENCOUNTER — OFFICE VISIT (OUTPATIENT)
Dept: VASCULAR LAB | Facility: MEDICAL CENTER | Age: 69
End: 2024-06-11
Payer: MEDICARE

## 2024-06-11 VITALS
SYSTOLIC BLOOD PRESSURE: 139 MMHG | DIASTOLIC BLOOD PRESSURE: 83 MMHG | HEART RATE: 78 BPM | WEIGHT: 206 LBS | BODY MASS INDEX: 34.32 KG/M2 | HEIGHT: 65 IN

## 2024-06-11 DIAGNOSIS — E11.69 DYSLIPIDEMIA ASSOCIATED WITH TYPE 2 DIABETES MELLITUS (HCC): ICD-10-CM

## 2024-06-11 DIAGNOSIS — I25.10 CORONARY ARTERY CALCIFICATION SEEN ON CT SCAN: ICD-10-CM

## 2024-06-11 DIAGNOSIS — I10 PRIMARY HYPERTENSION: ICD-10-CM

## 2024-06-11 DIAGNOSIS — E78.41 ELEVATED LIPOPROTEIN(A): ICD-10-CM

## 2024-06-11 DIAGNOSIS — I73.9 PAD (PERIPHERAL ARTERY DISEASE) (HCC): ICD-10-CM

## 2024-06-11 DIAGNOSIS — E11.51 TYPE 2 DIABETES MELLITUS WITH DIABETIC PERIPHERAL ANGIOPATHY WITHOUT GANGRENE, WITHOUT LONG-TERM CURRENT USE OF INSULIN (HCC): ICD-10-CM

## 2024-06-11 DIAGNOSIS — Z79.02 LONG TERM CURRENT USE OF ANTITHROMBOTICS/ANTIPLATELETS: ICD-10-CM

## 2024-06-11 DIAGNOSIS — E78.5 DYSLIPIDEMIA ASSOCIATED WITH TYPE 2 DIABETES MELLITUS (HCC): ICD-10-CM

## 2024-06-11 PROCEDURE — 3079F DIAST BP 80-89 MM HG: CPT

## 2024-06-11 PROCEDURE — 99214 OFFICE O/P EST MOD 30 MIN: CPT

## 2024-06-11 PROCEDURE — 99212 OFFICE O/P EST SF 10 MIN: CPT

## 2024-06-11 PROCEDURE — 3075F SYST BP GE 130 - 139MM HG: CPT

## 2024-06-11 RX ORDER — METFORMIN HYDROCHLORIDE 500 MG/1
1000 TABLET, EXTENDED RELEASE ORAL 2 TIMES DAILY
Qty: 360 TABLET | Refills: 3 | Status: SHIPPED | OUTPATIENT
Start: 2024-06-11

## 2024-06-11 ASSESSMENT — ENCOUNTER SYMPTOMS
BRUISES/BLEEDS EASILY: 0
BLOOD IN STOOL: 0
NAUSEA: 0
WEIGHT LOSS: 0
DIZZINESS: 0
SPEECH CHANGE: 0
WEAKNESS: 0
CLAUDICATION: 0
COUGH: 0
FOCAL WEAKNESS: 0
MYALGIAS: 0
PALPITATIONS: 0
NERVOUS/ANXIOUS: 0
TINGLING: 1
SHORTNESS OF BREATH: 0
FEVER: 0
CHILLS: 0

## 2024-06-11 ASSESSMENT — FIBROSIS 4 INDEX: FIB4 SCORE: 1.2

## 2024-06-11 NOTE — PROGRESS NOTES
"RESISTANT HTN CLINIC - Follow Up EVALUATION   06/11/2024         History of Present Illness:   Dunia Her is a 67 yo female seen for evaluation of resistant HTN and management.   Dunia Her is initially referred by Ref Not In Computer     Subjective    HTN:  Home BP log:  not checking,   Good med adherence  Overall, feeling well,   Has been noting increased leg issues - \"stinging\" type pain from feet to groin, usually upon standing and initial first steps.    Due for annual foot exam  Some worsening claudication symptoms, only able to walk to end of block now, used to be able to walk around the whole block.  Pain with walking after about 10 minutes, and resolves within 1-2 min.  No temp changes, but notes her toes and feet are more \"purple\" in color, occasional leg cramping at rest.  Continues to eat more sugars and carbs - breads and pastas  Did fasting labs      Adherence to current HTN meds: compliant all of the time  Hx of clinical ASCVD events: Symptomatic PAD (hx of claudication with HERNANDEZ <0.85, previous revascularization/amputation    Lifestyle factors:  Weight Change: up a few lbs from last - 4lbs  BMI Readings from Last 5 Encounters:   06/11/24 34.28 kg/m²   03/26/24 33.81 kg/m²   02/20/24 33.61 kg/m²   01/17/24 33.43 kg/m²   11/30/23 33.44 kg/m²     Diet pattern: low red meat, increase veg, has lost weight on Med diet in past, more comfort foods recently, more carbs and sugars  Daily salt intake estimate:  Low   - none   EtOH: No  Exercise habits: minimal exercise  Perceived barriers to care: leg pain  Pertinent HTN hx (reviewed at initial visit):   Age at HTN dx: several years   (if female, any hx of pregnancy-related HTN): n/a   Past HTN medications: as noted   HTN crises:  No prior hospitalization or crises   Interfering substances/contributing factors:  None    Hyperlipidemia:    Stable, tolerating rosuva   Occasional tolerable myalgias  Clinical evidence of ASCVD: Symptomatic PAD (hx of " claudication with HERNANDEZ <0.85, previous revascularization/amputation    Antiplatelet/anticoagulation: Yes, Details: ASA , no bleeding noted     T2D:  Increase leg/foot pains, no other symptoms  Tolerating meds, but thinks Trulicity is causing increased appetite  Has f/u with [facility] pharmacist - now yearly appts  Last A1c   Lab Results   Component Value Date    HBA1C 6.9 (A) 03/26/2024     Lab Results   Component Value Date/Time    MALBCRT 30 06/06/2024 09:24 AM    MICROALBUR 8.5 06/06/2024 09:24 AM            Problem List:     Patient Active Problem List    Diagnosis Date Noted    Gastroesophageal reflux disease without esophagitis 11/30/2023    Osteopenia of neck of left femur 08/02/2023    Controlled type 2 diabetes mellitus with complication, without long-term current use of insulin ([facility]) 06/02/2023    Mixed stress and urge urinary incontinence 06/02/2023    COPD (chronic obstructive pulmonary disease) ([facility]) 04/20/2022    Lung nodule 04/20/2022    Greater trochanteric bursitis of left hip 01/31/2022    Elevated lipoprotein(a) 12/29/2021    Healthcare maintenance 06/17/2021    Essential hypertension 10/06/2020    Coronary artery calcification seen on CT scan 04/07/2020    PAD (peripheral artery disease) ([facility]) 02/07/2020    Recurrent major depressive disorder, in partial remission ([facility]) 02/10/2019    Chronic insomnia 04/18/2016    Dyslipidemia associated with type 2 diabetes mellitus ([facility]) 10/02/2014    Class 1 obesity due to excess calories with body mass index (BMI) of 34.0 to 34.9 in adult 10/02/2014       Current Outpatient Medications:     metFORMIN ER, 1,000 mg, Oral, BID    traZODone, 50 mg, Oral, QHS PRN, Taking    aspirin EC, 325 mg, Oral, DAILY, Taking    telmisartan, 80 mg, Oral, QHS, Taking    HYDROcodone-acetaminophen, 12, Taking    omeprazole, 20 mg, Oral, DAILY, Taking    rosuvastatin, TAKE ONE AND ONE-HALF TABLETS EVERY EVENING, Taking    amLODIPine, 5 mg, Oral, QHS, Taking    albuterol, 1-2 Puff,  "Inhalation, Q4HRS PRN, Taking    albuterol, 2.5 mg, Nebulization, Q4HRS PRN, Taking    PARoxetine, 30 mg, Oral, DAILY, Taking    Dulaglutide, Inject 0.5 mL under the skin., Taking    ibuprofen, 600 mg, Oral, QDAY PRN, Taking    vitamin D3, 3,000 Units, Oral, QAM, Taking   Sulfa drugs     Social History:     Social History     Tobacco Use    Smoking status: Former     Current packs/day: 0.00     Average packs/day: 1 pack/day for 49.4 years (49.4 ttl pk-yrs)     Types: Cigarettes     Start date:      Quit date: 2017     Years since quittin.0    Smokeless tobacco: Never   Vaping Use    Vaping status: Never Used   Substance Use Topics    Alcohol use: No     Alcohol/week: 0.0 oz    Drug use: Never       Review of Systems:   Review of Systems   Constitutional:  Negative for chills, fever and weight loss.   HENT:  Negative for nosebleeds.    Respiratory:  Negative for cough and shortness of breath.    Cardiovascular:  Positive for leg swelling. Negative for chest pain, palpitations and claudication.   Gastrointestinal:  Negative for blood in stool, melena and nausea. Abdominal pain: occasional.  Genitourinary:  Negative for hematuria.   Musculoskeletal:  Positive for joint pain. Negative for myalgias.   Neurological:  Positive for tingling (stinging). Negative for dizziness, speech change, focal weakness and weakness.   Endo/Heme/Allergies:  Does not bruise/bleed easily.   Psychiatric/Behavioral:  The patient is not nervous/anxious.          Objective     Objective:     Vitals:    24 1020 24 1024   BP: (!) 153/87 139/83   BP Location: Left arm Left arm   Patient Position: Sitting Sitting   BP Cuff Size: Large adult Large adult   Pulse: 78 78   Weight: 93.4 kg (206 lb)    Height: 1.651 m (5' 5\")        Body mass index is 34.28 kg/m².  BP Readings from Last 5 Encounters:   24 139/83   24 126/74   24 121/76   24 110/58   23 120/68      Physical Exam  Vitals reviewed. " "  Constitutional:       General: She is not in acute distress.  HENT:      Head: Normocephalic and atraumatic.   Eyes:      General: No scleral icterus.  Cardiovascular:      Rate and Rhythm: Normal rate and regular rhythm.      Pulses:           Dorsalis pedis pulses are 0 on the right side and 0 on the left side.      Heart sounds: Normal heart sounds. No murmur heard.     Comments: No palpable pulses, but appear well perfused, warm to touch, <3sec cap refill   Pulmonary:      Effort: Pulmonary effort is normal. No respiratory distress.      Breath sounds: Normal breath sounds. No wheezing.   Musculoskeletal:         General: No swelling. Normal range of motion.      Right lower leg: No edema.      Left lower leg: No edema.   Skin:     General: Skin is warm and dry.      Capillary Refill: Capillary refill takes 2 to 3 seconds.      Coloration: Skin is not pale.      Findings: No erythema.      Comments: No wounds   Neurological:      General: No focal deficit present.      Mental Status: She is alert and oriented to person, place, and time.      Motor: No weakness.      Gait: Gait normal.   Psychiatric:         Mood and Affect: Mood normal.         Behavior: Behavior normal.         Thought Content: Thought content normal.        Accessory Clinical Findings:     Lab Results   Component Value Date    CHOLSTRLTOT 157 06/06/2024    LDL 59 06/06/2024    HDL 73 06/06/2024    TRIGLYCERIDE 125 06/06/2024      Lab Results   Component Value Date/Time    LIPOPROTA 125 (H) 12/27/2021 10:41 AM      No results found for: \"APOB\"         Lab Results   Component Value Date    HBA1C 6.9 (A) 03/26/2024      Lab Results   Component Value Date    SODIUM 144 06/06/2024    POTASSIUM 4.7 06/06/2024    CHLORIDE 109 06/06/2024    CO2 20 06/06/2024    GLUCOSE 147 (H) 06/06/2024    BUN 21 06/06/2024    CREATININE 0.86 06/06/2024          Lab Results   Component Value Date    URCREAT 287.27 06/06/2024    MICROALBUR 8.5 06/06/2024    MALBCRT " 30 06/06/2024     VASCULAR IMAGING:     Last EKG:  Reviewed     HERNANDEZ 2/2020   Right HERNANDEZ: 1.02  Left HERNANDEZ:  0.79   IMPRESSION:   1.  Post occlusive waveform is noted in the distal left peroneal artery. Short segment occlusion or high-grade stenosis proximal to this location is likely present, although not directly visualized.   2.  No other evidence of occlusion or significant stenosis bilaterally.   3.  Moderate calcified atherosclerotic plaque is identified in each lower extremity.    CAC 2/2020  Coronary calcification:  LMA - 0.0  LCX - 0.0  LAD - 315  RCA - 260.8   Total Calcium Score: 575.8   Percentile: Calcium score is above the 90th percentile for the patient's age and sex.   Other findings:  Heart: Normal size.  Lungs: Tiny granuloma in the left upper lobe.  Mediastinum: Normal.  Upper abdomen: Normal.  Spondylotic changes of the spine.    MPI 4/2020  NUCLEAR IMAGING INTERPRETATION   Normal left ventricular size, ejection fraction, and wall motion.    Small fixed defect in the inferolateral wall could be artifact. Infarct is in    the differential but consider less likely.     No reversible defect.    Sinus rhythm.   Nondiagnostic ECG portion of a Regadenoson nuclear stress test.   ECG INTERPRETATION   Nondiagnostic ECG portion of a Regadenoson nuclear stress test.    CT abd 2/2021   The abdominal aorta is normal in caliber with aortic atherosclerosis.    CT chest 10/2021   1.  Stable 1.1 cm right upper lobe groundglass density nodule. Continued follow-up is recommended.  2.  Cholelithiasis.  3.  Hepatic steatosis  Cardiac: Heart normal in size without pericardial effusion.   Vascular: Atherosclerotic calcifications of the coronary arteries, aorta and branches...    LE art with HERNANDEZ 5/2023  1.  Diffuse atherosclerotic changes without focal high-grade stenosis.  2.  RIGHT peroneal artery is not visualized.  3.  Ankle brachial indices indicate mild atherosclerotic disease on the LEFT.        1. Type 2 diabetes  mellitus with diabetic peripheral angiopathy without gangrene, without long-term current use of insulin (HCC)  metFORMIN ER (GLUCOPHAGE XR) 500 MG TABLET SR 24 HR      2. PAD (peripheral artery disease) (HCC)  US-EXTREMITY ARTERY LOWER BILAT W/HERNANDEZ (COMBO)      3. Coronary artery calcification seen on CT scan        4. Elevated lipoprotein(a)        5. Primary hypertension        6. Dyslipidemia associated with type 2 diabetes mellitus (HCC)        7. Long term current use of antithrombotics/antiplatelets              ASSESSMENT AND PLAN:    1. BLOOD PRESSURE CONTROL   Qualifies as Resistant HTN (RH):  No, not on optimized, max-dosed or max-tolerated meds from 3 classes   Office BP Goal ACC/AHA (2017) goal <130/80  Home BP at goal:  not checking  Office BP at goal: no, endorses stressed about upcoming court/guardianship for granddaughter  Home readings 120/70-80s previously, no current readings  Good med adherence    2. WORK UP FOR SECONDARY CAUSES OF RH:  Obstructive Sleep Apnea: Not Yet Assessed- no current symptoms  Renal parenchymal disease: Excluded  Renovascular HTN: Not Yet Assessed  Primary Aldosteronism: Not Yet Assessed  Thyroid Function: Excluded 12/2021  Pheochromocytoma: Not Yet Assessed   Cushing's:   Not Yet Assessed   Coarctation of Aorta: excluded  Other: none identified    Recommendations At This Visit: as noted in orders         3. MEDICATION USE / ADHERENCE:   Assessment: Complete  Recommendations: Instructed to Continue Taking All Medications As Prescribed         4. HTN-MEDIATED END-ORGAN DAMAGE:  Left Ventricular Hypertrophy: Absent on  ECG Date: 2020  Urine Albuminuria: None on Date: 12/2021  Renal Function: Chronic Kidney Disease  CKD G2 at worst   Ophthalmologic: Absent per patient report and/or eye specialist   Established Cardiovascular Disease:     # CAD per CT scan - continue current med mgmt, no further surveillance and no prior ASCVD events     # PAD, LLE inflow - chronic, worsening,  appears to have worsening sx with walking ~10 min, resolves in a minute or 2,  but was able to walk further without leg pain (closer to 15-20 min), is noting more color changes, but no temp changes, no wounds, and is occasionally having resting pain.  On exam, unable to palpate post tib pulses, BLE feet slightly dusky in color, <3sec cap refill, and warm to touch, appear well perfused.      mild disease noted on most recent imaging 5/2023 as ordered by cardiology.    - update imaging given worsening of symptoms.  BLE art/sheree ordered today  -Reviewed red flags.  - continue exercise, TLC, med mgmt      5. LIFESTYLE INTERVENTIONS:    SMOKING:   reports that she quit smoking about 7 years ago. Her smoking use included cigarettes. She started smoking about 56 years ago. She has a 49.4 pack-year smoking history. She has never used smokeless tobacco.   - continued complete avoidance of all tobacco products     PHYSICAL ACTIVITY: continue healthy activity to improve CV fitness.  In general, targeting >150min/week of moderate-level activity.      WEIGHT MANAGEMENT AND NUTRITION: Dietary plan was discussed with patient at this visit including DASH-dietary approaches, substitution of MUFA/PUFA for saturated fats, substituting whole grains for simple carbs, substituting lean meats for fatty meats, increase use of plant-based protein vs animal-based, lowered sodium.  Additional details reviewed with patient and/or outlined in care instructions.    6. STANDARD HTN PHARMACOTHERAPY:  ACEI/ARB: continue telmisartan 80mg daily   Thiazide Type Diuretic: consider as next agent   DHP-CCB: continue amlodipine 5mg qh    7. ADDITIONAL AGENTS   Aldosterone Receptor Antagonist: not indicated   Loop Diuretic: not indicated   Peripheral Alpha Blocker: not indicated   BB: not indicated, stopped on prior visits as unlikely beneficial for BP   Non-DHP-CCB: not indicated   Direct Vasodilator: not indicated   Centrally Acting Alpha Agonist: not  "indicated   Potassium-sparing diuretic: not indicated   DRI: not indicated     LIPID MANAGEMENT:   Qualifies for Statin Therapy Based on 2018 ACC/AHA Guidelines: yes, Secondary Prevention - Very high risk ASCVD - 2 major events or 1 event with other high-risk conditions, Diabetes and HERNANDEZ <0.9  The 10-year ASCVD risk score (Carl WOLFF, et al., 2019) is: 21%, 7.5 - <20% \"intermediate risk\"   Major ASCVD events: Symptomatic PAD (hx of claudication with HERNANDEZ <0.85, previous revascularization/amputation  High-risk conditions: >64yr old , Diabetes , Hypertension  and N/A  Risk-enhancers: N/A  Currently on Statin: Yes  Treatment goals: reduce LDL-C >50%  and LDL-C <70 (consider non-HDL-C <100, apoB <80)  At goal? Yes 6/2024, LDL 59  Plan:   - reinforced ongoing TLC measures as noted   - monitor labs in 6 mo - order at next appt,   -intensify therapy if remains above goal on next labs  Meds:   - continue rosuvastatin 30 mg daily, although not a usual dose    GLYCEMIA MANAGEMENT:  Diabetic   Goal A1c < 7.0  Lab Results   Component Value Date    HBA1C 6.9 (A) 03/26/2024      Lab Results   Component Value Date/Time    MALBCRT 30 06/06/2024 09:24 AM    MICROALBUR 8.5 06/06/2024 09:24 AM   Recommend aggressive treatment due to pt's overall risk.    Close f/u with [facility] clinic; try not to miss visits! - is on yearly follow up for now per their recommendations, consider sooner if increases above goal.  Long discussion today with pt on increasing A1c, increased carbs/sugar intake and need for intensification of lifestyle changes.  Pt expressed concern with Trulicity possibly causing increased appetite, reviewed dietary changes and refocusing on reducing sugars/carbs and portion sizes.  Pt agreeable to moving up DM [facility] appt sooner, and increasing metformin in the meantime.     Increased \"stinging\" type sensation in BLE feet to groin upon standing and walking, pt is due for annual foot exam, recommend pt follow up with PCP to discuss " further.    - Continue Trulicity as per DM clinic - pt to discuss other options with pharmacists at upcoming appt  - INCREASE metformin SR to 1000mg BID  - a1c and f/u per [facility] clinic  - recommmend for routine care to include regular A1c monitoring, annual albumin/creatinine ratio (ACR), annual diabetic retinopathy screening, foot exams, annual flu vaccine, and updates to pneumonia vaccines as appropriate     ANTIPLATELET/ANTICOAGULANT THERAPY: continue asa 325mg daily  Monitor for bleeding    OTHER ISSUES:     # MDD, recurrent - stable, continue current meds, f/u with PCP      Studies Ordered At This Visit: BLE art/sheree    Blood Work to Be Obtained Prior to Next Visit: none, A1c per DM clinic    Disposition:  3 mo to review bps and imaging,   With DM at end of month - pt to call and schedule appt for after 6/27/24 to allow for updated A1c (msg to pharm pool as well)        [facility].  [facility]   [facility]   [phone]

## 2024-06-11 NOTE — Clinical Note
Pt with increasing A1c, does not have follow up with DM clinic until Nov.  Pt would like to be seen sooner if possible.  Also has questions about her meds and possible changes

## 2024-06-19 ENCOUNTER — OFFICE VISIT (OUTPATIENT)
Dept: CARDIOLOGY | Facility: MEDICAL CENTER | Age: 69
End: 2024-06-19
Attending: INTERNAL MEDICINE
Payer: MEDICARE

## 2024-06-19 VITALS
HEART RATE: 86 BPM | OXYGEN SATURATION: 95 % | DIASTOLIC BLOOD PRESSURE: 70 MMHG | BODY MASS INDEX: 34.16 KG/M2 | RESPIRATION RATE: 16 BRPM | WEIGHT: 205 LBS | SYSTOLIC BLOOD PRESSURE: 122 MMHG | HEIGHT: 65 IN

## 2024-06-19 DIAGNOSIS — I25.10 CORONARY ARTERY CALCIFICATION SEEN ON CT SCAN: ICD-10-CM

## 2024-06-19 DIAGNOSIS — I73.9 PAD (PERIPHERAL ARTERY DISEASE) (HCC): Chronic | ICD-10-CM

## 2024-06-19 DIAGNOSIS — I10 ESSENTIAL HYPERTENSION: ICD-10-CM

## 2024-06-19 DIAGNOSIS — E11.69 DYSLIPIDEMIA ASSOCIATED WITH TYPE 2 DIABETES MELLITUS (HCC): Chronic | ICD-10-CM

## 2024-06-19 DIAGNOSIS — E78.5 DYSLIPIDEMIA ASSOCIATED WITH TYPE 2 DIABETES MELLITUS (HCC): Chronic | ICD-10-CM

## 2024-06-19 PROCEDURE — 99214 OFFICE O/P EST MOD 30 MIN: CPT | Performed by: INTERNAL MEDICINE

## 2024-06-19 PROCEDURE — 99213 OFFICE O/P EST LOW 20 MIN: CPT | Performed by: INTERNAL MEDICINE

## 2024-06-19 PROCEDURE — 3074F SYST BP LT 130 MM HG: CPT | Performed by: INTERNAL MEDICINE

## 2024-06-19 PROCEDURE — 3078F DIAST BP <80 MM HG: CPT | Performed by: INTERNAL MEDICINE

## 2024-06-19 RX ORDER — CILOSTAZOL 50 MG/1
50 TABLET ORAL 2 TIMES DAILY
Qty: 60 TABLET | Refills: 3 | Status: SHIPPED | OUTPATIENT
Start: 2024-06-19

## 2024-06-19 ASSESSMENT — ENCOUNTER SYMPTOMS
DECREASED APPETITE: 0
PND: 0
WEIGHT GAIN: 0
DEPRESSION: 0
IRREGULAR HEARTBEAT: 0
PALPITATIONS: 0
CONSTIPATION: 0
HEARTBURN: 0
SYNCOPE: 0
DIARRHEA: 0
DIZZINESS: 0
FEVER: 0
SHORTNESS OF BREATH: 0
ORTHOPNEA: 0
BACK PAIN: 0
WEIGHT LOSS: 0
NAUSEA: 0
BLURRED VISION: 0
ABDOMINAL PAIN: 0
DYSPNEA ON EXERTION: 0
VOMITING: 0
COUGH: 0
ALTERED MENTAL STATUS: 0
NEAR-SYNCOPE: 0
FLANK PAIN: 0
CLAUDICATION: 0

## 2024-06-19 ASSESSMENT — FIBROSIS 4 INDEX: FIB4 SCORE: 1.21

## 2024-06-19 NOTE — PROGRESS NOTES
Cardiology Note    Chief Complaint   Patient presents with    Dyslipidemia    Hypertension    Coronary Artery Disease       History of Present Illness: Dunia Her is a 69 y.o. female PMH former smoker 50 pack years, asthma, DM2, MVP, HTN, HLD, CAD /PAD /ASCVD, elevated lipoprotein (a) presents for follow up.     Chronic claudication symptoms which start after about a block mostly in calves. She rests and can start walking again after. Follows with vascular medicine for serial imaging. No other cardiac complaints. Compliant with medications and denies adverse effects.    Review of Systems   Constitutional: Negative for decreased appetite, fever, malaise/fatigue, weight gain and weight loss.   HENT:  Negative for congestion and nosebleeds.    Eyes:  Negative for blurred vision.   Cardiovascular:  Negative for chest pain, claudication, dyspnea on exertion, irregular heartbeat, leg swelling, near-syncope, orthopnea, palpitations, paroxysmal nocturnal dyspnea and syncope.   Respiratory:  Negative for cough and shortness of breath.    Endocrine: Negative for cold intolerance and heat intolerance.   Skin:  Negative for rash.   Musculoskeletal:  Negative for back pain.   Gastrointestinal:  Negative for abdominal pain, constipation, diarrhea, heartburn, melena, nausea and vomiting.   Genitourinary:  Negative for dysuria, flank pain and hematuria.   Neurological:  Negative for dizziness.   Psychiatric/Behavioral:  Negative for altered mental status and depression.          Past Medical History:   Diagnosis Date    Hyperlipidemia LDL goal < 100 10/02/2014    Hypertension     Obesity (BMI 30-39.9) 10/02/2014    Pre-diabetes 10/02/2014    Recurrent sinusitis     Type II or unspecified type diabetes mellitus without mention of complication, not stated as uncontrolled     pre-diabetes    Wheezing          Past Surgical History:   Procedure Laterality Date    ND LAP,APPENDECTOMY N/A 2/12/2021    Procedure: APPENDECTOMY,  LAPAROSCOPIC;  Surgeon: Juan Dawson M.D.;  Location: SURGERY [facility];  Service: General    ABDOMINAL HYSTERECTOMY TOTAL  1993    benign cysts, total         Current Outpatient Medications   Medication Sig Dispense Refill    cilostazol (PLETAL) 50 MG tablet Take 1 Tablet by mouth 2 times a day. 60 Tablet 3    metFORMIN ER (GLUCOPHAGE XR) 500 MG TABLET SR 24 HR Take 2 Tablets by mouth 2 times a day. 360 Tablet 3    traZODone (DESYREL) 50 MG Tab TAKE ONE TABLET BY MOUTH EVERY NIGHT AT BEDTIME AS NEEDED FOR SLEEP 90 Tablet 2    aspirin EC (ECOTRIN) 325 MG Tablet Delayed Response Take 1 Tablet by mouth every day. 100 Tablet 3    telmisartan (MICARDIS) 80 MG Tab Take 1 Tablet by mouth at bedtime. 100 Tablet 3    HYDROcodone-acetaminophen (NORCO) 5-325 MG Tab per tablet 12      omeprazole (PRILOSEC) 20 MG delayed-release capsule TAKE 1 CAPSULE BY MOUTH DAILY 90 Capsule 3    rosuvastatin (CRESTOR) 20 MG Tab TAKE ONE AND ONE-HALF TABLETS EVERY EVENING 135 Tablet 1    amLODIPine (NORVASC) 5 MG Tab Take 1 Tablet by mouth at bedtime. 90 Tablet 3    albuterol 108 (90 Base) MCG/ACT Aero Soln inhalation aerosol Inhale 1-2 Puffs every four hours as needed for Shortness of Breath. 1 Each 6    albuterol (PROVENTIL) 2.5mg/3ml Nebu Soln solution for nebulization Take 3 mL by nebulization every four hours as needed for Shortness of Breath. 120 Each 3    PARoxetine (PAXIL) 30 MG Tab Take 1 Tablet by mouth every day. 90 Tablet 3    Dulaglutide 4.5 MG/0.5ML Solution Pen-injector Inject 0.5 mL under the skin.      ibuprofen (MOTRIN) 200 MG Tab Take 3 Tablets by mouth 1 time a day as needed (Moderate pain). 3 tablets = 600 mg.      vitamin D (CHOLECALCIFEROL) 1000 Unit (25 mcg) Tab Take 3 Tablets by mouth every morning. 3 tablets = 3000 units.       No current facility-administered medications for this visit.         Allergies   Allergen Reactions    Sulfa Drugs Hives         Family History   Problem Relation Age of Onset    Cancer  Mother         pancreatic     Heart Disease Father     Heart Attack Father     Diabetes Father         pre-diabetes    Stroke Neg Hx          Social History     Socioeconomic History    Marital status:      Spouse name: Not on file    Number of children: Not on file    Years of education: Not on file    Highest education level: Some college, no degree   Occupational History    Not on file   Tobacco Use    Smoking status: Former     Current packs/day: 0.00     Average packs/day: 1 pack/day for 49.4 years (49.4 ttl pk-yrs)     Types: Cigarettes     Start date:      Quit date: 2017     Years since quittin.0    Smokeless tobacco: Never   Vaping Use    Vaping status: Never Used   Substance and Sexual Activity    Alcohol use: No     Alcohol/week: 0.0 oz    Drug use: Never    Sexual activity: Not Currently     Comment:    Other Topics Concern    Not on file   Social History Narrative    Not on file     Social Determinants of Health     Financial Resource Strain: Low Risk  (7/3/2022)    Overall Financial Resource Strain (CARDIA)     Difficulty of Paying Living Expenses: Not very hard   Food Insecurity: No Food Insecurity (7/3/2022)    Hunger Vital Sign     Worried About Running Out of Food in the Last Year: Never true     Ran Out of Food in the Last Year: Never true   Transportation Needs: No Transportation Needs (7/3/2022)    PRAPARE - Transportation     Lack of Transportation (Medical): No     Lack of Transportation (Non-Medical): No   Physical Activity: Sufficiently Active (7/3/2022)    Exercise Vital Sign     Days of Exercise per Week: 4 days     Minutes of Exercise per Session: 40 min   Stress: Stress Concern Present (7/3/2022)    [place] [employer] - Occupational Stress Questionnaire     Feeling of Stress : To some extent   Social Connections: Moderately Integrated (7/3/2022)    Social Connection and Isolation Panel [NHANES]     Frequency of Communication with Friends  "and Family: More than three times a week     Frequency of Social Gatherings with Friends and Family: Once a week     Attends Christian Services: 1 to 4 times per year     Active Member of Clubs or Organizations: No     Attends Club or Organization Meetings: Not on file     Marital Status:    Intimate Partner Violence: Not on file   Housing Stability: Low Risk  (7/3/2022)    Housing Stability Vital Sign     Unable to Pay for Housing in the Last Year: No     Number of Places Lived in the Last Year: 1     Unstable Housing in the Last Year: No         Physical Exam:  Ambulatory Vitals  /70 (BP Location: Left arm, Patient Position: Sitting, BP Cuff Size: Adult)   Pulse 86   Resp 16   Ht 1.651 m (5' 5\")   Wt 93 kg (205 lb)   SpO2 95%    BP Readings from Last 4 Encounters:   06/19/24 122/70   06/11/24 139/83   03/26/24 126/74   02/20/24 121/76     Weight/BMI:   Vitals:    06/19/24 1441   BP: 122/70   Weight: 93 kg (205 lb)   Height: 1.651 m (5' 5\")    Body mass index is 34.11 kg/m².  Wt Readings from Last 4 Encounters:   06/19/24 93 kg (205 lb)   06/11/24 93.4 kg (206 lb)   03/26/24 92.2 kg (203 lb 3.2 oz)   02/20/24 91.6 kg (202 lb)       Physical Exam  Constitutional:       General: She is not in acute distress.  HENT:      Head: Normocephalic and atraumatic.   Eyes:      Conjunctiva/sclera: Conjunctivae normal.      Pupils: Pupils are equal, round, and reactive to light.   Neck:      Vascular: No JVD.   Cardiovascular:      Rate and Rhythm: Normal rate and regular rhythm.      Heart sounds: Normal heart sounds. No murmur heard.     No friction rub. No gallop.   Pulmonary:      Effort: Pulmonary effort is normal. No respiratory distress.      Breath sounds: Normal breath sounds. No wheezing or rales.   Chest:      Chest wall: No tenderness.   Abdominal:      General: Bowel sounds are normal. There is no distension.      Palpations: Abdomen is soft.   Musculoskeletal:      Cervical back: Normal range of " "motion and neck supple.   Skin:     General: Skin is warm and dry.   Neurological:      Mental Status: She is alert and oriented to person, place, and time.   Psychiatric:         Mood and Affect: Affect normal.         Judgment: Judgment normal.         Lab Data Review:  Lab Results   Component Value Date/Time    CHOLSTRLTOT 157 06/06/2024 09:23 AM    LDL 59 06/06/2024 09:23 AM    HDL 73 06/06/2024 09:23 AM    TRIGLYCERIDE 125 06/06/2024 09:23 AM       Lab Results   Component Value Date/Time    SODIUM 144 06/06/2024 09:23 AM    POTASSIUM 4.7 06/06/2024 09:23 AM    CHLORIDE 109 06/06/2024 09:23 AM    CO2 20 06/06/2024 09:23 AM    GLUCOSE 147 (H) 06/06/2024 09:23 AM    BUN 21 06/06/2024 09:23 AM    CREATININE 0.86 06/06/2024 09:23 AM     CrCl cannot be calculated (Patient's most recent lab result is older than the maximum 7 days allowed.).  Lab Results   Component Value Date/Time    ALKPHOSPHAT 64 06/06/2024 09:23 AM    ASTSGOT 20 06/06/2024 09:23 AM    ALTSGPT 22 06/06/2024 09:23 AM    TBILIRUBIN 0.3 06/06/2024 09:23 AM      Lab Results   Component Value Date/Time    WBC 6.1 11/27/2023 09:53 AM     Lab Results   Component Value Date/Time    HBA1C 6.9 (A) 03/26/2024 10:09 AM     No components found for: \"TROP\"      Cardiac Imaging and Procedures Review:      LE art vascular ultrasound 2/21/20  Left HERNANDEZ:  0.79  Right HERNANDEZ: 1.02  1.  Post occlusive waveform is noted in the distal left peroneal artery. Short segment occlusion or high-grade stenosis proximal to this location is likely present, although not directly visualized.  2.  No other evidence of occlusion or significant stenosis bilaterally.  3.  Moderate calcified atherosclerotic plaque is identified in each lower extremity.    TTE 2/2020  CONCLUSIONS  Left ventricular ejection fraction is visually estimated to be 65%.  Mild concentric left ventricular hypertrophy.  Decreased left ventricular compliance with indeterminate diastolic   function.  Normal right " ventricular size and systolic function.    Nuclear stress spect sachi 4/3/20   NUCLEAR IMAGING INTERPRETATION   Normal left ventricular size, ejection fraction, and wall motion.   Small fixed defect in the inferolateral wall could be artifact. Infarct is in the differential but consider less likely.    No reversible defect.    CAC CT 2/20/20  Coronary calcification:  LMA - 0.0  LCX - 0.0  LAD - 315  RCA - 260.8  Total Calcium Score: 575.8    Art ultrasound 5/2023  IMPRESSION:  1.  Diffuse atherosclerotic changes without focal high-grade stenosis.  2.  RIGHT peroneal artery is not visualized.  3.  Ankle brachial indices indicate mild atherosclerotic disease on the LEFT.    Medical Decision Making:  Problem List Items Addressed This Visit       Dyslipidemia associated with type 2 diabetes mellitus (HCC) (Chronic)    PAD (peripheral artery disease) (HCC) (Chronic)    Relevant Medications    cilostazol (PLETAL) 50 MG tablet    Coronary artery calcification seen on CT scan    Essential hypertension       DM2 / HLD - continue statin and aspirin. Controlled.    HTN - BP at goal <130/80. Continue antihypertensives. Controlled.    CAD/PAD/ASCVD - continue aspirin and statin. Add cilostazol. LDL at goal <70 and trig <150. Elevated Lp (a). Continue symptom limited exercise. Serial vascular testing with vascular medicine.    It was my pleasure to meet with Ms. Cool

## 2024-06-28 ENCOUNTER — OFFICE VISIT (OUTPATIENT)
Dept: MEDICAL GROUP | Facility: PHYSICIAN GROUP | Age: 69
End: 2024-06-28
Payer: MEDICARE

## 2024-06-28 DIAGNOSIS — E78.5 DYSLIPIDEMIA ASSOCIATED WITH TYPE 2 DIABETES MELLITUS (HCC): Chronic | ICD-10-CM

## 2024-06-28 DIAGNOSIS — E11.69 DYSLIPIDEMIA ASSOCIATED WITH TYPE 2 DIABETES MELLITUS (HCC): Chronic | ICD-10-CM

## 2024-06-28 DIAGNOSIS — E11.8 CONTROLLED TYPE 2 DIABETES MELLITUS WITH COMPLICATION, WITHOUT LONG-TERM CURRENT USE OF INSULIN (HCC): ICD-10-CM

## 2024-06-28 LAB
HBA1C MFR BLD: 6.6 % (ref ?–5.8)
POCT INT CON NEG: NEGATIVE
POCT INT CON POS: POSITIVE

## 2024-06-28 NOTE — PROGRESS NOTES
Patient Consult Note    TIME IN: 2:30 pm  TIME OUT: 3:02 pm    Primary care physician: Becca Marley P.A.-C.    Reason for consult: Management of Controlled Type 2 Diabetes    HPI:  Dunia Her is a 69 y.o. old patient who comes in today for evaluation of above stated problem.    Allergies  Sulfa drugs    Current Diabetes Medication Regimen  Metformin ER: 1000 mg BID  GLP-1 or GLP-1/GIP Analog: dulaglutide (Trulicity) 4.5 mg weekly      Potential Barriers to Care:  Adherence: denies missed doses  Side effects: Reports nausea after increasing metformin to 1000mg BID  Affordability: Receives Trulicity via PAP      SMBG  Pt has home glucometer and proper testing technique - Yes  Discussed BG Goals: FBG 80 - 130, 2hPP < 180, A1c < 7%    Pt reports blood sugars: hasn't been testing      Hypoglycemia  Hypoglycemia awareness: No   Nocturnal hypoglycemia: None  Hypoglycemia:  None  Pt's treatment of Hypoglycemia  Discussed 15:15 Rule    Lifestyle  Current Exercise - stationary bicycle twice weekly, walk the dog. Legs hurt which limit mobility   Exercise Goal - use stationary bicycle 150 minutes    Dietary -   Breakfast - Nothing recently Avocado on toast  Lunch - skips  Dinner - mediterranean diet (vegetables with chicken)  Snacks - Yogurt  Dessert - Yogurt  Drinks - Water, coffee    Preventative Management  BP regimen (ACEi/ARB): Telmisartan 80 mg once daily  Statin: rosuvastatin (Crestor) 30 mg daily  Last Microalbumin/Cr:  Lab Results   Component Value Date/Time    MALBCRT 30 06/06/2024 09:24 AM    MICROALBUR 8.5 06/06/2024 09:24 AM     Last A1c:  Lab Results   Component Value Date/Time    HBA1C 6.6 (A) 06/28/2024 02:55 PM      Last Retinal Scan: Completed 7/2024. Reminded pt to schedule prior to next f/u    Monofilament exam: Completed today  Monofilament testing with a 10 gram force: sensation intact: decreased bilaterally.    Visual Inspection: Feet without maceration, ulcers, fissures.  Feet dry.  Pedal  pulses: intact bilaterally      Labs  Lab Results   Component Value Date/Time    SODIUM 144 06/06/2024 09:23 AM    POTASSIUM 4.7 06/06/2024 09:23 AM    CHLORIDE 109 06/06/2024 09:23 AM    CO2 20 06/06/2024 09:23 AM    GLUCOSE 147 (H) 06/06/2024 09:23 AM    BUN 21 06/06/2024 09:23 AM    CREATININE 0.86 06/06/2024 09:23 AM     Lab Results   Component Value Date/Time    ALKPHOSPHAT 64 06/06/2024 09:23 AM    ASTSGOT 20 06/06/2024 09:23 AM    ALTSGPT 22 06/06/2024 09:23 AM    TBILIRUBIN 0.3 06/06/2024 09:23 AM    ALBUMIN 4.3 06/06/2024 09:23 AM        Physical Examination:  Vital signs: There were no vitals taken for this visit. There is no height or weight on file to calculate BMI.    Assessment and Plan:    1. DM2  Pt presents to clinic doing well since last visit. She is due for her repeat A1c today. She also has c/o increasing BG/A1c.  Discussed Goals: FBG 80 - 130, 2hPP < 180, a1c < 7.0%   Most recent a1c drawn on 06/28/24 was 6.6%, which is at goal and decreased from last (6.9% on 03/26/24).  Pt has not been routinely SMBG d/t controlled A1c.  Pt reports nausea and lack of appetite since increasing metformin as instructed by her PCP recently. Pt did not titrate up to 1000 mg BID dose - likely the cause for her GI upset.     - Medication changes:  Decrease metformin ER to 500 mg QAM and 1000 mg QHS  - Continue:  Trulicity 4.5 mg subQ QW     - Lifestyle changes:  Diet: Maximize lean proteins and veggies. Cut out/down on carbs. Avoid simple sugars.   Exercise: Increase as tolerated    Follow Up:  3 months    Jose Layne, PharmD, BCACP    Kj Hood, Student Pharmacist     CC:   Becca Marley P.A.-C.

## 2024-07-15 ENCOUNTER — HOSPITAL ENCOUNTER (OUTPATIENT)
Dept: RADIOLOGY | Facility: MEDICAL CENTER | Age: 69
End: 2024-07-15
Payer: MEDICARE

## 2024-07-15 DIAGNOSIS — I73.9 PAD (PERIPHERAL ARTERY DISEASE) (HCC): ICD-10-CM

## 2024-07-15 PROCEDURE — 93922 UPR/L XTREMITY ART 2 LEVELS: CPT

## 2024-07-16 ENCOUNTER — DOCUMENTATION (OUTPATIENT)
Dept: VASCULAR LAB | Facility: MEDICAL CENTER | Age: 69
End: 2024-07-16
Payer: MEDICARE

## 2024-07-16 NOTE — ASSESSMENT & PLAN NOTE
Chronic in nature.  Stable on meds.  Due for labs and refills.  
Chronic in nature.  Stable on meds.  Due for labs and refills.    
Chronic in nature.  Stable on meds.  Due for refills of trazodone.  
Due for labs.  
Reports that he mood has been poor recently and the wellbutrin was not helping.  She stopped taking it and she feels the same.  She has been under a lot of stress recently.  Denies SI/HI.  Reports that paxil has worked well for her in the past and she would like to restart that.  Understands risks vs benefits.  
Would like a flu shot today.    
Anesthesiologist

## 2024-07-24 ENCOUNTER — TELEPHONE (OUTPATIENT)
Dept: VASCULAR LAB | Facility: MEDICAL CENTER | Age: 69
End: 2024-07-24
Payer: MEDICARE

## 2024-07-29 ENCOUNTER — PATIENT MESSAGE (OUTPATIENT)
Dept: VASCULAR LAB | Facility: MEDICAL CENTER | Age: 69
End: 2024-07-29
Payer: MEDICARE

## 2024-07-29 DIAGNOSIS — I73.9 PAD (PERIPHERAL ARTERY DISEASE) (HCC): ICD-10-CM

## 2024-07-29 DIAGNOSIS — I10 PRIMARY HYPERTENSION: ICD-10-CM

## 2024-07-29 DIAGNOSIS — E11.69 DYSLIPIDEMIA ASSOCIATED WITH TYPE 2 DIABETES MELLITUS (HCC): ICD-10-CM

## 2024-07-29 DIAGNOSIS — E78.5 DYSLIPIDEMIA ASSOCIATED WITH TYPE 2 DIABETES MELLITUS (HCC): ICD-10-CM

## 2024-07-30 ENCOUNTER — APPOINTMENT (OUTPATIENT)
Dept: SLEEP MEDICINE | Facility: MEDICAL CENTER | Age: 69
End: 2024-07-30
Attending: INTERNAL MEDICINE
Payer: MEDICARE

## 2024-08-05 DIAGNOSIS — E78.5 HYPERLIPIDEMIA LDL GOAL <70: ICD-10-CM

## 2024-08-05 RX ORDER — ROSUVASTATIN CALCIUM 20 MG/1
TABLET, COATED ORAL
Qty: 135 TABLET | Refills: 3 | Status: SHIPPED | OUTPATIENT
Start: 2024-08-05

## 2024-08-05 RX ORDER — PAROXETINE 30 MG/1
30 TABLET, FILM COATED ORAL DAILY
Qty: 90 TABLET | Refills: 2 | Status: SHIPPED | OUTPATIENT
Start: 2024-08-05

## 2024-08-05 NOTE — TELEPHONE ENCOUNTER
Received request via: Pharmacy    Was the patient seen in the last year in this department? Yes    Does the patient have an active prescription (recently filled or refills available) for medication(s) requested? No    Pharmacy Name: [facility]     Does the patient have long-term Plus and need 100 day supply (blood pressure, diabetes and cholesterol meds only)? Patient does not have SCP

## 2024-08-20 ENCOUNTER — HOSPITAL ENCOUNTER (OUTPATIENT)
Dept: RADIOLOGY | Facility: MEDICAL CENTER | Age: 69
End: 2024-08-20
Attending: INTERNAL MEDICINE
Payer: MEDICARE

## 2024-08-20 ENCOUNTER — DOCUMENTATION (OUTPATIENT)
Dept: VASCULAR LAB | Facility: MEDICAL CENTER | Age: 69
End: 2024-08-20

## 2024-08-20 DIAGNOSIS — E78.5 DYSLIPIDEMIA ASSOCIATED WITH TYPE 2 DIABETES MELLITUS (HCC): ICD-10-CM

## 2024-08-20 DIAGNOSIS — E11.69 DYSLIPIDEMIA ASSOCIATED WITH TYPE 2 DIABETES MELLITUS (HCC): ICD-10-CM

## 2024-08-20 DIAGNOSIS — I10 PRIMARY HYPERTENSION: ICD-10-CM

## 2024-08-20 DIAGNOSIS — I73.9 PAD (PERIPHERAL ARTERY DISEASE) (HCC): ICD-10-CM

## 2024-08-20 PROCEDURE — 93978 VASCULAR STUDY: CPT

## 2024-08-20 PROCEDURE — 93978 VASCULAR STUDY: CPT | Mod: 26 | Performed by: INTERNAL MEDICINE

## 2024-08-20 NOTE — Clinical Note
Silas aortoiliac duplex and lower extremity arterial duplex as done.  No further imaging at this time

## 2024-08-21 NOTE — PROGRESS NOTES
Aortoiliac duplex normal  No evidence of large vessel obstruction  Will discuss with patient follow-up visit    Michael J. Bloch, MD  [facility]

## 2024-09-10 ENCOUNTER — OFFICE VISIT (OUTPATIENT)
Dept: MEDICAL GROUP | Facility: PHYSICIAN GROUP | Age: 69
End: 2024-09-10
Payer: MEDICARE

## 2024-09-10 VITALS
BODY MASS INDEX: 33.59 KG/M2 | TEMPERATURE: 97.1 F | DIASTOLIC BLOOD PRESSURE: 84 MMHG | OXYGEN SATURATION: 94 % | WEIGHT: 201.6 LBS | RESPIRATION RATE: 16 BRPM | HEART RATE: 86 BPM | SYSTOLIC BLOOD PRESSURE: 132 MMHG | HEIGHT: 65 IN

## 2024-09-10 DIAGNOSIS — F51.04 CHRONIC INSOMNIA: ICD-10-CM

## 2024-09-10 DIAGNOSIS — I73.9 PAD (PERIPHERAL ARTERY DISEASE) (HCC): Chronic | ICD-10-CM

## 2024-09-10 DIAGNOSIS — I10 ESSENTIAL HYPERTENSION: ICD-10-CM

## 2024-09-10 DIAGNOSIS — F33.41 RECURRENT MAJOR DEPRESSIVE DISORDER, IN PARTIAL REMISSION (HCC): Chronic | ICD-10-CM

## 2024-09-10 DIAGNOSIS — M70.62 GREATER TROCHANTERIC BURSITIS OF LEFT HIP: ICD-10-CM

## 2024-09-10 DIAGNOSIS — R10.11 RUQ PAIN: ICD-10-CM

## 2024-09-10 DIAGNOSIS — N39.46 MIXED STRESS AND URGE URINARY INCONTINENCE: ICD-10-CM

## 2024-09-10 DIAGNOSIS — M85.852 OSTEOPENIA OF NECK OF LEFT FEMUR: ICD-10-CM

## 2024-09-10 DIAGNOSIS — K59.00 CONSTIPATION, UNSPECIFIED CONSTIPATION TYPE: ICD-10-CM

## 2024-09-10 DIAGNOSIS — E11.8 CONTROLLED TYPE 2 DIABETES MELLITUS WITH COMPLICATION, WITHOUT LONG-TERM CURRENT USE OF INSULIN (HCC): ICD-10-CM

## 2024-09-10 DIAGNOSIS — R91.1 LUNG NODULE: ICD-10-CM

## 2024-09-10 DIAGNOSIS — K21.9 GASTROESOPHAGEAL REFLUX DISEASE WITHOUT ESOPHAGITIS: ICD-10-CM

## 2024-09-10 DIAGNOSIS — E78.5 DYSLIPIDEMIA ASSOCIATED WITH TYPE 2 DIABETES MELLITUS (HCC): Chronic | ICD-10-CM

## 2024-09-10 DIAGNOSIS — E11.69 DYSLIPIDEMIA ASSOCIATED WITH TYPE 2 DIABETES MELLITUS (HCC): Chronic | ICD-10-CM

## 2024-09-10 DIAGNOSIS — E78.41 ELEVATED LIPOPROTEIN(A): ICD-10-CM

## 2024-09-10 PROBLEM — Z00.00 HEALTHCARE MAINTENANCE: Status: RESOLVED | Noted: 2021-06-17 | Resolved: 2024-09-10

## 2024-09-10 PROCEDURE — 3079F DIAST BP 80-89 MM HG: CPT | Performed by: PHYSICIAN ASSISTANT

## 2024-09-10 PROCEDURE — 92250 FUNDUS PHOTOGRAPHY W/I&R: CPT | Mod: TC | Performed by: PHYSICIAN ASSISTANT

## 2024-09-10 PROCEDURE — 99214 OFFICE O/P EST MOD 30 MIN: CPT | Performed by: PHYSICIAN ASSISTANT

## 2024-09-10 PROCEDURE — 3075F SYST BP GE 130 - 139MM HG: CPT | Performed by: PHYSICIAN ASSISTANT

## 2024-09-10 ASSESSMENT — ENCOUNTER SYMPTOMS
SHORTNESS OF BREATH: 0
ABDOMINAL PAIN: 1
CHILLS: 0
FEVER: 0

## 2024-09-10 ASSESSMENT — FIBROSIS 4 INDEX: FIB4 SCORE: 1.21

## 2024-09-10 NOTE — ASSESSMENT & PLAN NOTE
Chronic, uncontrolled.    Interval history:  Started years ago.  Uses a pad daily.  Denies any issues with rashes.  Discussed medication options, as well as the side effects.  Patient declines medication at this time.

## 2024-09-10 NOTE — ASSESSMENT & PLAN NOTE
Chronic, controlled.  Tolerating/continue amlodipine 5mg daily and telmisartan 80mg daily.  Managed by cardiology.

## 2024-09-10 NOTE — ASSESSMENT & PLAN NOTE
Chronic, controlled.     Latest Labs:   Lab Results   Component Value Date/Time    CHOLSTRLTOT 157 06/06/2024 09:23 AM    LDL 59 06/06/2024 09:23 AM    HDL 73 06/06/2024 09:23 AM    TRIGLYCERIDE 125 06/06/2024 09:23 AM      Medications: Tolerating/continue crestor 20mg.  Medication side effects: none  Risk calculator: The 10-year ASCVD risk score (Carl WOLFF, et al., 2019) is: 18.7%

## 2024-09-10 NOTE — ASSESSMENT & PLAN NOTE
Chronic, uncontrolled.  Worsening.  RUQ pain off/on since 2023  Sometimes radiates to entire abdomen, sometimes to the right side only  Diarrhea intermittently unrelated to the pain  Nausea with the pain  Does run constipated at times  Was really constipated with the last episode of pain about a week ago  Immediately following the pain she had loose stool then the pain went away  Denies fevers but did feel warm with the pain  History of abdominal surgeries: total hysterectomy, appendectomy

## 2024-09-10 NOTE — ASSESSMENT & PLAN NOTE
Chronic, controlled.     Tolerating/continue current medications:  Trulicity 4.5mg every Saturday  Metformin XR 1000mg once daily    A1c: 6.6 (6/2024); 6.9 (3/2024); 6.8 (11/2023); 7.0 (5/2023)  Microalb/Cr ratio: 30 (6/2024); 12 (7/2023)  Diabetic foot exam: 6/2024  Retinal eye exam: 7/2023  ACEi/ARB: telmisartan 80mg daily  Statin: rosuvastatin 20mg daily  Aspirin: 325mg daily  Concomitant HTN: controlled  Nightly foot checks: yes

## 2024-09-10 NOTE — PROGRESS NOTES
SUBJECTIVE:     CC: follow up chronic issues    HPI:   Dunia presents today with the following:     ASSESSMENT & PLAN by Problem:     Problem   Ruq Pain    Chronic, uncontrolled.  RQ US ordered.     Gastroesophageal Reflux Disease Without Esophagitis   Osteopenia of Neck of Left Femur    Chronic, mildly decreased bone density.  Recommend adding vitamin D and calcium.  Last DEXA: 8/2023.          Controlled Type 2 Diabetes Mellitus With Complication, Without Long-Term Current Use of Insulin (Hcc)    Chronic, controlled.     Tolerating/continue current medications:  Trulicity 4.5mg every Saturday  Metformin XR 1000mg once daily     Mixed Stress and Urge Urinary Incontinence    Chronic, uncontrolled.  Uses pads.  Declines medication.     Lung Nodule    Chronic, controlled.  Followed by pulmonology.     Greater Trochanteric Bursitis of Left Hip    Chronic, intermittent.  Had an injection from Dr. Morales but didn't notice any change.        Elevated Lipoprotein(a)    Chronic, controlled.  Tolerating/continue crestor 20mg.     Essential Hypertension    Chronic, controlled.  Tolerating/continue amlodipine 5mg daily and telmisartan 80mg daily.  Managed by cardiology.      Pad (Peripheral Artery Disease) (Hcc)    Chronic, controlled.  Tolerating/continue crestor 20mg and ASA 325mg daily.  Still sees vascular.     Recurrent Major Depressive Disorder, in Partial Remission (Hcc)    Chronic, controlled.  Tolerating/continue paxil 30mg.     Chronic Insomnia    Chronic, controlled.  Tolerating/continue trazodone 50mg at night and it helps.      Dyslipidemia Associated With Type 2 Diabetes Mellitus (Hcc)    Chronic, controlled.  Tolerating/continue crestor 20mg.     Healthcare Maintenance (Resolved)    Mammogram: due  DEXA: due         Retinal screen: performed today    Follow-up around April 2025.  Will determine lab needs at that time.    Return in about 7 months (around 4/10/2025) for med check.        Healthcare  Maintenance:      HPI:     Problem List Items Addressed This Visit       Chronic insomnia     Chronic, controlled.  Tolerating/continue trazodone 50mg at night and it helps.          Controlled type 2 diabetes mellitus with complication, without long-term current use of insulin (HCC)     Chronic, controlled.     Tolerating/continue current medications:  Trulicity 4.5mg every Saturday  Metformin XR 1000mg once daily    A1c: 6.6 (6/2024); 6.9 (3/2024); 6.8 (11/2023); 7.0 (5/2023)  Microalb/Cr ratio: 30 (6/2024); 12 (7/2023)  Diabetic foot exam: 6/2024  Retinal eye exam: 7/2023  ACEi/ARB: telmisartan 80mg daily  Statin: rosuvastatin 20mg daily  Aspirin: 325mg daily  Concomitant HTN: controlled  Nightly foot checks: yes           Relevant Orders    POCT Retinal Eye Exam    Dyslipidemia associated with type 2 diabetes mellitus (HCC) (Chronic)     Chronic, controlled.     Latest Labs:   Lab Results   Component Value Date/Time    CHOLSTRLTOT 157 06/06/2024 09:23 AM    LDL 59 06/06/2024 09:23 AM    HDL 73 06/06/2024 09:23 AM    TRIGLYCERIDE 125 06/06/2024 09:23 AM      Medications: Tolerating/continue crestor 20mg.  Medication side effects: none  Risk calculator: The 10-year ASCVD risk score (Carl DK, et al., 2019) is: 18.7%            Elevated lipoprotein(a)     Chronic, controlled.  Tolerating/continue crestor 20mg.         Essential hypertension     Chronic, controlled.  Tolerating/continue amlodipine 5mg daily and telmisartan 80mg daily.  Managed by cardiology.          Gastroesophageal reflux disease without esophagitis     Chronic, uncontrolled.    Interval history:  Has been on prevacid for a couple years.  Stopped working a couple months.  Denies CP, SOB, chest heaviness/pressure.  Worse at night when she lies down.  Has bile/acid taste in mouth when she wakes up.  NOT worse with exertion.           Greater trochanteric bursitis of left hip     Chronic, intermittent.  Had an injection from Dr. Morales but didn't  notice any change.         Lung nodule     Chronic, controlled.  Followed by pulmonology.         Mixed stress and urge urinary incontinence     Chronic, uncontrolled.    Interval history:  Started years ago.  Uses a pad daily.  Denies any issues with rashes.  Discussed medication options, as well as the side effects.  Patient declines medication at this time.          Osteopenia of neck of left femur     Chronic, mildly decreased bone density.  Recommend adding vitamin D and calcium.    DEXA 8/2023:  L-spine T score -0.4  Left femur T score -1.2         PAD (peripheral artery disease) (HCC) (Chronic)     Chronic, controlled.  Tolerating/continue crestor 20mg and ASA 325mg daily.  Still sees vascular.         Recurrent major depressive disorder, in partial remission (HCC) (Chronic)     Chronic, controlled.  Tolerating paxil 30mg for years and it seems to help overall.  Still has episodes where she feels down but overall feels good.          RUQ pain     Chronic, uncontrolled.  Worsening.  RUQ pain off/on since 2023  Sometimes radiates to entire abdomen, sometimes to the right side only  Diarrhea intermittently unrelated to the pain  Nausea with the pain  Does run constipated at times  Was really constipated with the last episode of pain about a week ago  Immediately following the pain she had loose stool then the pain went away  Denies fevers but did feel warm with the pain  History of abdominal surgeries: total hysterectomy, appendectomy         Relevant Orders    US-RUQ     Other Visit Diagnoses       Constipation, unspecified constipation type        Relevant Orders    [id number] VIEWS                   ROS:  Review of Systems   Constitutional:  Negative for chills and fever.   Respiratory:  Negative for shortness of breath.    Cardiovascular:  Negative for chest pain.   Gastrointestinal:  Positive for abdominal pain.       OBJECTIVE:     Exam:  /84 (BP Location: Left arm, Patient Position: Sitting, BP  "Cuff Size: Large adult)   Pulse 86   Temp 36.2 °C (97.1 °F) (Temporal)   Resp 16   Ht 1.651 m (5' 5\")   Wt 91.4 kg (201 lb 9.6 oz)   SpO2 94%   BMI 33.55 kg/m²  Body mass index is 33.55 kg/m².    Physical Exam  Vitals reviewed.   Constitutional:       General: She is not in acute distress.     Appearance: Normal appearance.   Pulmonary:      Effort: Pulmonary effort is normal.   Abdominal:      Tenderness: There is abdominal tenderness. There is guarding (voluntary, RUQ).   Neurological:      General: No focal deficit present.      Mental Status: She is alert.   Psychiatric:         Mood and Affect: Mood normal.         Behavior: Behavior normal.         Judgment: Judgment normal.           CHART REVIEW:     Labs:                        Please note that this dictation was created using voice recognition software. I have made every reasonable attempt to correct obvious errors, but I expect that there are errors of grammar and possibly content that I did not discover before finalizing the note.    "

## 2024-09-10 NOTE — ASSESSMENT & PLAN NOTE
Chronic, mildly decreased bone density.  Recommend adding vitamin D and calcium.    DEXA 8/2023:  L-spine T score -0.4  Left femur T score -1.2

## 2024-09-10 NOTE — ASSESSMENT & PLAN NOTE
Chronic, uncontrolled.    Interval history:  Has been on prevacid for a couple years.  Stopped working a couple months.  Denies CP, SOB, chest heaviness/pressure.  Worse at night when she lies down.  Has bile/acid taste in mouth when she wakes up.  NOT worse with exertion.

## 2024-09-19 ENCOUNTER — OFFICE VISIT (OUTPATIENT)
Dept: VASCULAR LAB | Facility: MEDICAL CENTER | Age: 69
End: 2024-09-19
Attending: NURSE PRACTITIONER
Payer: MEDICARE

## 2024-09-19 VITALS
HEIGHT: 65 IN | HEART RATE: 87 BPM | BODY MASS INDEX: 33.82 KG/M2 | WEIGHT: 203 LBS | SYSTOLIC BLOOD PRESSURE: 121 MMHG | DIASTOLIC BLOOD PRESSURE: 72 MMHG

## 2024-09-19 DIAGNOSIS — E78.41 ELEVATED LIPOPROTEIN(A): ICD-10-CM

## 2024-09-19 DIAGNOSIS — E11.51 TYPE 2 DIABETES MELLITUS WITH DIABETIC PERIPHERAL ANGIOPATHY WITHOUT GANGRENE, WITHOUT LONG-TERM CURRENT USE OF INSULIN (HCC): ICD-10-CM

## 2024-09-19 DIAGNOSIS — I10 ESSENTIAL HYPERTENSION: ICD-10-CM

## 2024-09-19 DIAGNOSIS — I25.10 CORONARY ARTERY CALCIFICATION SEEN ON CT SCAN: ICD-10-CM

## 2024-09-19 DIAGNOSIS — E78.5 HYPERLIPIDEMIA LDL GOAL <70: ICD-10-CM

## 2024-09-19 DIAGNOSIS — I73.9 PAD (PERIPHERAL ARTERY DISEASE) (HCC): Chronic | ICD-10-CM

## 2024-09-19 PROCEDURE — 3074F SYST BP LT 130 MM HG: CPT | Performed by: NURSE PRACTITIONER

## 2024-09-19 PROCEDURE — 99214 OFFICE O/P EST MOD 30 MIN: CPT | Performed by: NURSE PRACTITIONER

## 2024-09-19 PROCEDURE — G2211 COMPLEX E/M VISIT ADD ON: HCPCS | Performed by: NURSE PRACTITIONER

## 2024-09-19 PROCEDURE — 3078F DIAST BP <80 MM HG: CPT | Performed by: NURSE PRACTITIONER

## 2024-09-19 PROCEDURE — 99212 OFFICE O/P EST SF 10 MIN: CPT

## 2024-09-19 ASSESSMENT — ENCOUNTER SYMPTOMS
COUGH: 0
BLOOD IN STOOL: 0
DIZZINESS: 0
PALPITATIONS: 0
BRUISES/BLEEDS EASILY: 0
CHILLS: 0
WEAKNESS: 0
NERVOUS/ANXIOUS: 0
NAUSEA: 0
FEVER: 0
CLAUDICATION: 0
MYALGIAS: 0
FOCAL WEAKNESS: 0
WEIGHT LOSS: 0
SPEECH CHANGE: 0
SHORTNESS OF BREATH: 0
TINGLING: 1

## 2024-09-19 ASSESSMENT — FIBROSIS 4 INDEX: FIB4 SCORE: 1.21

## 2024-09-19 NOTE — PROGRESS NOTES
RESISTANT HTN CLINIC - Follow Up EVALUATION   09/19/2024      History of Present Illness:   Dunia Her is a 70 yo female seen for evaluation of resistant HTN and management.   Dunia Her is initially referred by Ref Not In Computer     Subjective    HTN:  Home BP log: forgot log but states it is around 120/70's  Overall, feeling well  Leg pain and claudication has been improving since cardiology started pletal  She continues on ASA   No bleeding problems  Denies any LE wounds  Had all imaging completed    Adherence to current HTN meds: compliant all of the time  Hx of clinical ASCVD events: Symptomatic PAD (hx of claudication with HERNANDEZ <0.85, previous revascularization/amputation    Lifestyle factors:  Weight Change: up a few lbs from last - 4lbs  BMI Readings from Last 5 Encounters:   09/19/24 33.78 kg/m²   09/10/24 33.55 kg/m²   06/19/24 34.11 kg/m²   06/11/24 34.28 kg/m²   03/26/24 33.81 kg/m²     Diet pattern: low red meat, increase veg, has lost weight on Med diet in past, more comfort foods recently, more carbs and sugars  Daily salt intake estimate:  Low   - none   EtOH: No  Exercise habits: minimal exercise  Perceived barriers to care: leg pain  Pertinent HTN hx (reviewed at initial visit):   Age at HTN dx: several years   (if female, any hx of pregnancy-related HTN): n/a   Past HTN medications: as noted   HTN crises:  No prior hospitalization or crises   Interfering substances/contributing factors:  None    Hyperlipidemia:    Stable, tolerating rosuva   Occasional tolerable myalgias  Clinical evidence of ASCVD: Symptomatic PAD (hx of claudication with HERNANDEZ <0.85, previous revascularization/amputation    Antiplatelet/anticoagulation: Yes, Details: ASA , no bleeding noted     T2D:  Increase leg/foot pains, no other symptoms  Tolerating meds  Has f/u with [facility] pharmacist - now yearly appts  Last A1c   Lab Results   Component Value Date    HBA1C 6.6 (A) 06/28/2024     Lab Results   Component Value  Date/Time    [facility]24 [facility]    MICROALBUR 8.5 06/06/2024 09:24 AM        Problem List:     Patient Active Problem List    Diagnosis Date Noted    RUQ pain 09/10/2024    Gastroesophageal reflux disease without esophagitis 11/30/2023    Osteopenia of neck of left femur 08/02/2023    Controlled type 2 diabetes mellitus with complication, without long-term current use of insulin ([facility]) 06/02/2023    Mixed stress and urge urinary incontinence 06/02/2023    COPD (chronic obstructive pulmonary disease) ([facility]) 04/20/2022    Lung nodule 04/20/2022    Greater trochanteric bursitis of left hip 01/31/2022    Elevated lipoprotein(a) 12/29/2021    Essential hypertension 10/06/2020    Coronary artery calcification seen on CT scan 04/07/2020    PAD (peripheral artery disease) ([facility]) 02/07/2020    Recurrent major depressive disorder, in partial remission ([facility]) 02/10/2019    Chronic insomnia 04/18/2016    Dyslipidemia associated with type 2 diabetes mellitus ([facility]) 10/02/2014    Class 1 obesity due to excess calories with body mass index (BMI) of 34.0 to 34.9 in adult 10/02/2014       Current Outpatient Medications:     rosuvastatin, TAKE ONE AND ONE-HALF TABLETS BY MOUTH EVERY EVENING, Taking    PARoxetine, 30 mg, Oral, DAILY, Taking    cilostazol, 50 mg, Oral, BID, Taking    metFORMIN ER, 1,000 mg, Oral, BID, Taking    traZODone, 50 mg, Oral, QHS PRN, Taking    aspirin EC, 325 mg, Oral, DAILY, Taking    telmisartan, 80 mg, Oral, QHS, Taking    omeprazole, 20 mg, Oral, DAILY, Taking    amLODIPine, 5 mg, Oral, QHS, Taking    albuterol, 1-2 Puff, Inhalation, Q4HRS PRN, Taking    albuterol, 2.5 mg, Nebulization, Q4HRS PRN, PRN    Dulaglutide, Inject 0.5 mL under the skin., Taking    ibuprofen, 600 mg, Oral, QDAY PRN, Taking    vitamin D3, 3,000 Units, Oral, QAM, Taking   Sulfa drugs     Social History:     Social History     Tobacco Use    Smoking status: Former     Current packs/day: 0.00     Average packs/day: 1 pack/day  "for 49.4 years (49.4 ttl pk-yrs)     Types: Cigarettes     Start date:      Quit date: 2017     Years since quittin.2    Smokeless tobacco: Never   Vaping Use    Vaping status: Never Used   Substance Use Topics    Alcohol use: No     Alcohol/week: 0.0 oz    Drug use: Never       Review of Systems:   Review of Systems   Constitutional:  Negative for chills, fever and weight loss.   HENT:  Negative for nosebleeds.    Respiratory:  Negative for cough and shortness of breath.    Cardiovascular:  Positive for leg swelling. Negative for chest pain, palpitations and claudication.   Gastrointestinal:  Negative for blood in stool, melena and nausea. Abdominal pain: occasional.  Genitourinary:  Negative for hematuria.   Musculoskeletal:  Positive for joint pain. Negative for myalgias.   Neurological:  Positive for tingling (stinging). Negative for dizziness, speech change, focal weakness and weakness.   Endo/Heme/Allergies:  Does not bruise/bleed easily.   Psychiatric/Behavioral:  The patient is not nervous/anxious.          Objective     Objective:     Vitals:    24 0959 24 1004   BP: 139/79 121/72   BP Location: Left arm Left arm   Patient Position: Sitting Sitting   BP Cuff Size: Large adult Large adult   Pulse: 90 87   Weight: 92.1 kg (203 lb)    Height: 1.651 m (5' 5\")        Body mass index is 33.78 kg/m².  BP Readings from Last 5 Encounters:   24 121/72   09/10/24 132/84   24 122/70   24 139/83   24 126/74      Physical Exam  Vitals reviewed.   Constitutional:       General: She is not in acute distress.  HENT:      Head: Normocephalic and atraumatic.   Eyes:      General: No scleral icterus.  Cardiovascular:      Rate and Rhythm: Normal rate and regular rhythm.      Pulses:           Dorsalis pedis pulses are 0 on the right side and 0 on the left side.      Heart sounds: Normal heart sounds. No murmur heard.     Comments: No palpable pulses, but appear well perfused, " warm to touch, <3sec cap refill   Pulmonary:      Effort: Pulmonary effort is normal. No respiratory distress.      Breath sounds: Normal breath sounds. No wheezing.   Musculoskeletal:         General: No swelling. Normal range of motion.      Right lower leg: No edema.      Left lower leg: No edema.   Skin:     General: Skin is warm and dry.      Capillary Refill: Capillary refill takes 2 to 3 seconds.      Coloration: Skin is not pale.      Findings: No erythema.      Comments: No wounds   Neurological:      General: No focal deficit present.      Mental Status: She is alert and oriented to person, place, and time.      Motor: No weakness.      Gait: Gait normal.   Psychiatric:         Mood and Affect: Mood normal.         Behavior: Behavior normal.         Thought Content: Thought content normal.        Accessory Clinical Findings:     Lab Results   Component Value Date    CHOLSTRLTOT 157 06/06/2024    LDL 59 06/06/2024    HDL 73 06/06/2024    TRIGLYCERIDE 125 06/06/2024      Lab Results   Component Value Date/Time    LIPOPROTA 125 (H) 12/27/2021 10:41 AM           Lab Results   Component Value Date    HBA1C 6.6 (A) 06/28/2024      Lab Results   Component Value Date    SODIUM 144 06/06/2024    POTASSIUM 4.7 06/06/2024    CHLORIDE 109 06/06/2024    CO2 20 06/06/2024    GLUCOSE 147 (H) 06/06/2024    BUN 21 06/06/2024    CREATININE 0.86 06/06/2024          Lab Results   Component Value Date    URCREAT 287.27 06/06/2024    MICROALBUR 8.5 06/06/2024    MALBCRT 30 06/06/2024     VASCULAR IMAGING:     Last EKG:  Reviewed     HERNANDEZ 2/2020   Right HERNANDEZ: 1.02  Left HERNANDEZ:  0.79   IMPRESSION:   1.  Post occlusive waveform is noted in the distal left peroneal artery. Short segment occlusion or high-grade stenosis proximal to this location is likely present, although not directly visualized.   2.  No other evidence of occlusion or significant stenosis bilaterally.   3.  Moderate calcified atherosclerotic plaque is identified in  each lower extremity.    CAC 2/2020  Coronary calcification:  LMA - 0.0  LCX - 0.0  LAD - 315  RCA - 260.8   Total Calcium Score: 575.8   Percentile: Calcium score is above the 90th percentile for the patient's age and sex.   Other findings:  Heart: Normal size.  Lungs: Tiny granuloma in the left upper lobe.  Mediastinum: Normal.  Upper abdomen: Normal.  Spondylotic changes of the spine.    MPI 4/2020  NUCLEAR IMAGING INTERPRETATION   Normal left ventricular size, ejection fraction, and wall motion.    Small fixed defect in the inferolateral wall could be artifact. Infarct is in    the differential but consider less likely.     No reversible defect.    Sinus rhythm.   Nondiagnostic ECG portion of a Regadenoson nuclear stress test.   ECG INTERPRETATION   Nondiagnostic ECG portion of a Regadenoson nuclear stress test.    CT abd 2/2021   The abdominal aorta is normal in caliber with aortic atherosclerosis.    CT chest 10/2021   1.  Stable 1.1 cm right upper lobe groundglass density nodule. Continued follow-up is recommended.  2.  Cholelithiasis.  3.  Hepatic steatosis  Cardiac: Heart normal in size without pericardial effusion.   Vascular: Atherosclerotic calcifications of the coronary arteries, aorta and branches...    LE art with HERNANDEZ 5/2023  1.  Diffuse atherosclerotic changes without focal high-grade stenosis.  2.  RIGHT peroneal artery is not visualized.  3.  Ankle brachial indices indicate mild atherosclerotic disease on the LEFT.      Aorto-iliac Duplex 8/2024  CONCLUSIONS   No evidence of abdominal aortic or iliac artery aneurysm.    Bilateral LE arterial duplex with HERNANDEZ  IMPRESSION:   1.  Distal anterior tibial artery is not visualized bilaterally. This could be related to presence of calcified atherosclerotic plaque although occlusion is also possible.   2.  No other stenosis or occlusion identified.    1. Essential hypertension  Comp Metabolic Panel    CBC WITHOUT DIFFERENTIAL      2. Elevated lipoprotein(a)         3. PAD (peripheral artery disease) (HCC)        4. Hyperlipidemia LDL goal <70  Lipid Profile      5. Type 2 diabetes mellitus with diabetic peripheral angiopathy without gangrene, without long-term current use of insulin (HCC)  HEMOGLOBIN A1C      6. Coronary artery calcification seen on CT scan          ASSESSMENT AND PLAN:    1. BLOOD PRESSURE CONTROL   Qualifies as Resistant HTN (RH):  No, not on optimized, max-dosed or max-tolerated meds from 3 classes   Office BP Goal ACC/AHA (2017) goal <130/80  Home BP at goal:  not checking  Office BP at goal: no, endorses stressed about upcoming court/guardianship for granddaughter  Home readings 120/70-80s previously, no current readings  Good med adherence    2. WORK UP FOR SECONDARY CAUSES OF RH:  Obstructive Sleep Apnea: Not Yet Assessed- no current symptoms  Renal parenchymal disease: Excluded  Renovascular HTN: Not Yet Assessed  Primary Aldosteronism: Not Yet Assessed  Thyroid Function: Excluded 12/2021  Pheochromocytoma: Not Yet Assessed   Cushing's:   Not Yet Assessed   Coarctation of Aorta: excluded  Other: none identified    Recommendations At This Visit: as noted in orders         3. MEDICATION USE / ADHERENCE:   Assessment: Complete  Recommendations: Instructed to Continue Taking All Medications As Prescribed         4. HTN-MEDIATED END-ORGAN DAMAGE:  Left Ventricular Hypertrophy: Absent on  ECG Date: 2020  Urine Albuminuria: None on Date: 12/2021  Renal Function: Chronic Kidney Disease  CKD G2 at worst   Ophthalmologic: Absent per patient report and/or eye specialist   Established Cardiovascular Disease:     # CAD per CT scan - continue current med mgmt, no further surveillance and no prior ASCVD events     # PAD, LLE inflow - chronic, had been worsening but since addition of Pletal symptoms are improving; no rest pain.  No signs of CLI.      mild disease noted on most recent imaging 5/2023   Most recent imaging with continued reduced HERNANDEZ on LLE but with  triphasic waveform   Aorto-iliac duplex without evidence of large vessel obstruction   - continue pletal   - recommended she trial exercise therapy following below routine.     For 1 hour 3 times per week for next 12 weeks:   Structured exercise therapy:     1) warm up legs with stretch and little walking for 5min  2) for 10-15min then brisk walk  3) then rest for 2-5 minutes if needed   4) then repeat cycle for additional 2 cycles   5) 5-10min cool-down and stretching      5. LIFESTYLE INTERVENTIONS:    SMOKING:   reports that she quit smoking about 7 years ago. Her smoking use included cigarettes. She started smoking about 56 years ago. She has a 49.4 pack-year smoking history. She has never used smokeless tobacco.   - continued complete avoidance of all tobacco products     PHYSICAL ACTIVITY: continue healthy activity to improve CV fitness.  In general, targeting >150min/week of moderate-level activity.      WEIGHT MANAGEMENT AND NUTRITION: Dietary plan was discussed with patient at this visit including DASH-dietary approaches, substitution of MUFA/PUFA for saturated fats, substituting whole grains for simple carbs, substituting lean meats for fatty meats, increase use of plant-based protein vs animal-based, lowered sodium.  Additional details reviewed with patient and/or outlined in care instructions.    6. STANDARD HTN PHARMACOTHERAPY:  ACEI/ARB: continue telmisartan 80mg daily   Thiazide Type Diuretic: consider as next agent   DHP-CCB: continue amlodipine 5mg qh    7. ADDITIONAL AGENTS   Aldosterone Receptor Antagonist: not indicated   Loop Diuretic: not indicated   Peripheral Alpha Blocker: not indicated   BB: not indicated, stopped on prior visits as unlikely beneficial for BP   Non-DHP-CCB: not indicated   Direct Vasodilator: not indicated   Centrally Acting Alpha Agonist: not indicated   Potassium-sparing diuretic: not indicated   DRI: not indicated     LIPID MANAGEMENT:   Qualifies for Statin Therapy  "Based on 2018 ACC/AHA Guidelines: yes, Secondary Prevention - Very high risk ASCVD - 2 major events or 1 event with other high-risk conditions, Diabetes and HERNANDEZ <0.9  The 10-year ASCVD risk score (Carl WOLFF, et al., 2019) is: 21%, 7.5 - <20% \"intermediate risk\"   Major ASCVD events: Symptomatic PAD (hx of claudication with HERNANDEZ <0.85, previous revascularization/amputation  High-risk conditions: >64yr old , Diabetes , Hypertension  and N/A  Risk-enhancers: N/A  Currently on Statin: Yes  Treatment goals: reduce LDL-C >50%  and LDL-C <70 (consider non-HDL-C <100, apoB <80)  At goal? Yes 6/2024  Plan:   - reinforced ongoing TLC measures as noted   - monitor labs in 6 mo - order at next appt,   -intensify therapy if remains above goal on next labs  Meds:   - continue rosuvastatin 30 mg daily, although not a usual dose    GLYCEMIA MANAGEMENT:  Diabetic   Goal A1c < 7.0  Lab Results   Component Value Date    HBA1C 6.6 (A) 06/28/2024      Lab Results   Component Value Date/Time    MALBCRT 30 06/06/2024 09:24 AM    MICROALBUR 8.5 06/06/2024 09:24 AM   Recommend aggressive treatment due to pt's overall risk.    Close f/u with [facility] clinic  Metformin ER 1000mg BID with nausea and diarrhea-- she is tolerating 1500mg daily   - Continue Trulicity as per DM clinic - she would like to consider change to Ozempic   - Continue metformin SR to 500 in AM, 1000mg in PM   - A1C and f/u per [facility] clinic  - recommmend for routine care to include regular A1c monitoring, annual albumin/creatinine ratio (ACR), annual diabetic retinopathy screening, foot exams, annual flu vaccine, and updates to pneumonia vaccines as appropriate     ANTIPLATELET/ANTICOAGULANT THERAPY: continue asa 325mg daily  Monitor for bleeding    OTHER ISSUES:     # MDD, recurrent - stable, continue current meds, f/u with PCP    Studies Ordered At This Visit: BLE arterial duplex/HERNANDEZ-- July 2025  Blood Work to Be Obtained Prior to Next Visit: none, A1c per [facility] " clinic    Disposition:  6 months     MARIAN Bangura.  [facility]   [facility]   [phone]

## 2024-09-19 NOTE — PATIENT INSTRUCTIONS
Exercise Therapy     For 1 hour 3 times per week for next 12 weeks:   Structured exercise therapy:     1) warm up legs with stretch and little walking for 5min  2) for 10-15min then brisk walk  3) then rest for 2-5 minutes if needed   4) then repeat cycle for additional 2 cycles   5) 5-10min cool-down and stretching

## 2024-09-26 LAB — RETINAL SCREEN: NEGATIVE

## 2024-10-03 ENCOUNTER — HOSPITAL ENCOUNTER (OUTPATIENT)
Dept: RADIOLOGY | Facility: MEDICAL CENTER | Age: 69
End: 2024-10-03
Attending: PHYSICIAN ASSISTANT
Payer: MEDICARE

## 2024-10-03 DIAGNOSIS — K59.00 CONSTIPATION, UNSPECIFIED CONSTIPATION TYPE: ICD-10-CM

## 2024-10-03 DIAGNOSIS — R10.11 RUQ PAIN: ICD-10-CM

## 2024-10-03 PROCEDURE — 76705 ECHO EXAM OF ABDOMEN: CPT

## 2024-10-03 PROCEDURE — 74019 RADEX ABDOMEN 2 VIEWS: CPT

## 2024-10-04 ENCOUNTER — APPOINTMENT (OUTPATIENT)
Dept: MEDICAL GROUP | Facility: PHYSICIAN GROUP | Age: 69
End: 2024-10-04
Payer: MEDICARE

## 2024-10-04 PROBLEM — K76.0 FATTY (CHANGE OF) LIVER, NOT ELSEWHERE CLASSIFIED: Status: ACTIVE | Noted: 2024-10-04

## 2024-10-20 DIAGNOSIS — I73.9 PAD (PERIPHERAL ARTERY DISEASE) (HCC): Chronic | ICD-10-CM

## 2024-10-21 RX ORDER — CILOSTAZOL 50 MG/1
50 TABLET ORAL 2 TIMES DAILY
Qty: 60 TABLET | Refills: 3 | Status: SHIPPED | OUTPATIENT
Start: 2024-10-21

## 2024-10-23 ENCOUNTER — HOSPITAL ENCOUNTER (OUTPATIENT)
Dept: RADIOLOGY | Facility: MEDICAL CENTER | Age: 69
End: 2024-10-23
Attending: INTERNAL MEDICINE
Payer: MEDICARE

## 2024-10-23 DIAGNOSIS — Z87.891 FORMER SMOKER: ICD-10-CM

## 2024-10-23 DIAGNOSIS — R91.1 LUNG NODULE: ICD-10-CM

## 2024-10-23 PROCEDURE — 71250 CT THORAX DX C-: CPT

## 2024-11-04 ENCOUNTER — OFFICE VISIT (OUTPATIENT)
Dept: MEDICAL GROUP | Facility: PHYSICIAN GROUP | Age: 69
End: 2024-11-04
Payer: MEDICARE

## 2024-11-04 DIAGNOSIS — E11.8 CONTROLLED TYPE 2 DIABETES MELLITUS WITH COMPLICATION, WITHOUT LONG-TERM CURRENT USE OF INSULIN (HCC): ICD-10-CM

## 2024-11-04 LAB
HBA1C MFR BLD: 6.5 % (ref ?–5.8)
POCT INT CON NEG: NEGATIVE
POCT INT CON POS: POSITIVE

## 2024-11-04 PROCEDURE — 83036 HEMOGLOBIN GLYCOSYLATED A1C: CPT | Performed by: PHARMACIST

## 2024-11-04 PROCEDURE — 99211 OFF/OP EST MAY X REQ PHY/QHP: CPT | Performed by: PHARMACIST

## 2024-11-04 NOTE — PROGRESS NOTES
Patient Consult Note - Follow Up Visit  Primary care physician: Becca Marley P.A.-C.    Reason for consult: Management of Uncontrolled Type 2 Diabetes    Time IN:  10:31am  Time OUT:  10:55am    HPI:  Dunia Her is a 69 y.o. old patient who comes in today for evaluation of above stated problem.    Most Recent HbA1c:   Lab Results   Component Value Date/Time    HBA1C 6.5 (A) 11/04/2024 10:33 AM      Reliable and Exact Care Diabetes Score    Displays the Diabetes REC Score.  A patient gets 1 point for each measure for a maximum of 7 points   0  Measures Not Met            Current Diabetes Medication Regimen  Metformin ER: 1000 mg BID  GLP-1 or GLP-1/GIP Analog: dulaglutide (Trulicity) 4.5 mg weekly    Previously attempted medications:  None     Potential Barriers to Care:  Adherence: denies missed doses  Side effects: Reports nausea after increasing metformin to 1000mg BID  Affordability: Receives Trulicity via PAP    Discussed FBG goal of , 2hrPP <180, and A1c <7.0%.  Before Breakfast:  Before Lunch:  Before Dinner:  Before Bedtime:  Other times:  Hypoglycemia:  None    Diet/Exercise:  No appreciable improvements or worsening from last visit.    Previous visit notes:  Current Exercise - stationary bicycle twice weekly, walk the dog. Legs hurt which limit mobility   Exercise Goal - use stationary bicycle 150 minutes     Dietary -   Breakfast - Nothing recently Avocado on toast  Lunch - skips  Dinner - mediterranean diet (vegetables with chicken)  Snacks - Yogurt  Dessert - Yogurt  Drinks - Water, coffee    Preventative Management  BP regimen (ACEi/ARB): Telmisartan 80mg QD  BP <140/90: Yes  Statin: rosuvastatin (Crestor) 20 mg daily  LDL <100: Yes  Lab Results   Component Value Date/Time    CHOLSTRLTOT 157 06/06/2024 09:23 AM    LDL 59 06/06/2024 09:23 AM    HDL 73 06/06/2024 09:23 AM    TRIGLYCERIDE 125 06/06/2024 09:23 AM       Last Microalbumin/Cr:  Lab Results   Component Value Date/Time     MALBCRT 30 06/06/2024 09:24 AM    MICROALBUR 8.5 06/06/2024 09:24 AM     Last A1c:  Lab Results   Component Value Date/Time    HBA1C 6.5 (A) 11/04/2024 10:33 AM        Monofilament exam: up to date, last done 6/2024     REC DM Score: 0  Care gaps addressed:   N/A  Care gaps updated in Health Maintenance      ROS:  Constitutional: No weight loss  Cardiac: No palpitations or racing heart  Resp: No shortness of breath  Neuro: No numbness or tinging in feet  Endo: No heat or cold intolerance, no polyuria or polydipsia  All other systems were reviewed and were negative.    Past Medical History:  Patient Active Problem List    Diagnosis Date Noted    Fatty (change of) liver, not elsewhere classified 10/04/2024    RUQ pain 09/10/2024    Gastroesophageal reflux disease without esophagitis 11/30/2023    Osteopenia of neck of left femur 08/02/2023    Controlled type 2 diabetes mellitus with complication, without long-term current use of insulin ([facility]) 06/02/2023    Mixed stress and urge urinary incontinence 06/02/2023    COPD (chronic obstructive pulmonary disease) (Formerly [facility]) 04/20/2022    Lung nodule 04/20/2022    Greater trochanteric bursitis of left hip 01/31/2022    Elevated lipoprotein(a) 12/29/2021    Essential hypertension 10/06/2020    Coronary artery calcification seen on CT scan 04/07/2020    PAD (peripheral artery disease) ([facility]) 02/07/2020    Recurrent major depressive disorder, in partial remission ([facility]) 02/10/2019    Chronic insomnia 04/18/2016    Dyslipidemia associated with type 2 diabetes mellitus ([facility]) 10/02/2014    Class 1 obesity due to excess calories with body mass index (BMI) of 34.0 to 34.9 in adult 10/02/2014       Past Surgical History:  Past Surgical History:   Procedure Laterality Date    IN LAP,APPENDECTOMY N/A 2/12/2021    Procedure: APPENDECTOMY, LAPAROSCOPIC;  Surgeon: Juan Dawson M.D.;  Location: SURGERY [facility];  Service: General    ABDOMINAL HYSTERECTOMY TOTAL  1993    benign cysts, total        Allergies:  Sulfa drugs    Social History:  Social History     Socioeconomic History    Marital status:      Spouse name: Not on file    Number of children: Not on file    Years of education: Not on file    Highest education level: Some college, no degree   Occupational History    Not on file   Tobacco Use    Smoking status: Former     Current packs/day: 0.00     Average packs/day: 1 pack/day for 49.4 years (49.4 ttl pk-yrs)     Types: Cigarettes     Start date:      Quit date: 2017     Years since quittin.4    Smokeless tobacco: Never   Vaping Use    Vaping status: Never Used   Substance and Sexual Activity    Alcohol use: No     Alcohol/week: 0.0 oz    Drug use: Never    Sexual activity: Not Currently     Comment:    Other Topics Concern    Not on file   Social History Narrative    Not on file     Social Drivers of Health     Financial Resource Strain: Low Risk  (7/3/2022)    Overall Financial Resource Strain (CARDIA)     Difficulty of Paying Living Expenses: Not very hard   Food Insecurity: No Food Insecurity (7/3/2022)    Hunger Vital Sign     Worried About Running Out of Food in the Last Year: Never true     Ran Out of Food in the Last Year: Never true   Transportation Needs: No Transportation Needs (7/3/2022)    PRAPARE - Transportation     Lack of Transportation (Medical): No     Lack of Transportation (Non-Medical): No   Physical Activity: Sufficiently Active (7/3/2022)    Exercise Vital Sign     Days of Exercise per Week: 4 days     Minutes of Exercise per Session: 40 min   Stress: Stress Concern Present (7/3/2022)    [employer] [place] [employer] - Occupational Stress Questionnaire     Feeling of Stress : To some extent   Social Connections: Moderately Integrated (7/3/2022)    Social Connection and Isolation Panel [NHANES]     Frequency of Communication with Friends and Family: More than three times a week     Frequency of Social Gatherings with Friends and  Family: Once a week     Attends Restoration Services: 1 to 4 times per year     Active Member of Clubs or Organizations: No     Attends Club or Organization Meetings: Not on file     Marital Status:    Intimate Partner Violence: Not on file   Housing Stability: Low Risk  (7/3/2022)    Housing Stability Vital Sign     Unable to Pay for Housing in the Last Year: No     Number of Places Lived in the Last Year: 1     Unstable Housing in the Last Year: No       Family History:  Family History   Problem Relation Age of Onset    Cancer Mother         pancreatic     Heart Disease Father     Heart Attack Father     Diabetes Father         pre-diabetes    Stroke Neg Hx        Medications:    Current Outpatient Medications:     cilostazol (PLETAL) 50 MG tablet, TAKE 1 TABLET BY MOUTH 2 TIMES A DAY, Disp: 60 Tablet, Rfl: 3    rosuvastatin (CRESTOR) 20 MG Tab, TAKE ONE AND ONE-HALF TABLETS BY MOUTH EVERY EVENING, Disp: 135 Tablet, Rfl: 3    PARoxetine (PAXIL) 30 MG Tab, TAKE 1 TABLET BY MOUTH DAILY, Disp: 90 Tablet, Rfl: 2    metFORMIN ER (GLUCOPHAGE XR) 500 MG TABLET SR 24 HR, Take 2 Tablets by mouth 2 times a day., Disp: 360 Tablet, Rfl: 3    traZODone (DESYREL) 50 MG Tab, TAKE ONE TABLET BY MOUTH EVERY NIGHT AT BEDTIME AS NEEDED FOR SLEEP, Disp: 90 Tablet, Rfl: 2    aspirin EC (ECOTRIN) 325 MG Tablet Delayed Response, Take 1 Tablet by mouth every day., Disp: 100 Tablet, Rfl: 3    telmisartan (MICARDIS) 80 MG Tab, Take 1 Tablet by mouth at bedtime., Disp: 100 Tablet, Rfl: 3    omeprazole (PRILOSEC) 20 MG delayed-release capsule, TAKE 1 CAPSULE BY MOUTH DAILY, Disp: 90 Capsule, Rfl: 3    amLODIPine (NORVASC) 5 MG Tab, Take 1 Tablet by mouth at bedtime., Disp: 90 Tablet, Rfl: 3    albuterol 108 (90 Base) MCG/ACT Aero Soln inhalation aerosol, Inhale 1-2 Puffs every four hours as needed for Shortness of Breath., Disp: 1 Each, Rfl: 6    albuterol (PROVENTIL) 2.5mg/3ml Nebu Soln solution for nebulization, Take 3 mL by  nebulization every four hours as needed for Shortness of Breath., Disp: 120 Each, Rfl: 3    Dulaglutide 4.5 MG/0.5ML Solution Pen-injector, Inject 0.5 mL under the skin., Disp: , Rfl:     ibuprofen (MOTRIN) 200 MG Tab, Take 600 mg by mouth 1 time a day as needed (Moderate pain). 3 tablets = 600 mg., Disp: , Rfl:     vitamin D (CHOLECALCIFEROL) 1000 Unit (25 mcg) Tab, Take 3 Tablets by mouth every morning. 3 tablets = 3000 units., Disp: , Rfl:     Labs: Reviewed    Physical Examination:  Vital signs: There were no vitals taken for this visit. There is no height or weight on file to calculate BMI.  General: No apparent distress, cooperative  Eyes: No scleral icterus or discharge  ENMT: Normal on external inspection of nose, lips, normal thyroid exam  Neck: No abnormal masses on inspection  Resp: Normal effort, clear to auscultation bilaterally   CVS: Regular rate and rhythm, S1 S2 normal, no murmur   Extremities: No edema  Abdomen: abdominal obesity present  Neuro: Alert and oriented  Skin: No rash  Psych: Normal mood and affect, intact memory and able to make informed decisions    Assessment and Plan:    Basic physiology of DMII was explained to patient as well as microvascular/macrovascular complications. The importance of increasing physical activity to improve diabetes control was discussed with the patient. Patient was also educated on changing diet and making better choices to help control blood sugar.    Patient is optimized on Trulicity.  Somewhat tolerating current dosing of metformin, continues to have some intermittent GI distress.  Not currently testing home FBG.  A1c continues to improve, still at goal at 6.5% today.    No appreciable improvements to nutrition or exercise routines.    She is interested in more weight loss impact from GLP1a use and transition to Ozempic.  Has been receiving Trulicity through [facility] P[facility], enough supply at home for ~one month.    STOP Trulicity.  START Ozempic 1mg SQ  once weekly.  [employer] PAP forms sent with patient today, she will return ASAP with proof of income.  DECREASE metformin to 500mg BID.  Continue to be mindful of nutrition habits, keeping simple carbs minimized while focusing on lean proteins and fiber.  Increase exercise/activity as tolerated.    Follow Up:  Three months    Thank you for allowing me to participate in the care of this patient.    Pillo Truong, PharmD, BCACP  11/04/24    CC:   Becca Marley P.A.-C.

## 2024-11-05 ENCOUNTER — OFFICE VISIT (OUTPATIENT)
Dept: SLEEP MEDICINE | Facility: MEDICAL CENTER | Age: 69
End: 2024-11-05
Attending: NURSE PRACTITIONER
Payer: MEDICARE

## 2024-11-05 ENCOUNTER — NON-PROVIDER VISIT (OUTPATIENT)
Dept: SLEEP MEDICINE | Facility: MEDICAL CENTER | Age: 69
End: 2024-11-05
Attending: INTERNAL MEDICINE
Payer: MEDICARE

## 2024-11-05 VITALS — WEIGHT: 198 LBS | HEIGHT: 66 IN | BODY MASS INDEX: 31.82 KG/M2

## 2024-11-05 VITALS
HEIGHT: 66 IN | WEIGHT: 198 LBS | OXYGEN SATURATION: 95 % | HEART RATE: 102 BPM | DIASTOLIC BLOOD PRESSURE: 72 MMHG | RESPIRATION RATE: 16 BRPM | BODY MASS INDEX: 31.82 KG/M2 | SYSTOLIC BLOOD PRESSURE: 122 MMHG

## 2024-11-05 DIAGNOSIS — R91.1 LUNG NODULE: ICD-10-CM

## 2024-11-05 DIAGNOSIS — J44.9 CHRONIC OBSTRUCTIVE PULMONARY DISEASE, UNSPECIFIED COPD TYPE (HCC): ICD-10-CM

## 2024-11-05 DIAGNOSIS — Z87.891 FORMER SMOKER: ICD-10-CM

## 2024-11-05 PROCEDURE — 3078F DIAST BP <80 MM HG: CPT | Performed by: NURSE PRACTITIONER

## 2024-11-05 PROCEDURE — 94729 DIFFUSING CAPACITY: CPT | Performed by: INTERNAL MEDICINE

## 2024-11-05 PROCEDURE — 99213 OFFICE O/P EST LOW 20 MIN: CPT | Performed by: NURSE PRACTITIONER

## 2024-11-05 PROCEDURE — 94060 EVALUATION OF WHEEZING: CPT | Performed by: INTERNAL MEDICINE

## 2024-11-05 PROCEDURE — 94726 PLETHYSMOGRAPHY LUNG VOLUMES: CPT | Mod: 26 | Performed by: INTERNAL MEDICINE

## 2024-11-05 PROCEDURE — 94726 PLETHYSMOGRAPHY LUNG VOLUMES: CPT | Performed by: INTERNAL MEDICINE

## 2024-11-05 PROCEDURE — 94060 EVALUATION OF WHEEZING: CPT | Mod: 26 | Performed by: INTERNAL MEDICINE

## 2024-11-05 PROCEDURE — 99213 OFFICE O/P EST LOW 20 MIN: CPT | Mod: 25 | Performed by: NURSE PRACTITIONER

## 2024-11-05 PROCEDURE — 94729 DIFFUSING CAPACITY: CPT | Mod: 26 | Performed by: INTERNAL MEDICINE

## 2024-11-05 PROCEDURE — 3074F SYST BP LT 130 MM HG: CPT | Performed by: NURSE PRACTITIONER

## 2024-11-05 RX ORDER — AZITHROMYCIN 250 MG/1
TABLET, FILM COATED ORAL
Qty: 6 TABLET | Refills: 0 | Status: SHIPPED | OUTPATIENT
Start: 2024-11-05

## 2024-11-05 RX ORDER — METHYLPREDNISOLONE 4 MG/1
TABLET ORAL
Qty: 21 TABLET | Refills: 0 | Status: SHIPPED | OUTPATIENT
Start: 2024-11-05

## 2024-11-05 ASSESSMENT — FIBROSIS 4 INDEX
FIB4 SCORE: 1.21
FIB4 SCORE: 1.21

## 2024-11-05 NOTE — PATIENT INSTRUCTIONS
Start sinus rinse followed by flonase    Start zpak with medrol dosepak    Complete CT scan 10/2025 to complete 5 yrs of monitoring

## 2024-11-05 NOTE — PROCEDURES
Technician: SHANNAN Kendrick    Technician Comment:  Good patient effort & cooperation.  The results of this test meet the ATS/ERS standards for acceptability & reproducibility.  Test was performed on the V[employer] Body Plethysmograph-Elite DX system.  Predicted values were GLI-2012 for spirometry, GLI-2020 for DLCO, ITS for Lung Volumes.  The DLCO was uncorrected for Hgb.  A bronchodilator of Albuterol HFA -2puffs via spacer administered.  DLCO performed during dilation period.    Interpretation  1.  There is mild obstruction, FEV1 is 2.07 L / 89%  2.  There is no significant bronchodilator response  3.  RV is mildly elevated to 124%  4.  DLCO is normal at 91%  5.  Flow volume loop consistent with spirometry    Mild obstruction with air trapping    Lilia Armstrong,    Pulmonary and Critical Care

## 2024-11-05 NOTE — PROGRESS NOTES
Chief Complaint   Patient presents with    Follow-Up     obstructive lung disease and pulmonary nodules  1/17/2024- DR. MCCOY     Results     PFT -11/5/2024         HPI:  Dunia Her is a 69 y.o. year old female here today for follow-up on mild obstructive lung disease.  Last office visit 1/17/2024 with Dr. Mccoy    CT chest 10/23/24:  1. Stable 11 mm right upper lobe groundglass nodule. Recommend follow-up chest CT in 2 years.  2. No acute process or change.  Reviewed with patient; recommend 1 last CT scan in 6 to 12 months for 5 years of monitoring.  If stable at next CT may stop imaging.    PFT 11/5/2024 indicates mild obstruction with FEV1 1.97 L or 85%, ratio 69, trend towards bronchodilator response, %, TLC 5.55 L or 105% and DLCO 91% predicted.  Reviewed with patient.  Will compare to 2022 testing.    MMRC Grade: 0-1  Exacerbations this year: 0    Currently using albuterol HFA inhaler or nebulizer as needed which is rare.    Interval events  11/5/2024: Patient presents today with increased sinus congestion affecting her breathing.  She feels overall her respiratory status is stable with no increased shortness of breath.  She does have regular cough and phlegm that has been changing color from clear/white to yellow and green.  She notes occasional wheezing.  No history of prior ENT evaluation or surgeries of sinuses.  She did previously use a sinus rinse and Flonase but has stopped doing so since she forgot about them.  She does feel more fatigued and has been in bed a few days the last week.  She notes an occasional possible fever but no chills.    PULM history:  Former smoker, quit 2017 with 50-pack-year history.  PFT 2021 indicated FEV1 1.68 L or 68% ratio 70, TLC 5.57 L 105% and DLCO 83% predicted.  Significant bronchodilator response noted.  CTA chest 4/2/2020 indicated no PE and 1.1 cm right upper lobe groundglass nodule.    ROS: As per HPI and otherwise negative if not stated.    Past  Medical History:   Diagnosis Date    Hyperlipidemia LDL goal < 100 10/02/2014    Hypertension     Obesity (BMI 30-39.9) 10/02/2014    Pre-diabetes 10/02/2014    Recurrent sinusitis     Type II or unspecified type diabetes mellitus without mention of complication, not stated as uncontrolled     pre-diabetes    Wheezing        Past Surgical History:   Procedure Laterality Date    TX LAP,APPENDECTOMY N/A 2021    Procedure: APPENDECTOMY, LAPAROSCOPIC;  Surgeon: Juan Dawson M.D.;  Location: SURGERY [facility];  Service: General    ABDOMINAL HYSTERECTOMY TOTAL      benign cysts, total       Family History   Problem Relation Age of Onset    Cancer Mother         pancreatic     Heart Disease Father     Heart Attack Father     Diabetes Father         pre-diabetes    Stroke Neg Hx        Social History     Socioeconomic History    Marital status:      Spouse name: Not on file    Number of children: Not on file    Years of education: Not on file    Highest education level: Some college, no degree   Occupational History    Not on file   Tobacco Use    Smoking status: Former     Current packs/day: 0.00     Average packs/day: 1 pack/day for 49.4 years (49.4 ttl pk-yrs)     Types: Cigarettes     Start date:      Quit date: 2017     Years since quittin.4    Smokeless tobacco: Never   Vaping Use    Vaping status: Never Used   Substance and Sexual Activity    Alcohol use: No     Alcohol/week: 0.0 oz    Drug use: Never    Sexual activity: Not Currently     Comment:    Other Topics Concern    Not on file   Social History Narrative    Not on file     Social Drivers of Health     Financial Resource Strain: Low Risk  (7/3/2022)    Overall Financial Resource Strain (CARDIA)     Difficulty of Paying Living Expenses: Not very hard   Food Insecurity: No Food Insecurity (7/3/2022)    Hunger Vital Sign     Worried About Running Out of Food in the Last Year: Never true     Ran Out of Food in the Last  "Year: Never true   Transportation Needs: No Transportation Needs (7/3/2022)    PRAPARE - Transportation     Lack of Transportation (Medical): No     Lack of Transportation (Non-Medical): No   Physical Activity: Sufficiently Active (7/3/2022)    Exercise Vital Sign     Days of Exercise per Week: 4 days     Minutes of Exercise per Session: 40 min   Stress: Stress Concern Present (7/3/2022)    [employer] [place] [employer] [place] - Occupational Stress Questionnaire     Feeling of Stress : To some extent   Social Connections: Moderately Integrated (7/3/2022)    Social Connection and Isolation Panel [NHANES]     Frequency of Communication with Friends and Family: More than three times a week     Frequency of Social Gatherings with Friends and Family: Once a week     Attends [employer] Services: 1 to 4 times per year     Active Member of Clubs or Organizations: No     Attends Club or Organization Meetings: Not on file     Marital Status:    Intimate Partner Violence: Not on file   Housing Stability: Low Risk  (7/3/2022)    Housing Stability Vital Sign     Unable to Pay for Housing in the Last Year: No     Number of Places Lived in the Last Year: 1     Unstable Housing in the Last Year: No       Allergies as of 11/05/2024 - Reviewed 11/05/2024   Allergen Reaction Noted    Sulfa drugs Hives 12/28/2009        Vitals:  /72 (BP Location: Left arm, Patient Position: Sitting, BP Cuff Size: Adult)   Pulse (!) 102   Resp 16   Ht 1.664 m (5' 5.5\")   Wt 89.8 kg (198 lb)   SpO2 95%     Current medications as of today   Current Outpatient Medications   Medication Sig Dispense Refill    azithromycin (ZITHROMAX Z-JOSIANE) 250 MG Tab Take 2 tablets on day 1. Then take 1 tablet a day for 4 days. 6 Tablet 0    methylPREDNISolone (MEDROL DOSEPAK) 4 MG Tablet Therapy Pack Take as directed. 21 Tablet 0    cilostazol (PLETAL) 50 MG tablet TAKE 1 TABLET BY MOUTH 2 TIMES A DAY 60 Tablet 3    rosuvastatin (CRESTOR) 20 MG Tab TAKE " ONE AND ONE-HALF TABLETS BY MOUTH EVERY EVENING 135 Tablet 3    PARoxetine (PAXIL) 30 MG Tab TAKE 1 TABLET BY MOUTH DAILY 90 Tablet 2    metFORMIN ER (GLUCOPHAGE XR) 500 MG TABLET SR 24 HR Take 2 Tablets by mouth 2 times a day. 360 Tablet 3    traZODone (DESYREL) 50 MG Tab TAKE ONE TABLET BY MOUTH EVERY NIGHT AT BEDTIME AS NEEDED FOR SLEEP 90 Tablet 2    aspirin EC (ECOTRIN) 325 MG Tablet Delayed Response Take 1 Tablet by mouth every day. 100 Tablet 3    telmisartan (MICARDIS) 80 MG Tab Take 1 Tablet by mouth at bedtime. 100 Tablet 3    omeprazole (PRILOSEC) 20 MG delayed-release capsule TAKE 1 CAPSULE BY MOUTH DAILY 90 Capsule 3    amLODIPine (NORVASC) 5 MG Tab Take 1 Tablet by mouth at bedtime. 90 Tablet 3    albuterol 108 (90 Base) MCG/ACT Aero Soln inhalation aerosol Inhale 1-2 Puffs every four hours as needed for Shortness of Breath. 1 Each 6    albuterol (PROVENTIL) 2.5mg/3ml Nebu Soln solution for nebulization Take 3 mL by nebulization every four hours as needed for Shortness of Breath. 120 Each 3    Dulaglutide 4.5 MG/0.5ML Solution Pen-injector Inject 0.5 mL under the skin.      ibuprofen (MOTRIN) 200 MG Tab Take 600 mg by mouth 1 time a day as needed (Moderate pain). 3 tablets = 600 mg.      vitamin D (CHOLECALCIFEROL) 1000 Unit (25 mcg) Tab Take 3 Tablets by mouth every morning. 3 tablets = 3000 units.       No current facility-administered medications for this visit.         Physical Exam:   Gen:           Alert and oriented, No apparent distress. Mood and affect appropriate, normal interaction with examiner.  Eyes:          PERRL, EOM intact, sclere white, conjunctive moist.  Ears:          Not examined.   Hearing:     Grossly intact.  Nose:          Normal, no lesions or deformities.  Dentition:    Not examined.   Oropharynx:   Not examined.   Mallampati Classification: Not examined.   Neck:        Supple, trachea midline, no masses.  Respiratory Effort: No intercostal retractions or use of accessory  muscles.   Lung Auscultation:      Rhonchi in upper lobes and cough on exam. Hoarse voice.  CV:            Regular rate and rhythm. No murmurs, rubs or gallops.  Abd:           Not examined.  Lymphadenopathy: Not examined.   Gait and Station: Normal.  Digits and Nails: No clubbing, cyanosis, petechiae, or nodes.   Cranial Nerves: II-XII grossly intact.  Skin:        No rashes, lesions or ulcers noted.               Ext:           No cyanosis or edema.      Assessment:  1. Chronic obstructive pulmonary disease, unspecified COPD type (HCC)  CT-CHEST (THORAX) W/O    azithromycin (ZITHROMAX Z-JOSIANE) 250 MG Tab    methylPREDNISolone (MEDROL DOSEPAK) 4 MG Tablet Therapy Pack      2. Lung nodule  CT-CHEST (THORAX) W/O      3. BMI 32.0-32.9,adult  HEIGHT AND WEIGHT      4. Former smoker  CT-CHEST (THORAX) W/O               Immunizations:    Flu:9/2024  Pneumovax 23:2020  Prevnar 13:not due  PCV 20: 2024  COVID-19: 9/2024    Plan:  Patient is having a possible mild COPD exacerbation.  She is having increased sinus drainage which may now be moving into her chest.  Recommend starting antibiotic and steroid taper to stop progression of symptoms.  She will continue albuterol HFA inhaler as needed.  She does have the shakes after administration on testing today.   RX zpak/medrol dosepak to take now  Lung nodule is stable and patient has completed 4.5 years of monitoring.  Recommend 1 last CT scan in 6 to 12 months to complete 5 years of monitoring.   CT chest w/o  Encourage healthy diet and activity for conditioning.  Follow-up in 1 year CT scan of chest, sooner if needed.  Patient advised to go to urgent care if symptoms progress or to send [email or web address] message if having any other questions or concerns.    Please note that this dictation was created using voice recognition software. I have made every reasonable attempt to correct obvious errors, but it is possible there are errors of grammar and possibly content that I did not discover  before finalizing the note.

## 2024-12-14 DIAGNOSIS — J44.9 CHRONIC OBSTRUCTIVE PULMONARY DISEASE, UNSPECIFIED COPD TYPE (HCC): ICD-10-CM

## 2024-12-16 RX ORDER — AZITHROMYCIN 250 MG/1
TABLET, FILM COATED ORAL
Qty: 6 TABLET | Refills: 0 | Status: SHIPPED | OUTPATIENT
Start: 2024-12-16

## 2024-12-16 NOTE — TELEPHONE ENCOUNTER
Have we ever prescribed this med? Yes.  If yes, what date? 11/5/24    Last OV: 11/5/24 BEE Wisdom, APRN    Next OV: no pending appt    DX: Chronic obstructive pulmonary disease, unspecified COPD type (HCC) [J44.9]     Medications: azithromycin (ZITHROMAX Z-JOSIANE) 250 MG Tab

## 2024-12-30 ENCOUNTER — APPOINTMENT (OUTPATIENT)
Dept: CARDIOLOGY | Facility: MEDICAL CENTER | Age: 69
End: 2024-12-30
Attending: INTERNAL MEDICINE
Payer: MEDICARE

## 2025-01-10 ENCOUNTER — PATIENT MESSAGE (OUTPATIENT)
Dept: HEALTH INFORMATION MANAGEMENT | Facility: OTHER | Age: 70
End: 2025-01-10

## 2025-01-16 ENCOUNTER — TELEPHONE (OUTPATIENT)
Dept: VASCULAR LAB | Facility: MEDICAL CENTER | Age: 70
End: 2025-01-16
Payer: MEDICARE

## 2025-01-16 NOTE — TELEPHONE ENCOUNTER
Caller: Dunia Her    Topic/issue: Patient called and asked if her Ozempic is ready to  at the office.     Please advise.     Callback Number: [phone]    Thank you,     Perri LUCAS

## 2025-01-30 DIAGNOSIS — I10 ESSENTIAL HYPERTENSION: ICD-10-CM

## 2025-01-30 RX ORDER — AMLODIPINE BESYLATE 5 MG/1
5 TABLET ORAL
Qty: 100 TABLET | Refills: 3 | Status: SHIPPED | OUTPATIENT
Start: 2025-01-30

## 2025-02-01 DIAGNOSIS — K21.9 GASTROESOPHAGEAL REFLUX DISEASE WITHOUT ESOPHAGITIS: ICD-10-CM

## 2025-02-03 ENCOUNTER — OFFICE VISIT (OUTPATIENT)
Dept: MEDICAL GROUP | Facility: PHYSICIAN GROUP | Age: 70
End: 2025-02-03
Payer: MEDICARE

## 2025-02-03 DIAGNOSIS — E11.8 CONTROLLED TYPE 2 DIABETES MELLITUS WITH COMPLICATION, WITHOUT LONG-TERM CURRENT USE OF INSULIN (HCC): ICD-10-CM

## 2025-02-03 LAB
HBA1C MFR BLD: 7.2 % (ref ?–5.8)
POCT INT CON NEG: NEGATIVE
POCT INT CON POS: POSITIVE

## 2025-02-03 PROCEDURE — 83036 HEMOGLOBIN GLYCOSYLATED A1C: CPT | Performed by: PHARMACIST

## 2025-02-03 PROCEDURE — 99401 PREV MED CNSL INDIV APPRX 15: CPT | Performed by: PHARMACIST

## 2025-02-03 RX ORDER — OMEPRAZOLE 20 MG/1
20 CAPSULE, DELAYED RELEASE ORAL DAILY
Qty: 90 CAPSULE | Refills: 3 | Status: SHIPPED | OUTPATIENT
Start: 2025-02-03

## 2025-02-03 NOTE — PROGRESS NOTES
Patient Consult Note - Follow Up Visit  Primary care physician: Becca Marley P.A.-C.    Reason for consult: Management of Uncontrolled Type 2 Diabetes    Time IN:  10:31am  Time OUT:  10:51am    HPI:  Dunia Her is a 69 y.o. old patient who comes in today for evaluation of above stated problem.    Most Recent HbA1c:   Lab Results   Component Value Date/Time    HBA1C 7.2 (A) 02/03/2025 10:38 AM      Reliable and Exact Care Diabetes Score    Displays the Diabetes REC Score.  A patient gets 1 point for each measure for a maximum of 7 points   0  Measures Not Met          Current Diabetes Medication Regimen  Metformin ER: 1000 mg BID  GLP-1 or GLP-1/GIP Analog: dulaglutide (Trulicity) 4.5 mg weekly     Previously attempted medications:  None     Potential Barriers to Care:  Adherence: denies missed doses  Side effects: Reports nausea after increasing metformin to 1000mg BID  Affordability: Receives Trulicity via PAP    Discussed FBG goal of , 2hrPP <180, and A1c <7.0%.  Before Breakfast:  Before Lunch:  Before Dinner:  Before Bedtime:  Other times:  Hypoglycemia:  None    Lifestyle:  More indulgences recently with her sister in town.  States that nutrition habits were stable through holiday season.  Exercise consistent with previous visit.     Previous visit notes:  Current Exercise - stationary bicycle twice weekly, walk the dog. Legs hurt which limit mobility   Exercise Goal - use stationary bicycle 150 minutes     Dietary -   Breakfast - Nothing recently Avocado on toast  Lunch - skips  Dinner - mediterranean diet (vegetables with chicken)  Snacks - Yogurt  Dessert - Yogurt  Drinks - Water, coffee    Preventative Management  BP regimen (ACEi/ARB): Telmisartan 80mg QD  BP <140/90: Yes  Statin: rosuvastatin (Crestor) 20 mg daily  LDL <100: Yes  Lab Results   Component Value Date/Time    CHOLSTRLTOT 157 06/06/2024 09:23 AM    LDL 59 06/06/2024 09:23 AM    HDL 73 06/06/2024 09:23 AM    TRIGLYCERIDE 125  06/06/2024 09:23 AM       Last Microalbumin/Cr:  Lab Results   Component Value Date/Time    MALBCRT 30 06/06/2024 09:24 AM    MICROALBUR 8.5 06/06/2024 09:24 AM     Last A1c:  Lab Results   Component Value Date/Time    HBA1C 7.2 (A) 02/03/2025 10:38 AM      Monofilament exam: up to date, last done 6/2024      REC DM Score: 0  Care gaps addressed:   N/A  Care gaps updated in Health Maintenance    ROS:  Constitutional: No weight loss  Cardiac: No palpitations or racing heart  Resp: No shortness of breath  Neuro: No numbness or tinging in feet  Endo: No heat or cold intolerance, no polyuria or polydipsia  All other systems were reviewed and were negative.    Past Medical History:  Patient Active Problem List    Diagnosis Date Noted    Fatty (change of) liver, not elsewhere classified 10/04/2024    RUQ pain 09/10/2024    Gastroesophageal reflux disease without esophagitis 11/30/2023    Osteopenia of neck of left femur 08/02/2023    Controlled type 2 diabetes mellitus with complication, without long-term current use of insulin (HCC) 06/02/2023    Mixed stress and urge urinary incontinence 06/02/2023    COPD (chronic obstructive pulmonary disease) ([facility]) 04/20/2022    Lung nodule 04/20/2022    Greater trochanteric bursitis of left hip 01/31/2022    Elevated lipoprotein(a) 12/29/2021    Essential hypertension 10/06/2020    Coronary artery calcification seen on CT scan 04/07/2020    PAD (peripheral artery disease) ([facility]) 02/07/2020    Recurrent major depressive disorder, in partial remission ([facility]) 02/10/2019    Chronic insomnia 04/18/2016    Dyslipidemia associated with type 2 diabetes mellitus (HCC) 10/02/2014    Class 1 obesity due to excess calories with body mass index (BMI) of 34.0 to 34.9 in adult 10/02/2014       Past Surgical History:  Past Surgical History:   Procedure Laterality Date    CO LAP,APPENDECTOMY N/A 2/12/2021    Procedure: APPENDECTOMY, LAPAROSCOPIC;  Surgeon: Juan Dawson M.D.;  Location: SURGERY  [facility];  Service: General    ABDOMINAL HYSTERECTOMY TOTAL      benign cysts, total       Allergies:  Sulfa drugs    Social History:  Social History     Socioeconomic History    Marital status:      Spouse name: Not on file    Number of children: Not on file    Years of education: Not on file    Highest education level: Some college, no degree   Occupational History    Not on file   Tobacco Use    Smoking status: Former     Current packs/day: 0.00     Average packs/day: 1 pack/day for 49.4 years (49.4 ttl pk-yrs)     Types: Cigarettes     Start date:      Quit date: 2017     Years since quittin.6    Smokeless tobacco: Never   Vaping Use    Vaping status: Never Used   Substance and Sexual Activity    Alcohol use: No     Alcohol/week: 0.0 oz    Drug use: Never    Sexual activity: Not Currently     Comment:    Other Topics Concern    Not on file   Social History Narrative    Not on file     Social Drivers of Health     Financial Resource Strain: Low Risk  (7/3/2022)    Overall Financial Resource Strain (CARDIA)     Difficulty of Paying Living Expenses: Not very hard   Food Insecurity: No Food Insecurity (7/3/2022)    Hunger Vital Sign     Worried About Running Out of Food in the Last Year: Never true     Ran Out of Food in the Last Year: Never true   Transportation Needs: No Transportation Needs (7/3/2022)    PRAPARE - Transportation     Lack of Transportation (Medical): No     Lack of Transportation (Non-Medical): No   Physical Activity: Sufficiently Active (7/3/2022)    Exercise Vital Sign     Days of Exercise per Week: 4 days     Minutes of Exercise per Session: 40 min   Stress: Stress Concern Present (7/3/2022)    [employer] - Occupational Stress Questionnaire     Feeling of Stress : To some extent   Social Connections: Moderately Integrated (7/3/2022)    Social Connection and Isolation Panel [NHANES]     Frequency of Communication with Friends and  Family: More than three times a week     Frequency of Social Gatherings with Friends and Family: Once a week     Attends Anglican Services: 1 to 4 times per year     Active Member of Clubs or Organizations: No     Attends Club or Organization Meetings: Not on file     Marital Status:    Intimate Partner Violence: Not on file   Housing Stability: Low Risk  (7/3/2022)    Housing Stability Vital Sign     Unable to Pay for Housing in the Last Year: No     Number of Places Lived in the Last Year: 1     Unstable Housing in the Last Year: No       Family History:  Family History   Problem Relation Age of Onset    Cancer Mother         pancreatic     Heart Disease Father     Heart Attack Father     Diabetes Father         pre-diabetes    Stroke Neg Hx        Medications:    Current Outpatient Medications:     amLODIPine (NORVASC) 5 MG Tab, TAKE 1 TABLET BY MOUTH AT BEDTIME, Disp: 100 Tablet, Rfl: 3    Semaglutide, 1 MG/DOSE, 4 MG/3ML Solution Pen-injector, Inject 1 mg under the skin every 7 days., Disp: 3 mL, Rfl: 0    azithromycin (ZITHROMAX) 250 MG Tab, TAKE 2 TABLETS BY MOUTH TODAY, THEN TAKE 1 TABLET DAILY FOR 4 DAYS, Disp: 6 Tablet, Rfl: 0    methylPREDNISolone (MEDROL DOSEPAK) 4 MG Tablet Therapy Pack, Take as directed., Disp: 21 Tablet, Rfl: 0    cilostazol (PLETAL) 50 MG tablet, TAKE 1 TABLET BY MOUTH 2 TIMES A DAY, Disp: 60 Tablet, Rfl: 3    rosuvastatin (CRESTOR) 20 MG Tab, TAKE ONE AND ONE-HALF TABLETS BY MOUTH EVERY EVENING, Disp: 135 Tablet, Rfl: 3    PARoxetine (PAXIL) 30 MG Tab, TAKE 1 TABLET BY MOUTH DAILY, Disp: 90 Tablet, Rfl: 2    metFORMIN ER (GLUCOPHAGE XR) 500 MG TABLET SR 24 HR, Take 2 Tablets by mouth 2 times a day., Disp: 360 Tablet, Rfl: 3    traZODone (DESYREL) 50 MG Tab, TAKE ONE TABLET BY MOUTH EVERY NIGHT AT BEDTIME AS NEEDED FOR SLEEP, Disp: 90 Tablet, Rfl: 2    aspirin EC (ECOTRIN) 325 MG Tablet Delayed Response, Take 1 Tablet by mouth every day., Disp: 100 Tablet, Rfl: 3     telmisartan (MICARDIS) 80 MG Tab, Take 1 Tablet by mouth at bedtime., Disp: 100 Tablet, Rfl: 3    omeprazole (PRILOSEC) 20 MG delayed-release capsule, TAKE 1 CAPSULE BY MOUTH DAILY, Disp: 90 Capsule, Rfl: 3    albuterol 108 (90 Base) MCG/ACT Aero Soln inhalation aerosol, Inhale 1-2 Puffs every four hours as needed for Shortness of Breath., Disp: 1 Each, Rfl: 6    albuterol (PROVENTIL) 2.5mg/3ml Nebu Soln solution for nebulization, Take 3 mL by nebulization every four hours as needed for Shortness of Breath., Disp: 120 Each, Rfl: 3    Dulaglutide 4.5 MG/0.5ML Solution Pen-injector, Inject 0.5 mL under the skin., Disp: , Rfl:     ibuprofen (MOTRIN) 200 MG Tab, Take 600 mg by mouth 1 time a day as needed (Moderate pain). 3 tablets = 600 mg., Disp: , Rfl:     vitamin D (CHOLECALCIFEROL) 1000 Unit (25 mcg) Tab, Take 3 Tablets by mouth every morning. 3 tablets = 3000 units., Disp: , Rfl:     Labs: Reviewed    Physical Examination:  Vital signs: There were no vitals taken for this visit. There is no height or weight on file to calculate BMI.  General: No apparent distress, cooperative  Eyes: No scleral icterus or discharge  ENMT: Normal on external inspection of nose, lips, normal thyroid exam  Neck: No abnormal masses on inspection  Resp: Normal effort, clear to auscultation bilaterally   CVS: Regular rate and rhythm, S1 S2 normal, no murmur   Extremities: No edema  Abdomen: abdominal obesity present  Neuro: Alert and oriented  Skin: No rash  Psych: Normal mood and affect, intact memory and able to make informed decisions    Assessment and Plan:    Basic physiology of DMII was explained to patient as well as microvascular/macrovascular complications. The importance of increasing physical activity to improve diabetes control was discussed with the patient. Patient was also educated on changing diet and making better choices to help control blood sugar.    Patient is optimized on Trulicity and tolerating current dosing of  metformin.  Was to have transitioned to Ozempic as she was approved through [facility], but delays in shipping have prevented her from starting.  Not currently testing FBG.  A1c drawn today, has worsened some to 7.2%, which is above goal for her.    She does admit to good amount of sweets the last couple weeks with her sister in town.  States that she maintained normal habits through the holidays.  No other appreciable nutrition or exercise changes.    She has received approval for Ozempic, received voucher to fill through local pharmacy until initial shipment received.  She will transition to Ozempic at time of next weekly injection.  Continue all other medications for now.  Stressed importance of getting back to previously good nutrition habits.  Continue with regular exercise.    Follow Up:  Six weeks.    Thank you for allowing me to participate in the care of this patient.    Pillo Truong, PharmD, [facility]  02/03/25    CC:   Becca Marley P.A.-C.

## 2025-02-03 NOTE — TELEPHONE ENCOUNTER
Received request via: Pharmacy    Was the patient seen in the last year in this department? Yes    Does the patient have an active prescription (recently filled or refills available) for medication(s) requested? No    Does the patient have intermediate Plus and need 100-day supply? (This applies to ALL medications) Yes, quantity updated to 100 days

## 2025-02-12 ENCOUNTER — TELEPHONE (OUTPATIENT)
Dept: FAMILY PLANNING/WOMEN'S HEALTH CLINIC | Facility: PHYSICIAN GROUP | Age: 70
End: 2025-02-12
Payer: MEDICARE

## 2025-02-27 DIAGNOSIS — E11.51 TYPE 2 DIABETES MELLITUS WITH DIABETIC PERIPHERAL ANGIOPATHY WITHOUT GANGRENE, WITHOUT LONG-TERM CURRENT USE OF INSULIN (HCC): ICD-10-CM

## 2025-02-28 RX ORDER — SEMAGLUTIDE 1.34 MG/ML
1 INJECTION, SOLUTION SUBCUTANEOUS
Qty: 3 ML | Refills: 5 | Status: SHIPPED | OUTPATIENT
Start: 2025-02-28

## 2025-03-09 DIAGNOSIS — F51.04 CHRONIC INSOMNIA: ICD-10-CM

## 2025-03-10 NOTE — TELEPHONE ENCOUNTER
Received request via: Pharmacy    Was the patient seen in the last year in this department? Yes    Does the patient have an active prescription (recently filled or refills available) for medication(s) requested? No    [facility]    Does the patient have [facility] Plus and need 100-day supply? (This applies to ALL medications) Yes, quantity updated to 100 days

## 2025-03-11 RX ORDER — TRAZODONE HYDROCHLORIDE 50 MG/1
50 TABLET ORAL
Qty: 100 TABLET | Refills: 0 | Status: SHIPPED | OUTPATIENT
Start: 2025-03-11

## 2025-03-12 ENCOUNTER — OFFICE VISIT (OUTPATIENT)
Dept: CARDIOLOGY | Facility: MEDICAL CENTER | Age: 70
End: 2025-03-12
Attending: INTERNAL MEDICINE
Payer: MEDICARE

## 2025-03-12 VITALS
OXYGEN SATURATION: 94 % | HEART RATE: 90 BPM | SYSTOLIC BLOOD PRESSURE: 122 MMHG | WEIGHT: 201 LBS | HEIGHT: 65 IN | DIASTOLIC BLOOD PRESSURE: 60 MMHG | BODY MASS INDEX: 33.49 KG/M2 | RESPIRATION RATE: 16 BRPM

## 2025-03-12 DIAGNOSIS — I20.89 ANGINA AT REST (HCC): ICD-10-CM

## 2025-03-12 DIAGNOSIS — I10 ESSENTIAL HYPERTENSION: ICD-10-CM

## 2025-03-12 DIAGNOSIS — I73.9 PAD (PERIPHERAL ARTERY DISEASE) (HCC): Chronic | ICD-10-CM

## 2025-03-12 DIAGNOSIS — E11.69 DYSLIPIDEMIA ASSOCIATED WITH TYPE 2 DIABETES MELLITUS (HCC): Chronic | ICD-10-CM

## 2025-03-12 DIAGNOSIS — I25.10 CORONARY ARTERY CALCIFICATION SEEN ON CT SCAN: ICD-10-CM

## 2025-03-12 DIAGNOSIS — E78.5 DYSLIPIDEMIA ASSOCIATED WITH TYPE 2 DIABETES MELLITUS (HCC): Chronic | ICD-10-CM

## 2025-03-12 PROCEDURE — 3078F DIAST BP <80 MM HG: CPT | Performed by: INTERNAL MEDICINE

## 2025-03-12 PROCEDURE — 99213 OFFICE O/P EST LOW 20 MIN: CPT | Performed by: INTERNAL MEDICINE

## 2025-03-12 PROCEDURE — 3074F SYST BP LT 130 MM HG: CPT | Performed by: INTERNAL MEDICINE

## 2025-03-12 PROCEDURE — 99214 OFFICE O/P EST MOD 30 MIN: CPT | Performed by: INTERNAL MEDICINE

## 2025-03-12 PROCEDURE — G2211 COMPLEX E/M VISIT ADD ON: HCPCS | Performed by: INTERNAL MEDICINE

## 2025-03-12 RX ORDER — CILOSTAZOL 50 MG/1
50 TABLET ORAL 2 TIMES DAILY
Qty: 200 TABLET | Refills: 3 | Status: SHIPPED | OUTPATIENT
Start: 2025-03-12

## 2025-03-12 RX ORDER — CILOSTAZOL 50 MG/1
50 TABLET ORAL 2 TIMES DAILY
Qty: 180 TABLET | Refills: 3 | Status: SHIPPED | OUTPATIENT
Start: 2025-03-12 | End: 2025-03-12

## 2025-03-12 ASSESSMENT — ENCOUNTER SYMPTOMS
WEIGHT GAIN: 0
DIZZINESS: 0
DEPRESSION: 0
ABDOMINAL PAIN: 0
COUGH: 0
DIARRHEA: 0
CLAUDICATION: 0
BLURRED VISION: 0
IRREGULAR HEARTBEAT: 0
FEVER: 0
ORTHOPNEA: 0
CONSTIPATION: 0
SYNCOPE: 0
HEARTBURN: 0
WEIGHT LOSS: 0
SHORTNESS OF BREATH: 0
ALTERED MENTAL STATUS: 0
DYSPNEA ON EXERTION: 0
VOMITING: 0
BACK PAIN: 0
NAUSEA: 0
PND: 0
NEAR-SYNCOPE: 0
FLANK PAIN: 0
PALPITATIONS: 0
DECREASED APPETITE: 0

## 2025-03-12 ASSESSMENT — FIBROSIS 4 INDEX: FIB4 SCORE: 1.21

## 2025-03-12 NOTE — TELEPHONE ENCOUNTER
Is the patient due for a refill? Yes    Was the patient seen the last 15 months? Yes    Date of last office visit: 6/19/24    Does the patient have an upcoming appointment?  Yes   If yes, When? 3/12/2025    Provider to refill:[facility]    Does the patient have [facility] Plus and need 100-day supply? (This applies to ALL medications) Yes, quantity updated to 100 days

## 2025-03-13 ENCOUNTER — TELEPHONE (OUTPATIENT)
Dept: VASCULAR LAB | Facility: MEDICAL CENTER | Age: 70
End: 2025-03-13
Payer: MEDICARE

## 2025-03-13 NOTE — TELEPHONE ENCOUNTER
Received pt's Ozempic PAP shipment. Called and notified pt. Instructed to p/u at [facility] anytime Mon-Fri from 8-4, away for lunch 12-1.    Jose Layne, RommelD, BCACP

## 2025-03-24 ENCOUNTER — OFFICE VISIT (OUTPATIENT)
Dept: MEDICAL GROUP | Facility: PHYSICIAN GROUP | Age: 70
End: 2025-03-24
Payer: MEDICARE

## 2025-03-24 DIAGNOSIS — E11.8 CONTROLLED TYPE 2 DIABETES MELLITUS WITH COMPLICATION, WITHOUT LONG-TERM CURRENT USE OF INSULIN (HCC): ICD-10-CM

## 2025-03-24 PROCEDURE — 99211 OFF/OP EST MAY X REQ PHY/QHP: CPT | Performed by: INTERNAL MEDICINE

## 2025-03-24 NOTE — PROGRESS NOTES
Patient Consult Note - Follow Up Visit  Primary care physician: Becca Marley P.A.-C.    Reason for consult: Management of Uncontrolled Type 2 Diabetes    Time IN:  10:41am  Time OUT:  10:59am    HPI:  Dunia Her is a 69 y.o. old patient who comes in today for evaluation of above stated problem.    Most Recent HbA1c:   Lab Results   Component Value Date/Time    HBA1C 7.2 (A) 02/03/2025 10:38 AM      Reliable and Exact Care Diabetes Score    Displays the Diabetes REC Score.  A patient gets 1 point for each measure for a maximum of 7 points   0  Measures Not Met          Current Diabetes Medication Regimen  Metformin ER: 1000 mg BID  GLP-1 or GLP-1/GIP Analog: Ozempic 1mg SQ once weekly - only couple doses thus far     Previously attempted medications:  Trulicity - stopped in favor of Ozempic     Potential Barriers to Care:  Adherence: denies missed doses  Side effects: Reports nausea after increasing metformin to 1000mg BID  Affordability: Receives Ozempic via PAP    Discussed FBG goal of , 2hrPP <180, and A1c <7.0%.  Before Breakfast: not currently testing, encouraged to test as frequent as possible especially with recent med changes  Before Lunch:  Before Dinner:  Before Bedtime:  Other times:  Hypoglycemia:  None    Lifestyle:  Has noticed a bit more appetite suppression with Ozempic thus far than with Trulicity in the past.  No appreciable changes to types of foods in nutrition plan.  Occasional stationary bike use.     Previous visit notes:  2/2025  More indulgences recently with her sister in [facility].  States that nutrition habits were stable through holiday season.  Exercise consistent with previous visit.    11/2024  Current Exercise - stationary bicycle twice weekly, walk the dog. Legs hurt which limit mobility   Exercise Goal - use stationary bicycle 150 minutes     Dietary -   Breakfast - Nothing recently Avocado on toast  Lunch - skips  Dinner - mediterranean diet (vegetables with  chicken)  Snacks - Yogurt  Dessert - Yogurt  Drinks - Water, coffee    Preventative Management  BP regimen (ACEi/ARB): Telmisartan 80mg QD  BP <140/90: Yes  Statin: rosuvastatin (Crestor) 20 mg daily  LDL <100: Yes  Lab Results   Component Value Date/Time    CHOLSTRLTOT 157 06/06/2024 09:23 AM    LDL 59 06/06/2024 09:23 AM    HDL 73 06/06/2024 09:23 AM    TRIGLYCERIDE 125 06/06/2024 09:23 AM       Last Microalbumin/Cr:  Lab Results   Component Value Date/Time    MALBCRT 30 06/06/2024 09:24 AM    MICROALBUR 8.5 06/06/2024 09:24 AM     Last A1c:  Lab Results   Component Value Date/Time    HBA1C 7.2 (A) 02/03/2025 10:38 AM      Monofilament exam: up to date, last done 6/2024      REC DM Score: 0  Care gaps addressed:   N/A  Care gaps updated in Health Maintenance    ROS:  Constitutional: No weight loss  Cardiac: No palpitations or racing heart  Resp: No shortness of breath  Neuro: No numbness or tinging in feet  Endo: No heat or cold intolerance, no polyuria or polydipsia  All other systems were reviewed and were negative.    Past Medical History:  Patient Active Problem List    Diagnosis Date Noted    Fatty (change of) liver, not elsewhere classified 10/04/2024    RUQ pain 09/10/2024    Gastroesophageal reflux disease without esophagitis 11/30/2023    Osteopenia of neck of left femur 08/02/2023    Controlled type 2 diabetes mellitus with complication, without long-term current use of insulin ([facility]) 06/02/2023    Mixed stress and urge urinary incontinence 06/02/2023    Lung nodule 04/20/2022    Greater trochanteric bursitis of left hip 01/31/2022    Elevated lipoprotein(a) 12/29/2021    Essential hypertension 10/06/2020    Coronary artery calcification seen on CT scan 04/07/2020    Angina at rest ([facility]) 04/03/2020    PAD (peripheral artery disease) ([facility]) 02/07/2020    Recurrent major depressive disorder, in partial remission ([facility]) 02/10/2019    Chronic insomnia 04/18/2016    Dyslipidemia associated with type 2 diabetes  mellitus (HCC) 10/02/2014    Class 1 obesity due to excess calories with body mass index (BMI) of 34.0 to 34.9 in adult 10/02/2014       Past Surgical History:  Past Surgical History:   Procedure Laterality Date    [place] LAP,APPENDECTOMY N/A 2021    Procedure: APPENDECTOMY, LAPAROSCOPIC;  Surgeon: Juan Dawson M.D.;  Location: SURGERY [facility];  Service: General    ABDOMINAL HYSTERECTOMY TOTAL      benign cysts, total       Allergies:  Sulfa drugs    Social History:  Social History     Socioeconomic History    Marital status:      Spouse name: Not on file    Number of children: Not on file    Years of education: Not on file    Highest education level: Some college, no degree   Occupational History    Not on file   Tobacco Use    Smoking status: Former     Current packs/day: 0.00     Average packs/day: 1 pack/day for 49.4 years (49.4 ttl pk-yrs)     Types: Cigarettes     Start date:      Quit date: 2017     Years since quittin.8    Smokeless tobacco: Never   Vaping Use    Vaping status: Never Used   Substance and Sexual Activity    Alcohol use: No     Alcohol/week: 0.0 oz    Drug use: Never    Sexual activity: Not Currently     Comment:    Other Topics Concern    Not on file   Social History Narrative    Not on file     Social Drivers of Health     Financial Resource Strain: Low Risk  (7/3/2022)    Overall Financial Resource Strain (CARDIA)     Difficulty of Paying Living Expenses: Not very hard   Food Insecurity: No Food Insecurity (7/3/2022)    Hunger Vital Sign     Worried About Running Out of Food in the Last Year: Never true     Ran Out of Food in the Last Year: Never true   Transportation Needs: No Transportation Needs (7/3/2022)    PRAPARE - Transportation     Lack of Transportation (Medical): No     Lack of Transportation (Non-Medical): No   Physical Activity: Sufficiently Active (7/3/2022)    Exercise Vital Sign     Days of Exercise per Week: 4 days     Minutes of  Exercise per Session: 40 min   Stress: Stress Concern Present (7/3/2022)    [employer] - Occupational Stress Questionnaire     Feeling of Stress : To some extent   Social Connections: Moderately Integrated (7/3/2022)    Social Connection and Isolation Panel [NHANES]     Frequency of Communication with Friends and Family: More than three times a week     Frequency of Social Gatherings with Friends and Family: Once a week     Attends Restorationism Services: 1 to 4 times per year     Active Member of Clubs or Organizations: No     Attends Club or Organization Meetings: Not on file     Marital Status:    Intimate Partner Violence: Not on file   Housing Stability: Low Risk  (7/3/2022)    Housing Stability Vital Sign     Unable to Pay for Housing in the Last Year: No     Number of Places Lived in the Last Year: 1     Unstable Housing in the Last Year: No       Family History:  Family History   Problem Relation Age of Onset    Cancer Mother         pancreatic     Heart Disease Father     Heart Attack Father     Diabetes Father         pre-diabetes    Stroke Neg Hx        Medications:    Current Outpatient Medications:     cilostazol (PLETAL) 50 MG tablet, Take 1 Tablet by mouth 2 times a day., Disp: 200 Tablet, Rfl: 3    traZODone (DESYREL) 50 MG Tab, TAKE 1 TABLET BY MOUTH EVERY NIGHT AT BEDTIME AS NEEDED FOR SLEEP, Disp: 100 Tablet, Rfl: 0    Semaglutide, 1 MG/DOSE, (OZEMPIC, 1 MG/DOSE,) 4 MG/3ML Solution Pen-injector, Inject 1 mg under the skin every 7 days., Disp: 3 mL, Rfl: 5    omeprazole (PRILOSEC) 20 MG delayed-release capsule, TAKE 1 CAPSULE BY MOUTH DAILY, Disp: 90 Capsule, Rfl: 3    amLODIPine (NORVASC) 5 MG Tab, TAKE 1 TABLET BY MOUTH AT BEDTIME, Disp: 100 Tablet, Rfl: 3    azithromycin (ZITHROMAX) 250 MG Tab, TAKE 2 TABLETS BY MOUTH TODAY, THEN TAKE 1 TABLET DAILY FOR 4 DAYS, Disp: 6 Tablet, Rfl: 0    methylPREDNISolone (MEDROL DOSEPAK) 4 MG Tablet Therapy Pack, Take as  directed., Disp: 21 Tablet, Rfl: 0    rosuvastatin (CRESTOR) 20 MG Tab, TAKE ONE AND ONE-HALF TABLETS BY MOUTH EVERY EVENING, Disp: 135 Tablet, Rfl: 3    PARoxetine (PAXIL) 30 MG Tab, TAKE 1 TABLET BY MOUTH DAILY, Disp: 90 Tablet, Rfl: 2    metFORMIN ER (GLUCOPHAGE XR) 500 MG TABLET SR 24 HR, Take 2 Tablets by mouth 2 times a day., Disp: 360 Tablet, Rfl: 3    aspirin EC (ECOTRIN) 325 MG Tablet Delayed Response, Take 1 Tablet by mouth every day., Disp: 100 Tablet, Rfl: 3    telmisartan (MICARDIS) 80 MG Tab, Take 1 Tablet by mouth at bedtime., Disp: 100 Tablet, Rfl: 3    albuterol 108 (90 Base) MCG/ACT Aero Soln inhalation aerosol, Inhale 1-2 Puffs every four hours as needed for Shortness of Breath., Disp: 1 Each, Rfl: 6    albuterol (PROVENTIL) 2.5mg/3ml Nebu Soln solution for nebulization, Take 3 mL by nebulization every four hours as needed for Shortness of Breath., Disp: 120 Each, Rfl: 3    Dulaglutide 4.5 MG/0.5ML Solution Pen-injector, Inject 0.5 mL under the skin., Disp: , Rfl:     ibuprofen (MOTRIN) 200 MG Tab, Take 600 mg by mouth 1 time a day as needed (Moderate pain). 3 tablets = 600 mg., Disp: , Rfl:     vitamin D (CHOLECALCIFEROL) 1000 Unit (25 mcg) Tab, Take 3 Tablets by mouth every morning. 3 tablets = 3000 units., Disp: , Rfl:     Labs: Reviewed    Physical Examination:  Vital signs: There were no vitals taken for this visit. There is no height or weight on file to calculate BMI.  General: No apparent distress, cooperative  Eyes: No scleral icterus or discharge  ENMT: Normal on external inspection of nose, lips, normal thyroid exam  Neck: No abnormal masses on inspection  Resp: Normal effort, clear to auscultation bilaterally   CVS: Regular rate and rhythm, S1 S2 normal, no murmur   Extremities: No edema  Abdomen: abdominal obesity present  Neuro: Alert and oriented  Skin: No rash  Psych: Normal mood and affect, intact memory and able to make informed decisions    Assessment and Plan:    Basic  physiology of DMII was explained to patient as well as microvascular/macrovascular complications. The importance of increasing physical activity to improve diabetes control was discussed with the patient. Patient was also educated on changing diet and making better choices to help control blood sugar.    Patient is tolerating current dosing of metformin  Only a couple doses into Ozempic (transition from Trulicity), seems to be tolerating, though does c/o headache and mild nausea.  Not currently testing FBG.  Last A1c was above goal at 7.2% (2/2025), not due for repeat at this time.    More appetite suppression with Ozempic thus far, but does note some nausea around times of overeating.    Counseled on MOA of Ozempic and to be more cognizant of feeling satiated at meal time and not overeating.  Continues with occasional mild exercise via stationary bike.    Will continue all current medications for now.  Stressed importance of maintaining appropriate diabetic nutrition habits, decrease carb intake, maximize protein intake.  Continue with regular exercise.    Follow Up:  Six weeks for A1c    Thank you for allowing me to participate in the care of this patient.    Pillo Truong, PharmD, BCACP  03/24/2025    CC:   Becca Marley P.A.-C.

## 2025-03-27 ENCOUNTER — OFFICE VISIT (OUTPATIENT)
Dept: VASCULAR LAB | Facility: MEDICAL CENTER | Age: 70
End: 2025-03-27
Attending: NURSE PRACTITIONER
Payer: MEDICARE

## 2025-03-27 VITALS
BODY MASS INDEX: 31.82 KG/M2 | SYSTOLIC BLOOD PRESSURE: 124 MMHG | DIASTOLIC BLOOD PRESSURE: 76 MMHG | HEART RATE: 85 BPM | WEIGHT: 198 LBS | HEIGHT: 66 IN

## 2025-03-27 DIAGNOSIS — E11.69 DYSLIPIDEMIA ASSOCIATED WITH TYPE 2 DIABETES MELLITUS (HCC): Chronic | ICD-10-CM

## 2025-03-27 DIAGNOSIS — E78.41 ELEVATED LIPOPROTEIN(A): ICD-10-CM

## 2025-03-27 DIAGNOSIS — I73.9 PAD (PERIPHERAL ARTERY DISEASE) (HCC): Chronic | ICD-10-CM

## 2025-03-27 DIAGNOSIS — E78.5 DYSLIPIDEMIA ASSOCIATED WITH TYPE 2 DIABETES MELLITUS (HCC): Chronic | ICD-10-CM

## 2025-03-27 DIAGNOSIS — I10 ESSENTIAL HYPERTENSION: ICD-10-CM

## 2025-03-27 PROCEDURE — 3074F SYST BP LT 130 MM HG: CPT | Performed by: NURSE PRACTITIONER

## 2025-03-27 PROCEDURE — 3078F DIAST BP <80 MM HG: CPT | Performed by: NURSE PRACTITIONER

## 2025-03-27 PROCEDURE — 99214 OFFICE O/P EST MOD 30 MIN: CPT | Performed by: NURSE PRACTITIONER

## 2025-03-27 PROCEDURE — 99212 OFFICE O/P EST SF 10 MIN: CPT

## 2025-03-27 ASSESSMENT — ENCOUNTER SYMPTOMS
NERVOUS/ANXIOUS: 0
SPEECH CHANGE: 0
TINGLING: 1
WEIGHT LOSS: 0
CHILLS: 0
COUGH: 0
WEAKNESS: 0
PALPITATIONS: 0
FOCAL WEAKNESS: 0
CLAUDICATION: 0
FEVER: 0
BRUISES/BLEEDS EASILY: 0
NAUSEA: 0
BLOOD IN STOOL: 0
DIZZINESS: 0
MYALGIAS: 0
SHORTNESS OF BREATH: 0

## 2025-03-27 ASSESSMENT — FIBROSIS 4 INDEX: FIB4 SCORE: 1.21

## 2025-03-27 NOTE — PROGRESS NOTES
RESISTANT HTN CLINIC - Follow Up EVALUATION   03/27/2025      History of Present Illness:   Dunia Her is a 70 yo female seen for evaluation of resistant HTN and management.   Dunia Her is initially referred by Ref Not In Computer     Subjective    HTN:  Home BP log: forgot log but states it is around 120/70's  States she has been having chest pain more frequently  She saw cardiology and he ordered a cardiac pet scan which she is scheduled for   No current CP, SOB, palpitations    PAD:  Continues to have LE claudication but significant improvement since starting Pletal  On ASA 325mg   No bleeding issues     Adherence to current HTN meds: compliant all of the time  Hx of clinical ASCVD events: Symptomatic PAD (hx of claudication with HERNANDEZ <0.85, previous revascularization/amputation    Lifestyle factors:  Weight Change: up a few lbs from last - 4lbs  BMI Readings from Last 5 Encounters:   03/27/25 31.96 kg/m²   03/12/25 33.45 kg/m²   11/05/24 32.45 kg/m²   11/05/24 32.45 kg/m²   09/19/24 33.78 kg/m²     Diet pattern: low red meat, increase veg, has lost weight on Med diet in past, more comfort foods recently, more carbs and sugars  Daily salt intake estimate:  Low   - none   EtOH: No  Exercise habits: minimal exercise  Perceived barriers to care: leg pain  Pertinent HTN hx (reviewed at initial visit):   Age at HTN dx: several years   (if female, any hx of pregnancy-related HTN): n/a   Past HTN medications: as noted   HTN crises:  No prior hospitalization or crises   Interfering substances/contributing factors:  None    Hyperlipidemia:    Stable, tolerating rosuva   Occasional tolerable myalgias  Clinical evidence of ASCVD: Symptomatic PAD (hx of claudication with HERNANDEZ <0.85, previous revascularization/amputation    Antiplatelet/anticoagulation: Yes, Details: ASA , no bleeding noted     T2D:  Increase leg/foot pains, no other symptoms  Tolerating meds  Has f/u with [facility] pharmacist - now yearly  appts  Last A1c   Lab Results   Component Value Date    HBA1C 7.2 (A) 02/03/2025     Lab Results   Component Value Date/Time    MALBCRT 30 06/06/2024 09:24 AM    MICROALBUR 8.5 06/06/2024 09:24 AM        Problem List:     Patient Active Problem List    Diagnosis Date Noted    Fatty (change of) liver, not elsewhere classified 10/04/2024    RUQ pain 09/10/2024    Gastroesophageal reflux disease without esophagitis 11/30/2023    Osteopenia of neck of left femur 08/02/2023    Controlled type 2 diabetes mellitus with complication, without long-term current use of insulin ([facility]) 06/02/2023    Mixed stress and urge urinary incontinence 06/02/2023    Lung nodule 04/20/2022    Greater trochanteric bursitis of left hip 01/31/2022    Elevated lipoprotein(a) 12/29/2021    Essential hypertension 10/06/2020    Coronary artery calcification seen on CT scan 04/07/2020    Angina at rest ([facility]) 04/03/2020    PAD (peripheral artery disease) ([facility]) 02/07/2020    Recurrent major depressive disorder, in partial remission ([facility]) 02/10/2019    Chronic insomnia 04/18/2016    Dyslipidemia associated with type 2 diabetes mellitus ([facility]) 10/02/2014    Class 1 obesity due to excess calories with body mass index (BMI) of 34.0 to 34.9 in adult 10/02/2014       Current Outpatient Medications:     cilostazol, 50 mg, Oral, BID, Taking    traZODone, 50 mg, Oral, QHS PRN, Taking    Ozempic (1 MG/DOSE), 1 mg, Subcutaneous, Q7 DAYS, Taking    omeprazole, 20 mg, Oral, DAILY, Taking    amLODIPine, 5 mg, Oral, QHS, Taking    azithromycin, TAKE 2 TABLETS BY MOUTH TODAY, THEN TAKE 1 TABLET DAILY FOR 4 DAYS, Taking    methylPREDNISolone, Take as directed., Taking    rosuvastatin, TAKE ONE AND ONE-HALF TABLETS BY MOUTH EVERY EVENING, Taking    PARoxetine, 30 mg, Oral, DAILY, Taking    metFORMIN ER, 1,000 mg, Oral, BID, Taking    aspirin EC, 325 mg, Oral, DAILY, Taking    telmisartan, 80 mg, Oral, QHS, Taking    albuterol, 1-2 Puff, Inhalation, Q4HRS PRN, PRN     "albuterol, 2.5 mg, Nebulization, Q4HRS PRN, PRN    ibuprofen, 600 mg, Oral, QDAY PRN, Taking    vitamin D3, 3,000 Units, Oral, QAM, Taking   Sulfa drugs     Social History:     Social History     Tobacco Use    Smoking status: Former     Current packs/day: 0.00     Average packs/day: 1 pack/day for 49.4 years (49.4 ttl pk-yrs)     Types: Cigarettes     Start date:      Quit date: 2017     Years since quittin.8    Smokeless tobacco: Never   Vaping Use    Vaping status: Never Used   Substance Use Topics    Alcohol use: No     Alcohol/week: 0.0 oz    Drug use: Never       Review of Systems:   Review of Systems   Constitutional:  Negative for chills, fever and weight loss.   HENT:  Negative for nosebleeds.    Respiratory:  Negative for cough and shortness of breath.    Cardiovascular:  Positive for leg swelling. Negative for chest pain, palpitations and claudication.   Gastrointestinal:  Negative for blood in stool, melena and nausea. Abdominal pain: occasional.  Genitourinary:  Negative for hematuria.   Musculoskeletal:  Positive for joint pain. Negative for myalgias.   Neurological:  Positive for tingling (stinging). Negative for dizziness, speech change, focal weakness and weakness.   Endo/Heme/Allergies:  Does not bruise/bleed easily.   Psychiatric/Behavioral:  The patient is not nervous/anxious.          Objective     Objective:     Vitals:    25 1036   BP: 124/76   BP Location: Left arm   Patient Position: Sitting   BP Cuff Size: Large adult   Pulse: 85   Weight: 89.8 kg (198 lb)   Height: 1.676 m (5' 6\")       Body mass index is 31.96 kg/m².  BP Readings from Last 5 Encounters:   25 124/76   25 122/60   24 122/72   24 121/72   09/10/24 132/84      Physical Exam  Vitals reviewed.   Constitutional:       General: She is not in acute distress.  HENT:      Head: Normocephalic and atraumatic.   Eyes:      General: No scleral icterus.  Cardiovascular:      Rate and Rhythm: " Normal rate and regular rhythm.      Pulses:           Dorsalis pedis pulses are 0 on the right side and 0 on the left side.      Heart sounds: Normal heart sounds. No murmur heard.     Comments: No palpable pulses, but appear well perfused, warm to touch, <3sec cap refill   Pulmonary:      Effort: Pulmonary effort is normal. No respiratory distress.      Breath sounds: Normal breath sounds. No wheezing.   Musculoskeletal:         General: No swelling. Normal range of motion.      Right lower leg: No edema.      Left lower leg: No edema.   Skin:     General: Skin is warm and dry.      Capillary Refill: Capillary refill takes 2 to 3 seconds.      Coloration: Skin is not pale.      Findings: No erythema.      Comments: No wounds   Neurological:      General: No focal deficit present.      Mental Status: She is alert and oriented to person, place, and time.      Motor: No weakness.      Gait: Gait normal.   Psychiatric:         Mood and Affect: Mood normal.         Behavior: Behavior normal.         Thought Content: Thought content normal.        Accessory Clinical Findings:     Lab Results   Component Value Date    CHOLSTRLTOT 157 06/06/2024    LDL 59 06/06/2024    HDL 73 06/06/2024    TRIGLYCERIDE 125 06/06/2024      Lab Results   Component Value Date/Time    LIPOPROTA 125 (H) 12/27/2021 10:41 AM           Lab Results   Component Value Date    HBA1C 7.2 (A) 02/03/2025      Lab Results   Component Value Date    SODIUM 144 06/06/2024    POTASSIUM 4.7 06/06/2024    CHLORIDE 109 06/06/2024    CO2 20 06/06/2024    GLUCOSE 147 (H) 06/06/2024    BUN 21 06/06/2024    CREATININE 0.86 06/06/2024          Lab Results   Component Value Date    URCREAT 287.27 06/06/2024    MICROALBUR 8.5 06/06/2024    MALBCRT 30 06/06/2024     VASCULAR IMAGING:     Last EKG:  Reviewed     HERNANDEZ 2/2020   Right HERNANDEZ: 1.02  Left HERNANDEZ:  0.79   IMPRESSION:   1.  Post occlusive waveform is noted in the distal left peroneal artery. Short segment occlusion  or high-grade stenosis proximal to this location is likely present, although not directly visualized.   2.  No other evidence of occlusion or significant stenosis bilaterally.   3.  Moderate calcified atherosclerotic plaque is identified in each lower extremity.    CAC 2/2020  Coronary calcification:  LMA - 0.0  LCX - 0.0  LAD - 315  RCA - 260.8   Total Calcium Score: 575.8   Percentile: Calcium score is above the 90th percentile for the patient's age and sex.   Other findings:  Heart: Normal size.  Lungs: Tiny granuloma in the left upper lobe.  Mediastinum: Normal.  Upper abdomen: Normal.  Spondylotic changes of the spine.    MPI 4/2020  NUCLEAR IMAGING INTERPRETATION   Normal left ventricular size, ejection fraction, and wall motion.    Small fixed defect in the inferolateral wall could be artifact. Infarct is in    the differential but consider less likely.     No reversible defect.    Sinus rhythm.   Nondiagnostic ECG portion of a Regadenoson nuclear stress test.   ECG INTERPRETATION   Nondiagnostic ECG portion of a Regadenoson nuclear stress test.    CT abd 2/2021   The abdominal aorta is normal in caliber with aortic atherosclerosis.    CT chest 10/2021   1.  Stable 1.1 cm right upper lobe groundglass density nodule. Continued follow-up is recommended.  2.  Cholelithiasis.  3.  Hepatic steatosis  Cardiac: Heart normal in size without pericardial effusion.   Vascular: Atherosclerotic calcifications of the coronary arteries, aorta and branches...    LE art with HERNANDEZ 5/2023  1.  Diffuse atherosclerotic changes without focal high-grade stenosis.  2.  RIGHT peroneal artery is not visualized.  3.  Ankle brachial indices indicate mild atherosclerotic disease on the LEFT.      Aorto-iliac Duplex 8/2024  CONCLUSIONS   No evidence of abdominal aortic or iliac artery aneurysm.    Bilateral LE arterial duplex with HERNANDEZ  IMPRESSION:   1.  Distal anterior tibial artery is not visualized bilaterally. This could be related to  presence of calcified atherosclerotic plaque although occlusion is also possible.   2.  No other stenosis or occlusion identified.    1. Elevated lipoprotein(a)        2. Essential hypertension        3. Dyslipidemia associated with type 2 diabetes mellitus (HCC)        4. PAD (peripheral artery disease) ([facility])          ASSESSMENT AND PLAN:    1. BLOOD PRESSURE CONTROL   Qualifies as Resistant HTN (RH):  No, not on optimized, max-dosed or max-tolerated meds from 3 classes   Office BP Goal ACC/AHA (2017) goal <130/80  Home BP at goal:  not checking  Office BP at goal: no, endorses stressed about upcoming court/guardianship for granddaughter  Home readings 120/70-80s previously, no current readings  Good med adherence    2. WORK UP FOR SECONDARY CAUSES OF RH:  Obstructive Sleep Apnea: Not Yet Assessed- no current symptoms  Renal parenchymal disease: Excluded  Renovascular HTN: Not Yet Assessed  Primary Aldosteronism: Not Yet Assessed  Thyroid Function: Excluded 12/2021  Pheochromocytoma: Not Yet Assessed   Cushing's:   Not Yet Assessed   Coarctation of Aorta: excluded  Other: none identified    Recommendations At This Visit: as noted in orders         3. MEDICATION USE / ADHERENCE:   Assessment: Complete  Recommendations: Instructed to Continue Taking All Medications As Prescribed         4. HTN-MEDIATED END-ORGAN DAMAGE:  Left Ventricular Hypertrophy: Absent on  ECG Date: 2020  Urine Albuminuria: None on Date: 12/2021  Renal Function: Chronic Kidney Disease  CKD G2 at worst   Ophthalmologic: Absent per patient report and/or eye specialist   Established Cardiovascular Disease:     # CAD per CT scan - continue current med mgmt, no further surveillance and no prior ASCVD events     # PAD, LLE inflow - chronic, had been worsening but since addition of Pletal symptoms are improving; no rest pain.  No signs of CLI.      mild disease noted on most recent imaging 5/2023   Most recent imaging with continued reduced HERNANDEZ on LLE  but with triphasic waveform   Aorto-iliac duplex without evidence of large vessel obstruction   - continue pletal   - recommended she trial exercise therapy following below routine.     For 1 hour 3 times per week for next 12 weeks:   Structured exercise therapy:     1) warm up legs with stretch and little walking for 5min  2) for 10-15min then brisk walk  3) then rest for 2-5 minutes if needed   4) then repeat cycle for additional 2 cycles   5) 5-10min cool-down and stretching      5. LIFESTYLE INTERVENTIONS:    SMOKING:   reports that she quit smoking about 7 years ago. Her smoking use included cigarettes. She started smoking about 56 years ago. She has a 49.4 pack-year smoking history. She has never used smokeless tobacco.   - continued complete avoidance of all tobacco products     PHYSICAL ACTIVITY: continue healthy activity to improve CV fitness.  In general, targeting >150min/week of moderate-level activity.      WEIGHT MANAGEMENT AND NUTRITION: Dietary plan was discussed with patient at this visit including DASH-dietary approaches, substitution of MUFA/PUFA for saturated fats, substituting whole grains for simple carbs, substituting lean meats for fatty meats, increase use of plant-based protein vs animal-based, lowered sodium.  Additional details reviewed with patient and/or outlined in care instructions.    6. STANDARD HTN PHARMACOTHERAPY:  ACEI/ARB: continue telmisartan 80mg daily   Thiazide Type Diuretic: consider as next agent   DHP-CCB: continue amlodipine 5mg qh    7. ADDITIONAL AGENTS   Aldosterone Receptor Antagonist: not indicated   Loop Diuretic: not indicated   Peripheral Alpha Blocker: not indicated   BB: not indicated, stopped on prior visits as unlikely beneficial for BP   Non-DHP-CCB: not indicated   Direct Vasodilator: not indicated   Centrally Acting Alpha Agonist: not indicated   Potassium-sparing diuretic: not indicated   DRI: not indicated     LIPID MANAGEMENT:   Qualifies for Statin  "Therapy Based on 2018 ACC/AHA Guidelines: yes, Secondary Prevention - Very high risk ASCVD - 2 major events or 1 event with other high-risk conditions, Diabetes and HERNANDEZ <0.9  The 10-year ASCVD risk score (Carl WOLFF, et al., 2019) is: 21%, 7.5 - <20% \"intermediate risk\"   Major ASCVD events: Symptomatic PAD (hx of claudication with HERNANDEZ <0.85, previous revascularization/amputation  High-risk conditions: >64yr old , Diabetes , Hypertension  and N/A  Risk-enhancers: N/A  Currently on Statin: Yes  Treatment goals: reduce LDL-C >50%  and LDL-C <70 (consider non-HDL-C <100, apoB <80)  At goal? Yes 6/2024  Plan:   - reinforced ongoing TLC measures as noted   - monitor labs prior to next appt   -intensify therapy if remains above goal on next labs  Meds:   - continue rosuvastatin 30 mg daily, although not a usual dose    GLYCEMIA MANAGEMENT:  Diabetic   Goal A1c < 7.0  Lab Results   Component Value Date    HBA1C 7.2 (A) 02/03/2025      Lab Results   Component Value Date/Time    MALBCRT 30 06/06/2024 09:24 AM    MICROALBUR 8.5 06/06/2024 09:24 AM   Recommend aggressive treatment due to pt's overall risk.    Close f/u with [facility] clinic  - Continue meds as directed by DM clinic   - A1C and f/u per [facility] clinic  - recommmend for routine care to include regular A1c monitoring, annual albumin/creatinine ratio (ACR), annual diabetic retinopathy screening, foot exams, annual flu vaccine, and updates to pneumonia vaccines as appropriate     ANTIPLATELET/ANTICOAGULANT THERAPY: continue asa 325mg daily  Monitor for bleeding    OTHER ISSUES:     # MDD, recurrent - stable, continue current meds, f/u with PCP    Studies Ordered At This Visit: BLE arterial duplex/HERNANDEZ-- July 2025  Blood Work to Be Obtained Prior to Next Visit: none, A1c per DM clinic    Disposition: 6 months     JUANI Bangura  [facility]   [facility]   [phone]              "

## 2025-04-10 ENCOUNTER — OFFICE VISIT (OUTPATIENT)
Dept: MEDICAL GROUP | Facility: PHYSICIAN GROUP | Age: 70
End: 2025-04-10
Payer: MEDICARE

## 2025-04-10 VITALS
HEART RATE: 86 BPM | SYSTOLIC BLOOD PRESSURE: 124 MMHG | OXYGEN SATURATION: 96 % | DIASTOLIC BLOOD PRESSURE: 72 MMHG | RESPIRATION RATE: 18 BRPM | HEIGHT: 66 IN | BODY MASS INDEX: 32.11 KG/M2 | WEIGHT: 199.8 LBS | TEMPERATURE: 98.6 F

## 2025-04-10 DIAGNOSIS — E11.8 CONTROLLED TYPE 2 DIABETES MELLITUS WITH COMPLICATION, WITHOUT LONG-TERM CURRENT USE OF INSULIN (HCC): ICD-10-CM

## 2025-04-10 DIAGNOSIS — Z12.31 ENCOUNTER FOR SCREENING MAMMOGRAM FOR MALIGNANT NEOPLASM OF BREAST: ICD-10-CM

## 2025-04-10 DIAGNOSIS — F33.41 RECURRENT MAJOR DEPRESSIVE DISORDER, IN PARTIAL REMISSION (HCC): Chronic | ICD-10-CM

## 2025-04-10 DIAGNOSIS — R10.11 RUQ PAIN: ICD-10-CM

## 2025-04-10 DIAGNOSIS — N39.46 MIXED STRESS AND URGE URINARY INCONTINENCE: ICD-10-CM

## 2025-04-10 DIAGNOSIS — E66.09 CLASS 1 OBESITY DUE TO EXCESS CALORIES WITH BODY MASS INDEX (BMI) OF 34.0 TO 34.9 IN ADULT, UNSPECIFIED WHETHER SERIOUS COMORBIDITY PRESENT: ICD-10-CM

## 2025-04-10 DIAGNOSIS — E66.811 CLASS 1 OBESITY DUE TO EXCESS CALORIES WITH BODY MASS INDEX (BMI) OF 34.0 TO 34.9 IN ADULT, UNSPECIFIED WHETHER SERIOUS COMORBIDITY PRESENT: ICD-10-CM

## 2025-04-10 PROCEDURE — 99214 OFFICE O/P EST MOD 30 MIN: CPT | Performed by: PHYSICIAN ASSISTANT

## 2025-04-10 PROCEDURE — 3074F SYST BP LT 130 MM HG: CPT | Performed by: PHYSICIAN ASSISTANT

## 2025-04-10 PROCEDURE — 3078F DIAST BP <80 MM HG: CPT | Performed by: PHYSICIAN ASSISTANT

## 2025-04-10 RX ORDER — PAROXETINE 40 MG/1
40 TABLET, FILM COATED ORAL DAILY
Qty: 90 TABLET | Refills: 0 | Status: SHIPPED | OUTPATIENT
Start: 2025-04-10

## 2025-04-10 ASSESSMENT — PATIENT HEALTH QUESTIONNAIRE - PHQ9
6. FEELING BAD ABOUT YOURSELF - OR THAT YOU ARE A FAILURE OR HAVE LET YOURSELF OR YOUR FAMILY DOWN: SEVERAL DAYS
SUM OF ALL RESPONSES TO PHQ9 QUESTIONS 1 AND 2: 4
2. FEELING DOWN, DEPRESSED, IRRITABLE, OR HOPELESS: MORE THAN HALF THE DAYS
7. TROUBLE CONCENTRATING ON THINGS, SUCH AS READING THE NEWSPAPER OR WATCHING TELEVISION: MORE THAN HALF THE DAYS
3. TROUBLE FALLING OR STAYING ASLEEP OR SLEEPING TOO MUCH: MORE THAN HALF THE DAYS
1. LITTLE INTEREST OR PLEASURE IN DOING THINGS: MORE THAN HALF THE DAYS
4. FEELING TIRED OR HAVING LITTLE ENERGY: MORE THAN HALF THE DAYS
9. THOUGHTS THAT YOU WOULD BE BETTER OFF DEAD, OR OF HURTING YOURSELF: NOT AT ALL
SUM OF ALL RESPONSES TO PHQ QUESTIONS 1-9: 13
8. MOVING OR SPEAKING SO SLOWLY THAT OTHER PEOPLE COULD HAVE NOTICED. OR THE OPPOSITE, BEING SO FIGETY OR RESTLESS THAT YOU HAVE BEEN MOVING AROUND A LOT MORE THAN USUAL: NOT AT ALL
5. POOR APPETITE OR OVEREATING: MORE THAN HALF THE DAYS

## 2025-04-10 ASSESSMENT — ENCOUNTER SYMPTOMS
CHILLS: 0
FEVER: 0
SHORTNESS OF BREATH: 0

## 2025-04-10 ASSESSMENT — FIBROSIS 4 INDEX: FIB4 SCORE: 1.21

## 2025-04-10 NOTE — ASSESSMENT & PLAN NOTE
Chronic, uncontrolled.     Current Diabetes Medication Regimen  Metformin ER: 1000 mg BID  GLP-1 or GLP-1/GIP Analog: Ozempic 1mg SQ once weekly - only couple doses thus far     Previously attempted medications:  Trulicity - stopped in favor of Ozempic    Potential Barriers to Care:  Adherence: denies missed doses  Side effects: Reports nausea after increasing metformin to 1000mg BID  Affordability: Receives Ozempic via PAP    A1c: 7.2 (2/2025); 6.6 (6/2024); 6.9 (3/2024); 6.8 (11/2023); 7.0 (5/2023)  Microalb/Cr ratio: 30 (6/2024); 12 (7/2023)  Diabetic foot exam: 6/2024  Retinal eye exam: 9/2024  ACEi/ARB: telmisartan 80mg daily  Statin: rosuvastatin 20mg daily  Aspirin: 325mg daily  Concomitant HTN: controlled  Nightly foot checks: yes

## 2025-04-10 NOTE — ASSESSMENT & PLAN NOTE
Unremarkable RUQ US 10//2024    Interval history 9/2024  Chronic, uncontrolled.  Worsening.  RUQ pain off/on since 2023  Sometimes radiates to entire abdomen, sometimes to the right side only  Diarrhea intermittently unrelated to the pain  Nausea with the pain  Does run constipated at times  Was really constipated with the last episode of pain about a week ago  Immediately following the pain she had loose stool then the pain went away  Denies fevers but did feel warm with the pain  History of abdominal surgeries: total hysterectomy, appendectomy

## 2025-04-10 NOTE — PROGRESS NOTES
SUBJECTIVE:     CC: follow up chronic issues    HPI:   Dunia presents today with the following:    ASSESSMENT & PLAN by Problem:     Problem   Ruq Pain    Chronic, uncontrolled.  RQ US ordered.     Controlled Type 2 Diabetes Mellitus With Complication, Without Long-Term Current Use of Insulin (Hcc)    Chronic, uncontrolled. .  Managed by pharmacotherapy    Current Diabetes Medication Regimen  Metformin ER: 1000 mg BID  GLP-1 or GLP-1/GIP Analog: Ozempic 1mg SQ once weekly - only couple doses thus far     Mixed Stress and Urge Urinary Incontinence    Chronic, uncontrolled.  Uses pads.  Declines medication.       Recurrent Major Depressive Disorder, in Partial Remission (Hcc)    Chronic, uncontrolled.   Tolerating paxil 30mg for years and it seems to help overall.  Still has episodes where she feels down but overall feels good.   Feels it's not working as well as it was.    Increase to 40mg.  May need to try a different medication.       Mammogram: ordered today    Has vascular labs scheduled for June 2025.  POC A1C likely 5/9/2025.    Scheduled follow-up appointments:      Return in about 2 months (around 6/10/2025).    HCC Gap Form    Last edited 04/10/25 11:53 PDT by Becca Marley P.A.-C.            HPI:     Problem List Items Addressed This Visit       Class 1 obesity due to excess calories with body mass index (BMI) of 34.0 to 34.9 in adult    Relevant Orders    CBC WITH DIFFERENTIAL    Controlled type 2 diabetes mellitus with complication, without long-term current use of insulin (HCC)    Chronic, uncontrolled.     Current Diabetes Medication Regimen  Metformin ER: 1000 mg BID  GLP-1 or GLP-1/GIP Analog: Ozempic 1mg SQ once weekly - only couple doses thus far     Previously attempted medications:  Trulicity - stopped in favor of Ozempic    Potential Barriers to Care:  Adherence: denies missed doses  Side effects: Reports nausea after increasing metformin to 1000mg BID  Affordability: Receives Ozempic via  PAP    A1c: 7.2 (2/2025); 6.6 (6/2024); 6.9 (3/2024); 6.8 (11/2023); 7.0 (5/2023)  Microalb/Cr ratio: 30 (6/2024); 12 (7/2023)  Diabetic foot exam: 6/2024  Retinal eye exam: 9/2024  ACEi/ARB: telmisartan 80mg daily  Statin: rosuvastatin 20mg daily  Aspirin: 325mg daily  Concomitant HTN: controlled  Nightly foot checks: yes            Mixed stress and urge urinary incontinence    Chronic, uncontrolled.    Interval history:  Started years ago.  Uses a pad daily.  Denies any issues with rashes.  Discussed medication options, as well as the side effects.  Patient declines medication at this time.          Recurrent major depressive disorder, in partial remission (HCC) (Chronic)    Chronic, uncontrolled.   Tolerating paxil 30mg for years and it seems to help overall.  Still has episodes where she feels down but overall feels good.   Feels it's not working as well as it was.    Tried: sertraline (didn't work)         Relevant Medications    paroxetine (PAXIL) 40 MG tablet    RUQ pain    Unremarkable RUQ US 10//2024    Interval history 9/2024  Chronic, uncontrolled.  Worsening.  RUQ pain off/on since 2023  Sometimes radiates to entire abdomen, sometimes to the right side only  Diarrhea intermittently unrelated to the pain  Nausea with the pain  Does run constipated at times  Was really constipated with the last episode of pain about a week ago  Immediately following the pain she had loose stool then the pain went away  Denies fevers but did feel warm with the pain  History of abdominal surgeries: total hysterectomy, appendectomy          Other Visit Diagnoses         Encounter for screening mammogram for malignant neoplasm of breast        Relevant Orders    MA-SCREENING MAMMO BILAT W/TOMOSYNTHESIS W/CAD                   ROS:  Review of Systems   Constitutional:  Negative for chills and fever.   Respiratory:  Negative for shortness of breath.    Cardiovascular:  Negative for chest pain.       OBJECTIVE:     Exam:  BP  "124/72   Pulse 86   Temp 37 °C (98.6 °F) (Temporal)   Resp 18   Ht 1.676 m (5' 6\")   Wt 90.6 kg (199 lb 12.8 oz)   SpO2 96%   BMI 32.25 kg/m²  Body mass index is 32.25 kg/m².    Physical Exam  Vitals reviewed.   Constitutional:       General: She is not in acute distress.     Appearance: Normal appearance.   Cardiovascular:      Rate and Rhythm: Normal rate and regular rhythm.      Heart sounds: Normal heart sounds.   Pulmonary:      Effort: Pulmonary effort is normal.      Breath sounds: Normal breath sounds.   Musculoskeletal:      Cervical back: Normal range of motion and neck supple.   Skin:     General: Skin is warm.   Neurological:      General: No focal deficit present.      Mental Status: She is alert.   Psychiatric:         Mood and Affect: Mood normal.         Behavior: Behavior normal.         Judgment: Judgment normal.             Please note that this dictation was created using voice recognition software. I have made every reasonable attempt to correct obvious errors, but I expect that there are errors of grammar and possibly content that I did not discover before finalizing the note.    "

## 2025-04-10 NOTE — ASSESSMENT & PLAN NOTE
Chronic, uncontrolled.   Tolerating paxil 30mg for years and it seems to help overall.  Still has episodes where she feels down but overall feels good.   Feels it's not working as well as it was.    Tried: sertraline (didn't work)

## 2025-04-11 ENCOUNTER — HOSPITAL ENCOUNTER (OUTPATIENT)
Dept: RADIOLOGY | Facility: MEDICAL CENTER | Age: 70
End: 2025-04-11
Attending: INTERNAL MEDICINE
Payer: MEDICARE

## 2025-04-11 DIAGNOSIS — I20.89 ANGINA AT REST (HCC): ICD-10-CM

## 2025-04-11 PROCEDURE — 93017 CV STRESS TEST TRACING ONLY: CPT

## 2025-04-11 PROCEDURE — 78431 MYOCRD IMG PET RST&STRS CT: CPT | Mod: 26 | Performed by: INTERNAL MEDICINE

## 2025-04-14 ENCOUNTER — APPOINTMENT (OUTPATIENT)
Dept: ADMISSIONS | Facility: MEDICAL CENTER | Age: 70
End: 2025-04-14
Attending: INTERNAL MEDICINE
Payer: MEDICARE

## 2025-04-14 ENCOUNTER — TELEPHONE (OUTPATIENT)
Dept: CARDIOLOGY | Facility: MEDICAL CENTER | Age: 70
End: 2025-04-14
Payer: MEDICARE

## 2025-04-14 ENCOUNTER — RESULTS FOLLOW-UP (OUTPATIENT)
Dept: CARDIOLOGY | Facility: MEDICAL CENTER | Age: 70
End: 2025-04-14
Payer: MEDICARE

## 2025-04-14 DIAGNOSIS — I20.89 ANGINA AT REST (HCC): ICD-10-CM

## 2025-04-14 PROCEDURE — 93018 CV STRESS TEST I&R ONLY: CPT | Performed by: INTERNAL MEDICINE

## 2025-04-14 NOTE — TELEPHONE ENCOUNTER
Schedule [facility] w/poss per . Emailed Dr. Alfred to Yen BROWN to have .

## 2025-04-14 NOTE — TELEPHONE ENCOUNTER
Patient is scheduled on 4-23-25 for a [facility] w/poss with Dr. Mendoza. Patient was told to hold Semaglutide for 7 days prior, hold Metformin day of and 48hrs after. Patient to check in at 7:00 for a 9:00AM procedure. Updated H&P to be done on admit by NP. Pre admit to call patient. Instruction sheet sent to patients MyChart per her request.

## 2025-04-14 NOTE — TELEPHONE ENCOUNTER
Schedule [facility] w/poss per . Emailed Dr. Alfred to Yen BROWN to have .

## 2025-04-14 NOTE — TELEPHONE ENCOUNTER
----- Message from Physician Bradley Bajwa M.D. sent at 4/14/2025 11:05 AM PDT -----  Mrs Her,  Pleasure speaking on the phone. Your stress test suggests coronary blockage. Will refer you for a coronary angiogram. Please expect our call.  Best,  Dr Bajwa   Statement Selected

## 2025-04-21 ENCOUNTER — PRE-ADMISSION TESTING (OUTPATIENT)
Dept: ADMISSIONS | Facility: MEDICAL CENTER | Age: 70
End: 2025-04-21
Attending: INTERNAL MEDICINE
Payer: MEDICARE

## 2025-04-21 NOTE — OR NURSING
RN tele pre admit appointment completed with patient:  Patient provided medication and fasting instructions, per Cardiology Procedure Instruction Sheet and bathing instructions per Preparing for Your Procedure packet.  Procedure date 4/23/2025.    During pre admit appointment this RN encouraged patient to increase fluid intake the day prior to procedure including intake of electrolyte drinks such as Gatorade or electrolyte water and patient may have up to 16 ounces of clear liquids until 3 hours prior to procedure or per Providers instructions.     Patient verbalizes understanding of all instructions given. No further questions at this time.

## 2025-04-22 ENCOUNTER — PRE-ADMISSION TESTING (OUTPATIENT)
Dept: ADMISSIONS | Facility: MEDICAL CENTER | Age: 70
End: 2025-04-22
Attending: INTERNAL MEDICINE
Payer: MEDICARE

## 2025-04-22 DIAGNOSIS — Z01.810 PRE-OPERATIVE CARDIOVASCULAR EXAMINATION: ICD-10-CM

## 2025-04-22 DIAGNOSIS — Z01.812 PRE-OPERATIVE LABORATORY EXAMINATION: ICD-10-CM

## 2025-04-22 LAB
ALBUMIN SERPL BCP-MCNC: 4.3 G/DL (ref 3.2–4.9)
ALBUMIN/GLOB SERPL: 1.6 G/DL
ALP SERPL-CCNC: 59 U/L (ref 30–99)
ALT SERPL-CCNC: 22 U/L (ref 2–50)
ANION GAP SERPL CALC-SCNC: 14 MMOL/L (ref 7–16)
APTT PPP: 29 SEC (ref 24.7–36)
AST SERPL-CCNC: 22 U/L (ref 12–45)
BILIRUB SERPL-MCNC: 0.3 MG/DL (ref 0.1–1.5)
BUN SERPL-MCNC: 18 MG/DL (ref 8–22)
CALCIUM ALBUM COR SERPL-MCNC: 8.9 MG/DL (ref 8.5–10.5)
CALCIUM SERPL-MCNC: 9.1 MG/DL (ref 8.5–10.5)
CHLORIDE SERPL-SCNC: 106 MMOL/L (ref 96–112)
CO2 SERPL-SCNC: 20 MMOL/L (ref 20–33)
CREAT SERPL-MCNC: 0.83 MG/DL (ref 0.5–1.4)
EKG IMPRESSION: NORMAL
ERYTHROCYTE [DISTWIDTH] IN BLOOD BY AUTOMATED COUNT: 45.9 FL (ref 35.9–50)
GFR SERPLBLD CREATININE-BSD FMLA CKD-EPI: 75 ML/MIN/1.73 M 2
GLOBULIN SER CALC-MCNC: 2.7 G/DL (ref 1.9–3.5)
GLUCOSE SERPL-MCNC: 138 MG/DL (ref 65–99)
HCT VFR BLD AUTO: 38.1 % (ref 37–47)
HGB BLD-MCNC: 12.4 G/DL (ref 12–16)
INR PPP: 0.93 (ref 0.87–1.13)
MCH RBC QN AUTO: 30.5 PG (ref 27–33)
MCHC RBC AUTO-ENTMCNC: 32.5 G/DL (ref 32.2–35.5)
MCV RBC AUTO: 93.8 FL (ref 81.4–97.8)
PLATELET # BLD AUTO: 239 K/UL (ref 164–446)
PMV BLD AUTO: 9.8 FL (ref 9–12.9)
POTASSIUM SERPL-SCNC: 4.3 MMOL/L (ref 3.6–5.5)
PROT SERPL-MCNC: 7 G/DL (ref 6–8.2)
PROTHROMBIN TIME: 12.5 SEC (ref 12–14.6)
RBC # BLD AUTO: 4.06 M/UL (ref 4.2–5.4)
SODIUM SERPL-SCNC: 140 MMOL/L (ref 135–145)
WBC # BLD AUTO: 7 K/UL (ref 4.8–10.8)

## 2025-04-22 PROCEDURE — 85027 COMPLETE CBC AUTOMATED: CPT

## 2025-04-22 PROCEDURE — 80053 COMPREHEN METABOLIC PANEL: CPT

## 2025-04-22 PROCEDURE — 93005 ELECTROCARDIOGRAM TRACING: CPT | Mod: TC

## 2025-04-22 PROCEDURE — 93010 ELECTROCARDIOGRAM REPORT: CPT | Performed by: STUDENT IN AN ORGANIZED HEALTH CARE EDUCATION/TRAINING PROGRAM

## 2025-04-22 PROCEDURE — 85610 PROTHROMBIN TIME: CPT

## 2025-04-22 PROCEDURE — 36415 COLL VENOUS BLD VENIPUNCTURE: CPT

## 2025-04-22 PROCEDURE — 85730 THROMBOPLASTIN TIME PARTIAL: CPT

## 2025-04-22 NOTE — H&P
History:  Primary Diagnosis: Abnormal cardiac PET and Stable angina      HPI:  69 years old female patient of Dr. Bajwa with significant history of former tobacco abuse 50 pack yrs, asthma, type 2 DM (ozempic and metformin), hypertension, hyperlipidemia, PAD (on Cilostazol), elevated lipoprotein.     Patient was seen 3/12/2025 for OV with Dr. Bajwa in which, he reported central chest pain. Cardiac PET was obtained and showed large sized, partially reversible uptake of severe severity in the inferoseptal and inferior segments during post stress images.    Currently patient denies chest pain, shortness of breath or palpitations.     Ride home is son MCCOY (Joe)O since midnight   Cr 0.88  NO Allergy to contrast  No upcoming surgery         Cardiac PET  4/11/2025   Positive stress test for ischemia.    Large sized, partially reversible, decreased uptake of severe severity in the    inferoseptal and inferior segments during post stress images   Small sized, nonreversible, decreased uptake of mild severity in the basal    inferolateral segment during post stress images   Homogenous normal tracer uptake of the myocardium during rest and stress in   all other segments    Normal left ventricular size, ejection fraction, and wall motion.    FLOW RESERVE INTERPRETATION    Normal Myocardial Blood Flow Reserve (MBFR) globally at 2.1 x baseline flow.     ECG INTERPRETATION      No ischemic changes with stress compared to baseline ECG.      LE art vascular ultrasound 2/21/20  Left HERNANDEZ:  0.79  Right HERNANDEZ: 1.02  1.  Post occlusive waveform is noted in the distal left peroneal artery. Short segment occlusion or high-grade stenosis proximal to this location is likely present, although not directly visualized.  2.  No other evidence of occlusion or significant stenosis bilaterally.  3.  Moderate calcified atherosclerotic plaque is identified in each lower extremity.     TTE 2/2020  CONCLUSIONS  Left ventricular ejection fraction  is visually estimated to be 65%.  Mild concentric left ventricular hypertrophy.  Decreased left ventricular compliance with indeterminate diastolic   function.  Normal right ventricular size and systolic function.     Nuclear stress spect sachi 4/3/20   NUCLEAR IMAGING INTERPRETATION   Normal left ventricular size, ejection fraction, and wall motion.   Small fixed defect in the inferolateral wall could be artifact. Infarct is in the differential but consider less likely.    No reversible defect.       Medical history:   Past Medical History:   Diagnosis Date    Bronchitis 2025    Frequently - when smoking.  Quit smoking 8 years ago.    Hyperlipidemia LDL goal < 100 10/02/2014    Hypertension     Obesity (BMI 30-39.9) 10/02/2014    Pre-diabetes 10/02/2014    Recurrent sinusitis     Type II or unspecified type diabetes mellitus without mention of complication, not stated as uncontrolled     pre-diabetes    Wheezing        Surgical history:   Past Surgical History:   Procedure Laterality Date    AZ LAP,APPENDECTOMY N/A 2021    Procedure: APPENDECTOMY, LAPAROSCOPIC;  Surgeon: Juan Dawson M.D.;  Location: SURGERY [facility];  Service: General    ABDOMINAL HYSTERECTOMY TOTAL      benign cysts, total       Social history:   Social History     Tobacco Use   Smoking Status Former    Current packs/day: 0.00    Average packs/day: 1 pack/day for 49.4 years (49.4 ttl pk-yrs)    Types: Cigarettes    Start date:     Quit date: 2017    Years since quittin.8   Smokeless Tobacco Never      Social History     Substance and Sexual Activity   Alcohol Use No    Alcohol/week: 0.0 oz      Social History     Substance and Sexual Activity   Drug Use Never        Family history:   Family History   Problem Relation Age of Onset    Cancer Mother         pancreatic     Heart Disease Father     Heart Attack Father     Diabetes Father         pre-diabetes    Stroke Neg Hx        Allergies:   Allergies   Allergen  Reactions    Sulfa Drugs Hives       Home medications: No current facility-administered medications for this encounter.    Review of Systems:  Review of Systems   Constitutional:  Negative for malaise/fatigue.   Eyes:  Negative for blurred vision and double vision.   Respiratory:  Negative for cough, shortness of breath and wheezing.    Cardiovascular:  Negative for chest pain, palpitations, orthopnea and leg swelling.   Musculoskeletal:  Negative for falls.   Neurological:  Negative for dizziness, focal weakness, loss of consciousness, weakness and headaches.   All other systems reviewed and are negative.      Physical Examination:  Physical Exam  Vitals and nursing note reviewed.   Constitutional:       Appearance: Normal appearance.   Cardiovascular:      Rate and Rhythm: Normal rate and regular rhythm.   Pulmonary:      Effort: Pulmonary effort is normal.      Breath sounds: Normal breath sounds.   Abdominal:      General: There is no distension.   Musculoskeletal:      Right lower leg: No edema.      Left lower leg: No edema.   Neurological:      Mental Status: She is alert and oriented to person, place, and time.   Psychiatric:         Mood and Affect: Mood normal.         Behavior: Behavior normal.         Thought Content: Thought content normal.         Judgment: Judgment normal.         Impression:  # Abnormal Cardiac PET stress   # Stable angina   # Hyperlipidemia   # Diabetes (glycohemoglobin 7.2)  # History of tobacco abuse   # PAD       Plan:  Left cardiac cath with possible intervention.     The risks, benefits, and alternatives to coronary angiography with IV sedation were discussed in great detail. Specific risks mentioned include bleeding, infection, kidney damage, allergic reaction, cardiac perforation with possible tamponade requiring pericardiocentesis or possibly open heart surgery. In addition, we discussed that 10% of patients will experience small to moderate bruising at the site of the  arterial puncture. Lastly, the risks of heart attack, stroke and death were discussed; the risk of major complications such as heart attack or stroke caused by the angiogram is approximately 1%; the risk of death is approximately 1 in 1000. The patient verbalized understanding of the potential complications and wishes to proceed with this procedure.    The risks/benefits of the procedure will be further discussed with the consenting physician performing the procedure.     Connie AGUILERA, Cardiology  [facility], [facility].  [address] [address]  Phone: [phone]  Fax: [phone]

## 2025-04-23 ENCOUNTER — APPOINTMENT (OUTPATIENT)
Dept: CARDIOLOGY | Facility: MEDICAL CENTER | Age: 70
End: 2025-04-23
Attending: INTERNAL MEDICINE
Payer: MEDICARE

## 2025-04-23 ENCOUNTER — HOSPITAL ENCOUNTER (OUTPATIENT)
Facility: MEDICAL CENTER | Age: 70
End: 2025-04-23
Attending: INTERNAL MEDICINE | Admitting: INTERNAL MEDICINE
Payer: MEDICARE

## 2025-04-23 VITALS
HEART RATE: 78 BPM | RESPIRATION RATE: 14 BRPM | HEIGHT: 65 IN | DIASTOLIC BLOOD PRESSURE: 89 MMHG | SYSTOLIC BLOOD PRESSURE: 163 MMHG | OXYGEN SATURATION: 95 % | TEMPERATURE: 97.5 F | BODY MASS INDEX: 33.06 KG/M2 | WEIGHT: 198.41 LBS

## 2025-04-23 DIAGNOSIS — E11.51 TYPE 2 DIABETES MELLITUS WITH DIABETIC PERIPHERAL ANGIOPATHY WITHOUT GANGRENE, WITHOUT LONG-TERM CURRENT USE OF INSULIN (HCC): ICD-10-CM

## 2025-04-23 DIAGNOSIS — I20.89 ANGINA AT REST (HCC): ICD-10-CM

## 2025-04-23 LAB — GLUCOSE BLD STRIP.AUTO-MCNC: 124 MG/DL (ref 65–99)

## 2025-04-23 PROCEDURE — 160047 HCHG PACU  - EA ADDL 30 MINS PHASE II

## 2025-04-23 PROCEDURE — 160046 HCHG PACU - 1ST 60 MINS PHASE II

## 2025-04-23 PROCEDURE — 93458 L HRT ARTERY/VENTRICLE ANGIO: CPT | Mod: 26 | Performed by: INTERNAL MEDICINE

## 2025-04-23 PROCEDURE — 700101 HCHG RX REV CODE 250

## 2025-04-23 PROCEDURE — 160015 HCHG STAT PREOP MINUTES

## 2025-04-23 PROCEDURE — 700111 HCHG RX REV CODE 636 W/ 250 OVERRIDE (IP): Mod: JZ

## 2025-04-23 PROCEDURE — 160035 HCHG PACU - 1ST 60 MINS PHASE I

## 2025-04-23 PROCEDURE — 99152 MOD SED SAME PHYS/QHP 5/>YRS: CPT | Performed by: INTERNAL MEDICINE

## 2025-04-23 PROCEDURE — 700117 HCHG RX CONTRAST REV CODE 255: Performed by: INTERNAL MEDICINE

## 2025-04-23 PROCEDURE — 99153 MOD SED SAME PHYS/QHP EA: CPT

## 2025-04-23 PROCEDURE — 160002 HCHG RECOVERY MINUTES (STAT)

## 2025-04-23 PROCEDURE — 82962 GLUCOSE BLOOD TEST: CPT

## 2025-04-23 RX ORDER — LIDOCAINE HYDROCHLORIDE 20 MG/ML
INJECTION, SOLUTION INFILTRATION; PERINEURAL
Status: COMPLETED
Start: 2025-04-23 | End: 2025-04-23

## 2025-04-23 RX ORDER — VERAPAMIL HYDROCHLORIDE 2.5 MG/ML
INJECTION INTRAVENOUS
Status: COMPLETED
Start: 2025-04-23 | End: 2025-04-23

## 2025-04-23 RX ORDER — METFORMIN HYDROCHLORIDE 500 MG/1
500 TABLET, EXTENDED RELEASE ORAL 2 TIMES DAILY
Qty: 180 TABLET | Refills: 3
Start: 2025-04-23

## 2025-04-23 RX ORDER — SEMAGLUTIDE 1.34 MG/ML
1 INJECTION, SOLUTION SUBCUTANEOUS
Qty: 3 ML | Refills: 3
Start: 2025-04-23

## 2025-04-23 RX ORDER — HEPARIN SODIUM 200 [USP'U]/100ML
INJECTION, SOLUTION INTRAVENOUS
Status: COMPLETED
Start: 2025-04-23 | End: 2025-04-23

## 2025-04-23 RX ORDER — MIDAZOLAM HYDROCHLORIDE 1 MG/ML
INJECTION INTRAMUSCULAR; INTRAVENOUS
Status: COMPLETED
Start: 2025-04-23 | End: 2025-04-23

## 2025-04-23 RX ORDER — HEPARIN SODIUM 1000 [USP'U]/ML
INJECTION, SOLUTION INTRAVENOUS; SUBCUTANEOUS
Status: COMPLETED
Start: 2025-04-23 | End: 2025-04-23

## 2025-04-23 RX ORDER — METOPROLOL SUCCINATE 25 MG/1
25 TABLET, EXTENDED RELEASE ORAL DAILY
Qty: 100 TABLET | Refills: 3 | Status: SHIPPED | OUTPATIENT
Start: 2025-04-23

## 2025-04-23 RX ADMIN — LIDOCAINE HYDROCHLORIDE: 20 INJECTION, SOLUTION INFILTRATION; PERINEURAL at 09:14

## 2025-04-23 RX ADMIN — VERAPAMIL HYDROCHLORIDE 2.5 MG: 2.5 INJECTION, SOLUTION INTRAVENOUS at 09:14

## 2025-04-23 RX ADMIN — NITROGLYCERIN 10 ML: 20 INJECTION INTRAVENOUS at 09:15

## 2025-04-23 RX ADMIN — FENTANYL CITRATE 100 MCG: 50 INJECTION, SOLUTION INTRAMUSCULAR; INTRAVENOUS at 09:49

## 2025-04-23 RX ADMIN — HEPARIN SODIUM: 1000 INJECTION, SOLUTION INTRAVENOUS; SUBCUTANEOUS at 09:14

## 2025-04-23 RX ADMIN — HEPARIN SODIUM 2000 UNITS: 200 INJECTION, SOLUTION INTRAVENOUS at 09:14

## 2025-04-23 RX ADMIN — MIDAZOLAM HYDROCHLORIDE 2 MG: 1 INJECTION, SOLUTION INTRAMUSCULAR; INTRAVENOUS at 09:36

## 2025-04-23 RX ADMIN — IOHEXOL 40 ML: 350 INJECTION, SOLUTION INTRAVENOUS at 09:58

## 2025-04-23 ASSESSMENT — ENCOUNTER SYMPTOMS
DIZZINESS: 0
BLURRED VISION: 0
SHORTNESS OF BREATH: 0
WEAKNESS: 0
LOSS OF CONSCIOUSNESS: 0
PALPITATIONS: 0
FALLS: 0
COUGH: 0
FOCAL WEAKNESS: 0
HEADACHES: 0
ORTHOPNEA: 0
DOUBLE VISION: 0
WHEEZING: 0

## 2025-04-23 ASSESSMENT — FIBROSIS 4 INDEX: FIB4 SCORE: 1.37

## 2025-04-23 NOTE — OR NURSING
1007 - Patient arrival to [facility] after [facility]. Bedside report  with Damian BROWN. Pt awake and alert. TR band to right radial is C/D/soft, denies numbness or tingling to right hand. VSS. Denies pain at this time. Educated patient on plan of care and wrist instructions.   1015 - Tolerating oral intake. Updated patient's sister Cally  1020 - Belongings returned to patient at bedside. Family at bedside.   [id number] - air removed from TR band according to orders. No bleeding or oozing and site remains soft  1200 - steady ambulation up to bathroom  1230 - TR band removed and replaced with gauze and tegaderm dressing  1355 - Discharge instructions given; discussed diet, activity, medications, dressing care, follow up care, and worsening symptoms. Pt and sister verbalized understanding and questions answered.  1400 - Pt's VSS; denies N/V; patient out of bed, denies discomfort. Dressing C/D/soft to right radial.  IV dc'd. Patient states ready to d/c home.  Pt taken out  in stable condition with sister to meet Niece with all belongings present.

## 2025-04-23 NOTE — PROCEDURES
Cardiac Catheterization Laboratory Procedure Note    DATE: 4/23/2025    : Davey Mendoza MD    PROCEDURES PERFORMED:  Left heart catheterization  Coronary angiography  Moderate conscious sedation    INDICATIONS:  The patient is a 69-year-old woman referred for cardiac catheterization to evaluate exertional chest pain symptoms.    CONSENT:  The complete alternatives, risks, and benefits of the procedure were explained to the patient.  Signed informed consent was obtained and placed in the chart prior to the procedure.  A timeout was performed prior to beginning the procedure.    MEDICATIONS:  Lidocaine  Fentanyl  Midazolam  Nitroglycerin  Verapamil  Heparin    MODERATE CONSCIOUS SEDATION:  I personally supervised the administration of moderate conscious sedation by the nursing staff for 33 minutes.  Sedation start time: 9:24 AM  Sedation end time: 9:57 AM    CONTRAST: Omnipaque 40 cc    ACCESS: 6-Sammarinese Glidesheath in the right radial artery.    ESTIMATED BLOOD LOSS: 10 cc    COMPLICATIONS: None    DESCRIPTION OF PROCEDURE:  The patient was brought to the cardiac catheterization laboratory in the fasting state.  The skin over the right wrist was prepped and draped in the usual sterile fashion.  Lidocaine infiltration was used to anesthetize the tissue over the right radial artery.  Using the micropuncture technique, a 6-Sammarinese Glidesheath was inserted in the right radial artery.  A 5-Sammarinese Manohar catheter was then advanced over a standard J-wire into the left ventricular cavity where it was gently aspirated, flushed, and then withdrawn across the aortic valve with sequential pressures measured.  A 6-Sammarinese JL-3.5 diagnostic catheter was used to engage the ostium of the left main coronary artery and cineangiograms were obtained in multiple projections for complete evaluation of the left coronary system.  A 6-Sammarinese JR-4 diagnostic catheter catheter was used to engage the ostium of the right coronary artery  and cineangiograms were obtained in multiple projections for complete evaluation of the right coronary system.  At the completion of the case all wires, catheters, and sheaths were removed.  A TR band was placed using the patent hemostasis technique.    HEMODYNAMICS:   Aortic pressure: 123/61 mmHg  Pre-A wave pressure: 12 mmHg  No significant aortic gradient on pullback    CORONARY ANGIOGRAPHY:  The left main coronary artery is patent and trifurcates into the left anterior descending, ramus intermedius, and left circumflex coronary arteries.  The left anterior descending coronary artery is a large, transapical vessel with focal mid 40% disease and otherwise luminal irregularities.  It supplies several small diagonal branches.  The ramus intermedius/high first diagonal branch is a large vessel with luminal irregularities.  The left circumflex coronary artery is a moderate, nondominant vessel that supplies a large first obtuse marginal branch and several small distal obtuse marginal/posterolateral branches and has luminal irregularities in the distribution.  The right coronary artery is a moderate, dominant vessel with a mid chronic total occlusion with ambiguous proximal cap.  The distal vessel fills by well-developed septal and epicardial collaterals from the left coronary system.    IMPRESSION:  Severe obstructive one-vessel coronary artery disease with a chronic total occlusion of the mid right coronary artery.  Normal left heart filling pressures.    RECOMMENDATIONS:  Return to floor with continued care per primary service.  TR band release per protocol.  Start metoprolol XL 25 mg daily for additional antianginal therapy.  Follow-up in clinic to discuss response to metoprolol therapy and options for management of RCA .    NOTIFICATION:  The patient's family was notified of the results of her cardiac catheterization.     done

## 2025-04-23 NOTE — DISCHARGE INSTRUCTIONS
What to Expect Post Sedation    Rest and take it easy for the first 24 hours.  A responsible adult is recommended to remain with you during that time.  It is normal to feel sleepy.  We encourage you to not do anything that requires balance, judgment or coordination.    FOR 24 HOURS DO NOT:  Drive, operate machinery or run household appliances.  Drink beer or alcoholic beverages.  Make important decisions or sign legal documents.    MILD FLU-LIKE SYMPTOMS ARE NORMAL. You may experience generalized muscle aches, throat irritation, headache, and/or some nausea.  If any questions arise, call your provider.  If your provider is not available, please feel free to call the Surgical Center at [phone].    Medications: Resume taking daily medication.  Take prescribed pain medication with food.  If no medication is prescribed, you may take non-aspirin pain medication if needed.  PAIN  medication can be very constipating. Take a stool softener or laxative such as senokot, pericolace, or milk of magnesia if needed.    Diet: Resume your normal diet as tolerated.  A diet low in cholesterol, fat, and sodium is recommended for good health. To avoid nausea, slowly advance diet as tolerated, avoiding spicy or greasy foods for the first day.  Add more substantial food to your diet according to your provider's instructions.     Dressing: Keep dressing and incision dry and intact for 24 hours, may remove dressing and shower after 11 am  on 4/24/25, do not need to replace.  Do not submerge site in water for 7 days.     BOWEL FUNCTION:  Prescription pain medication may cause constipation. If you are having problems, use what you normally would or call your provider for suggestions. It also helps to stay regular by including fiber in your diet (for example: bran or fruits and vegetables) and drink plenty of liquids (water, juice, etc.).    Activity: No strenuous activity for 1 week.  You may tire easily; rest as needed during the  day.    10 Pound Weight Restriction Post Surgery. Do not lift anything heavier than a gallon of milk. Routine activities such as walking and using the stairs are safe. You may sleep in any position that is comfortable. Avoid strenuous activities and exercise that involves twisting, bending, and running.    Discharge Instructions:  POST ANGIOGRAM  General Care Instructions  Maintain a bandage over the incision site for 24 hours.  It's normal to find a small bruise or dime-sized lump at the insertion site. This should disappear within a few weeks.  Do not apply lotions or powders to the site.  Do not immerse the catheter insertion site in water (bathtub/swimming) for five days. It is ok to shower 24 hours after the procedure.  You may resume your normal diet immediately; on the day of your procedure, drink 6-10 glasses of water to help flush the contrast liquid out of your system.  If the doctor inserted the catheter in your arm:  For 5 days, DO NOT lift anything heavier than 10 pounds (approximately a gallon of milk). DO NOT do any heavy pushing, pulling, or twisting.    Medications  If your current medications need to be changed, you will be provided with an updated list of your medications prior to discharge.  If you take warfarin (Coumadin), resume taking your usual dose the evening after the procedure.  DO NOT STOP taking prescribed blood thinning (anti-platelet) medications unless instructed by your cardiologist.  These medications include:  Aspirin, Clopidogrel (Plavix), Ticagrelor (Brilinta), or Prasugrel (Effient)   If you take one of the following anticoagulants, RESUME 24 HOURS after your procedure:  Apixiban (Eliquis), Rivaroxaban (Xarelto), Dabigatran (Pradaxa), Edoxaban (Savaysa)  If you take metformin (Glucophage), RESUME 48 HOURS after your procedure.    When to call your healthcare provider  Call your cardiologist right away at [phone] if you have any of the following:   Problems/Concerns taking  any of your prescribed heart medicines.   The insertion site has increasing pain, swelling, redness, bleeding, or drainage.   Your arm or leg below where the insertion site changes color, is cool, or is numb.   You have chest pain or shortness of breath that does not go away with rest.   Your pulse feels irregular -- very slow (less than 60 beats/minute) or very fast (over 100 beats/minute).   You have dizziness, fainting, or you are very tired.   You are coughing up blood or yellow or green mucus.   You have chills or a fever over 101°F (38.3°C).    If there is bleeding at the catheter insertion site, apply pressure for 10 minutes.  If bleeding persists, call 911, and continue to hold pressure until advanced medical support arrives.

## 2025-04-26 DIAGNOSIS — I10 PRIMARY HYPERTENSION: ICD-10-CM

## 2025-04-28 RX ORDER — TELMISARTAN 80 MG/1
80 TABLET ORAL
Qty: 100 TABLET | Refills: 3 | Status: SHIPPED | OUTPATIENT
Start: 2025-04-28

## 2025-05-11 NOTE — PROGRESS NOTES
INTERVENTIONAL CARDIOLOGY COMPLEX CORONARY CONSULTATION NOTE      Date of Visit: 5/12/2025    Primary Care Provider: Becca Marley P.A.-C.    Patient Name: Dunia Her  YOB: 1955  MRN: [id number]     Reason for Visit:    Consultation     Patient Story:   Dunia Her is a 69 year-old woman with a past medical history of coronary artery disease.  Briefly, she had a PET MPI test performed on 4/11/2025 for evaluation of chest pain symptoms.  This demonstrated a large area of reversibility in the inferior territories consistent with inducible ischemia.  Therefore, she underwent coronary angiography on 4/20/2025, which revealed a chronic total occlusion of the mid right coronary artery with well-developed collaterals from the left coronary system.  She presents today to discuss possible  intervention.    She states that she primarily had chest pain symptoms after  a severe illness in her brother that was causing significant stress.  Overall, her symptoms have improved with time and medical therapy.  She is not highly active due to limitations from peripheral vascular disease, but denies frequent episodes of exertional chest pain that are limiting to her quality of life.  She does have intermittent episodes of resting chest pain, but these are relatively infrequent and brief.  Overall, she does not feel that her chest pain symptoms are highly limiting to her quality of life.     Medications and Allergies:     Current Outpatient Medications   Medication Sig Dispense Refill    telmisartan (MICARDIS) 80 MG Tab TAKE 1 TABLET BY MOUTH AT BEDTIME 100 Tablet 3    metFORMIN ER (GLUCOPHAGE XR) 500 MG TABLET SR 24 HR Take 1 Tablet by mouth 2 times a day. 180 Tablet 3    Semaglutide, 1 MG/DOSE, (OZEMPIC, 1 MG/DOSE,) 4 MG/3ML Solution Pen-injector Inject 1 mg under the skin every 7 days. Take on Thursday 3 mL 3    metoprolol SR (TOPROL XL) 25 MG TABLET SR 24 HR Take 1 Tablet by mouth every  day. To improve heart pain. 100 Tablet 3    paroxetine (PAXIL) 40 MG tablet Take 1 Tablet by mouth every day. 90 Tablet 0    cilostazol (PLETAL) 50 MG tablet Take 1 Tablet by mouth 2 times a day. 200 Tablet 3    traZODone (DESYREL) 50 MG Tab TAKE 1 TABLET BY MOUTH EVERY NIGHT AT BEDTIME AS NEEDED FOR SLEEP 100 Tablet 0    omeprazole (PRILOSEC) 20 MG delayed-release capsule TAKE 1 CAPSULE BY MOUTH DAILY 90 Capsule 3    amLODIPine (NORVASC) 5 MG Tab TAKE 1 TABLET BY MOUTH AT BEDTIME 100 Tablet 3    rosuvastatin (CRESTOR) 20 MG Tab TAKE ONE AND ONE-HALF TABLETS BY MOUTH EVERY EVENING 135 Tablet 3    aspirin EC (ECOTRIN) 325 MG Tablet Delayed Response Take 1 Tablet by mouth every day. 100 Tablet 3    albuterol 108 (90 Base) MCG/ACT Aero Soln inhalation aerosol Inhale 1-2 Puffs every four hours as needed for Shortness of Breath. 1 Each 6    albuterol (PROVENTIL) 2.5mg/3ml Nebu Soln solution for nebulization Take 3 mL by nebulization every four hours as needed for Shortness of Breath. 120 Each 3    ibuprofen (MOTRIN) 200 MG Tab Take 600 mg by mouth 1 time a day as needed (Moderate pain). 3 tablets = 600 mg.      vitamin D (CHOLECALCIFEROL) 1000 Unit (25 mcg) Tab Take 3 Tablets by mouth every morning. 3 tablets = 3000 units.       No current facility-administered medications for this visit.     Allergies   Allergen Reactions    Sulfa Drugs Hives      Medical Decision Making:   I reviewed the patient's angiogram images in detail with the patient and her son.  We had a long discussion regarding the indications for  intervention, which are primarily for improvement in symptoms and not improvement in mortality.  We also discussed that  intervention is a complex and high risk procedure that carries a higher risk for both failure as well as serious and/for life-threatening complications.  Following this discussion, the patient felt that she was not symptomatic enough currently to want to undergo a higher risk procedure  that is primarily for symptomatic improvement.  This is a very reasonable decision given the patient's age, underlying comorbidities, and improvement in symptoms from when she previously had her stress test performed.    Follow Up  She was instructed that, if her symptoms worsen, we can have a further discussion about whether  intervention made more sense for her at that time.  Technically, although her  would likely require a retrograde approach, successful intervention appears feasible.  Therefore, she would be a candidate for  intervention in the future if her symptoms did not worsen.     Cardiac Studies and Procedures:   Coronary Angiography/Cardiac Catheterization  CAG (4/23/2025)-personally reviewed and interpreted  The left main coronary artery is patent and trifurcates into the left anterior descending, ramus intermedius, and left circumflex coronary arteries.  The left anterior descending coronary artery is a large, transapical vessel with focal mid 40% disease and otherwise luminal irregularities.  It supplies several small diagonal branches.  The ramus intermedius/high first diagonal branch is a large vessel with luminal irregularities.  The left circumflex coronary artery is a moderate, nondominant vessel that supplies a large first obtuse marginal branch and several small distal obtuse marginal/posterolateral branches and has luminal irregularities in the distribution.  The right coronary artery is a moderate, dominant vessel with a mid chronic total occlusion with an ambiguous proximal cap.  The distal vessel fills by well-developed septal and epicardial collaterals from the left coronary system.    Stress Testing  PET MPI (4/11/2025)  I personally viewed and interpreted the patient's PET MPI, which demonstrated evidence of a prior nontransmural inferior infarct with significant beth-infarct ischemia consistent with viability.    Electrophysiology  ECG (4/22/2022)  I personally reviewed and  "interpreted the patient's ECG, which demonstrated normal sinus rhythm with no definitive evidence of prior transmural infarct.     Vital Signs:   /62 (BP Location: Left arm, Patient Position: Sitting)   Pulse 76   Resp 16   Ht 1.651 m (5' 5\")   Wt 89.8 kg (198 lb)   SpO2 94%    BP Readings from Last 4 Encounters:   05/12/25 118/62   04/23/25 (!) 163/89   04/10/25 124/72   03/27/25 124/76     Wt Readings from Last 4 Encounters:   05/12/25 89.8 kg (198 lb)   04/23/25 90 kg (198 lb 6.6 oz)   04/10/25 90.6 kg (199 lb 12.8 oz)   03/27/25 89.8 kg (198 lb)     Body mass index is 32.95 kg/m².     Laboratories:   Lipids  Lab Results   Component Value Date/Time    LDL 59 06/06/2024 09:23 AM    LDL 77 11/27/2023 09:53 AM    LDL 65 07/03/2023 10:27 AM    LDL 66 11/14/2022 09:05 AM    LDL 77 07/05/2022 08:19 AM       Lab Results   Component Value Date/Time    HDL 73 06/06/2024 09:23 AM    HDL 72 11/27/2023 09:53 AM    HDL 69 07/03/2023 10:27 AM    HDL 71 11/14/2022 09:05 AM    HDL 72 07/05/2022 08:19 AM       Lab Results   Component Value Date/Time    TRIGLYCERIDE 125 06/06/2024 09:23 AM    TRIGLYCERIDE 106 11/27/2023 09:53 AM    TRIGLYCERIDE 134 07/03/2023 10:27 AM    TRIGLYCERIDE 137 11/14/2022 09:05 AM    TRIGLYCERIDE 154 (H) 07/05/2022 08:19 AM       Lab Results   Component Value Date/Time    CHOLSTRLTOT 157 06/06/2024 09:23 AM    CHOLSTRLTOT 170 11/27/2023 09:53 AM    CHOLSTRLTOT 161 07/03/2023 10:27 AM    CHOLSTRLTOT 164 11/14/2022 09:05 AM    CHOLSTRLTOT 180 07/05/2022 08:19 AM       No components found for: \"LPA\"      Chemistries  Lab Results   Component Value Date/Time    CREATININE 0.83 04/22/2025 10:10 AM    CREATININE 0.86 06/06/2024 09:23 AM    CREATININE 0.77 11/27/2023 09:53 AM    CREATININE 0.92 07/03/2023 10:27 AM    CREATININE 0.75 11/14/2022 09:05 AM     Lab Results   Component Value Date/Time    BUN 18 04/22/2025 10:10 AM    BUN 21 06/06/2024 09:23 AM    BUN 22 11/27/2023 09:53 AM    BUN 21 " "07/03/2023 10:27 AM    BUN 14 11/14/2022 09:05 AM     Lab Results   Component Value Date/Time    POTASSIUM 4.3 04/22/2025 10:10 AM    POTASSIUM 4.7 06/06/2024 09:23 AM    POTASSIUM 4.8 11/27/2023 09:53 AM     Lab Results   Component Value Date/Time    SODIUM 140 04/22/2025 10:10 AM    SODIUM 144 06/06/2024 09:23 AM    SODIUM 142 11/27/2023 09:53 AM     Lab Results   Component Value Date/Time    GLUCOSE 138 (H) 04/22/2025 10:10 AM    GLUCOSE 147 (H) 06/06/2024 09:23 AM    GLUCOSE 139 (H) 11/27/2023 09:53 AM     Lab Results   Component Value Date/Time    ASTSGOT 22 04/22/2025 10:10 AM    ASTSGOT 20 06/06/2024 09:23 AM    ASTSGOT 20 07/03/2023 10:27 AM     Lab Results   Component Value Date/Time    ALTSGPT 22 04/22/2025 10:10 AM    ALTSGPT 22 06/06/2024 09:23 AM    ALTSGPT 26 07/03/2023 10:27 AM     Lab Results   Component Value Date/Time    ALKPHOSPHAT 59 04/22/2025 10:10 AM    ALKPHOSPHAT 64 06/06/2024 09:23 AM    ALKPHOSPHAT 70 07/03/2023 10:27 AM     Lab Results   Component Value Date/Time    HBA1C 7.2 (A) 02/03/2025 10:38 AM    HBA1C 6.5 (A) 11/04/2024 10:33 AM    HBA1C 6.6 (A) 06/28/2024 02:55 PM     No results found for: \"TSH\"  No results found for: \"NTPROBNP\"  Lab Results   Component Value Date/Time    TROPONINT 7 04/03/2020 10:27 AM    TROPONINT 9 04/03/2020 04:35 AM    TROPONINT 9 04/02/2020 09:46 PM       Blood Counts  Lab Results   Component Value Date/Time    HEMOGLOBIN 12.4 04/22/2025 10:10 AM    HEMOGLOBIN 12.5 11/27/2023 09:53 AM    HEMOGLOBIN 12.9 11/14/2022 09:12 AM     Lab Results   Component Value Date/Time    PLATELETCT 239 04/22/2025 10:10 AM    PLATELETCT 246 11/27/2023 09:53 AM    PLATELETCT 246 11/14/2022 09:12 AM     Lab Results   Component Value Date/Time    WBC 7.0 04/22/2025 10:10 AM    WBC 6.1 11/27/2023 09:53 AM    WBC 7.1 11/14/2022 09:12 AM      Physical Examination:   General: Well-appearing, no acute distress  Eyes: Extraocular movements intact, anicteric  Neck: Full range of motion, " no gross jugular venous distension  Pulmonary: Normal respiratory effort, no distress  Cardiovascular: Regular rate, regular rhythm  Gastrointestinal: Obese, non-distended  Extremities: Moves all extremities normally  Neurological: Alert and oriented, normal speech  Psychiatric: Appropriate affect, normal judgment     Past History:   Past Medical History  The patient's past medical history was reviewed.  See HPI and self-reported patient medical history form for pertinent medical history to consultation.    Past Social History  The patient's social history was reviewed.  See HPI self-reported patient medical history form for pertinent social history to consultation.    Past Family History  The patient's family history was reviewed.  See HPI self-reported patient medical history form for pertinent family history to consultation.    Review of Systems  A pertinent cardiac review of systems was performed and was otherwise unremarkable except as per HPI and self-reported patient medical history form.        Davey Mendoza MD, [facility]  Interventional Cardiology  [facility], [address]  [address] [address]  Phone: [phone]  Fax: [phone]

## 2025-05-12 ENCOUNTER — RESULTS FOLLOW-UP (OUTPATIENT)
Dept: CARDIOLOGY | Facility: MEDICAL CENTER | Age: 70
End: 2025-05-12

## 2025-05-12 ENCOUNTER — OFFICE VISIT (OUTPATIENT)
Dept: CARDIOLOGY | Facility: MEDICAL CENTER | Age: 70
End: 2025-05-12
Attending: INTERNAL MEDICINE
Payer: MEDICARE

## 2025-05-12 VITALS
OXYGEN SATURATION: 94 % | BODY MASS INDEX: 32.99 KG/M2 | SYSTOLIC BLOOD PRESSURE: 118 MMHG | RESPIRATION RATE: 16 BRPM | HEIGHT: 65 IN | DIASTOLIC BLOOD PRESSURE: 62 MMHG | HEART RATE: 76 BPM | WEIGHT: 198 LBS

## 2025-05-12 DIAGNOSIS — I25.82 CHRONIC TOTAL OCCLUSION OF CORONARY ARTERY: ICD-10-CM

## 2025-05-12 DIAGNOSIS — I20.89 ANGINA AT REST (HCC): ICD-10-CM

## 2025-05-12 PROCEDURE — 3078F DIAST BP <80 MM HG: CPT | Performed by: INTERNAL MEDICINE

## 2025-05-12 PROCEDURE — 99204 OFFICE O/P NEW MOD 45 MIN: CPT | Performed by: INTERNAL MEDICINE

## 2025-05-12 PROCEDURE — 99212 OFFICE O/P EST SF 10 MIN: CPT | Performed by: INTERNAL MEDICINE

## 2025-05-12 PROCEDURE — 3074F SYST BP LT 130 MM HG: CPT | Performed by: INTERNAL MEDICINE

## 2025-05-12 ASSESSMENT — FIBROSIS 4 INDEX: FIB4 SCORE: 1.37

## 2025-05-19 ENCOUNTER — TELEPHONE (OUTPATIENT)
Dept: HEALTH INFORMATION MANAGEMENT | Facility: OTHER | Age: 70
End: 2025-05-19
Payer: MEDICARE

## 2025-05-21 ENCOUNTER — APPOINTMENT (OUTPATIENT)
Dept: CARDIOLOGY | Facility: MEDICAL CENTER | Age: 70
End: 2025-05-21
Payer: MEDICARE

## 2025-05-23 ENCOUNTER — OFFICE VISIT (OUTPATIENT)
Dept: MEDICAL GROUP | Facility: PHYSICIAN GROUP | Age: 70
End: 2025-05-23
Payer: MEDICARE

## 2025-05-23 VITALS
BODY MASS INDEX: 32.99 KG/M2 | OXYGEN SATURATION: 96 % | HEIGHT: 65 IN | HEART RATE: 72 BPM | RESPIRATION RATE: 14 BRPM | WEIGHT: 198 LBS

## 2025-05-23 DIAGNOSIS — E11.8 CONTROLLED TYPE 2 DIABETES MELLITUS WITH COMPLICATION, WITHOUT LONG-TERM CURRENT USE OF INSULIN (HCC): Primary | ICD-10-CM

## 2025-05-23 ASSESSMENT — FIBROSIS 4 INDEX: FIB4 SCORE: 1.37

## 2025-05-23 NOTE — PROGRESS NOTES
Patient Consult Note - Follow Up Visit  Primary care physician: Becca Marley P.A.-C.    Reason for consult: Management of Uncontrolled Type 2 Diabetes    Time IN:  9:58am  Time OUT:  10:18am    HPI:  Dunia Her is a 69 y.o. old patient who comes in today for evaluation of above stated problem.    Most Recent HbA1c:   Lab Results   Component Value Date/Time    HBA1C 7.2 (A) 02/03/2025 10:38 AM      Reliable and Exact Care Diabetes Score    Displays the Diabetes REC Score.  A patient gets 1 point for each measure for a maximum of 7 points   0  Measures Not Met          Current Diabetes Medication Regimen  Metformin ER: 1000 mg BID  GLP-1 or GLP-1/GIP Analog: Ozempic 1mg SQ once weekly - only couple doses thus far     Previously attempted medications:  Trulicity - stopped in favor of Ozempic     Potential Barriers to Care:  Adherence: denies missed doses  Side effects: Reports nausea after increasing metformin to 1000mg BID  Affordability: Receives Ozempic via PAP    Discussed FBG goal of , 2hrPP <180, and A1c <7.0%.  Before Breakfast: 104-117  Before Lunch:  Before Dinner:  Before Bedtime:  Other times:  Hypoglycemia:  None    Lifestyle:  Appetite has increased over the last couple months, but she has been good about keeping portions under same control.  No other appreciable nutrition improvements or worsening.  Exercise limited to walks with dog.    Previous visit notes:  3/2025  Has noticed a bit more appetite suppression with Ozempic thus far than with Trulicity in the past.  No appreciable changes to types of foods in nutrition plan.  Occasional stationary bike use.     2/2025  More indulgences recently with her sister in [facility].  States that nutrition habits were stable through holiday season.  Exercise consistent with previous visit.    11/2024  Current Exercise - stationary bicycle twice weekly, walk the dog. Legs hurt which limit mobility   Exercise Goal - use stationary bicycle 150 minutes      Dietary -   Breakfast - Nothing recently Avocado on toast  Lunch - skips  Dinner - mediterranean diet (vegetables with chicken)  Snacks - Yogurt  Dessert - Yogurt  Drinks - Water, coffee    Preventative Management  BP regimen (ACEi/ARB): Telmisartan 80mg QD  BP <140/90: Yes  Statin: rosuvastatin (Crestor) 20 mg daily  LDL <100: Yes  Lab Results   Component Value Date/Time    CHOLSTRLTOT 157 06/06/2024 09:23 AM    LDL 59 06/06/2024 09:23 AM    HDL 73 06/06/2024 09:23 AM    TRIGLYCERIDE 125 06/06/2024 09:23 AM       Last Microalbumin/Cr:  Lab Results   Component Value Date/Time    MALBCRT 30 06/06/2024 09:24 AM    MICROALBUR 8.5 06/06/2024 09:24 AM     Last A1c:  Lab Results   Component Value Date/Time    HBA1C 7.2 (A) 02/03/2025 10:38 AM      Monofilament exam: up to date, last done 6/2024      REC DM Score: 0  Care gaps addressed:   N/A  Care gaps updated in Health Maintenance    ROS:  Constitutional: No weight loss  Cardiac: No palpitations or racing heart  Resp: No shortness of breath  Neuro: No numbness or tinging in feet  Endo: No heat or cold intolerance, no polyuria or polydipsia  All other systems were reviewed and were negative.    Past Medical History:  Patient Active Problem List    Diagnosis Date Noted    Chronic total occlusion of coronary artery - RCA 05/12/2025    Fatty (change of) liver, not elsewhere classified 10/04/2024    RUQ pain 09/10/2024    Gastroesophageal reflux disease without esophagitis 11/30/2023    Osteopenia of neck of left femur 08/02/2023    Controlled type 2 diabetes mellitus with complication, without long-term current use of insulin (HCC) 06/02/2023    Mixed stress and urge urinary incontinence 06/02/2023    Lung nodule 04/20/2022    Greater trochanteric bursitis of left hip 01/31/2022    Elevated lipoprotein(a) 12/29/2021    Essential hypertension 10/06/2020    Coronary artery calcification seen on CT scan 04/07/2020    Angina at rest (HCC) 04/03/2020    PAD (peripheral artery  disease) (HCC) 2020    Recurrent major depressive disorder, in partial remission (HCC) 02/10/2019    Chronic insomnia 2016    Dyslipidemia associated with type 2 diabetes mellitus (HCC) 10/02/2014    Class 1 obesity due to excess calories with body mass index (BMI) of 34.0 to 34.9 in adult 10/02/2014       Past Surgical History:  Past Surgical History:   Procedure Laterality Date    WA LAP,APPENDECTOMY N/A 2021    Procedure: APPENDECTOMY, LAPAROSCOPIC;  Surgeon: Juan Dawson M.D.;  Location: SURGERY [facility];  Service: General    ABDOMINAL HYSTERECTOMY TOTAL      benign cysts, total       Allergies:  Sulfa drugs    Social History:  Social History     Socioeconomic History    Marital status:      Spouse name: Not on file    Number of children: Not on file    Years of education: Not on file    Highest education level: Some college, no degree   Occupational History    Not on file   Tobacco Use    Smoking status: Former     Current packs/day: 0.00     Average packs/day: 1 pack/day for 49.4 years (49.4 ttl pk-yrs)     Types: Cigarettes     Start date:      Quit date: 2017     Years since quittin.9    Smokeless tobacco: Never   Vaping Use    Vaping status: Never Used   Substance and Sexual Activity    Alcohol use: No     Alcohol/week: 0.0 oz    Drug use: Never    Sexual activity: Not Currently     Comment:    Other Topics Concern    Not on file   Social History Narrative    Not on file     Social Drivers of Health     Financial Resource Strain: Low Risk  (7/3/2022)    Overall Financial Resource Strain (CARDIA)     Difficulty of Paying Living Expenses: Not very hard   Food Insecurity: No Food Insecurity (7/3/2022)    Hunger Vital Sign     Worried About Running Out of Food in the Last Year: Never true     Ran Out of Food in the Last Year: Never true   Transportation Needs: No Transportation Needs (7/3/2022)    PRAPARE - Transportation     Lack of Transportation  (Medical): No     Lack of Transportation (Non-Medical): No   Physical Activity: Sufficiently Active (7/3/2022)    Exercise Vital Sign     Days of Exercise per Week: 4 days     Minutes of Exercise per Session: 40 min   Stress: Stress Concern Present (7/3/2022)    [employer] - Occupational Stress Questionnaire     Feeling of Stress : To some extent   Social Connections: Moderately Integrated (7/3/2022)    Social Connection and Isolation Panel [NHANES]     Frequency of Communication with Friends and Family: More than three times a week     Frequency of Social Gatherings with Friends and Family: Once a week     Attends Zoroastrianism Services: 1 to 4 times per year     Active Member of Clubs or Organizations: No     Attends Club or Organization Meetings: Not on file     Marital Status:    Intimate Partner Violence: Not on file   Housing Stability: Low Risk  (7/3/2022)    Housing Stability Vital Sign     Unable to Pay for Housing in the Last Year: No     Number of Places Lived in the Last Year: 1     Unstable Housing in the Last Year: No       Family History:  Family History   Problem Relation Age of Onset    Cancer Mother         pancreatic     Heart Disease Father     Heart Attack Father     Diabetes Father         pre-diabetes    Stroke Neg Hx        Medications:    Current Outpatient Medications:     telmisartan (MICARDIS) 80 MG Tab, TAKE 1 TABLET BY MOUTH AT BEDTIME, Disp: 100 Tablet, Rfl: 3    metFORMIN ER (GLUCOPHAGE XR) 500 MG TABLET SR 24 HR, Take 1 Tablet by mouth 2 times a day., Disp: 180 Tablet, Rfl: 3    Semaglutide, 1 MG/DOSE, (OZEMPIC, 1 MG/DOSE,) 4 MG/3ML Solution Pen-injector, Inject 1 mg under the skin every 7 days. Take on Thursday, Disp: 3 mL, Rfl: 3    metoprolol SR (TOPROL XL) 25 MG TABLET SR 24 HR, Take 1 Tablet by mouth every day. To improve heart pain., Disp: 100 Tablet, Rfl: 3    paroxetine (PAXIL) 40 MG tablet, Take 1 Tablet by mouth every day., Disp: 90 Tablet,  "Rfl: 0    cilostazol (PLETAL) 50 MG tablet, Take 1 Tablet by mouth 2 times a day., Disp: 200 Tablet, Rfl: 3    traZODone (DESYREL) 50 MG Tab, TAKE 1 TABLET BY MOUTH EVERY NIGHT AT BEDTIME AS NEEDED FOR SLEEP, Disp: 100 Tablet, Rfl: 0    omeprazole (PRILOSEC) 20 MG delayed-release capsule, TAKE 1 CAPSULE BY MOUTH DAILY, Disp: 90 Capsule, Rfl: 3    amLODIPine (NORVASC) 5 MG Tab, TAKE 1 TABLET BY MOUTH AT BEDTIME, Disp: 100 Tablet, Rfl: 3    rosuvastatin (CRESTOR) 20 MG Tab, TAKE ONE AND ONE-HALF TABLETS BY MOUTH EVERY EVENING, Disp: 135 Tablet, Rfl: 3    aspirin EC (ECOTRIN) 325 MG Tablet Delayed Response, Take 1 Tablet by mouth every day., Disp: 100 Tablet, Rfl: 3    albuterol 108 (90 Base) MCG/ACT Aero Soln inhalation aerosol, Inhale 1-2 Puffs every four hours as needed for Shortness of Breath., Disp: 1 Each, Rfl: 6    albuterol (PROVENTIL) 2.5mg/3ml Nebu Soln solution for nebulization, Take 3 mL by nebulization every four hours as needed for Shortness of Breath., Disp: 120 Each, Rfl: 3    ibuprofen (MOTRIN) 200 MG Tab, Take 600 mg by mouth 1 time a day as needed (Moderate pain). 3 tablets = 600 mg., Disp: , Rfl:     vitamin D (CHOLECALCIFEROL) 1000 Unit (25 mcg) Tab, Take 3 Tablets by mouth every morning. 3 tablets = 3000 units., Disp: , Rfl:     Labs: Reviewed    Physical Examination:  Vital signs: Pulse 72   Resp 14   Ht 1.651 m (5' 5\")   Wt 89.8 kg (198 lb)   SpO2 96%   BMI 32.95 kg/m²  Body mass index is 32.95 kg/m².  General: No apparent distress, cooperative  Eyes: No scleral icterus or discharge  ENMT: Normal on external inspection of nose, lips, normal thyroid exam  Neck: No abnormal masses on inspection  Resp: Normal effort, clear to auscultation bilaterally   CVS: Regular rate and rhythm, S1 S2 normal, no murmur   Extremities: No edema  Abdomen: abdominal obesity present  Neuro: Alert and oriented  Skin: No rash  Psych: Normal mood and affect, intact memory and able to make informed " decisions    Assessment and Plan:    Basic physiology of DMII was explained to patient as well as microvascular/macrovascular complications. The importance of increasing physical activity to improve diabetes control was discussed with the patient. Patient was also educated on changing diet and making better choices to help control blood sugar.    Patient is tolerating current dosing of metformin  Tolerating current dosing of Ozempic.  Home FBG look to be at goal for her.  A1c drawn today, has improved to 6.7% (previously 7.2% 2/2025), which is now at goal for her.    As noted above, she has seen a bit of appetite return over the last couple months.  She has been cognizant to keep portions under good control during that time.  No other appreciable improvements or worsening to nutrition practices.  Exercise limited to walks with dog on occasion.    INCREASE Ozempic to 2mg SQ once weekly, prescription addendum sent to [facility].  Continue all other medications for now.  Stressed importance of maintaining appropriate diabetic nutrition habits, decrease carb intake, maximize protein intake.  Continue with regular exercise.    Follow Up:  Three months for A1c    Thank you for allowing me to participate in the care of this patient.    Pillo Truong, PharmD, BCACP  05/23/2025    CC:   Becca Marley P.A.-C.

## 2025-05-27 ENCOUNTER — HOSPITAL ENCOUNTER (OUTPATIENT)
Dept: RADIOLOGY | Facility: MEDICAL CENTER | Age: 70
End: 2025-05-27
Attending: NURSE PRACTITIONER
Payer: MEDICARE

## 2025-05-27 DIAGNOSIS — I73.9 PAD (PERIPHERAL ARTERY DISEASE) (HCC): Chronic | ICD-10-CM

## 2025-05-27 PROCEDURE — 93922 UPR/L XTREMITY ART 2 LEVELS: CPT

## 2025-05-27 ASSESSMENT — PATIENT HEALTH QUESTIONNAIRE - PHQ9
2. FEELING DOWN, DEPRESSED, IRRITABLE, OR HOPELESS: SEVERAL DAYS
1. LITTLE INTEREST OR PLEASURE IN DOING THINGS: MORE THAN HALF THE DAYS

## 2025-05-27 ASSESSMENT — ENCOUNTER SYMPTOMS: GENERAL WELL-BEING: FAIR

## 2025-05-27 ASSESSMENT — ACTIVITIES OF DAILY LIVING (ADL): BATHING_REQUIRES_ASSISTANCE: 0

## 2025-05-28 ENCOUNTER — DOCUMENTATION (OUTPATIENT)
Dept: VASCULAR LAB | Facility: MEDICAL CENTER | Age: 70
End: 2025-05-28

## 2025-05-28 ENCOUNTER — OFFICE VISIT (OUTPATIENT)
Dept: FAMILY PLANNING/WOMEN'S HEALTH CLINIC | Facility: PHYSICIAN GROUP | Age: 70
End: 2025-05-28
Payer: MEDICARE

## 2025-05-28 ENCOUNTER — HOSPITAL ENCOUNTER (OUTPATIENT)
Facility: MEDICAL CENTER | Age: 70
End: 2025-05-28
Attending: NURSE PRACTITIONER
Payer: MEDICARE

## 2025-05-28 VITALS
RESPIRATION RATE: 16 BRPM | WEIGHT: 198 LBS | OXYGEN SATURATION: 93 % | HEIGHT: 65 IN | HEART RATE: 77 BPM | BODY MASS INDEX: 32.99 KG/M2 | DIASTOLIC BLOOD PRESSURE: 72 MMHG | SYSTOLIC BLOOD PRESSURE: 112 MMHG | TEMPERATURE: 97.6 F

## 2025-05-28 DIAGNOSIS — E78.5 DYSLIPIDEMIA ASSOCIATED WITH TYPE 2 DIABETES MELLITUS (HCC): Chronic | ICD-10-CM

## 2025-05-28 DIAGNOSIS — E66.811 CLASS 1 DRUG-INDUCED OBESITY WITH SERIOUS COMORBIDITY AND BODY MASS INDEX (BMI) OF 32.0 TO 32.9 IN ADULT: ICD-10-CM

## 2025-05-28 DIAGNOSIS — I25.118 CORONARY ARTERY DISEASE OF NATIVE ARTERY OF NATIVE HEART WITH STABLE ANGINA PECTORIS (HCC): ICD-10-CM

## 2025-05-28 DIAGNOSIS — K21.9 GASTROESOPHAGEAL REFLUX DISEASE WITHOUT ESOPHAGITIS: ICD-10-CM

## 2025-05-28 DIAGNOSIS — I10 ESSENTIAL HYPERTENSION: ICD-10-CM

## 2025-05-28 DIAGNOSIS — I25.10 CORONARY ARTERY CALCIFICATION SEEN ON CT SCAN: ICD-10-CM

## 2025-05-28 DIAGNOSIS — E66.1 CLASS 1 DRUG-INDUCED OBESITY WITH SERIOUS COMORBIDITY AND BODY MASS INDEX (BMI) OF 32.0 TO 32.9 IN ADULT: ICD-10-CM

## 2025-05-28 DIAGNOSIS — E11.69 DYSLIPIDEMIA ASSOCIATED WITH TYPE 2 DIABETES MELLITUS (HCC): Chronic | ICD-10-CM

## 2025-05-28 DIAGNOSIS — I73.9 INTERMITTENT CLAUDICATION (HCC): ICD-10-CM

## 2025-05-28 DIAGNOSIS — I25.82 CHRONIC TOTAL OCCLUSION OF CORONARY ARTERY: ICD-10-CM

## 2025-05-28 DIAGNOSIS — F32.0 MILD MAJOR DEPRESSION (HCC): Primary | ICD-10-CM

## 2025-05-28 LAB
AMBIGUOUS DTTM AMBI4: NORMAL
CREAT UR-MCNC: 188 MG/DL
MICROALBUMIN UR-MCNC: 2.7 MG/DL
MICROALBUMIN/CREAT UR: 14 MG/G (ref 0–30)

## 2025-05-28 PROCEDURE — 82570 ASSAY OF URINE CREATININE: CPT

## 2025-05-28 PROCEDURE — 82043 UR ALBUMIN QUANTITATIVE: CPT

## 2025-05-28 SDOH — ECONOMIC STABILITY: TRANSPORTATION INSECURITY: IN THE PAST 12 MONTHS, HAS LACK OF TRANSPORTATION KEPT YOU FROM MEDICAL APPOINTMENTS OR FROM GETTING MEDICATIONS?: NO

## 2025-05-28 SDOH — ECONOMIC STABILITY: FOOD INSECURITY: WITHIN THE PAST 12 MONTHS, YOU WORRIED THAT YOUR FOOD WOULD RUN OUT BEFORE YOU GOT THE MONEY TO BUY MORE.: NEVER TRUE

## 2025-05-28 SDOH — ECONOMIC STABILITY: FOOD INSECURITY: WITHIN THE PAST 12 MONTHS, THE FOOD YOU BOUGHT JUST DIDN'T LAST AND YOU DIDN'T HAVE MONEY TO GET MORE.: NEVER TRUE

## 2025-05-28 SDOH — ECONOMIC STABILITY: FOOD INSECURITY: HOW HARD IS IT FOR YOU TO PAY FOR THE VERY BASICS LIKE FOOD, HOUSING, MEDICAL CARE, AND HEATING?: SOMEWHAT HARD

## 2025-05-28 SDOH — ECONOMIC STABILITY: HOUSING INSECURITY: IN THE LAST 12 MONTHS, WAS THERE A TIME WHEN YOU WERE NOT ABLE TO PAY THE MORTGAGE OR RENT ON TIME?: NO

## 2025-05-28 SDOH — ECONOMIC STABILITY: HOUSING INSECURITY: AT ANY TIME IN THE PAST 12 MONTHS, WERE YOU HOMELESS OR LIVING IN A SHELTER (INCLUDING NOW)?: NO

## 2025-05-28 SDOH — ECONOMIC STABILITY: HOUSING INSECURITY: IN THE PAST 12 MONTHS, HOW MANY TIMES HAVE YOU MOVED WHERE YOU WERE LIVING?: 0

## 2025-05-28 ASSESSMENT — PATIENT HEALTH QUESTIONNAIRE - PHQ9
CLINICAL INTERPRETATION OF PHQ2 SCORE: 3
5. POOR APPETITE OR OVEREATING: 0 - NOT AT ALL
SUM OF ALL RESPONSES TO PHQ QUESTIONS 1-9: 7

## 2025-05-28 ASSESSMENT — PAIN SCALES - GENERAL: PAINLEVEL_OUTOF10: NO PAIN

## 2025-05-28 ASSESSMENT — FIBROSIS 4 INDEX: FIB4 SCORE: 1.37

## 2025-05-28 ASSESSMENT — ACTIVITIES OF DAILY LIVING (ADL): LACK_OF_TRANSPORTATION: NO

## 2025-05-28 NOTE — ASSESSMENT & PLAN NOTE
Stable on rosuvastatin / ozempic  Records review     Latest Reference Range & Units 06/06/24 09:23   Cholesterol,Tot 100 - 199 mg/dL 157   Triglycerides 0 - 149 mg/dL 125   HDL >=40 mg/dL 73   LDL <100 mg/dL 59     . No musculoskeletal issues associated with the use of a statin. Cont heart healthy diet and regular exercise. Cont f/u with PCP/ PHARM D  for ongoing monitoring and medication management

## 2025-05-28 NOTE — ASSESSMENT & PLAN NOTE
Stable. Patient taking telmisartan/ metoprolol/ amlodipine    BP today  112/72  HR RRR  No BLE edema present  Cont f/u with  cardiology ( Dr. Bajwa) for ongoing monitoring and medication management

## 2025-05-28 NOTE — PROGRESS NOTES
Comprehensive Health Assessment Program     Dunia Her is a 69 y.o. here for her comprehensive health assessment.    Patient Active Problem List    Diagnosis Date Noted    Chronic total occlusion of coronary artery - RCA 05/12/2025    Fatty (change of) liver, not elsewhere classified 10/04/2024    RUQ pain 09/10/2024    Gastroesophageal reflux disease without esophagitis 11/30/2023    Osteopenia of neck of left femur 08/02/2023    Controlled type 2 diabetes mellitus with complication, without long-term current use of insulin (HCC) 06/02/2023    Mixed stress and urge urinary incontinence 06/02/2023    Lung nodule 04/20/2022    Greater trochanteric bursitis of left hip 01/31/2022    Elevated lipoprotein(a) 12/29/2021    Essential hypertension 10/06/2020    Coronary artery calcification seen on CT scan 04/07/2020    Coronary artery disease of native artery of native heart with stable angina pectoris (HCC) 04/03/2020    Intermittent claudication ([facility]) 02/07/2020    Mild major depression ([facility]) 02/10/2019    Chronic insomnia 04/18/2016    Dyslipidemia associated with type 2 diabetes mellitus (HCC) 10/02/2014    Class 1 drug-induced obesity with serious comorbidity and body mass index (BMI) of 32.0 to 32.9 in adult 10/02/2014       Current Medications[1]       Current supplements as per medication list.     Allergies:   Sulfa drugs  Social History[2]  Family History   Problem Relation Age of Onset    Cancer Mother         pancreatic     Heart Disease Father     Heart Attack Father     Diabetes Father         pre-diabetes    Stroke Neg Hx      Dunia  has a past medical history of Bronchitis (04/21/2025), Hyperlipidemia LDL goal < 100 (10/02/2014), Hypertension, Obesity (BMI 30-39.9) (10/02/2014), Pre-diabetes (10/02/2014), Recurrent sinusitis, Type II or unspecified type diabetes mellitus without mention of complication, not stated as uncontrolled, and Wheezing.    She has no past medical history of ASTHMA,  Cough, Painful breathing, Shortness of breath, or Sputum production.   Past Surgical History[3]    Screening:  In the last six months have you experienced any leakage of urine? Yes    Depression Screening  Little interest or pleasure in doing things?  2 - more than half the days  Feeling down, depressed , or hopeless? 1 - several days  Trouble falling or staying asleep, or sleeping too much?  0 - not at all  Feeling tired or having little energy?  2 - more than half the days  Poor appetite or overeating?  0 - not at all  Feeling bad about yourself - or that you are a failure or have let yourself or your family down? 0 - not at all  Trouble concentrating on things, such as reading the newspaper or watching television? 1 - several days  Moving or speaking so slowly that other people could have noticed.  Or the opposite - being so fidgety or restless that you have been moving around a lot more than usual?  1 - several days  Thoughts that you would be better off dead, or of hurting yourself?  0 - not at all  Patient Health Questionnaire Score: 7      If depressive symptoms identified deferred to follow up visit unless specifically addressed in assessment and plan.    Interpretation of PHQ-9 Total Score   Score Severity   1-4 No Depression   5-9 Mild Depression   10-14 Moderate Depression   15-19 Moderately Severe Depression   20-27 Severe Depression    Screening for Cognitive Impairment  Do you or any of your friends or family members have any concern about your memory? No  Three Minute Recall (Village, Kitchen, Baby) 3/3    Ryan clock face with all 12 numbers and set the hands to show 10 minutes past 11.  Yes 5/5  Cognitive concerns identified deferred for follow up unless specifically addressed in assessment and plan.    Fall Risk Assessment  Has the patient had two or more falls in the last year or any fall with injury in the last year?  No    Safety Assessment  Do you always wear your seatbelt?  Yes  Any changes to  home needed to function safely? No  Difficulty hearing.  No  Patient counseled about all safety risks that were identified.    Functional Assessment ADLs  Are there any barriers preventing you from cooking for yourself or meeting nutritional needs?  No.    Are there any barriers preventing you from driving safely or obtaining transportation?  No.    Are there any barriers preventing you from using a telephone or calling for help?  No    Are there any barriers preventing you from shopping?  No.    Are there any barriers preventing you from taking care of your own finances?  No    Are there any barriers preventing you from managing your medications?  No    Are there any barriers preventing you from showering, bathing or dressing yourself? No    Are there any barriers preventing you from doing housework or laundry? No    Are there any barriers preventing you from using the toilet?No    Are you currently engaging in any exercise or physical activity?  Yes.      Self-Assessment of Health  What is your perception of your health? Fair    Do you sleep more than six hours a night? Yes    In the past 7 days, how much did pain keep you from doing your normal work? Some    Do you spend quality time with family or friends (virtually or in person)? Yes    Do you usually eat a heart healthy diet that constists of a variety of fruits, vegetables, whole grains and fiber? Yes    Do you eat foods high in fat and/or Fast Food more than three times per week? No    How concerned are you that your medical conditions are not being well managed? Not at all    Are you worried that in the next 2 months, you may not have stable housing that you own, rent, or stay in as part of a household? Choose not to answer        Advance Care Planning  Do you have an Advance Directive, Living Will, Durable Power of , or POLST? No                 Health Maintenance Summary            Current Care Gaps       Zoster (Shingles) Vaccines (2 of 3)  Overdue since 4/21/2015 02/24/2015  Imm Admin: Zoster Vaccine Live (ZVL) (Zostavax) - HISTORICAL DATA              Annual Wellness Visit (Yearly) Overdue since 7/6/2023 07/06/2022  Visit Dx: Medicare annual wellness visit, subsequent              COVID-19 Vaccine (7 - 2024-25 season) Overdue since 3/3/2025      09/03/2024  Imm Admin: Covid-19 Mrna (Spikevax) Moderna 12+ Years    10/31/2023  Imm Admin: Covid-19 Mrna (Spikevax) Moderna 12+ Years    09/28/2022  Imm Admin: PFIZER BIVALENT SARS-COV-2 VACCINE (12+)    12/30/2021  Imm Admin: MODERNA SARS-COV-2 VACCINE (12+)    04/23/2021  Imm Admin: PFIZER PURPLE CAP SARS-COV-2 VACCINATION (12+)     Only the first 5 history entries have been loaded, but more history exists.            Lung Cancer Screening Shared Decision Making (Once) Never done      No completion history exists for this topic.              Fasting Lipid Profile (Yearly) Due soon on 6/6/2025 09/19/2024  Order placed for Lipid Profile by JUANI Bangura    06/06/2024  Lipid Profile    11/27/2023  Lipid Profile    07/03/2023  Lipid Profile    11/14/2022  Lipid Profile     Only the first 5 history entries have been loaded, but more history exists.            Diabetes: Monofilament / LE Exam (Yearly) Due soon on 6/28/2025 06/28/2024  Done    05/15/2023  Diabetic Monofilament LE Exam    04/05/2022  [employer]: WORKFLOW - DIABETES - DIABETIC FOOT EXAM PERFORMED    04/05/2022  Diabetic Monofilament LE Exam    10/06/2020  Smart[employer]: WORKFLOW - DIABETES - DIABETIC FOOT EXAM PERFORMED      Only the first 5 history entries have been loaded, but more history exists.                      Needs Review       A1c Screening (Every 6 Months) Tentatively due on 8/3/2025      05/23/2025  Order placed for POCT  A1C by Pillo Truong, PharmD    02/03/2025  POCT  A1C    11/04/2024  POCT  A1C    06/28/2024  POCT  A1C    03/26/2024  POCT  A1C      Only the first 5 history entries have been loaded, but  more history exists.                      Awaiting Completion       Diabetes: Urine Protein Screening (Yearly) Order placed this encounter      05/28/2025  Order placed for MICROALBUMIN CREAT RATIO URINE by Kim Otto A.P.R.N.    06/06/2024  MICROALBUMIN CREAT RATIO URINE    07/03/2023  MICROALBUMIN CREAT RATIO URINE    12/27/2021  MICROALBUMIN CREAT RATIO URINE    10/26/2020  MICROALBUMIN CREAT RATIO URINE      Only the first 5 history entries have been loaded, but more history exists.              Diabetes: Retinopathy Screening (Yearly) Order placed this encounter      05/28/2025  Order placed for POCT Retinal Eye Exam by Kim Otto A.P.R.N.    09/10/2024  POCT Retinal Eye Exam    07/20/2023  AMB EXTERNAL RETINAL SCREENING RESULTS    06/06/2022  Done    05/27/2022  RETINAL SCREENING RESULTS      Only the first 5 history entries have been loaded, but more history exists.              Mammogram (Yearly) Scheduled for 10/7/2025      04/10/2025  Order placed for MA-SCREENING MAMMO BILAT W/TOMOSYNTHESIS W/CAD by Becca Marley P.A.-C.    08/01/2023  MA-SCREENING MAMMO BILAT W/TOMOSYNTHESIS W/CAD    10/18/2017  MA-SCREEN MAMMO W/CAD-BILAT    05/16/2016  MA-SCREEN MAMMO W/CAD-BILAT    10/21/2014  MA-SCREENING MAMMOGRAM W/ CAD      Only the first 5 history entries have been loaded, but more history exists.                      Upcoming       Annual Pulmonary Function Test / Spirometry (Yearly) Next due on 11/5/2025 11/05/2024  PULMONARY FUNCTION TESTS -Test requested: Complete Pulmonary Function Test    04/18/2022  PULMONARY FUNCTION TESTS -Test requested: Complete Pulmonary Function Test    04/12/2021  PULMONARY FUNCTION TESTS -Test requested: Complete Pulmonary Function Test              SERUM CREATININE (Yearly) Next due on 4/22/2026 04/22/2025  Comp Metabolic Panel    06/06/2024  Comp Metabolic Panel    11/27/2023  Basic Metabolic Panel    07/03/2023  Comp Metabolic Panel    11/14/2022  Comp  Metabolic Panel      Only the first 5 history entries have been loaded, but more history exists.              Bone Density Scan (Every 5 Years) Next due on 8/1/2028 08/01/2023  DS-BONE DENSITY STUDY (DEXA)              Colorectal Cancer Screening (Colonoscopy - Every 5 Years) Next due on 3/5/2029      03/05/2024  AMB EXTERNAL COLONOSCOPY RESULTS    01/31/2019  REFERRAL TO GI FOR COLONOSCOPY    05/02/2013  Colonoscopy (Previously completed)              IMM DTaP/Tdap/Td Vaccine (2 - Td or Tdap) Next due on 11/10/2030      11/10/2020  Imm Admin: Tdap Vaccine                      Completed or No Longer Recommended       Hepatitis C Screening  Completed      04/18/2014  Hepatitis C Antibody component of HEP C VIRUS ANTIBODY              Pneumococcal Vaccine: 50+ Years (Series Information) Completed      03/06/2024  Imm Admin: Pneumococcal Conjugate Vaccine (PCV20)    10/06/2020  Imm Admin: Pneumococcal polysaccharide vaccine (PPSV-23)    04/18/2016  Imm Admin: Pneumococcal Conjugate Vaccine (Prevnar/PCV-13)    02/24/2015  Imm Admin: Pneumococcal polysaccharide vaccine (PPSV-23)              Hepatitis A Vaccine (Hep A) (Series Information) Aged Out      No completion history exists for this topic.              HPV Vaccines (Series Information) Aged Out     No completion history exists for this topic.              Polio Vaccine (Inactivated Polio) (Series Information) Aged Out     No completion history exists for this topic.              Meningococcal Immunization (Series Information) Aged Out     No completion history exists for this topic.              Meningococcal B Vaccine (Series Information) Aged Out     No completion history exists for this topic.              Hepatitis B Vaccine (Hep B)  Discontinued     No completion history exists for this topic.              Influenza Vaccine  Discontinued      09/03/2024  Imm Admin: Influenza high-dose trivalent (PF)    10/04/2023  Imm Admin: Influenza Vaccine Adult HD  "   09/28/2022  Imm Admin: Influenza Vaccine Adult HD    09/19/2020  Imm Admin: Influenza Vaccine Adult HD    11/01/2019  Imm Admin: Influenza Vaccine Adult HD     Only the first 5 history entries have been loaded, but more history exists.            Lung Cancer Screening  Discontinued        Frequency changed to Never automatically (Topic No Longer Applies)    11/05/2024  Order placed for CT-CHEST (THORAX) W/O by PEPE Jovel.    10/23/2024  CT-CHEST (THORAX) W/O    10/31/2023  CT-CHEST (THORAX) W/O    10/04/2022  CT-CHEST (THORAX) W/O      Only the first 5 history entries have been loaded, but more history exists.                            Patient Care Team:  Becca Marley P.A.-C. as PCP - General (Physician Assistant)  Gunnar Jaimes O.D. (Optometry)    Financial Resource Strain: Medium Risk (5/28/2025)    Overall Financial Resource Strain (CARDIA)     Difficulty of Paying Living Expenses: Somewhat hard      Transportation Needs: No Transportation Needs (5/28/2025)    PRAPARE - Transportation     Lack of Transportation (Medical): No     Lack of Transportation (Non-Medical): No      Food Insecurity: No Food Insecurity (5/28/2025)    Hunger Vital Sign     Worried About Running Out of Food in the Last Year: Never true     Ran Out of Food in the Last Year: Never true        Encounter Vitals  Temperature: 36.4 °C (97.6 °F)  Temp src: Temporal  Blood Pressure : 112/72  Pulse: 77  Respiration: 16  Pulse Oximetry: 93 %  O2 Delivery Device: None - Room Air  Weight: 89.8 kg (198 lb)  Height: 165.1 cm (5' 5\")  BMI (Calculated): 32.95  Pain Score: No pain  DME  O2 Delivery Device: None - Room Air     ROS:  No fever, chills, nausea, vomiting, diarrhea, chest pain or shortness of breath. See HPI.    Physical Exam:  Constitutional: NAD  HENMT: NC/AT, OP clear, TM's clear, no lymphadenopathy, no thyromegaly.  No JVD. (-) carotid bruit   Cardiovascular: RRR,  Lungs: CTAB, bilat   Extremities: 2+ DP, PT and " Radial pulses bilaterally. No edema (-) monofilament   Skin: No legions notes  Neurologic: Alert & oriented     Assessment and Plan. The following treatment and monitoring plan is recommended:  Dyslipidemia associated with type 2 diabetes mellitus (HCC)  Stable on rosuvastatin / ozempic  Records review     Latest Reference Range & Units 06/06/24 09:23   Cholesterol,Tot 100 - 199 mg/dL 157   Triglycerides 0 - 149 mg/dL 125   HDL >=40 mg/dL 73   LDL <100 mg/dL 59     . No musculoskeletal issues associated with the use of a statin. Cont heart healthy diet and regular exercise. Cont f/u with PCP/ PHARM D  for ongoing monitoring and medication management      Class 1 drug-induced obesity with serious comorbidity and body mass index (BMI) of 32.0 to 32.9 in adult  Stable. BMI 32.95 Cont heart healthy diet and regular exercise. Cont f/u with PCP for ongoing monitoring and discussion        Essential hypertension  Stable. Patient taking telmisartan/ metoprolol/ amlodipine    BP today  112/72  HR RRR  No BLE edema present  Cont f/u with  cardiology ( Dr. Bajwa) for ongoing monitoring and medication management     Mild major depression (HCC)  Stable. PHQ 9 - 7 today (mild ) Patient agrees with finding. Rx paxil./ trazodone  Continue regular exercise. Cont f/u with PCP for ongoing discussion and medication management     Intermittent claudication (HCC)  Stable. Rx Pletal/ rosuvastatin/ ASA. Patient carries the dx of PAD. Pedal pulses +1. Patient is walking daily. Cont f/u with vascular ( Dr Browning) for ongoing monitoring and medication management     Gastroesophageal reflux disease without esophagitis  Stable on omeprazole  Symptoms improved with rx. Cont f/u with PCP for ongoing monitoring and medication management      Chronic total occlusion of coronary artery - RCA  Stable. Heart cath completed ( 4/23/25) . Occlusion RCA with natural neovascularization. No surgical intervention at this time. Patient is taking   mg/ metoprolol.  Patient does report continued CP as recent  as one week ago. Patient has f/u with appt with cardiology (6/19/25) Cont f/u     Coronary artery disease of native artery of native heart with stable angina pectoris (HCC)  Stable. Heart cath completed ( 4/23/25) . Occlusion RCA with natural neovascularization. No surgical intervention at this time. Patient is taking  mg/ metoprolol.  Patient does report continued CP as recent  as one week ago. Patient has f/u with appt with cardiology (6/19/25) Cont f/u       Services suggested: No services needed at this time  Health Care Screening: Age-appropriate preventive services recommended by USPTF and ACIP covered by Medicare were discussed today. Services ordered if indicated and agreed upon by the patient.  Referrals offered: Community-based lifestyle interventions to reduce health risks and promote self-management and wellness, fall prevention, nutrition, physical activity, tobacco-use cessation, weight loss, and mental health services as per orders if indicated.    Discussion today about general wellness and lifestyle habits:    Prevent falls and reduce trip hazards; Cautioned about securing or removing rugs.  Have a working fire alarm and carbon monoxide detector.  Engage in regular physical activity and social activities.    Follow-up: No follow-ups on file.              [1]   Current Outpatient Medications   Medication Sig Dispense Refill    telmisartan (MICARDIS) 80 MG Tab TAKE 1 TABLET BY MOUTH AT BEDTIME 100 Tablet 3    metFORMIN ER (GLUCOPHAGE XR) 500 MG TABLET SR 24 HR Take 1 Tablet by mouth 2 times a day. 180 Tablet 3    Semaglutide, 1 MG/DOSE, (OZEMPIC, 1 MG/DOSE,) 4 MG/3ML Solution Pen-injector Inject 1 mg under the skin every 7 days. Take on Thursday 3 mL 3    metoprolol SR (TOPROL XL) 25 MG TABLET SR 24 HR Take 1 Tablet by mouth every day. To improve heart pain. 100 Tablet 3    paroxetine (PAXIL) 40 MG tablet Take 1 Tablet by mouth every  day. 90 Tablet 0    cilostazol (PLETAL) 50 MG tablet Take 1 Tablet by mouth 2 times a day. 200 Tablet 3    traZODone (DESYREL) 50 MG Tab TAKE 1 TABLET BY MOUTH EVERY NIGHT AT BEDTIME AS NEEDED FOR SLEEP 100 Tablet 0    omeprazole (PRILOSEC) 20 MG delayed-release capsule TAKE 1 CAPSULE BY MOUTH DAILY 90 Capsule 3    amLODIPine (NORVASC) 5 MG Tab TAKE 1 TABLET BY MOUTH AT BEDTIME 100 Tablet 3    rosuvastatin (CRESTOR) 20 MG Tab TAKE ONE AND ONE-HALF TABLETS BY MOUTH EVERY EVENING 135 Tablet 3    aspirin EC (ECOTRIN) 325 MG Tablet Delayed Response Take 1 Tablet by mouth every day. 100 Tablet 3    albuterol 108 (90 Base) MCG/ACT Aero Soln inhalation aerosol Inhale 1-2 Puffs every four hours as needed for Shortness of Breath. 1 Each 6    albuterol (PROVENTIL) 2.5mg/3ml Nebu Soln solution for nebulization Take 3 mL by nebulization every four hours as needed for Shortness of Breath. 120 Each 3    ibuprofen (MOTRIN) 200 MG Tab Take 600 mg by mouth 1 time a day as needed (Moderate pain). 3 tablets = 600 mg.      vitamin D (CHOLECALCIFEROL) 1000 Unit (25 mcg) Tab Take 3 Tablets by mouth every morning. 3 tablets = 3000 units.       No current facility-administered medications for this visit.   [2]   Social History  Tobacco Use    Smoking status: Former     Current packs/day: 0.00     Average packs/day: 1 pack/day for 49.4 years (49.4 ttl pk-yrs)     Types: Cigarettes     Start date:      Quit date: 2017     Years since quittin.9    Smokeless tobacco: Never   Vaping Use    Vaping status: Never Used   Substance Use Topics    Alcohol use: No     Alcohol/week: 0.0 oz    Drug use: Never   [3]   Past Surgical History:  Procedure Laterality Date    SC LAP,APPENDECTOMY N/A 2021    Procedure: APPENDECTOMY, LAPAROSCOPIC;  Surgeon: Juan Dawson M.D.;  Location: SURGERY [facility];  Service: General    ABDOMINAL HYSTERECTOMY TOTAL      benign cysts, total

## 2025-05-28 NOTE — ASSESSMENT & PLAN NOTE
Stable. PHQ 9 - 7 today (mild ) Patient agrees with finding. Rx paxil./ trazodone  Continue regular exercise. Cont f/u with PCP for ongoing discussion and medication management

## 2025-05-28 NOTE — ASSESSMENT & PLAN NOTE
Stable on omeprazole  Symptoms improved with rx. Cont f/u with PCP for ongoing monitoring and medication management

## 2025-05-28 NOTE — ASSESSMENT & PLAN NOTE
Stable. Rx Pletal/ rosuvastatin/ ASA. Patient carries the dx of PAD. Pedal pulses +1. Patient is walking daily. Cont f/u with vascular ( Dr Browning) for ongoing monitoring and medication management

## 2025-05-28 NOTE — ASSESSMENT & PLAN NOTE
Stable. BMI 32.95 Cont heart healthy diet and regular exercise. Cont f/u with PCP for ongoing monitoring and discussion

## 2025-05-28 NOTE — ASSESSMENT & PLAN NOTE
Stable. Heart cath completed ( 4/23/25) . Occlusion RCA with natural neovascularization. No surgical intervention at this time. Patient is taking  mg/ metoprolol.  Patient does report continued CP as recent  as one week ago. Patient has f/u with appt with cardiology (6/19/25) Cont f/u

## 2025-06-07 DIAGNOSIS — E11.51 TYPE 2 DIABETES MELLITUS WITH DIABETIC PERIPHERAL ANGIOPATHY WITHOUT GANGRENE, WITHOUT LONG-TERM CURRENT USE OF INSULIN (HCC): ICD-10-CM

## 2025-06-09 RX ORDER — METFORMIN HYDROCHLORIDE 500 MG/1
1000 TABLET, EXTENDED RELEASE ORAL 2 TIMES DAILY
Qty: 360 TABLET | Refills: 3 | Status: SHIPPED | OUTPATIENT
Start: 2025-06-09

## 2025-06-10 ENCOUNTER — OFFICE VISIT (OUTPATIENT)
Dept: MEDICAL GROUP | Facility: PHYSICIAN GROUP | Age: 70
End: 2025-06-10
Payer: MEDICARE

## 2025-06-10 VITALS
SYSTOLIC BLOOD PRESSURE: 98 MMHG | BODY MASS INDEX: 32.76 KG/M2 | TEMPERATURE: 97.8 F | HEIGHT: 65 IN | OXYGEN SATURATION: 95 % | DIASTOLIC BLOOD PRESSURE: 60 MMHG | RESPIRATION RATE: 21 BRPM | WEIGHT: 196.6 LBS | HEART RATE: 79 BPM

## 2025-06-10 DIAGNOSIS — E78.5 DYSLIPIDEMIA ASSOCIATED WITH TYPE 2 DIABETES MELLITUS (HCC): Chronic | ICD-10-CM

## 2025-06-10 DIAGNOSIS — E11.69 DYSLIPIDEMIA ASSOCIATED WITH TYPE 2 DIABETES MELLITUS (HCC): Chronic | ICD-10-CM

## 2025-06-10 DIAGNOSIS — Z71.89 ADVANCE CARE PLANNING: ICD-10-CM

## 2025-06-10 DIAGNOSIS — F33.41 RECURRENT MAJOR DEPRESSIVE DISORDER, IN PARTIAL REMISSION (HCC): Chronic | ICD-10-CM

## 2025-06-10 DIAGNOSIS — F32.0 MILD MAJOR DEPRESSION (HCC): ICD-10-CM

## 2025-06-10 DIAGNOSIS — E11.8 CONTROLLED TYPE 2 DIABETES MELLITUS WITH COMPLICATION, WITHOUT LONG-TERM CURRENT USE OF INSULIN (HCC): Primary | ICD-10-CM

## 2025-06-10 DIAGNOSIS — N39.46 MIXED STRESS AND URGE URINARY INCONTINENCE: ICD-10-CM

## 2025-06-10 DIAGNOSIS — R91.1 LUNG NODULE: ICD-10-CM

## 2025-06-10 PROCEDURE — 3074F SYST BP LT 130 MM HG: CPT | Performed by: PHYSICIAN ASSISTANT

## 2025-06-10 PROCEDURE — 3078F DIAST BP <80 MM HG: CPT | Performed by: PHYSICIAN ASSISTANT

## 2025-06-10 PROCEDURE — 99214 OFFICE O/P EST MOD 30 MIN: CPT | Performed by: PHYSICIAN ASSISTANT

## 2025-06-10 RX ORDER — PAROXETINE 40 MG/1
40 TABLET, FILM COATED ORAL DAILY
Qty: 90 TABLET | Refills: 3 | Status: SHIPPED | OUTPATIENT
Start: 2025-06-10

## 2025-06-10 ASSESSMENT — PATIENT HEALTH QUESTIONNAIRE - PHQ9
CLINICAL INTERPRETATION OF PHQ2 SCORE: 4
5. POOR APPETITE OR OVEREATING: 3 - NEARLY EVERY DAY
SUM OF ALL RESPONSES TO PHQ QUESTIONS 1-9: 14

## 2025-06-10 ASSESSMENT — ENCOUNTER SYMPTOMS
SHORTNESS OF BREATH: 0
FEVER: 0
CHILLS: 0

## 2025-06-10 ASSESSMENT — FIBROSIS 4 INDEX: FIB4 SCORE: 1.37

## 2025-06-10 NOTE — PROGRESS NOTES
SUBJECTIVE:     CC: Follow-up depression    HPI:   Dunia presents today with the following:    ASSESSMENT & PLAN by Problem:     Problem   Advance Care Planning    Advance directives: working on it    Living situation: lives with granddaughter (11, patient is her guardian); 1 sister lives in [facility]; 3 sons live in town    Drives: yes     Controlled Type 2 Diabetes Mellitus With Complication, Without Long-Term Current Use of Insulin (Hcc)    Chronic, uncontrolled. .  Managed by pharmacotherapy    Current Diabetes Medication Regimen  Metformin ER: 1000 mg BID  GLP-1 or GLP-1/GIP Analog: Ozempic 1mg SQ once weekly - only couple doses thus far     Mixed Stress and Urge Urinary Incontinence    Chronic, uncontrolled.  Uses pads.  Declines medication.        Lung Nodule    Chronic, controlled.  Followed by pulmonology.      Mild Major Depression (Hcc)    Chronic, uncontrolled but improving.  Tolerating/continue paxil 40mg.    PHQ-9: 14 (6/2025)     Dyslipidemia Associated With Type 2 Diabetes Mellitus (Hcc)    Chronic, controlled.  Tolerating/continue crestor 20mg.       Falls: none    Advance directives: working on it    Living situation: lives with granddaughter (11, patient is her guardian); 1 sister lives in [facility]; 3 sons live in town    Drives: yes    PHQ9: 14      Return in about 1 year (around 6/10/2026), or if symptoms worsen or fail to improve.    Follow-up June 2026, sooner as needed.         HPI:     Problem List Items Addressed This Visit       Advance care planning    Controlled type 2 diabetes mellitus with complication, without long-term current use of insulin (HCC) - Primary    Chronic, uncontrolled.     Current Diabetes Medication Regimen  Metformin ER: 1000 mg BID  GLP-1 or GLP-1/GIP Analog: Ozempic 1mg SQ once weekly - only couple doses thus far     Previously attempted medications:  Trulicity - stopped in favor of Ozempic    Potential Barriers to Care:  Adherence: denies missed doses  Side effects:  "Reports nausea after increasing metformin to 1000mg BID  Affordability: Receives Ozempic via PAP    A1c: 6.7 (5/2025); 7.2 (2/2025); 6.6 (6/2024); 6.9 (3/2024); 6.8 (11/2023); 7.0 (5/2023)  Microalb/Cr ratio: 14 (5/2025); 30 (6/2024); 12 (7/2023)  Diabetic foot exam: 6/2025  Retinal eye exam: 9/2024  ACEi/ARB: telmisartan 80mg daily  Statin: rosuvastatin 20mg daily  Aspirin: 325mg daily  Concomitant HTN: controlled  Nightly foot checks: yes            Relevant Orders    Diabetic Monofilament LE Exam (Completed)    Dyslipidemia associated with type 2 diabetes mellitus (HCC) (Chronic)    Chronic, stable.     Latest Labs:   Lab Results   Component Value Date/Time    CHOLSTRLTOT 157 06/06/2024 09:23 AM    LDL 59 06/06/2024 09:23 AM    HDL 73 06/06/2024 09:23 AM    TRIGLYCERIDE 125 06/06/2024 09:23 AM        Medications: Tolerating/continue rosuvastatin 20 mg daily.    Risk calculator: The 10-year ASCVD risk score (Carl DK, et al., 2019) is: 16%            Lung nodule    Mild major depression (HCC)    Chronic, uncontrolled but improving.  Increased paroxetine to 40 mg daily.  Was having \"moments\" daily or more often  Now having having them maybe twice/week  These moments are more depression, less anxiety  Lasting a few hours  No relievers, resolves on its own eventually  No energy     Interval history 4/2025:  Chronic, uncontrolled.   Tolerating paxil 30mg for years and it seems to help overall.  Still has episodes where she feels down but overall feels good.   Feels it's not working as well as it was.    Increase to 40mg.  May need to try a different medication.         Relevant Medications    paroxetine (PAXIL) 40 MG tablet    Mixed stress and urge urinary incontinence     Other Visit Diagnoses         Recurrent major depressive disorder, in partial remission (HCC)  (Chronic)       Relevant Medications    paroxetine (PAXIL) 40 MG tablet                   ROS:  Review of Systems   Constitutional:  Negative for chills " "and fever.   Respiratory:  Negative for shortness of breath.    Cardiovascular:  Negative for chest pain.       OBJECTIVE:     Exam:  BP 98/60   Pulse 79   Temp 36.6 °C (97.8 °F) (Temporal)   Resp (!) 21   Ht 1.651 m (5' 5\")   Wt 89.2 kg (196 lb 9.6 oz)   SpO2 95%   BMI 32.72 kg/m²  Body mass index is 32.72 kg/m².    Physical Exam  Vitals reviewed.   Constitutional:       General: She is not in acute distress.     Appearance: Normal appearance.   Pulmonary:      Effort: Pulmonary effort is normal.   Neurological:      General: No focal deficit present.      Mental Status: She is alert.   Psychiatric:         Mood and Affect: Mood normal.         Behavior: Behavior normal.         Judgment: Judgment normal.       Monofilament testing with a 10 gram force: sensation intact: intact bilaterally  Visual Inspection: Feet without maceration, ulcers, fissures.  Pedal pulses: intact bilaterally      CHART REVIEW:     Labs:                       Please note that this dictation was created using voice recognition software. I have made every reasonable attempt to correct obvious errors, but I expect that there are errors of grammar and possibly content that I did not discover before finalizing the note.    "

## 2025-06-10 NOTE — ASSESSMENT & PLAN NOTE
Chronic, stable.     Latest Labs:   Lab Results   Component Value Date/Time    CHOLSTRLTOT 157 06/06/2024 09:23 AM    LDL 59 06/06/2024 09:23 AM    HDL 73 06/06/2024 09:23 AM    TRIGLYCERIDE 125 06/06/2024 09:23 AM        Medications: Tolerating/continue rosuvastatin 20 mg daily.    Risk calculator: The 10-year ASCVD risk score (Carl WOLFF, et al., 2019) is: 16%

## 2025-06-10 NOTE — ASSESSMENT & PLAN NOTE
"Chronic, uncontrolled but improving.  Increased paroxetine to 40 mg daily.  Was having \"moments\" daily or more often  Now having having them maybe twice/week  These moments are more depression, less anxiety  Lasting a few hours  No relievers, resolves on its own eventually  No energy     Interval history 4/2025:  Chronic, uncontrolled.   Tolerating paxil 30mg for years and it seems to help overall.  Still has episodes where she feels down but overall feels good.   Feels it's not working as well as it was.    Increase to 40mg.  May need to try a different medication.  "

## 2025-06-10 NOTE — ASSESSMENT & PLAN NOTE
Chronic, uncontrolled.     Current Diabetes Medication Regimen  Metformin ER: 1000 mg BID  GLP-1 or GLP-1/GIP Analog: Ozempic 1mg SQ once weekly - only couple doses thus far     Previously attempted medications:  Trulicity - stopped in favor of Ozempic    Potential Barriers to Care:  Adherence: denies missed doses  Side effects: Reports nausea after increasing metformin to 1000mg BID  Affordability: Receives Ozempic via PAP    A1c: 6.7 (5/2025); 7.2 (2/2025); 6.6 (6/2024); 6.9 (3/2024); 6.8 (11/2023); 7.0 (5/2023)  Microalb/Cr ratio: 14 (5/2025); 30 (6/2024); 12 (7/2023)  Diabetic foot exam: 6/2025  Retinal eye exam: 9/2024  ACEi/ARB: telmisartan 80mg daily  Statin: rosuvastatin 20mg daily  Aspirin: 325mg daily  Concomitant HTN: controlled  Nightly foot checks: yes

## 2025-06-18 DIAGNOSIS — F51.04 CHRONIC INSOMNIA: ICD-10-CM

## 2025-06-18 NOTE — TELEPHONE ENCOUNTER
Received request via: Pharmacy    Was the patient seen in the last year in this department? Yes    Does the patient have an active prescription (recently filled or refills available) for medication(s) requested? No    [facility] Name[facility]    Does the patient have alf Plus and need 100-day supply? (This applies to ALL medications) Yes, quantity updated to 100 days

## 2025-06-19 ENCOUNTER — OFFICE VISIT (OUTPATIENT)
Dept: CARDIOLOGY | Facility: MEDICAL CENTER | Age: 70
End: 2025-06-19
Attending: INTERNAL MEDICINE
Payer: MEDICARE

## 2025-06-19 VITALS
DIASTOLIC BLOOD PRESSURE: 72 MMHG | SYSTOLIC BLOOD PRESSURE: 120 MMHG | RESPIRATION RATE: 16 BRPM | WEIGHT: 198 LBS | BODY MASS INDEX: 32.99 KG/M2 | HEART RATE: 88 BPM | HEIGHT: 65 IN | OXYGEN SATURATION: 94 %

## 2025-06-19 DIAGNOSIS — I25.82 CHRONIC TOTAL OCCLUSION OF CORONARY ARTERY: ICD-10-CM

## 2025-06-19 DIAGNOSIS — I25.83 CORONARY ARTERY DISEASE DUE TO LIPID RICH PLAQUE: ICD-10-CM

## 2025-06-19 DIAGNOSIS — I25.10 CORONARY ARTERY DISEASE DUE TO LIPID RICH PLAQUE: ICD-10-CM

## 2025-06-19 DIAGNOSIS — I20.89 ANGINA AT REST (HCC): ICD-10-CM

## 2025-06-19 DIAGNOSIS — E78.5 DYSLIPIDEMIA ASSOCIATED WITH TYPE 2 DIABETES MELLITUS (HCC): Primary | Chronic | ICD-10-CM

## 2025-06-19 DIAGNOSIS — I10 ESSENTIAL HYPERTENSION: ICD-10-CM

## 2025-06-19 DIAGNOSIS — E11.69 DYSLIPIDEMIA ASSOCIATED WITH TYPE 2 DIABETES MELLITUS (HCC): Primary | Chronic | ICD-10-CM

## 2025-06-19 DIAGNOSIS — E78.41 ELEVATED LIPOPROTEIN(A): ICD-10-CM

## 2025-06-19 PROCEDURE — 99212 OFFICE O/P EST SF 10 MIN: CPT | Performed by: INTERNAL MEDICINE

## 2025-06-19 PROCEDURE — 99215 OFFICE O/P EST HI 40 MIN: CPT | Performed by: INTERNAL MEDICINE

## 2025-06-19 PROCEDURE — 3074F SYST BP LT 130 MM HG: CPT | Performed by: INTERNAL MEDICINE

## 2025-06-19 PROCEDURE — 3078F DIAST BP <80 MM HG: CPT | Performed by: INTERNAL MEDICINE

## 2025-06-19 RX ORDER — METOPROLOL SUCCINATE 50 MG/1
50 TABLET, EXTENDED RELEASE ORAL DAILY
Qty: 90 TABLET | Refills: 3 | Status: SHIPPED | OUTPATIENT
Start: 2025-06-19

## 2025-06-19 RX ORDER — ISOSORBIDE MONONITRATE 30 MG/1
30 TABLET, EXTENDED RELEASE ORAL EVERY MORNING
Qty: 90 TABLET | Refills: 3 | Status: SHIPPED | OUTPATIENT
Start: 2025-06-19

## 2025-06-19 RX ORDER — TRAZODONE HYDROCHLORIDE 50 MG/1
50 TABLET ORAL
Qty: 100 TABLET | Refills: 3 | Status: SHIPPED | OUTPATIENT
Start: 2025-06-19

## 2025-06-19 ASSESSMENT — ENCOUNTER SYMPTOMS
HEARTBURN: 0
DEPRESSION: 0
SYNCOPE: 0
BACK PAIN: 0
ABDOMINAL PAIN: 0
NEAR-SYNCOPE: 0
ALTERED MENTAL STATUS: 0
BLURRED VISION: 0
WEIGHT LOSS: 0
WEIGHT GAIN: 0
NAUSEA: 0
SHORTNESS OF BREATH: 0
VOMITING: 0
CONSTIPATION: 0
IRREGULAR HEARTBEAT: 0
DECREASED APPETITE: 0
PND: 0
COUGH: 0
FLANK PAIN: 0
DIARRHEA: 0
DIZZINESS: 0
FEVER: 0
PALPITATIONS: 0
ORTHOPNEA: 0
CLAUDICATION: 0
DYSPNEA ON EXERTION: 0

## 2025-06-19 ASSESSMENT — FIBROSIS 4 INDEX: FIB4 SCORE: 1.39

## 2025-06-19 NOTE — PROGRESS NOTES
Cardiology Note    Chief Complaint   Patient presents with    Follow-Up     PAD (peripheral artery disease) (HCC)    Hypertension    Dyslipidemia       History of Present Illness: Dunia Her is a 70 y.o. female PMH former smoker 50 pack years, asthma, DM2, MVP, HTN, HLD, CAD /PAD /ASCVD, elevated lipoprotein (a), s/p LHC found RCA  presents for follow up.     Doing well. Improved angina. Now symptoms only about twice weekly. However can last up to one hour. No other cardiac complaints. Compliant with medications and denies adverse effects. She is expecting to be great grandmother in November.    Review of Systems   Constitutional: Negative for decreased appetite, fever, malaise/fatigue, weight gain and weight loss.   HENT:  Negative for congestion and nosebleeds.    Eyes:  Negative for blurred vision.   Cardiovascular:  Negative for chest pain, claudication, dyspnea on exertion, irregular heartbeat, leg swelling, near-syncope, orthopnea, palpitations, paroxysmal nocturnal dyspnea and syncope.   Respiratory:  Negative for cough and shortness of breath.    Endocrine: Negative for cold intolerance and heat intolerance.   Skin:  Negative for rash.   Musculoskeletal:  Negative for back pain.   Gastrointestinal:  Negative for abdominal pain, constipation, diarrhea, heartburn, melena, nausea and vomiting.   Genitourinary:  Negative for dysuria, flank pain and hematuria.   Neurological:  Negative for dizziness.   Psychiatric/Behavioral:  Negative for altered mental status and depression.          Past Medical History:   Diagnosis Date    Bronchitis 04/21/2025    Frequently - when smoking.  Quit smoking 8 years ago.    Hyperlipidemia LDL goal < 100 10/02/2014    Hypertension     Obesity (BMI 30-39.9) 10/02/2014    Pre-diabetes 10/02/2014    Recurrent sinusitis     Type II or unspecified type diabetes mellitus without mention of complication, not stated as uncontrolled     pre-diabetes    Wheezing          Past Surgical  History:   Procedure Laterality Date    [place] LAP,APPENDECTOMY N/A 2/12/2021    Procedure: APPENDECTOMY, LAPAROSCOPIC;  Surgeon: Juan Dawson M.D.;  Location: SURGERY [facility];  Service: General    ABDOMINAL HYSTERECTOMY TOTAL  1993    benign cysts, total         Current Outpatient Medications   Medication Sig Dispense Refill    traZODone (DESYREL) 50 MG Tab TAKE 1 TABLET BY MOUTH EVERY NIGHT AT BEDTIME AS NEEDED FOR SLEEP 100 Tablet 3    isosorbide mononitrate SR (IMDUR) 30 MG TABLET SR 24 HR Take 1 Tablet by mouth every morning. 90 Tablet 3    metoprolol SR (TOPROL XL) 50 MG TABLET SR 24 HR Take 1 Tablet by mouth every day. To improve heart pain. 90 Tablet 3    paroxetine (PAXIL) 40 MG tablet Take 1 Tablet by mouth every day. 90 Tablet 3    metFORMIN ER (GLUCOPHAGE XR) 500 MG TABLET SR 24 HR TAKE 2 TABLETS BY MOUTH TWICE A  Tablet 3    telmisartan (MICARDIS) 80 MG Tab TAKE 1 TABLET BY MOUTH AT BEDTIME 100 Tablet 3    Semaglutide, 1 MG/DOSE, (OZEMPIC, 1 MG/DOSE,) 4 MG/3ML Solution Pen-injector Inject 1 mg under the skin every 7 days. Take on Thursday 3 mL 3    cilostazol (PLETAL) 50 MG tablet Take 1 Tablet by mouth 2 times a day. 200 Tablet 3    omeprazole (PRILOSEC) 20 MG delayed-release capsule TAKE 1 CAPSULE BY MOUTH DAILY 90 Capsule 3    amLODIPine (NORVASC) 5 MG Tab TAKE 1 TABLET BY MOUTH AT BEDTIME 100 Tablet 3    rosuvastatin (CRESTOR) 20 MG Tab TAKE ONE AND ONE-HALF TABLETS BY MOUTH EVERY EVENING 135 Tablet 3    aspirin EC (ECOTRIN) 325 MG Tablet Delayed Response Take 1 Tablet by mouth every day. 100 Tablet 3    albuterol 108 (90 Base) MCG/ACT Aero Soln inhalation aerosol Inhale 1-2 Puffs every four hours as needed for Shortness of Breath. 1 Each 6    albuterol (PROVENTIL) 2.5mg/3ml Nebu Soln solution for nebulization Take 3 mL by nebulization every four hours as needed for Shortness of Breath. 120 Each 3    ibuprofen (MOTRIN) 200 MG Tab Take 600 mg by mouth 1 time a day as needed (Moderate  pain). 3 tablets = 600 mg.      vitamin D (CHOLECALCIFEROL) 1000 Unit (25 mcg) Tab Take 3 Tablets by mouth every morning. 3 tablets = 3000 units.       No current facility-administered medications for this visit.         Allergies   Allergen Reactions    Sulfa Drugs Hives         Family History   Problem Relation Age of Onset    Cancer Mother         pancreatic     Heart Disease Father     Heart Attack Father     Diabetes Father         pre-diabetes    Stroke Neg Hx          Social History     Socioeconomic History    Marital status:      Spouse name: Not on file    Number of children: Not on file    Years of education: Not on file    Highest education level: Some college, no degree   Occupational History    Not on file   Tobacco Use    Smoking status: Former     Current packs/day: 0.00     Average packs/day: 1 pack/day for 49.4 years (49.4 ttl pk-yrs)     Types: Cigarettes     Start date:      Quit date: 2017     Years since quittin.0    Smokeless tobacco: Never   Vaping Use    Vaping status: Never Used   Substance and Sexual Activity    Alcohol use: No     Alcohol/week: 0.0 oz    Drug use: Never    Sexual activity: Not Currently     Comment:    Other Topics Concern    Not on file   Social History Narrative    Not on file     Social Drivers of Health     Financial Resource Strain: Medium Risk (2025)    Overall Financial Resource Strain (CARDIA)     Difficulty of Paying Living Expenses: Somewhat hard   Food Insecurity: No Food Insecurity (2025)    Hunger Vital Sign     Worried About Running Out of Food in the Last Year: Never true     Ran Out of Food in the Last Year: Never true   Transportation Needs: No Transportation Needs (2025)    PRAPARE - Transportation     Lack of Transportation (Medical): No     Lack of Transportation (Non-Medical): No   Physical Activity: Sufficiently Active (7/3/2022)    Exercise Vital Sign     Days of Exercise per Week: 4 days     Minutes of  "Exercise per Session: 40 min   Stress: Stress Concern Present (7/3/2022)    [employer] [place] [employer] - Occupational Stress Questionnaire     Feeling of Stress : To some extent   Social Connections: Moderately Integrated (7/3/2022)    Social Connection and Isolation Panel [NHANES]     Frequency of Communication with Friends and Family: More than three times a week     Frequency of Social Gatherings with Friends and Family: Once a week     Attends Jewish Services: 1 to 4 times per year     Active Member of Clubs or Organizations: No     Attends Club or Organization Meetings: Not on file     Marital Status:    Intimate Partner Violence: Not on file   Housing Stability: Low Risk  (5/28/2025)    Housing Stability Vital Sign     Unable to Pay for Housing in the Last Year: No     Number of Times Moved in the Last Year: 0     Homeless in the Last Year: No         Physical Exam:  Ambulatory Vitals  /72 (BP Location: Left arm, Patient Position: Sitting, BP Cuff Size: Adult)   Pulse 88   Resp 16   Ht 1.651 m (5' 5\")   Wt 89.8 kg (198 lb)   SpO2 94%    BP Readings from Last 4 Encounters:   06/19/25 120/72   06/10/25 98/60   05/28/25 112/72   05/12/25 118/62     Weight/BMI:   Vitals:    06/19/25 0834   BP: 120/72   Weight: 89.8 kg (198 lb)   Height: 1.651 m (5' 5\")    Body mass index is 32.95 kg/m².  Wt Readings from Last 4 Encounters:   06/19/25 89.8 kg (198 lb)   06/10/25 89.2 kg (196 lb 9.6 oz)   05/28/25 89.8 kg (198 lb)   05/23/25 89.8 kg (198 lb)       Physical Exam  Constitutional:       General: She is not in acute distress.  HENT:      Head: Normocephalic and atraumatic.   Eyes:      Conjunctiva/sclera: Conjunctivae normal.      Pupils: Pupils are equal, round, and reactive to light.   Neck:      Vascular: No JVD.   Cardiovascular:      Rate and Rhythm: Normal rate and regular rhythm.      Heart sounds: Normal heart sounds. No murmur heard.     No friction rub. No gallop. " "  Pulmonary:      Effort: Pulmonary effort is normal. No respiratory distress.      Breath sounds: Normal breath sounds. No wheezing or rales.   Chest:      Chest wall: No tenderness.   Abdominal:      General: Bowel sounds are normal. There is no distension.      Palpations: Abdomen is soft.   Musculoskeletal:      Cervical back: Normal range of motion and neck supple.   Skin:     General: Skin is warm and dry.   Neurological:      Mental Status: She is alert and oriented to person, place, and time.   Psychiatric:         Mood and Affect: Affect normal.         Judgment: Judgment normal.         Lab Data Review:  Lab Results   Component Value Date/Time    CHOLSTRLTOT 157 06/06/2024 09:23 AM    LDL 59 06/06/2024 09:23 AM    HDL 73 06/06/2024 09:23 AM    TRIGLYCERIDE 125 06/06/2024 09:23 AM       Lab Results   Component Value Date/Time    SODIUM 140 04/22/2025 10:10 AM    POTASSIUM 4.3 04/22/2025 10:10 AM    CHLORIDE 106 04/22/2025 10:10 AM    CO2 20 04/22/2025 10:10 AM    GLUCOSE 138 (H) 04/22/2025 10:10 AM    BUN 18 04/22/2025 10:10 AM    CREATININE 0.83 04/22/2025 10:10 AM     CrCl cannot be calculated (Patient's most recent lab result is older than the maximum 7 days allowed.).  Lab Results   Component Value Date/Time    ALKPHOSPHAT 59 04/22/2025 10:10 AM    ASTSGOT 22 04/22/2025 10:10 AM    ALTSGPT 22 04/22/2025 10:10 AM    TBILIRUBIN 0.3 04/22/2025 10:10 AM      Lab Results   Component Value Date/Time    WBC 7.0 04/22/2025 10:10 AM     Lab Results   Component Value Date/Time    HBA1C 7.2 (A) 02/03/2025 10:38 AM     No components found for: \"TROP\"      Cardiac Imaging and Procedures Review:      LE art vascular ultrasound 2/21/20  Left HERNANDEZ:  0.79  Right HERNANDEZ: 1.02  1.  Post occlusive waveform is noted in the distal left peroneal artery. Short segment occlusion or high-grade stenosis proximal to this location is likely present, although not directly visualized.  2.  No other evidence of occlusion or significant " stenosis bilaterally.  3.  Moderate calcified atherosclerotic plaque is identified in each lower extremity.    TTE 2/2020  CONCLUSIONS  Left ventricular ejection fraction is visually estimated to be 65%.  Mild concentric left ventricular hypertrophy.  Decreased left ventricular compliance with indeterminate diastolic   function.  Normal right ventricular size and systolic function.    Nuclear stress spect sachi 4/3/20   NUCLEAR IMAGING INTERPRETATION   Normal left ventricular size, ejection fraction, and wall motion.   Small fixed defect in the inferolateral wall could be artifact. Infarct is in the differential but consider less likely.    No reversible defect.    CAC CT 2/20/20  Coronary calcification:  LMA - 0.0  LCX - 0.0  LAD - 315  RCA - 260.8  Total Calcium Score: 575.8    Art ultrasound 5/2023  IMPRESSION:  1.  Diffuse atherosclerotic changes without focal high-grade stenosis.  2.  RIGHT peroneal artery is not visualized.  3.  Ankle brachial indices indicate mild atherosclerotic disease on the LEFT.     PET IMAGING INTERPRETATION 4/11/25   Positive stress test for ischemia.   Large sized, partially reversible, decreased uptake of severe severity in the   inferoseptal and inferior segments during post stress images   Small sized, nonreversible, decreased uptake of mild severity in the basal    inferolateral segment during post stress images   Homogenous normal tracer uptake of the myocardium during rest and stress in   all    other segments   Normal left ventricular size, ejection fraction, and wall motion.   FLOW RESERVE INTERPRETATION   Normal Myocardial Blood Flow Reserve (MBFR) globally at 2.1 x baseline flow.    ECG INTERPRETATION   No ischemic changes with stress compared to baseline ECG.    [facility] 5/12/25  HEMODYNAMICS:   Aortic pressure: 123/61 mmHg  Pre-A wave pressure: 12 mmHg  No significant aortic gradient on pullback     CORONARY ANGIOGRAPHY:  The left main coronary artery is patent and trifurcates  into the left anterior descending, ramus intermedius, and left circumflex coronary arteries.  The left anterior descending coronary artery is a large, transapical vessel with focal mid 40% disease and otherwise luminal irregularities.  It supplies several small diagonal branches.  The ramus intermedius/high first diagonal branch is a large vessel with luminal irregularities.  The left circumflex coronary artery is a moderate, nondominant vessel that supplies a large first obtuse marginal branch and several small distal obtuse marginal/posterolateral branches and has luminal irregularities in the distribution.  The right coronary artery is a moderate, dominant vessel with a mid chronic total occlusion with ambiguous proximal cap.  The distal vessel fills by well-developed septal and epicardial collaterals from the left coronary system.     IMPRESSION:  Severe obstructive one-vessel coronary artery disease with a chronic total occlusion of the mid right coronary artery.  Normal left heart filling pressures.     RECOMMENDATIONS:  Return to floor with continued care per primary service.  TR band release per protocol.  Start metoprolol XL 25 mg daily for additional antianginal therapy.  Follow-up in clinic to discuss response to metoprolol therapy and options for management of RCA .    Medical Decision Making:  Problem List Items Addressed This Visit       Dyslipidemia associated with type 2 diabetes mellitus (HCC) - Primary (Chronic)    Coronary artery disease due to lipid rich plaque    Relevant Medications    isosorbide mononitrate SR (IMDUR) 30 MG TABLET SR 24 HR    metoprolol SR (TOPROL XL) 50 MG TABLET SR 24 HR    Essential hypertension    Relevant Medications    isosorbide mononitrate SR (IMDUR) 30 MG TABLET SR 24 HR    metoprolol SR (TOPROL XL) 50 MG TABLET SR 24 HR    Elevated lipoprotein(a)    Chronic total occlusion of coronary artery - RCA    Relevant Medications    isosorbide mononitrate SR (IMDUR) 30 MG  TABLET SR 24 HR    metoprolol SR (TOPROL XL) 50 MG TABLET SR 24 HR    Angina at rest (HCC)    Relevant Medications    isosorbide mononitrate SR (IMDUR) 30 MG TABLET SR 24 HR    metoprolol SR (TOPROL XL) 50 MG TABLET SR 24 HR       DM2 / HLD - continue statin and aspirin. Controlled.    HTN - BP at goal <130/80. Continue antihypertensives. Controlled.    CAD/PAD/ASCVD - continue aspirin and statin. Continue cilostazol. LDL at goal <70 and trig <150. Elevated Lp (a). Continue symptom limited exercise. Serial vascular testing with vascular medicine. Antianginals as tolerated. Add imdur and titrate metoprolol. If persistent symptoms, worsening, or cannot tolerate medical therapy consider  intervention.     It was my pleasure to meet with Ms. Her.    I spent 65 minutes with Dunia Her, over fifty percent was spent counseling the patient on their condition, best management practices, reviewing test results, risks and benefits of treatment and coordinating care.

## 2025-06-20 DIAGNOSIS — E78.41 ELEVATED LIPOPROTEIN(A): ICD-10-CM

## 2025-06-20 DIAGNOSIS — I25.10 CORONARY ARTERY CALCIFICATION SEEN ON CT SCAN: ICD-10-CM

## 2025-06-23 RX ORDER — SENNOSIDES 8.6 MG
325 CAPSULE ORAL DAILY
Qty: 100 TABLET | Refills: 3 | Status: SHIPPED | OUTPATIENT
Start: 2025-06-23

## 2025-07-01 ENCOUNTER — DOCUMENTATION (OUTPATIENT)
Dept: VASCULAR LAB | Facility: MEDICAL CENTER | Age: 70
End: 2025-07-01
Payer: MEDICARE

## 2025-07-03 LAB — RETINAL SCREEN: NEGATIVE

## 2025-08-06 DIAGNOSIS — E78.5 HYPERLIPIDEMIA LDL GOAL <70: ICD-10-CM

## 2025-08-06 RX ORDER — ROSUVASTATIN CALCIUM 20 MG/1
TABLET, COATED ORAL
Qty: 135 TABLET | Refills: 3 | Status: SHIPPED | OUTPATIENT
Start: 2025-08-06

## (undated) DEVICE — SET LEADWIRE 5 LEAD BEDSIDE DISPOSABLE ECG (1SET OF 5/EA)

## (undated) DEVICE — GLOVE BIOGEL SZ 7.5 SURGICAL PF LTX - (50PR/BX 4BX/CA)

## (undated) DEVICE — CANISTER SUCTION 3000ML MECHANICAL FILTER AUTO SHUTOFF MEDI-VAC NONSTERILE LF DISP  (40EA/CA)

## (undated) DEVICE — PACK LAP CHOLE OR - (2EA/CA)

## (undated) DEVICE — SENSOR SPO2 NEO LNCS ADHESIVE (20/BX) SEE USER NOTES

## (undated) DEVICE — STAPLE 45MM BLUE 4.5MM (12EA/BX)

## (undated) DEVICE — TUBING CLEARLINK DUO-VENT - C-FLO (48EA/CA)

## (undated) DEVICE — CHLORAPREP 26 ML APPLICATOR - ORANGE TINT(25/CA)

## (undated) DEVICE — NEPTUNE 4 PORT MANIFOLD - (20/PK)

## (undated) DEVICE — CANNULA W/SEAL 5X100 Z-THRE - ADED KII (12/BX)

## (undated) DEVICE — HEAD HOLDER JUNIOR/ADULT

## (undated) DEVICE — STAPLER 45MM ARTICULATING - ENDO (3EA/BX)

## (undated) DEVICE — TROCAR 5X100 NON BLADED Z-TH - READ KII (6/BX)

## (undated) DEVICE — SODIUM CHL IRRIGATION 0.9% 1000ML (12EA/CA)

## (undated) DEVICE — DETERGENT RENUZYME PLUS 10 OZ PACKET (50/BX)

## (undated) DEVICE — LACTATED RINGERS INJ 1000 ML - (14EA/CA 60CA/PF)

## (undated) DEVICE — SUTURE 0 VICRYL PLUS UR-6 - 27 INCH (36/BX)

## (undated) DEVICE — SUCTION INSTRUMENT YANKAUER BULBOUS TIP W/O VENT (50EA/CA)

## (undated) DEVICE — GLOVE BIOGEL SZ 7 SURGICAL PF LTX - (50PR/BX 4BX/CA)

## (undated) DEVICE — GLOVE BIOGEL PI INDICATOR SZ 7.0 SURGICAL PF LF - (50/BX 4BX/CA)

## (undated) DEVICE — ELECTRODE 850 FOAM ADHESIVE - HYDROGEL RADIOTRNSPRNT (50/PK)

## (undated) DEVICE — GLOVE BIOGEL PI INDICATOR SZ 6.5 SURGICAL PF LF - (50/BX 4BX/CA)

## (undated) DEVICE — SUTURE GENERAL

## (undated) DEVICE — TROCAR Z THREAD 12 X 100 - BLADED (6/BX)

## (undated) DEVICE — SET SUCTION/IRRIGATION WITH DISPOSABLE TIP (6/CA )PART #0250-070-520 IS A SUB

## (undated) DEVICE — BAG RETRIEVAL 5MM (10EA/BX)

## (undated) DEVICE — DERMABOND ADVANCED - (12EA/BX)

## (undated) DEVICE — TUBING INSUFFLATION PNEUMOCLEAR HIGH-FLOW (10EA/BX)

## (undated) DEVICE — ELECTRODE DUAL RETURN W/ CORD - (50/PK)

## (undated) DEVICE — NEEDLE INSFL 120MM 14GA VRRS - (20/BX)

## (undated) DEVICE — SUTURE 4-0 MONOCRYL PLUS PS-2 - 27 INCH (36/BX)

## (undated) DEVICE — SET EXTENSION WITH 2 PORTS (48EA/CA) ***PART #2C8610 IS A SUBSTITUTE*****

## (undated) DEVICE — PROTECTOR ULNA NERVE - (36PR/CA)

## (undated) DEVICE — MASK ANESTHESIA ADULT  - (100/CA)

## (undated) DEVICE — KIT ANESTHESIA W/CIRCUIT & 3/LT BAG W/FILTER (20EA/CA)

## (undated) DEVICE — SLEEVE, VASO, THIGH, MED

## (undated) DEVICE — SYRINGE 30 ML LL (56/BX)

## (undated) DEVICE — SEALER VESSEL HARMONIC ACE PLUS WITH ADVANCED HEMOSTASIS 36CM (1/EA)

## (undated) DEVICE — SET TUBING PNEUMOCLEAR HIGH FLOW SMOKE EVACUATION (10EA/BX)